# Patient Record
Sex: FEMALE | Race: WHITE | NOT HISPANIC OR LATINO | Employment: FULL TIME | URBAN - METROPOLITAN AREA
[De-identification: names, ages, dates, MRNs, and addresses within clinical notes are randomized per-mention and may not be internally consistent; named-entity substitution may affect disease eponyms.]

---

## 2017-06-06 ENCOUNTER — OFFICE VISIT (OUTPATIENT)
Dept: URGENT CARE | Facility: CLINIC | Age: 67
End: 2017-06-06
Payer: MEDICARE

## 2017-06-06 VITALS
WEIGHT: 155.2 LBS | DIASTOLIC BLOOD PRESSURE: 76 MMHG | OXYGEN SATURATION: 96 % | SYSTOLIC BLOOD PRESSURE: 120 MMHG | RESPIRATION RATE: 16 BRPM | BODY MASS INDEX: 24.94 KG/M2 | TEMPERATURE: 98.2 F | HEART RATE: 100 BPM | HEIGHT: 66 IN

## 2017-06-06 DIAGNOSIS — J06.9 VIRAL URI: ICD-10-CM

## 2017-06-06 PROCEDURE — 99214 OFFICE O/P EST MOD 30 MIN: CPT | Performed by: PHYSICIAN ASSISTANT

## 2017-06-06 PROCEDURE — 3017F COLORECTAL CA SCREEN DOC REV: CPT | Mod: 8P | Performed by: PHYSICIAN ASSISTANT

## 2017-06-06 PROCEDURE — 1036F TOBACCO NON-USER: CPT | Performed by: PHYSICIAN ASSISTANT

## 2017-06-06 PROCEDURE — 4040F PNEUMOC VAC/ADMIN/RCVD: CPT | Mod: 8P | Performed by: PHYSICIAN ASSISTANT

## 2017-06-06 PROCEDURE — G8432 DEP SCR NOT DOC, RNG: HCPCS | Performed by: PHYSICIAN ASSISTANT

## 2017-06-06 PROCEDURE — 1101F PT FALLS ASSESS-DOCD LE1/YR: CPT | Performed by: PHYSICIAN ASSISTANT

## 2017-06-06 PROCEDURE — 3014F SCREEN MAMMO DOC REV: CPT | Mod: 8P | Performed by: PHYSICIAN ASSISTANT

## 2017-06-06 PROCEDURE — G8419 CALC BMI OUT NRM PARAM NOF/U: HCPCS | Performed by: PHYSICIAN ASSISTANT

## 2017-06-06 RX ORDER — AZITHROMYCIN 250 MG/1
TABLET, FILM COATED ORAL
Qty: 6 TAB | Refills: 0 | Status: SHIPPED | OUTPATIENT
Start: 2017-06-06 | End: 2018-05-16

## 2017-06-06 RX ORDER — BENZONATATE 200 MG/1
200 CAPSULE ORAL 3 TIMES DAILY PRN
Qty: 30 CAP | Refills: 0 | Status: SHIPPED | OUTPATIENT
Start: 2017-06-06 | End: 2018-05-16

## 2017-06-06 ASSESSMENT — ENCOUNTER SYMPTOMS
HEADACHES: 0
SPUTUM PRODUCTION: 0
SHORTNESS OF BREATH: 0
VOMITING: 0
TINGLING: 0
CHILLS: 0
SWEATS: 0
MYALGIAS: 0
RHINORRHEA: 1
DIZZINESS: 0
SORE THROAT: 1
FOCAL WEAKNESS: 0
COUGH: 1
NAUSEA: 0
WHEEZING: 0
ABDOMINAL PAIN: 0
HEMOPTYSIS: 0
SENSORY CHANGE: 0
DIARRHEA: 0
PALPITATIONS: 0
FEVER: 0

## 2017-06-06 ASSESSMENT — PATIENT HEALTH QUESTIONNAIRE - PHQ9: CLINICAL INTERPRETATION OF PHQ2 SCORE: 0

## 2017-06-06 ASSESSMENT — COPD QUESTIONNAIRES: COPD: 0

## 2017-06-06 NOTE — PROGRESS NOTES
Subjective:      Michela Valdez is a 66 y.o. female who presents with Cough            Cough  This is a new problem. Episode onset: 3-4 days  The problem has been gradually worsening. The cough is non-productive. Associated symptoms include nasal congestion, postnasal drip, rhinorrhea and a sore throat. Pertinent negatives include no chest pain, chills, ear congestion, ear pain, fever, headaches, hemoptysis, myalgias, rash, shortness of breath, sweats or wheezing. She has tried prescription cough suppressant (Tessalon) for the symptoms. The treatment provided mild relief. There is no history of asthma, bronchitis, COPD or pneumonia.       Past Medical History   Diagnosis Date   • Snoring    • Hyperlipidemia    • Thyroid disease      Past Surgical History   Procedure Laterality Date   • Gyn surgery     • Thyroidectomy total  2/8/2012     Performed by ALEX CONTEH at SURGERY SAME DAY [facility]       Family History   Problem Relation Age of Onset   • Osteoporosis Mother    • Hyperlipidemia Father        Allergies   Allergen Reactions   • Aleve [Gnp Naproxen Sodium] Rash   • Penicillin G Vomiting       Medications, Allergies, and current problem list reviewed today in Epic      Review of Systems   Constitutional: Negative for fever, chills and malaise/fatigue.   HENT: Positive for congestion, postnasal drip, rhinorrhea and sore throat. Negative for ear discharge and ear pain.    Respiratory: Positive for cough. Negative for hemoptysis, sputum production, shortness of breath and wheezing.    Cardiovascular: Negative for chest pain, palpitations and leg swelling.   Gastrointestinal: Negative for nausea, vomiting, abdominal pain and diarrhea.   Musculoskeletal: Negative for myalgias.   Skin: Negative for rash.   Neurological: Negative for dizziness, tingling, sensory change, focal weakness and headaches.     All other systems reviewed and are negative.        Objective:     /76 mmHg  Pulse 100  Temp(Src)  "36.8 °C (98.2 °F)  Resp 16  Ht 1.676 m (5' 5.98\")  Wt 70.398 kg (155 lb 3.2 oz)  BMI 25.06 kg/m2  SpO2 96%  Breastfeeding? No     Physical Exam   Constitutional: She is oriented to person, place, and time. She appears well-developed and well-nourished. No distress.   HENT:   Head: Normocephalic and atraumatic.   Right Ear: Tympanic membrane, external ear and ear canal normal.   Left Ear: Tympanic membrane, external ear and ear canal normal.   Nose: Mucosal edema and rhinorrhea present.   Mouth/Throat: Uvula is midline. No oropharyngeal exudate or posterior oropharyngeal erythema.   Oropharynx with cobblestone appearance and post-nasal drip    Eyes: Conjunctivae are normal.   Neck: Neck supple.   Cardiovascular: Normal rate, regular rhythm and normal heart sounds.  Exam reveals no gallop and no friction rub.    No murmur heard.  Pulmonary/Chest: Effort normal and breath sounds normal. No respiratory distress. She has no wheezes. She has no rales.   Deep, spasmodic cough   Lymphadenopathy:     She has no cervical adenopathy.   Neurological: She is alert and oriented to person, place, and time. No cranial nerve deficit.   Skin: Skin is warm and dry. No rash noted.   Psychiatric: She has a normal mood and affect. Her behavior is normal. Judgment and thought content normal.               Assessment/Plan:     1. Viral URI  benzonatate (TESSALON) 200 MG capsule    azithromycin (ZITHROMAX) 250 MG Tab     Viral etiology discussed. Informed patient that an antibiotic will not help a viral illness.  Patient was given a contingent antibiotic prescription to fill and use as directed if symptoms progressed as discussed and agreed upon.    Current outpatient prescriptions:   •  benzonatate (TESSALON) 200 MG capsule, Take 1 Cap by mouth 3 times a day as needed for Cough., Disp: 30 Cap, Rfl: 0  •  azithromycin (ZITHROMAX) 250 MG Tab, Take as directed on package. 1 pack., Disp: 6 Tab, Rfl: 0    Encouraged fluids, rest, " humidification.     Differential diagnoses, Supportive care, and indications for immediate follow-up discussed with patient.   Instructed to return to clinic or nearest emergency department for any change in condition, further concerns, or worsening of symptoms.    The patient demonstrated a good understanding and agreed with the treatment plan.    Laura Toth PA-C

## 2017-06-06 NOTE — MR AVS SNAPSHOT
"        Michela Jacobs José Miguel   2017 3:45 PM   Office Visit   MRN: [id number]    Department:  [facility]   Dept Phone:  [phone]    Description:  Female : 1950   Provider:  Laura Toth PA-C           Reason for Visit     Cough 3x days      Allergies as of 2017     Allergen Noted Reactions    Aleve [Gnp Naproxen Sodium] 2012   Rash    Penicillin G 2012   Vomiting      You were diagnosed with     Viral URI   [[id number]]         Vital Signs     Blood Pressure Pulse Temperature Respirations Height Weight    120/76 mmHg 100 36.8 °C (98.2 °F) 16 1.676 m (5' 5.98\") 70.398 kg (155 lb 3.2 oz)    Body Mass Index Oxygen Saturation Breastfeeding? Smoking Status          25.06 kg/m2 96% No Former Smoker        Basic Information     Date Of Birth Sex Race Ethnicity Preferred Language    1950 Female White Non- English      Problem List              ICD-10-CM Priority Class Noted - Resolved    Hypothyroid E03.9   10/13/2015 - Present    Dyslipidemia E78.5   10/13/2015 - Present      Health Maintenance        Date Due Completion Dates    PAP SMEAR 7/3/1969 ---    IMM ZOSTER VACCINE 7/3/2008 ---    IMM PNEUMOCOCCAL 65+ (ADULT) LOW/MEDIUM RISK SERIES (1 of 2 - PCV13) 7/3/2013 ---    BONE DENSITY 2013    MAMMOGRAM 10/13/2016 10/13/2015 (Postponed)    Override on 10/13/2015: Postponed (was done this year, will bring in results to pcp)    COLONOSCOPY 10/13/2025 10/13/2015 (Postponed)    Override on 10/13/2015: Postponed (was done this year, pt bring in  results to pcp)    IMM DTaP/Tdap/Td Vaccine (2 - Td) 10/24/2026 10/24/2016            Current Immunizations     Influenza TIV (IM) 2012  1:32 PM    Tdap Vaccine 10/24/2016      Below and/or attached are the medications your provider expects you to take. Review all of your home medications and newly ordered medications with your provider and/or pharmacist. Follow medication instructions as directed by your provider and/or " pharmacist. Please keep your medication list with you and share with your provider. Update the information when medications are discontinued, doses are changed, or new medications (including over-the-counter products) are added; and carry medication information at all times in the event of emergency situations     Allergies:  ALEVE - Rash     PENICILLIN G - Vomiting               Medications  Valid as of: June 06, 2017 -  4:13 PM    Generic Name Brand Name Tablet Size Instructions for use    Aspirin (Tablet Delayed Response) ECOTRIN 81 MG Take 81 mg by mouth every day. HOLD OFF TAKING THIS UNTIL 2/12/12.        Atorvastatin Calcium (Tab) LIPITOR 20 MG Take 40 mg by mouth every evening.        Azithromycin (Tab) ZITHROMAX 250 MG Take as directed on package. 1 pack.        Benzonatate (Cap) TESSALON 200 MG Take 1 Cap by mouth 3 times a day as needed for Cough.        Calcium Acetate (Phos Binder) (Cap) PHOS- MG Take 1,334 mg by mouth 3 times a day, with meals.        Fish Oil   by Does not apply route.        Glucosamine-Chondroitin   Take  by mouth.        Levothyroxine Sodium (Tab) SYNTHROID 88 MCG Take 88 mcg by mouth Every morning on an empty stomach. Indications: on Sundays only additional 1/2 tab        Multiple Vitamins-Minerals   Take  by mouth.        Nutritional Supplements   Take  by mouth.        Zinc Sulfate (Cap) ZINCATE 220 MG Take 50 mg by mouth every day.        .                 Medicines prescribed today were sent to:     [facility]Y #[id number] - [place] - [address] [facility]    [address] [place] [address]    Phone: [phone] Fax: [phone]    Open 24 Hours?: No      Medication refill instructions:       If your prescription bottle indicates you have medication refills left, it is not necessary to call your provider’s office. Please contact your pharmacy and they will refill your medication.    If your prescription bottle indicates you do not have any refills left, you may request  refills at any time through one of the following ways: The online Qzzr system (except Urgent Care), by calling your provider’s office, or by asking your pharmacy to contact your provider’s office with a refill request. Medication refills are processed only during regular business hours and may not be available until the next business day. Your provider may request additional information or to have a follow-up visit with you prior to refilling your medication.   *Please Note: Medication refills are assigned a new Rx number when refilled electronically. Your pharmacy may indicate that no refills were authorized even though a new prescription for the same medication is available at the pharmacy. Please request the medicine by name with the pharmacy before contacting your provider for a refill.           Qzzr Access Code: [email or web address]  Expires: 7/2/2017  2:33 PM    Qzzr  A secure, online tool to manage your health information     [employer]’s Qzzr® is a secure, online tool that connects you to your personalized health information from the privacy of your home -- day or night - making it very easy for you to manage your healthcare. Once the activation process is completed, you can even access your medical information using the Qzzr estrada, which is available for free in the Apple Estrada store or Google Play store.     Qzzr provides the following levels of access (as shown below):   My Chart Features   [facility] Doctor [facility]  Urgent  Care Non-[facility]  Primary Care  Doctor   Email your healthcare team securely and privately 24/7 X X X    Manage appointments: schedule your next appointment; view details of past/upcoming appointments X      Request prescription refills. X      View recent personal medical records, including lab and immunizations X X X X   View health record, including health history, allergies, medications X X X X   Read reports about your outpatient visits,  procedures, consult and ER notes X X X X   See your discharge summary, which is a recap of your hospital and/or ER visit that includes your diagnosis, lab results, and care plan. X X       How to register for Thwapr:  1. Go to  [email or web address].  2. Click on the Sign Up Now box, which takes you to the New Member Sign Up page. You will need to provide the following information:  a. Enter your Thwapr Access Code exactly as it appears at the top of this page. (You will not need to use this code after you’ve completed the sign-up process. If you do not sign up before the expiration date, you must request a new code.)   b. Enter your date of birth.   c. Enter your home email address.   d. Click Submit, and follow the next screen’s instructions.  3. Create a Biosceptret ID. This will be your Biosceptret login ID and cannot be changed, so think of one that is secure and easy to remember.  4. Create a Biosceptret password. You can change your password at any time.  5. Enter your Password Reset Question and Answer. This can be used at a later time if you forget your password.   6. Enter your e-mail address. This allows you to receive e-mail notifications when new information is available in Thwapr.  7. Click Sign Up. You can now view your health information.    For assistance activating your Thwapr account, call [phone]

## 2018-02-05 ENCOUNTER — OFFICE VISIT (OUTPATIENT)
Dept: URGENT CARE | Facility: CLINIC | Age: 68
End: 2018-02-05
Payer: MEDICARE

## 2018-02-05 VITALS
OXYGEN SATURATION: 95 % | SYSTOLIC BLOOD PRESSURE: 124 MMHG | DIASTOLIC BLOOD PRESSURE: 66 MMHG | WEIGHT: 156 LBS | TEMPERATURE: 98.6 F | BODY MASS INDEX: 25.99 KG/M2 | HEART RATE: 97 BPM | RESPIRATION RATE: 14 BRPM | HEIGHT: 65 IN

## 2018-02-05 DIAGNOSIS — J06.9 PROTRACTED URI: ICD-10-CM

## 2018-02-05 PROCEDURE — 99214 OFFICE O/P EST MOD 30 MIN: CPT | Performed by: PHYSICIAN ASSISTANT

## 2018-02-05 RX ORDER — METHYLPREDNISOLONE 4 MG/1
TABLET ORAL
Qty: 21 TAB | Refills: 0 | Status: SHIPPED | OUTPATIENT
Start: 2018-02-05 | End: 2018-05-16

## 2018-02-05 RX ORDER — BENZONATATE 200 MG/1
200 CAPSULE ORAL 3 TIMES DAILY PRN
Qty: 30 CAP | Refills: 0 | Status: SHIPPED | OUTPATIENT
Start: 2018-02-05 | End: 2018-05-16

## 2018-02-05 RX ORDER — AZITHROMYCIN 250 MG/1
TABLET, FILM COATED ORAL
Qty: 6 TAB | Refills: 0 | Status: SHIPPED | OUTPATIENT
Start: 2018-02-05 | End: 2018-05-16

## 2018-02-05 ASSESSMENT — ENCOUNTER SYMPTOMS
ABDOMINAL PAIN: 0
TINGLING: 0
MYALGIAS: 0
HEADACHES: 0
SHORTNESS OF BREATH: 0
COUGH: 1
NAUSEA: 0
FOCAL WEAKNESS: 0
SPUTUM PRODUCTION: 0
WHEEZING: 0
DIARRHEA: 0
SINUS PAIN: 0
PALPITATIONS: 0
VOMITING: 0
SORE THROAT: 0
FEVER: 0
CHILLS: 0
SENSORY CHANGE: 0

## 2018-02-05 NOTE — PROGRESS NOTES
"Subjective:      Michela Valdez is a 67 y.o. female who presents with Cough (chest congestion)            Cough   This is a new problem. Episode onset: 11 days. The problem has been gradually worsening. The cough is non-productive. Pertinent negatives include no chest pain, chills, ear pain, fever, headaches, myalgias, nasal congestion, postnasal drip, sore throat, shortness of breath or wheezing. Treatments tried: gianna selzer  The treatment provided no relief. There is no history of asthma, bronchitis or pneumonia.       Past Medical History:   Diagnosis Date   • Hyperlipidemia    • Snoring    • Thyroid disease        Past Surgical History:   Procedure Laterality Date   • THYROIDECTOMY TOTAL  2/8/2012    Performed by ALEX CONTEH at SURGERY SAME DAY [facility]S   • GYN SURGERY         Family History   Problem Relation Age of Onset   • Osteoporosis Mother    • Hyperlipidemia Father        Allergies   Allergen Reactions   • Aleve [Gnp Naproxen Sodium] Rash   • Penicillin G Vomiting       Medications, Allergies, and current problem list reviewed today in Epic      Review of Systems   Constitutional: Positive for malaise/fatigue. Negative for chills and fever.   HENT: Negative for congestion, ear discharge, ear pain, postnasal drip, sinus pain and sore throat.    Respiratory: Positive for cough. Negative for sputum production, shortness of breath and wheezing.    Cardiovascular: Negative for chest pain, palpitations and leg swelling.   Gastrointestinal: Negative for abdominal pain, diarrhea, nausea and vomiting.   Musculoskeletal: Negative for myalgias.   Neurological: Negative for tingling, sensory change, focal weakness and headaches.     All other systems reviewed and are negative.        Objective:     /66   Pulse 97   Temp 37 °C (98.6 °F)   Resp 14   Ht 1.651 m (5' 5\")   Wt 70.8 kg (156 lb)   SpO2 95%   BMI 25.96 kg/m²      Physical Exam   Constitutional: She is oriented to person, place, and " time. She appears well-developed and well-nourished. No distress.   HENT:   Head: Normocephalic and atraumatic.   Right Ear: Tympanic membrane, external ear and ear canal normal.   Left Ear: Tympanic membrane, external ear and ear canal normal.   Nose: Nose normal.   Mouth/Throat: Uvula is midline and oropharynx is clear and moist.   Neck: Neck supple.   Cardiovascular: Normal rate, regular rhythm and normal heart sounds.  Exam reveals no gallop and no friction rub.    No murmur heard.  Pulmonary/Chest: Effort normal and breath sounds normal. No respiratory distress. She has no wheezes. She has no rales.   Lymphadenopathy:     She has no cervical adenopathy.   Neurological: She is alert and oriented to person, place, and time. No cranial nerve deficit.   Psychiatric: She has a normal mood and affect. Her behavior is normal. Judgment and thought content normal.               Assessment/Plan:     1. Protracted URI  MethylPREDNISolone (MEDROL DOSEPAK) 4 MG Tablet Therapy Pack    benzonatate (TESSALON) 200 MG capsule    azithromycin (ZITHROMAX) 250 MG Tab         Current Outpatient Prescriptions:   •  MethylPREDNISolone (MEDROL DOSEPAK) 4 MG Tablet Therapy Pack, Take as directed on package. 1 pack., Disp: 21 Tab, Rfl: 0  •  benzonatate (TESSALON) 200 MG capsule, Take 1 Cap by mouth 3 times a day as needed., Disp: 30 Cap, Rfl: 0  •  azithromycin (ZITHROMAX) 250 MG Tab, Take as directed on package. 1 pack, Disp: 6 Tab, Rfl: 0  Patient was given a contingent antibiotic prescription to fill and use as directed if symptoms progressed as discussed and agreed upon.     Differential diagnoses, Supportive care, and indications for immediate follow-up discussed with patient.   Instructed to return to clinic or nearest emergency department for any change in condition, further concerns, or worsening of symptoms.    The patient demonstrated a good understanding and agreed with the treatment plan.    Laura Toth P.A.-C.

## 2018-02-14 ENCOUNTER — HOSPITAL ENCOUNTER (OUTPATIENT)
Facility: MEDICAL CENTER | Age: 68
End: 2018-02-14
Attending: NURSE PRACTITIONER
Payer: MEDICARE

## 2018-02-14 ENCOUNTER — OFFICE VISIT (OUTPATIENT)
Dept: URGENT CARE | Facility: CLINIC | Age: 68
End: 2018-02-14
Payer: MEDICARE

## 2018-02-14 VITALS
HEIGHT: 65 IN | RESPIRATION RATE: 20 BRPM | OXYGEN SATURATION: 98 % | SYSTOLIC BLOOD PRESSURE: 120 MMHG | BODY MASS INDEX: 25.66 KG/M2 | TEMPERATURE: 98 F | WEIGHT: 154 LBS | HEART RATE: 88 BPM | DIASTOLIC BLOOD PRESSURE: 72 MMHG

## 2018-02-14 DIAGNOSIS — J06.9 VIRAL URI WITH COUGH: ICD-10-CM

## 2018-02-14 DIAGNOSIS — J40 BRONCHITIS: ICD-10-CM

## 2018-02-14 DIAGNOSIS — N30.00 ACUTE CYSTITIS WITHOUT HEMATURIA: ICD-10-CM

## 2018-02-14 DIAGNOSIS — N30.01 ACUTE CYSTITIS WITH HEMATURIA: ICD-10-CM

## 2018-02-14 DIAGNOSIS — R30.0 DYSURIA: ICD-10-CM

## 2018-02-14 LAB
APPEARANCE UR: NORMAL
BILIRUB UR STRIP-MCNC: NORMAL MG/DL
COLOR UR AUTO: YELLOW
GLUCOSE UR STRIP.AUTO-MCNC: NORMAL MG/DL
KETONES UR STRIP.AUTO-MCNC: NORMAL MG/DL
LEUKOCYTE ESTERASE UR QL STRIP.AUTO: NORMAL
NITRITE UR QL STRIP.AUTO: NORMAL
PH UR STRIP.AUTO: 7 [PH] (ref 5–8)
PROT UR QL STRIP: NORMAL MG/DL
RBC UR QL AUTO: NORMAL
SP GR UR STRIP.AUTO: 1.01
UROBILINOGEN UR STRIP-MCNC: NORMAL MG/DL

## 2018-02-14 PROCEDURE — 81002 URINALYSIS NONAUTO W/O SCOPE: CPT | Performed by: NURSE PRACTITIONER

## 2018-02-14 PROCEDURE — 87086 URINE CULTURE/COLONY COUNT: CPT

## 2018-02-14 PROCEDURE — 99214 OFFICE O/P EST MOD 30 MIN: CPT | Performed by: NURSE PRACTITIONER

## 2018-02-14 PROCEDURE — 87186 SC STD MICRODIL/AGAR DIL: CPT

## 2018-02-14 PROCEDURE — 87077 CULTURE AEROBIC IDENTIFY: CPT

## 2018-02-14 PROCEDURE — 99000 SPECIMEN HANDLING OFFICE-LAB: CPT | Performed by: NURSE PRACTITIONER

## 2018-02-14 RX ORDER — PREDNISONE 10 MG/1
TABLET ORAL
Qty: 15 TAB | Refills: 0 | Status: SHIPPED | OUTPATIENT
Start: 2018-02-14 | End: 2018-05-16

## 2018-02-14 RX ORDER — ALBUTEROL SULFATE 90 UG/1
2 AEROSOL, METERED RESPIRATORY (INHALATION) EVERY 6 HOURS PRN
Qty: 8.5 G | Refills: 0 | Status: SHIPPED | OUTPATIENT
Start: 2018-02-14 | End: 2018-05-16

## 2018-02-14 RX ORDER — CEFDINIR 300 MG/1
300 CAPSULE ORAL EVERY 12 HOURS
Qty: 10 CAP | Refills: 0 | Status: SHIPPED | OUTPATIENT
Start: 2018-02-14 | End: 2018-02-19

## 2018-02-14 ASSESSMENT — ENCOUNTER SYMPTOMS
HEMOPTYSIS: 0
WHEEZING: 0
MYALGIAS: 0
RHINORRHEA: 1
CARDIOVASCULAR NEGATIVE: 1
FALLS: 0
BACK PAIN: 0
COUGH: 1
CHILLS: 0
NECK PAIN: 0
SHORTNESS OF BREATH: 0
SPUTUM PRODUCTION: 0

## 2018-02-14 NOTE — PROGRESS NOTES
Subjective:      Michela Valdez is a 67 y.o. female who presents with Cough (Stll coughing and stuffy ) and Bleeding/Bruising (Couple days ago blood in the urine .)    Past Medical History:   Diagnosis Date   • Hyperlipidemia    • Snoring    • Thyroid disease      Social History     Social History   • Marital status:      Spouse name: N/A   • Number of children: N/A   • Years of education: N/A     Occupational History   • Not on file.     Social History Main Topics   • Smoking status: Former Smoker     Packs/day: 0.50     Years: 4.00     Types: Cigarettes     Quit date: 10/13/1980   • Smokeless tobacco: Never Used   • Alcohol use 3.0 - 4.2 oz/week     5 - 7 Glasses of wine per week   • Drug use: No   • Sexual activity: Not Currently     Other Topics Concern   • Not on file     Social History Narrative   • No narrative on file     Family History   Problem Relation Age of Onset   • Osteoporosis Mother    • Hyperlipidemia Father        Allergies: Aleve [gnp naproxen sodium] and Penicillin g    Past Medical History:   Diagnosis Date   • Hyperlipidemia    • Snoring    • Thyroid disease      Social History     Social History   • Marital status:      Spouse name: N/A   • Number of children: N/A   • Years of education: N/A     Occupational History   • Not on file.     Social History Main Topics   • Smoking status: Former Smoker     Packs/day: 0.50     Years: 4.00     Types: Cigarettes     Quit date: 10/13/1980   • Smokeless tobacco: Never Used   • Alcohol use 3.0 - 4.2 oz/week     5 - 7 Glasses of wine per week   • Drug use: No   • Sexual activity: Not Currently     Other Topics Concern   • Not on file     Social History Narrative   • No narrative on file     Family History   Problem Relation Age of Onset   • Osteoporosis Mother    • Hyperlipidemia Father                    Cough   This is a new problem. The current episode started 1 to 4 weeks ago. The problem has been waxing and waning. The problem occurs  "every few minutes. The cough is non-productive. Associated symptoms include ear congestion, nasal congestion, postnasal drip and rhinorrhea. Pertinent negatives include no chills, hemoptysis, myalgias, shortness of breath or wheezing. Associated symptoms comments: Hematuria  . Nothing aggravates the symptoms. She has tried nothing for the symptoms. The treatment provided no relief.       Review of Systems   Constitutional: Positive for malaise/fatigue. Negative for chills.   HENT: Positive for congestion, postnasal drip and rhinorrhea.    Respiratory: Positive for cough. Negative for hemoptysis, sputum production, shortness of breath and wheezing.    Cardiovascular: Negative.    Genitourinary: Positive for hematuria and urgency.   Musculoskeletal: Negative for back pain, falls, myalgias and neck pain.   Skin: Negative.    All other systems reviewed and are negative.         Objective:     /72   Pulse 88   Temp 36.7 °C (98 °F)   Resp 20   Ht 1.651 m (5' 5\")   Wt 69.9 kg (154 lb)   SpO2 98%   BMI 25.63 kg/m²      Physical Exam   Constitutional: She is oriented to person, place, and time. She appears well-developed and well-nourished.   HENT:   Head: Normocephalic.   Right Ear: External ear normal.   Left Ear: External ear normal.   Mouth/Throat: Oropharynx is clear and moist. No oropharyngeal exudate.   Clear nasal drainage    Eyes: Conjunctivae and EOM are normal. Pupils are equal, round, and reactive to light. Right eye exhibits no discharge. Left eye exhibits no discharge.   Neck: Normal range of motion. Neck supple.   Cardiovascular: Normal rate and regular rhythm.    Pulmonary/Chest: Effort normal.   Bronchospastic cough   Musculoskeletal: Normal range of motion.   Neurological: She is alert and oriented to person, place, and time.   Skin: Skin is dry. Capillary refill takes less than 2 seconds.   Psychiatric: She has a normal mood and affect. Her behavior is normal. Judgment and thought content " normal.   Vitals reviewed.              Assessment/Plan:     1. Dysuria    - POCT Urinalysis    2. Acute cystitis with hematuria    - POCT Urinalysis  -omnicef  -urine culture    3. Viral URI with cough    - predniSONE (DELTASONE) 10 MG Tab; Take 1 tablet by mouth 3 times daily for 5 days.  Dispense: 15 Tab; Refill: 0  - albuterol 108 (90 Base) MCG/ACT Aero Soln inhalation aerosol; Inhale 2 Puffs by mouth every 6 hours as needed.  Dispense: 8.5 g; Refill: 0        4. Bronchitis    - predniSONE (DELTASONE) 10 MG Tab; Take 1 tablet by mouth 3 times daily for 5 days.  Dispense: 15 Tab; Refill: 0  - albuterol 108 (90 Base) MCG/ACT Aero Soln inhalation aerosol; Inhale 2 Puffs by mouth every 6 hours as needed.  Dispense: 8.5 g; Refill: 0  -ER precautions for respiratory distress     5. Acute cystitis without hematuria    - POCT Urinalysis  - Urine Culture; Future  - cefdinir (OMNICEF) 300 MG Cap; Take 1 Cap by mouth every 12 hours for 5 days.  Dispense: 10 Cap; Refill: 0

## 2018-02-14 NOTE — PATIENT INSTRUCTIONS
Urinary Tract Infection  A urinary tract infection (UTI) can occur any place along the urinary tract. The tract includes the kidneys, ureters, bladder, and urethra. A type of germ called bacteria often causes a UTI. UTIs are often helped with antibiotic medicine.   HOME CARE   · If given, take antibiotics as told by your doctor. Finish them even if you start to feel better.  · Drink enough fluids to keep your pee (urine) clear or pale yellow.  · Avoid tea, drinks with caffeine, and bubbly (carbonated) drinks.  · Pee often. Avoid holding your pee in for a long time.  · Pee before and after having sex (intercourse).  · Wipe from front to back after you poop (bowel movement) if you are a woman. Use each tissue only once.  GET HELP RIGHT AWAY IF:   · You have back pain.  · You have lower belly (abdominal) pain.  · You have chills.  · You feel sick to your stomach (nauseous).  · You throw up (vomit).  · Your burning or discomfort with peeing does not go away.  · You have a fever.  · Your symptoms are not better in 3 days.  MAKE SURE YOU:   · Understand these instructions.  · Will watch your condition.  · Will get help right away if you are not doing well or get worse.     This information is not intended to replace advice given to you by your health care provider. Make sure you discuss any questions you have with your health care provider.     Document Released: 06/05/2009 Document Revised: 01/08/2016 Document Reviewed: 07/18/2013  [employer] Patient Education ©2016 [employer]

## 2018-02-17 LAB
BACTERIA UR CULT: ABNORMAL
BACTERIA UR CULT: ABNORMAL
SIGNIFICANT IND 70042: ABNORMAL
SITE SITE: ABNORMAL
SOURCE SOURCE: ABNORMAL

## 2018-05-16 ENCOUNTER — OFFICE VISIT (OUTPATIENT)
Dept: MEDICAL GROUP | Facility: LAB | Age: 68
End: 2018-05-16
Payer: MEDICARE

## 2018-05-16 VITALS
HEIGHT: 65 IN | BODY MASS INDEX: 25.83 KG/M2 | TEMPERATURE: 97.5 F | RESPIRATION RATE: 12 BRPM | OXYGEN SATURATION: 97 % | HEART RATE: 88 BPM | DIASTOLIC BLOOD PRESSURE: 72 MMHG | WEIGHT: 155 LBS | SYSTOLIC BLOOD PRESSURE: 122 MMHG

## 2018-05-16 DIAGNOSIS — Z87.39 HISTORY OF OSTEOPENIA: ICD-10-CM

## 2018-05-16 DIAGNOSIS — R73.03 PREDIABETES: ICD-10-CM

## 2018-05-16 DIAGNOSIS — R53.82 CHRONIC FATIGUE: ICD-10-CM

## 2018-05-16 DIAGNOSIS — E55.9 VITAMIN D DEFICIENCY: ICD-10-CM

## 2018-05-16 DIAGNOSIS — E78.5 DYSLIPIDEMIA: ICD-10-CM

## 2018-05-16 DIAGNOSIS — Z82.49 FAMILY HISTORY OF EARLY CAD: ICD-10-CM

## 2018-05-16 DIAGNOSIS — Z78.0 POSTMENOPAUSAL ESTROGEN DEFICIENCY: ICD-10-CM

## 2018-05-16 DIAGNOSIS — R06.83 SNORING: ICD-10-CM

## 2018-05-16 DIAGNOSIS — Z12.31 SCREENING MAMMOGRAM, ENCOUNTER FOR: ICD-10-CM

## 2018-05-16 DIAGNOSIS — E89.0 POSTOPERATIVE HYPOTHYROIDISM: ICD-10-CM

## 2018-05-16 DIAGNOSIS — R79.89 ELEVATED LFTS: ICD-10-CM

## 2018-05-16 PROCEDURE — 99215 OFFICE O/P EST HI 40 MIN: CPT | Performed by: FAMILY MEDICINE

## 2018-05-16 ASSESSMENT — PATIENT HEALTH QUESTIONNAIRE - PHQ9: CLINICAL INTERPRETATION OF PHQ2 SCORE: 0

## 2018-05-16 NOTE — PROGRESS NOTES
Michela Valdez is a 67 y.o. female here for   Chief Complaint   Patient presents with   • Establish Care   -thyroid, fatigue    HPI:  Michela Jacobs is a very pleasant 67 y.o. female. She is here today to establish care. She is a realtor. Previous PCP Dr. Michael Bloch    1. Prediabetes  This is a new problem to discuss. Patient has a hemoglobin A1c of 6.0% on her last labs in February 2018. She does not eat a healthy diet because of her work life. She does not eat dinner until late at night. She has a high carbohydrate diet. She denies any polyuria, polydipsia, neuropathy    2. Dyslipidemia  This is a new problem to discuss with me, chronic for the patient.  Doing well.  Taking atorvastatin 40mg qhs as prescribed. No myalgias, GI upset, or other side effects from medication. Denies CP or stroke symptoms. Also taking a daily ASA. Last lipid panel 2/2018 and was reviewed with the patient.     3. Elevated LFTs  This is a new problem to discuss with me, chronic for the patient. Patient brings in her labs today which do show slightly elevated ALT in the high 30s. She does have one glass of wine every night. She is on a statin. She is slightly overweight. She does have elevated sugar levels.    4. Postoperative hypothyroidism  Labwork 2/2018 /reviewed up to date. Taking medicine as directed on empty stomach with water and waits at last 30 minutes to consume other food or beverage. Denies palpitations, skin changes, temperature intolerance, changes in bowel habits. She is having increasing fatigue    5. Screening mammogram, encounter for  Patient is due for mammogram    6. History of osteopenia   7. Postmenopausal estrogen deficiency  Patient has had osteopenia in the past. She started to exercise more and is currently on vitamin D and calcium. She states her last bone density scan was normal and this was in 2016    8. Chronic fatigue  This is a new problem to discuss. Patient believes that this is likely secondary to her  lifestyle. 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 She states that she wakes up about 6:30 every morning and goes to bed at about 11:30 every night. She is not waking up multiple times per night. She has an easy time falling asleep. She can nap within seconds of sitting down anywhere. She does snore at night. She denies any apnea. She is not sleeping in the same bed as anybody currently. She has a family history of obstructive sleep apnea. She denies any depression.    Current medicines (including changes today)  Current Outpatient Prescriptions   Medication Sig Dispense Refill   • Cholecalciferol (VITAMIN D3) 1000 units Cap Take 2,000 Units by mouth.     • atorvastatin (LIPITOR) 20 MG Tab Take 40 mg by mouth every evening.     • levothyroxine (SYNTHROID) 88 MCG Tab Take 88 mcg by mouth Every morning on an empty stomach. Indications: on Sundays only additional 1/2 tab     • Glucosamine-Chondroitin (MOVE FREE PO) Take  by mouth.     • Multiple Vitamins-Minerals (CENTRUM SILVER PO) Take  by mouth.     • FISH OIL by Does not apply route.     • aspirin EC (ECOTRIN) 81 MG TBEC Take 81 mg by mouth every day. HOLD OFF TAKING THIS UNTIL 2/12/12.       No current facility-administered medications for this visit.      She  has a past medical history of Dyslipidemia (10/13/2015); Elevated LFTs (5/16/2018); History of osteopenia (5/16/2018); Hyperlipidemia; Postoperative hypothyroidism (10/13/2015); Prediabetes (5/16/2018); Snoring; and Thyroid disease.  She  has a past surgical history that includes gyn surgery; thyroidectomy total (2/8/2012); and abdominal exploration.  Social History   Substance Use Topics   • Smoking status: Former Smoker     Packs/day: 0.50     Years: 4.00     Types: Cigarettes     Quit date: 10/13/1980   • Smokeless tobacco: Never Used   • Alcohol use 3.0 - 4.2 oz/week     5 - 7 Glasses of wine per week      Comment: glass of wine daily      Social History     Social History Narrative   • No narrative on file  "    Family History   Problem Relation Age of Onset   • Osteoporosis Mother    • Hyperlipidemia Father    • Heart Disease Father      CAD, MI    • Anemia Sister    • Cancer Brother      prostate   • Heart Disease Brother      CABG     Family Status   Relation Status   • Mother  at age 89    osteo   • Father  at age 53   • Sister    • Brother Alive         ROS  Positive for fatigue, snoring, gas and bloating, occasional constipation  All other systems reviewed and are negative     Objective:     Blood pressure 122/72, pulse 88, temperature 36.4 °C (97.5 °F), resp. rate 12, height 1.651 m (5' 5\"), weight 70.3 kg (155 lb), SpO2 97 %. Body mass index is 25.79 kg/m².  Physical Exam:    Constitutional: Alert, no distress.  Skin: Warm, dry, good turgor, no rashes in visible areas.  Eye: Equal, round and reactive, conjunctiva clear, lids normal.  ENMT: Lips without lesions, good dentition, oropharynx clear. TM's pearly gray with normal light reflexes bilaterally  Neck: Trachea midline, no masses, no thyromegaly. No cervical or supraclavicular lymphadenopathy.  Respiratory: Unlabored respiratory effort, lungs clear to auscultation bilaterally, no wheezes, no ronchi.  Cardiovascular: Normal S1, S2, RRR, no murmur, no edema.  Abdomen: Soft, non-tender, no masses, no hepatosplenomegaly.  Psych: Alert and oriented x3, normal affect and mood.      Assessment and Plan:   The following treatment plan was discussed    1. Prediabetes  This is a new problem, stable, hemoglobin A1c is 6.0% 3 months ago. We'll continue to monitor this at least every 6 months. Dietary recommendations discussed. Patient interested in nutritional counseling, I placed a referral  - REFERRAL TO NUTRITION SERVICES    2. Dyslipidemia  This is a new problem to discuss with me, stable, currently on atorvastatin 40 mg nightly. Discussed importance of lifestyle modifications. She is also on a baby aspirin. She does have slightly elevated LFTs and so " we will need to monitor these closely  - REFERRAL TO NUTRITION SERVICES  - US-CAROTID DOPPLER; Future    3. Elevated LFTs  This is a new problem, stable. Since been present for the last 2 years on her labs that she brings in today. Discussed differential diagnoses including impaired fasting glucose, statin use, overweight, alcohol use. We will monitor these closely as this is very mild elevation  - REFERRAL TO NUTRITION SERVICES    4. Postoperative hypothyroidism   chronic, last labs in February 2018 which are stable. Continue levothyroxine 88 µg daily  - REFERRAL TO NUTRITION SERVICES    5. Screening mammogram, encounter for  - MA-SCREEN MAMMO W/CAD-BILAT; Future    6. History of osteopenia  Chronic, stable on vitamin D 2000 units, calcium supplementation, increase exercise  - DS-BONE DENSITY STUDY (DEXA); Future    7. Postmenopausal estrogen deficiency  - DS-BONE DENSITY STUDY (DEXA); Future    8. Chronic fatigue  This is a new problem, discussed differential diagnosis with the patient including obstructive sleep apnea, not enough time in bed, lifestyle, lack of exercise. She is going to try to give herself at least 8 hours of good sleep, increase her exercise, and I will put in a referral for sleep studies given her snoring and concern for obstructive sleep apnea  - REFERRAL TO SLEEP STUDIES  - CBC WITH DIFFERENTIAL; Future  - VITAMIN B12; Future    9. Snoring  - REFERRAL TO SLEEP STUDIES    10. Vitamin D deficiency  - VITAMIN D,25 HYDROXY; Future    11. BMI 25.0-25.9,adult  - REFERRAL TO NUTRITION SERVICES    12. Family history of early CAD  - US-CAROTID DOPPLER; Future      Records requested.  Followup: Return in about 6 months (around 11/16/2018) for Medicare Annual.         This note was created using voice recognition software. I have made every reasonable attempt to correct errors, however, I do anticipate some grammatical errors.     Total 40 minutes face-to-face time spent with patient not including any  procedure, with greater than 50% of the total time discussing patient's issues and symptoms as listed above in assessment and plan, as well as managing coordination of care for future evaluation and treatment.

## 2018-10-05 DIAGNOSIS — Z12.31 SCREENING MAMMOGRAM, ENCOUNTER FOR: ICD-10-CM

## 2018-10-08 ENCOUNTER — TELEPHONE (OUTPATIENT)
Dept: MEDICAL GROUP | Facility: LAB | Age: 68
End: 2018-10-08

## 2018-10-08 NOTE — TELEPHONE ENCOUNTER
1. Caller Name: Michela          Call Back Number: [phone]      Patient approves a detailed voicemail message: N\A    Patient returning phone call     Results of patient's mammogram per Dr. Masterson's note were discussed with the patient.

## 2018-10-08 NOTE — TELEPHONE ENCOUNTER
----- Message from Esther Masterson M.D. sent at 10/7/2018  4:17 PM PDT -----  Please let Michela know that her mammogram is normal and that I recommend yearly mammograms.     Thank you!    Esther Masterson M.D.

## 2019-01-16 ENCOUNTER — OFFICE VISIT (OUTPATIENT)
Dept: MEDICAL GROUP | Facility: LAB | Age: 69
End: 2019-01-16
Payer: MEDICARE

## 2019-01-16 VITALS
TEMPERATURE: 98.5 F | BODY MASS INDEX: 25.33 KG/M2 | HEART RATE: 84 BPM | DIASTOLIC BLOOD PRESSURE: 62 MMHG | OXYGEN SATURATION: 94 % | WEIGHT: 152 LBS | HEIGHT: 65 IN | SYSTOLIC BLOOD PRESSURE: 118 MMHG | RESPIRATION RATE: 12 BRPM

## 2019-01-16 DIAGNOSIS — R73.03 PREDIABETES: ICD-10-CM

## 2019-01-16 DIAGNOSIS — E89.0 POSTOPERATIVE HYPOTHYROIDISM: ICD-10-CM

## 2019-01-16 DIAGNOSIS — Z78.0 POSTMENOPAUSAL ESTROGEN DEFICIENCY: ICD-10-CM

## 2019-01-16 DIAGNOSIS — E78.5 DYSLIPIDEMIA: ICD-10-CM

## 2019-01-16 DIAGNOSIS — R93.89 ABNORMAL DOPPLER ULTRASOUND OF CAROTID ARTERY: ICD-10-CM

## 2019-01-16 DIAGNOSIS — R79.89 ELEVATED LFTS: ICD-10-CM

## 2019-01-16 DIAGNOSIS — I77.89 OTHER SPECIFIED DISORDERS OF ARTERIES AND ARTERIOLES (HCC): ICD-10-CM

## 2019-01-16 DIAGNOSIS — Z00.00 MEDICARE ANNUAL WELLNESS VISIT, INITIAL: ICD-10-CM

## 2019-01-16 DIAGNOSIS — Z00.00 MEDICARE ANNUAL WELLNESS VISIT, SUBSEQUENT: Primary | ICD-10-CM

## 2019-01-16 DIAGNOSIS — Z87.39 HISTORY OF OSTEOPENIA: ICD-10-CM

## 2019-01-16 DIAGNOSIS — R15.1 FECAL SMEARING: ICD-10-CM

## 2019-01-16 PROBLEM — R53.82 CHRONIC FATIGUE: Status: RESOLVED | Noted: 2018-05-16 | Resolved: 2019-01-16

## 2019-01-16 PROCEDURE — G0439 PPPS, SUBSEQ VISIT: HCPCS | Performed by: FAMILY MEDICINE

## 2019-01-16 ASSESSMENT — ENCOUNTER SYMPTOMS: GENERAL WELL-BEING: GOOD

## 2019-01-16 ASSESSMENT — PATIENT HEALTH QUESTIONNAIRE - PHQ9: CLINICAL INTERPRETATION OF PHQ2 SCORE: 0

## 2019-01-16 ASSESSMENT — ACTIVITIES OF DAILY LIVING (ADL): BATHING_REQUIRES_ASSISTANCE: 0

## 2019-01-22 NOTE — PROGRESS NOTES
Chief Complaint   Patient presents with   • Annual Wellness Visit       HPI:  Michela Valdez is a 68 y.o. here for Medicare Annual Wellness Visit     Patient Active Problem List    Diagnosis Date Noted   • Fecal smearing 01/16/2019   • Abnormal Doppler ultrasound of carotid artery 01/16/2019   • Prediabetes 05/16/2018   • Elevated LFTs 05/16/2018   • History of osteopenia 05/16/2018   • Postoperative hypothyroidism 10/13/2015   • Dyslipidemia 10/13/2015       Current Outpatient Prescriptions   Medication Sig Dispense Refill   • Probiotic Product (PROBIOTIC DAILY PO) Take  by mouth.     • Cholecalciferol (VITAMIN D3) 1000 units Cap Take 2,000 Units by mouth.     • atorvastatin (LIPITOR) 20 MG Tab Take 40 mg by mouth every evening.     • levothyroxine (SYNTHROID) 88 MCG Tab Take 88 mcg by mouth Every morning on an empty stomach. Indications: on Sundays only additional 1/2 tab     • Glucosamine-Chondroitin (MOVE FREE PO) Take  by mouth.     • Multiple Vitamins-Minerals (CENTRUM SILVER PO) Take  by mouth.     • FISH OIL by Does not apply route.     • aspirin EC (ECOTRIN) 81 MG TBEC Take 81 mg by mouth every day. HOLD OFF TAKING THIS UNTIL 2/12/12.       No current facility-administered medications for this visit.             Current supplements as per medication list.       Allergies: Aleve [gnp naproxen sodium] and Penicillin g    Current social contact/activities: travel, exercise     She  reports that she quit smoking about 38 years ago. Her smoking use included Cigarettes. She has a 2.00 pack-year smoking history. She has never used smokeless tobacco. She reports that she drinks about 3.0 - 4.2 oz of alcohol per week . She reports that she does not use drugs.  Counseling given: Not Answered        DPA/Advanced Directive:  Patient does not have an Advanced Directive.  A packet and workshop information was given on Advanced Directives.      ROS:    Gait: Uses no assistive device   Ostomy: no   Other tubes: no    Amputations: no   Chronic oxygen use: no   Last eye exam: annual   : Reports urinary leakage during the last 6 months that has somewhat interfered with their daily activities or sleep. incontinence.       Screening:    Depression Screening    Little interest or pleasure in doing things?  0 - not at all  Feeling down, depressed , or hopeless? 0 - not at all  Patient Health Questionnaire Score: 0     If depressive symptoms identified deferred to follow up visit unless specifically addressed in assessment and plan.    Interpretation of PHQ-9 Total Score   Score Severity   1-4 No Depression   5-9 Mild Depression   10-14 Moderate Depression   15-19 Moderately Severe Depression   20-27 Severe Depression    Screening for Cognitive Impairment    Three Minute Recall (leader, season, table) 3/3    Dev clock face with all 12 numbers and set the hands to show 10 past 11.  Yes    Cognitive concerns identified deferred for follow up unless specifically addressed in assessment and plan.    Fall Risk Assessment    Has the patient had two or more falls in the last year or any fall with injury in the last year?  No    Safety Assessment    Throw rugs on floor.  No  Handrails on all stairs.  No  Good lighting in all hallways.  Yes  Difficulty hearing.  No  Patient counseled about all safety risks that were identified.    Functional Assessment ADLs    Are there any barriers preventing you from cooking for yourself or meeting nutritional needs?  No.    Are there any barriers preventing you from driving safely or obtaining transportation?  No.    Are there any barriers preventing you from using a telephone or calling for help?  No.    Are there any barriers preventing you from shopping?  No.    Are there any barriers preventing you from taking care of your own finances?  No.    Are there any barriers preventing you from managing your medications?  No.    Are there any barriers preventing you from showering, bathing or dressing  yourself?  No.    Are you currently engaging in any exercise or physical activity?  Yes.     What is your perception of your health?  Good.      Health Maintenance Summary                IMM ZOSTER VACCINES Overdue 7/3/1998     IMM PNEUMOCOCCAL 65+ (ADULT) LOW/MEDIUM RISK SERIES Overdue 7/3/2013     MAMMOGRAM Next Due 10/4/2019      Done 10/4/2018 MA-SCREEN MAMMO W/CAD-BILAT     Patient has more history with this topic...    Annual Wellness Visit Next Due 1/17/2020      Done 1/16/2019 SUBSEQUENT ANNUAL WELLNESS VISIT-INCLUDES PPPS ()     Patient has more history with this topic...    COLONOSCOPY Next Due 10/13/2020      Postponed 10/13/2015 was done this year, pt bring in  results to pcp     Patient has more history with this topic...    BONE DENSITY Next Due 6/16/2021      Done 6/16/2016 DS-BONE DENSITY STUDY (DEXA)     Patient has more history with this topic...    IMM DTaP/Tdap/Td Vaccine Next Due 10/24/2026      Done 10/24/2016 Imm Admin: Tdap Vaccine          Patient Care Team:  Esther Masterson M.D. as PCP - General (Family Medicine)  C Karthik Cadet D.P.M. (Podiatry)  Clinton John M.D. as Consulting Physician (Gastroenterology)        Social History   Substance Use Topics   • Smoking status: Former Smoker     Packs/day: 0.50     Years: 4.00     Types: Cigarettes     Quit date: 10/13/1980   • Smokeless tobacco: Never Used   • Alcohol use 3.0 - 4.2 oz/week     5 - 7 Glasses of wine per week      Comment: glass of wine daily      Family History   Problem Relation Age of Onset   • Osteoporosis Mother    • Hyperlipidemia Father    • Heart Disease Father         CAD, MI    • Anemia Sister    • Cancer Brother         prostate   • Heart Disease Brother         CABG     She  has a past medical history of Dyslipidemia (10/13/2015); Elevated LFTs (5/16/2018); History of osteopenia (5/16/2018); Hyperlipidemia; Postoperative hypothyroidism (10/13/2015); Prediabetes (5/16/2018); Snoring; and Thyroid  "disease.   Past Surgical History:   Procedure Laterality Date   • THYROIDECTOMY TOTAL  2/8/2012    Performed by ALEX CONTEH at [facility]   • ABDOMINAL EXPLORATION      pyloric stenosis   • GYN SURGERY         Exam:     Blood pressure 118/62, pulse 84, temperature 36.9 °C (98.5 °F), temperature source Temporal, resp. rate 12, height 1.651 m (5' 5\"), weight 68.9 kg (152 lb), SpO2 94 %, not currently breastfeeding. Body mass index is 25.29 kg/m².    Hearing excellent.    Dentition good  Alert, oriented in no acute distress.  Eye contact is good, speech goal directed, affect calm  CV: RRR, no m/r/g, no LE edema  Pulm: CTAB    Assessment and Plan. The following treatment and monitoring plan is recommended:    1. Medicare annual wellness visit, subsequent  Age appropriate counseling given   Vaccines recommended  Last labs done about 1 year ago  Has labs ordered - will get these done  Other HCM UTD   - Subsequent Annual Wellness Visit - Includes PPPS ()    2. Postoperative hypothyroidism  Chronic, stable, repeat labs indicated, continue levothyroxine 88 mcg  - Subsequent Annual Wellness Visit - Includes PPPS ()    3. Prediabetes  Chronic, stable, repeat labs indicated and these have been ordered.  She will get these done ASAP.  Dietary changes discussed  - US-CAROTID DOPPLER BILAT; Future  - Subsequent Annual Wellness Visit - Includes PPPS ()    4. Elevated LFTs  Chronic, stable, perhaps due to prediabetes.  She is due for repeat labs and she will get these done.  Further recommendations will be based on these labs.  - Subsequent Annual Wellness Visit - Includes PPPS ()    5. Dyslipidemia  Chronic, stable on atorvastatin 40 mg.  Discussed getting her repeat labs done  - US-CAROTID DOPPLER BILAT; Future  - Subsequent Annual Wellness Visit - Includes PPPS ()    6. Fecal smearing  Chronic, stable, she is considering nerve stimulator.  - Subsequent Annual Wellness Visit - " Includes PPPS ()    7. History of osteopenia  Chronic, stable, repeat bone density scan ordered.  - DS-BONE DENSITY STUDY (DEXA); Future  - Subsequent Annual Wellness Visit - Includes PPPS ()    8. Postmenopausal estrogen deficiency  Chronic, stable, bone density scan ordered  - DS-BONE DENSITY STUDY (DEXA); Future  - Subsequent Annual Wellness Visit - Includes PPPS ()    9. Abnormal Doppler ultrasound of carotid artery  Chronic, patient reports mild plaque in 1 of the carotid arteries.  We are going to repeat carotid Doppler due to this.  She is on statin and aspirin  - US-CAROTID DOPPLER BILAT; Future  - Subsequent Annual Wellness Visit - Includes PPPS ()    10. 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  We are going to repeat carotid Doppler due to this  - US-CAROTID DOPPLER BILAT; Future  - Subsequent Annual Wellness Visit - Includes PPPS ()          Services suggested: No services needed at this time  Health Care Screening: Age-appropriate preventive services Medicare covers discussed today and ordered if indicated.  Referrals offered: Community-based lifestyle interventions to reduce health risks and promote self-management and wellness, fall prevention, nutrition, physical activity, tobacco-use cessation, weight loss, and mental health services as per orders if indicated.    Discussion today about general wellness and lifestyle habits:    · Prevent falls and reduce trip hazards; Cautioned about securing or removing rugs.  · Have a working fire alarm and carbon monoxide detector;   · Engage in regular physical activity and social activities       Follow-up: Return in about 1 year (around 1/16/2020), or if symptoms worsen or fail to improve, for Medicare Annual.

## 2019-03-22 ENCOUNTER — TELEPHONE (OUTPATIENT)
Dept: MEDICAL GROUP | Facility: LAB | Age: 69
End: 2019-03-22

## 2019-03-22 NOTE — TELEPHONE ENCOUNTER
1. Caller Name: Michela Valdez      Call Back Number: [phone] (home) [phone] (work)        Patient approves a detailed voicemail message: N\A      Patient called and LVM asking which doctors  recommended for a colonoscopy, I left patient a voice mail today, with a list of names  Dr.Pfau Dr.Shekhzadeh Pineda

## 2019-03-27 ENCOUNTER — HOSPITAL ENCOUNTER (OUTPATIENT)
Dept: RADIOLOGY | Facility: MEDICAL CENTER | Age: 69
End: 2019-03-27
Attending: FAMILY MEDICINE
Payer: MEDICARE

## 2019-03-27 DIAGNOSIS — Z78.0 POSTMENOPAUSAL ESTROGEN DEFICIENCY: ICD-10-CM

## 2019-03-27 DIAGNOSIS — Z87.39 HISTORY OF OSTEOPENIA: ICD-10-CM

## 2019-03-27 PROCEDURE — 77080 DXA BONE DENSITY AXIAL: CPT

## 2019-03-28 ENCOUNTER — HOSPITAL ENCOUNTER (OUTPATIENT)
Dept: RADIOLOGY | Facility: MEDICAL CENTER | Age: 69
End: 2019-03-28
Attending: FAMILY MEDICINE
Payer: MEDICARE

## 2019-03-28 DIAGNOSIS — R93.89 ABNORMAL DOPPLER ULTRASOUND OF CAROTID ARTERY: ICD-10-CM

## 2019-03-28 DIAGNOSIS — I77.89 OTHER SPECIFIED DISORDERS OF ARTERIES AND ARTERIOLES (HCC): ICD-10-CM

## 2019-03-28 DIAGNOSIS — E78.5 DYSLIPIDEMIA: ICD-10-CM

## 2019-03-28 DIAGNOSIS — R73.03 PREDIABETES: ICD-10-CM

## 2019-03-28 PROCEDURE — 93880 EXTRACRANIAL BILAT STUDY: CPT

## 2019-04-01 ENCOUNTER — TELEPHONE (OUTPATIENT)
Dept: MEDICAL GROUP | Facility: LAB | Age: 69
End: 2019-04-01

## 2019-04-01 NOTE — TELEPHONE ENCOUNTER
Pt returning call for her results,   Please let Michela know that her carotid ultrasound shows some plaque, worse in the left carotid. At this point, we generally recommend an aspirin and statin, which she is already doing. We should recheck this in a couple of years.     Thanks!  Esther Masterson M.D.

## 2019-04-01 NOTE — TELEPHONE ENCOUNTER
----- Message from Esther Masterson M.D. sent at 3/31/2019  4:34 PM PDT -----  Please let Michela know that her carotid ultrasound shows some plaque, worse in the left carotid. At this point, we generally recommend an aspirin and statin, which she is already doing. We should recheck this in a couple of years.     Thanks!  Esther Masterson M.D.

## 2019-04-17 ENCOUNTER — APPOINTMENT (RX ONLY)
Dept: URBAN - METROPOLITAN AREA CLINIC 35 | Facility: CLINIC | Age: 69
Setting detail: DERMATOLOGY
End: 2019-04-17

## 2019-04-17 DIAGNOSIS — L21.8 OTHER SEBORRHEIC DERMATITIS: ICD-10-CM

## 2019-04-17 PROBLEM — L30.9 DERMATITIS, UNSPECIFIED: Status: ACTIVE | Noted: 2019-04-17

## 2019-04-17 PROCEDURE — ? PRESCRIPTION

## 2019-04-17 PROCEDURE — 99212 OFFICE O/P EST SF 10 MIN: CPT

## 2019-04-17 PROCEDURE — ? MEDICATION COUNSELING

## 2019-04-17 PROCEDURE — ? ADDITIONAL NOTES

## 2019-04-17 PROCEDURE — ? COUNSELING

## 2019-04-17 PROCEDURE — ? PRESCRIPTION SAMPLES PROVIDED

## 2019-04-17 RX ORDER — PIMECROLIMUS 10 MG/G
CREAM TOPICAL BID
Qty: 1 | Refills: 3 | Status: ERX

## 2019-04-17 ASSESSMENT — LOCATION DETAILED DESCRIPTION DERM: LOCATION DETAILED: LEFT NASAL ALA

## 2019-04-17 ASSESSMENT — LOCATION ZONE DERM: LOCATION ZONE: NOSE

## 2019-04-17 ASSESSMENT — LOCATION SIMPLE DESCRIPTION DERM: LOCATION SIMPLE: LEFT NOSE

## 2019-04-17 NOTE — PROCEDURE: MEDICATION COUNSELING
Zyclara Pregnancy And Lactation Text: This medication is Pregnancy Category C. It is unknown if this medication is excreted in breast milk.
Humira Pregnancy And Lactation Text: This medication is Pregnancy Category B and is considered safe during pregnancy. It is unknown if this medication is excreted in breast milk.
Niacinamide Pregnancy And Lactation Text: These medications are considered safe during pregnancy.
Erythromycin Counseling:  I discussed with the patient the risks of erythromycin including but not limited to GI upset, allergic reaction, drug rash, diarrhea, increase in liver enzymes, and yeast infections.
Hydroxyzine Counseling: Patient advised that the medication is sedating and not to drive a car after taking this medication.  Patient informed of potential adverse effects including but not limited to dry mouth, urinary retention, and blurry vision.  The patient verbalized understanding of the proper use and possible adverse effects of hydroxyzine.  All of the patient's questions and concerns were addressed.
Prednisone Pregnancy And Lactation Text: This medication is Pregnancy Category C and it isn't know if it is safe during pregnancy. This medication is excreted in breast milk.
Use Enhanced Medication Counseling?: No
Drysol Pregnancy And Lactation Text: This medication is considered safe during pregnancy and breast feeding.
Cellcept Counseling:  I discussed with the patient the risks of mycophenolate mofetil including but not limited to infection/immunosuppression, GI upset, hypokalemia, hypercholesterolemia, bone marrow suppression, lymphoproliferative disorders, malignancy, GI ulceration/bleed/perforation, colitis, interstitial lung disease, kidney failure, progressive multifocal leukoencephalopathy, and birth defects.  The patient understands that monitoring is required including a baseline creatinine and regular CBC testing. In addition, patient must alert us immediately if symptoms of infection or other concerning signs are noted.
Drysol Counseling:  I discussed with the patient the risks of drysol/aluminum chloride including but not limited to skin rash, itching, irritation, burning.
Azathioprine Pregnancy And Lactation Text: This medication is Pregnancy Category D and isn't considered safe during pregnancy. It is unknown if this medication is excreted in breast milk.
High Dose Vitamin A Pregnancy And Lactation Text: High dose vitamin A therapy is contraindicated during pregnancy and breast feeding.
Taltz Pregnancy And Lactation Text: The risk during pregnancy and breastfeeding is uncertain with this medication.
Rifampin Pregnancy And Lactation Text: This medication is Pregnancy Category C and it isn't know if it is safe during pregnancy. It is also excreted in breast milk and should not be used if you are breast feeding.
Dapsone Pregnancy And Lactation Text: This medication is Pregnancy Category C and is not considered safe during pregnancy or breast feeding.
Minoxidil Counseling: Minoxidil is a topical medication which can increase blood flow where it is applied. It is uncertain how this medication increases hair growth. Side effects are uncommon and include stinging and allergic reactions.
Itraconazole Pregnancy And Lactation Text: This medication is Pregnancy Category C and it isn't know if it is safe during pregnancy. It is also excreted in breast milk.
Birth Control Pills Counseling: Birth Control Pill Counseling: I discussed with the patient the potential side effects of OCPs including but not limited to increased risk of stroke, heart attack, thrombophlebitis, deep venous thrombosis, hepatic adenomas, breast changes, GI upset, headaches, and depression.  The patient verbalized understanding of the proper use and possible adverse effects of OCPs. All of the patient's questions and concerns were addressed.
Azithromycin Counseling:  I discussed with the patient the risks of azithromycin including but not limited to GI upset, allergic reaction, drug rash, diarrhea, and yeast infections.
Ilumya Counseling: I discussed with the patient the risks of tildrakizumab including but not limited to immunosuppression, malignancy, posterior leukoencephalopathy syndrome, and serious infections.  The patient understands that monitoring is required including a PPD at baseline and must alert us or the primary physician if symptoms of infection or other concerning signs are noted.
Elidel Counseling: Patient may experience a mild burning sensation during topical application. Elidel is not approved in children less than 2 years of age. There have been case reports of hematologic and skin malignancies in patients using topical calcineurin inhibitors although causality is questionable.
Ketoconazole Pregnancy And Lactation Text: This medication is Pregnancy Category C and it isn't know if it is safe during pregnancy. It is also excreted in breast milk and breast feeding isn't recommended.
Bactrim Counseling:  I discussed with the patient the risks of sulfa antibiotics including but not limited to GI upset, allergic reaction, drug rash, diarrhea, dizziness, photosensitivity, and yeast infections.  Rarely, more serious reactions can occur including but not limited to aplastic anemia, agranulocytosis, methemoglobinemia, blood dyscrasias, liver or kidney failure, lung infiltrates or desquamative/blistering drug rashes.
Tazorac Pregnancy And Lactation Text: This medication is not safe during pregnancy. It is unknown if this medication is excreted in breast milk.
Erythromycin Pregnancy And Lactation Text: This medication is Pregnancy Category B and is considered safe during pregnancy. It is also excreted in breast milk.
Ketoconazole Counseling:   Patient counseled regarding improving absorption with orange juice.  Adverse effects include but are not limited to breast enlargement, headache, diarrhea, nausea, upset stomach, liver function test abnormalities, taste disturbance, and stomach pain.  There is a rare possibility of liver failure that can occur when taking ketoconazole. The patient understands that monitoring of LFTs may be required, especially at baseline. The patient verbalized understanding of the proper use and possible adverse effects of ketoconazole.  All of the patient's questions and concerns were addressed.
Azithromycin Pregnancy And Lactation Text: This medication is considered safe during pregnancy and is also secreted in breast milk.
Tazorac Counseling:  Patient advised that medication is irritating and drying.  Patient may need to apply sparingly and wash off after an hour before eventually leaving it on overnight.  The patient verbalized understanding of the proper use and possible adverse effects of tazorac.  All of the patient's questions and concerns were addressed.
Minoxidil Pregnancy And Lactation Text: This medication has not been assigned a Pregnancy Risk Category but animal studies failed to show danger with the topical medication. It is unknown if the medication is excreted in breast milk.
Erivedge Counseling- I discussed with the patient the risks of Erivedge including but not limited to nausea, vomiting, diarrhea, constipation, weight loss, changes in the sense of taste, decreased appetite, muscle spasms, and hair loss.  The patient verbalized understanding of the proper use and possible adverse effects of Erivedge.  All of the patient's questions and concerns were addressed.
Hydroxyzine Pregnancy And Lactation Text: This medication is not safe during pregnancy and should not be taken. It is also excreted in breast milk and breast feeding isn't recommended.
Birth Control Pills Pregnancy And Lactation Text: This medication should be avoided if pregnant and for the first 30 days post-partum.
Cimzia Counseling:  I discussed with the patient the risks of Cimzia including but not limited to immunosuppression, allergic reactions and infections.  The patient understands that monitoring is required including a PPD at baseline and must alert us or the primary physician if symptoms of infection or other concerning signs are noted.
Tetracycline Counseling: Patient counseled regarding possible photosensitivity and increased risk for sunburn.  Patient instructed to avoid sunlight, if possible.  When exposed to sunlight, patients should wear protective clothing, sunglasses, and sunscreen.  The patient was instructed to call the office immediately if the following severe adverse effects occur:  hearing changes, easy bruising/bleeding, severe headache, or vision changes.  The patient verbalized understanding of the proper use and possible adverse effects of tetracycline.  All of the patient's questions and concerns were addressed. Patient understands to avoid pregnancy while on therapy due to potential birth defects.
Nsaids Counseling: NSAID Counseling: I discussed with the patient that NSAIDs should be taken with food. Prolonged use of NSAIDs can result in the development of stomach ulcers.  Patient advised to stop taking NSAIDs if abdominal pain occurs.  The patient verbalized understanding of the proper use and possible adverse effects of NSAIDs.  All of the patient's questions and concerns were addressed.
Tremfya Counseling: I discussed with the patient the risks of guselkumab including but not limited to immunosuppression, serious infections, worsening of inflammatory bowel disease and drug reactions.  The patient understands that monitoring is required including a PPD at baseline and must alert us or the primary physician if symptoms of infection or other concerning signs are noted.
Metronidazole Counseling:  I discussed with the patient the risks of metronidazole including but not limited to seizures, nausea/vomiting, a metallic taste in the mouth, nausea/vomiting and severe allergy.
Cyclophosphamide Counseling:  I discussed with the patient the risks of cyclophosphamide including but not limited to hair loss, hormonal abnormalities, decreased fertility, abdominal pain, diarrhea, nausea and vomiting, bone marrow suppression and infection. The patient understands that monitoring is required while taking this medication.
Topical Clindamycin Counseling: Patient counseled that this medication may cause skin irritation or allergic reactions.  In the event of skin irritation, the patient was advised to reduce the amount of the drug applied or use it less frequently.   The patient verbalized understanding of the proper use and possible adverse effects of clindamycin.  All of the patient's questions and concerns were addressed.
Nsaids Pregnancy And Lactation Text: These medications are considered safe up to 30 weeks gestation. It is excreted in breast milk.
Cosentyx Counseling:  I discussed with the patient the risks of Cosentyx including but not limited to worsening of Crohn's disease, immunosuppression, allergic reactions and infections.  The patient understands that monitoring is required including a PPD at baseline and must alert us or the primary physician if symptoms of infection or other concerning signs are noted.
Gabapentin Counseling: I discussed with the patient the risks of gabapentin including but not limited to dizziness, somnolence, fatigue and ataxia.
Detail Level: Detailed
Spironolactone Pregnancy And Lactation Text: This medication can cause feminization of the male fetus and should be avoided during pregnancy. The active metabolite is also found in breast milk.
Terbinafine Counseling: Patient counseling regarding adverse effects of terbinafine including but not limited to headache, diarrhea, rash, upset stomach, liver function test abnormalities, itching, taste/smell disturbance, nausea, abdominal pain, and flatulence.  There is a rare possibility of liver failure that can occur when taking terbinafine.  The patient understands that a baseline LFT and kidney function test may be required. The patient verbalized understanding of the proper use and possible adverse effects of terbinafine.  All of the patient's questions and concerns were addressed.
Bactrim Pregnancy And Lactation Text: This medication is Pregnancy Category D and is known to cause fetal risk.  It is also excreted in breast milk.
Picato Counseling:  I discussed with the patient the risks of Picato including but not limited to erythema, scaling, itching, weeping, crusting, and pain.
Erivedge Pregnancy And Lactation Text: This medication is Pregnancy Category X and is absolutely contraindicated during pregnancy. It is unknown if it is excreted in breast milk.
Arava Counseling:  Patient counseled regarding adverse effects of Arava including but not limited to nausea, vomiting, abnormalities in liver function tests. Patients may develop mouth sores, rash, diarrhea, and abnormalities in blood counts. The patient understands that monitoring is required including LFTs and blood counts.  There is a rare possibility of scarring of the liver and lung problems that can occur when taking methotrexate. Persistent nausea, loss of appetite, pale stools, dark urine, cough, and shortness of breath should be reported immediately. Patient advised to discontinue Arava treatment and consult with a physician prior to attempting conception. The patient will have to undergo a treatment to eliminate Arava from the body prior to conception.
Spironolactone Counseling: Patient advised regarding risks of diarrhea, abdominal pain, hyperkalemia, birth defects (for female patients), liver toxicity and renal toxicity. The patient may need blood work to monitor liver and kidney function and potassium levels while on therapy. The patient verbalized understanding of the proper use and possible adverse effects of spironolactone.  All of the patient's questions and concerns were addressed.
Tetracycline Pregnancy And Lactation Text: This medication is Pregnancy Category D and not consider safe during pregnancy. It is also excreted in breast milk.
Cimzia Pregnancy And Lactation Text: This medication crosses the placenta but can be considered safe in certain situations. Cimzia may be excreted in breast milk.
Acitretin Counseling:  I discussed with the patient the risks of acitretin including but not limited to hair loss, dry lips/skin/eyes, liver damage, hyperlipidemia, depression/suicidal ideation, photosensitivity.  Serious rare side effects can include but are not limited to pancreatitis, pseudotumor cerebri, bony changes, clot formation/stroke/heart attack.  Patient understands that alcohol is contraindicated since it can result in liver toxicity and significantly prolong the elimination of the drug by many years.
Cephalexin Counseling: I counseled the patient regarding use of cephalexin as an antibiotic for prophylactic and/or therapeutic purposes. Cephalexin (commonly prescribed under brand name Keflex) is a cephalosporin antibiotic which is active against numerous classes of bacteria, including most skin bacteria. Side effects may include nausea, diarrhea, gastrointestinal upset, rash, hives, yeast infections, and in rare cases, hepatitis, kidney disease, seizures, fever, confusion, neurologic symptoms, and others. Patients with severe allergies to penicillin medications are cautioned that there is about a 10% incidence of cross-reactivity with cephalosporins. When possible, patients with penicillin allergies should use alternatives to cephalosporins for antibiotic therapy.
Topical Clindamycin Pregnancy And Lactation Text: This medication is Pregnancy Category B and is considered safe during pregnancy. It is unknown if it is excreted in breast milk.
Odomzo Counseling- I discussed with the patient the risks of Odomzo including but not limited to nausea, vomiting, diarrhea, constipation, weight loss, changes in the sense of taste, decreased appetite, muscle spasms, and hair loss.  The patient verbalized understanding of the proper use and possible adverse effects of Odomzo.  All of the patient's questions and concerns were addressed.
Gabapentin Pregnancy And Lactation Text: This medication is Pregnancy Category C and isn't considered safe during pregnancy. It is excreted in breast milk.
Terbinafine Pregnancy And Lactation Text: This medication is Pregnancy Category B and is considered safe during pregnancy. It is also excreted in breast milk and breast feeding isn't recommended.
Albendazole Pregnancy And Lactation Text: This medication is Pregnancy Category C and it isn't known if it is safe during pregnancy. It is also excreted in breast milk.
Xeljanz Counseling: I discussed with the patient the risks of Xeljanz therapy including increased risk of infection, liver issues, headache, diarrhea, or cold symptoms. Live vaccines should be avoided. They were instructed to call if they have any problems.
Cyclophosphamide Pregnancy And Lactation Text: This medication is Pregnancy Category D and it isn't considered safe during pregnancy. This medication is excreted in breast milk.
SSKI Counseling:  I discussed with the patient the risks of SSKI including but not limited to thyroid abnormalities, metallic taste, GI upset, fever, headache, acne, arthralgias, paraesthesias, lymphadenopathy, easy bleeding, arrhythmias, and allergic reaction.
Fluconazole Counseling:  Patient counseled regarding adverse effects of fluconazole including but not limited to headache, diarrhea, nausea, upset stomach, liver function test abnormalities, taste disturbance, and stomach pain.  There is a rare possibility of liver failure that can occur when taking fluconazole.  The patient understands that monitoring of LFTs and kidney function test may be required, especially at baseline. The patient verbalized understanding of the proper use and possible adverse effects of fluconazole.  All of the patient's questions and concerns were addressed.
Benzoyl Peroxide Pregnancy And Lactation Text: This medication is Pregnancy Category C. It is unknown if benzoyl peroxide is excreted in breast milk.
Infliximab Counseling:  I discussed with the patient the risks of infliximab including but not limited to myelosuppression, immunosuppression, autoimmune hepatitis, demyelinating diseases, lymphoma, and serious infections.  The patient understands that monitoring is required including a PPD at baseline and must alert us or the primary physician if symptoms of infection or other concerning signs are noted.
Benzoyl Peroxide Counseling: Patient counseled that medicine may cause skin irritation and bleach clothing.  In the event of skin irritation, the patient was advised to reduce the amount of the drug applied or use it less frequently.   The patient verbalized understanding of the proper use and possible adverse effects of benzoyl peroxide.  All of the patient's questions and concerns were addressed.
Albendazole Counseling:  I discussed with the patient the risks of albendazole including but not limited to cytopenia, kidney damage, nausea/vomiting and severe allergy.  The patient understands that this medication is being used in an off-label manner.
Acitretin Pregnancy And Lactation Text: This medication is Pregnancy Category X and should not be given to women who are pregnant or may become pregnant in the future. This medication is excreted in breast milk.
Metronidazole Pregnancy And Lactation Text: This medication is Pregnancy Category B and considered safe during pregnancy.  It is also excreted in breast milk.
Eucrisa Counseling: Patient may experience a mild burning sensation during topical application. Eucrisa is not approved in children less than 2 years of age.
Glycopyrrolate Counseling:  I discussed with the patient the risks of glycopyrrolate including but not limited to skin rash, drowsiness, dry mouth, difficulty urinating, and blurred vision.
Ivermectin Counseling:  Patient instructed to take medication on an empty stomach with a full glass of water.  Patient informed of potential adverse effects including but not limited to nausea, diarrhea, dizziness, itching, and swelling of the extremities or lymph nodes.  The patient verbalized understanding of the proper use and possible adverse effects of ivermectin.  All of the patient's questions and concerns were addressed.
Cyclosporine Counseling:  I discussed with the patient the risks of cyclosporine including but not limited to hypertension, gingival hyperplasia,myelosuppression, immunosuppression, liver damage, kidney damage, neurotoxicity, lymphoma, and serious infections. The patient understands that monitoring is required including baseline blood pressure, CBC, CMP, lipid panel and uric acid, and then 1-2 times monthly CMP and blood pressure.
Sski Pregnancy And Lactation Text: This medication is Pregnancy Category D and isn't considered safe during pregnancy. It is excreted in breast milk.
Dupixent Counseling: I discussed with the patient the risks of dupilumab including but not limited to eye infection and irritation, cold sores, injection site reactions, worsening of asthma, allergic reactions and increased risk of parasitic infection.  Live vaccines should be avoided while taking dupilumab. Dupilumab will also interact with certain medications such as warfarin and cyclosporine. The patient understands that monitoring is required and they must alert us or the primary physician if symptoms of infection or other concerning signs are noted.
Carac Counseling:  I discussed with the patient the risks of Carac including but not limited to erythema, scaling, itching, weeping, crusting, and pain.
Simponi Counseling:  I discussed with the patient the risks of golimumab including but not limited to myelosuppression, immunosuppression, autoimmune hepatitis, demyelinating diseases, lymphoma, and serious infections.  The patient understands that monitoring is required including a PPD at baseline and must alert us or the primary physician if symptoms of infection or other concerning signs are noted.
Protopic Pregnancy And Lactation Text: This medication is Pregnancy Category C. It is unknown if this medication is excreted in breast milk when applied topically.
Rituxan Counseling:  I discussed with the patient the risks of Rituxan infusions. Side effects can include infusion reactions, severe drug rashes including mucocutaneous reactions, reactivation of latent hepatitis and other infections and rarely progressive multifocal leukoencephalopathy.  All of the patient's questions and concerns were addressed.
Bexarotene Pregnancy And Lactation Text: This medication is Pregnancy Category X and should not be given to women who are pregnant or may become pregnant. This medication should not be used if you are breast feeding.
Xelpiercez Pregnancy And Lactation Text: This medication is Pregnancy Category D and is not considered safe during pregnancy.  The risk during breast feeding is also uncertain.
Protopic Counseling: Patient may experience a mild burning sensation during topical application. Protopic is not approved in children less than 2 years of age. There have been case reports of hematologic and skin malignancies in patients using topical calcineurin inhibitors although causality is questionable.
Bexarotene Counseling:  I discussed with the patient the risks of bexarotene including but not limited to hair loss, dry lips/skin/eyes, liver abnormalities, hyperlipidemia, pancreatitis, depression/suicidal ideation, photosensitivity, drug rash/allergic reactions, hypothyroidism, anemia, leukopenia, infection, cataracts, and teratogenicity.  Patient understands that they will need regular blood tests to check lipid profile, liver function tests, white blood cell count, thyroid function tests and pregnancy test if applicable.
Xolair Counseling:  Patient informed of potential adverse effects including but not limited to fever, muscle aches, rash and allergic reactions.  The patient verbalized understanding of the proper use and possible adverse effects of Xolair.  All of the patient's questions and concerns were addressed.
Clofazimine Counseling:  I discussed with the patient the risks of clofazimine including but not limited to skin and eye pigmentation, liver damage, nausea/vomiting, gastrointestinal bleeding and allergy.
Cephalexin Pregnancy And Lactation Text: This medication is Pregnancy Category B and considered safe during pregnancy.  It is also excreted in breast milk but can be used safely for shorter doses.
Topical Sulfur Applications Counseling: Topical Sulfur Counseling: Patient counseled that this medication may cause skin irritation or allergic reactions.  In the event of skin irritation, the patient was advised to reduce the amount of the drug applied or use it less frequently.   The patient verbalized understanding of the proper use and possible adverse effects of topical sulfur application.  All of the patient's questions and concerns were addressed.
Minocycline Counseling: Patient advised regarding possible photosensitivity and discoloration of the teeth, skin, lips, tongue and gums.  Patient instructed to avoid sunlight, if possible.  When exposed to sunlight, patients should wear protective clothing, sunglasses, and sunscreen.  The patient was instructed to call the office immediately if the following severe adverse effects occur:  hearing changes, easy bruising/bleeding, severe headache, or vision changes.  The patient verbalized understanding of the proper use and possible adverse effects of minocycline.  All of the patient's questions and concerns were addressed.
Rituxan Pregnancy And Lactation Text: This medication is Pregnancy Category C and it isn't know if it is safe during pregnancy. It is unknown if this medication is excreted in breast milk but similar antibodies are known to be excreted.
Carac Pregnancy And Lactation Text: This medication is Pregnancy Category X and contraindicated in pregnancy and in women who may become pregnant. It is unknown if this medication is excreted in breast milk.
Isotretinoin Counseling: Patient should get monthly blood tests, not donate blood, not drive at night if vision affected, not share medication, and not undergo elective surgery for 6 months after tx completed. Side effects reviewed, pt to contact office should one occur.
Solaraze Counseling:  I discussed with the patient the risks of Solaraze including but not limited to erythema, scaling, itching, weeping, crusting, and pain.
Quinolones Counseling:  I discussed with the patient the risks of fluoroquinolones including but not limited to GI upset, allergic reaction, drug rash, diarrhea, dizziness, photosensitivity, yeast infections, liver function test abnormalities, tendonitis/tendon rupture.
Colchicine Counseling:  Patient counseled regarding adverse effects including but not limited to stomach upset (nausea, vomiting, stomach pain, or diarrhea).  Patient instructed to limit alcohol consumption while taking this medication.  Colchicine may reduce blood counts especially with prolonged use.  The patient understands that monitoring of kidney function and blood counts may be required, especially at baseline. The patient verbalized understanding of the proper use and possible adverse effects of colchicine.  All of the patient's questions and concerns were addressed.
Hydroquinone Counseling:  Patient advised that medication may result in skin irritation, lightening (hypopigmentation), dryness, and burning.  In the event of skin irritation, the patient was advised to reduce the amount of the drug applied or use it less frequently.  Rarely, spots that are treated with hydroquinone can become darker (pseudoochronosis).  Should this occur, patient instructed to stop medication and call the office. The patient verbalized understanding of the proper use and possible adverse effects of hydroquinone.  All of the patient's questions and concerns were addressed.
Otezla Counseling: The side effects of Otezla were discussed with the patient, including but not limited to worsening or new depression, weight loss, diarrhea, nausea, upper respiratory tract infection, and headache. Patient instructed to call the office should any adverse effect occur.  The patient verbalized understanding of the proper use and possible adverse effects of Otezla.  All the patient's questions and concerns were addressed.
Topical Sulfur Applications Pregnancy And Lactation Text: This medication is Pregnancy Category C and has an unknown safety profile during pregnancy. It is unknown if this topical medication is excreted in breast milk.
Griseofulvin Counseling:  I discussed with the patient the risks of griseofulvin including but not limited to photosensitivity, cytopenia, liver damage, nausea/vomiting and severe allergy.  The patient understands that this medication is best absorbed when taken with a fatty meal (e.g., ice cream or french fries).
Dupixent Pregnancy And Lactation Text: This medication likely crosses the placenta but the risk for the fetus is uncertain. This medication is excreted in breast milk.
Xolair Pregnancy And Lactation Text: This medication is Pregnancy Category B and is considered safe during pregnancy. This medication is excreted in breast milk.
Glycopyrrolate Pregnancy And Lactation Text: This medication is Pregnancy Category B and is considered safe during pregnancy. It is unknown if it is excreted breast milk.
Thalidomide Counseling: I discussed with the patient the risks of thalidomide including but not limited to birth defects, anxiety, weakness, chest pain, dizziness, cough and severe allergy.
Clindamycin Counseling: I counseled the patient regarding use of clindamycin as an antibiotic for prophylactic and/or therapeutic purposes. Clindamycin is active against numerous classes of bacteria, including skin bacteria. Side effects may include nausea, diarrhea, gastrointestinal upset, rash, hives, yeast infections, and in rare cases, colitis.
Cimetidine Counseling:  I discussed with the patient the risks of Cimetidine including but not limited to gynecomastia, headache, diarrhea, nausea, drowsiness, arrhythmias, pancreatitis, skin rashes, psychosis, bone marrow suppression and kidney toxicity.
Isotretinoin Pregnancy And Lactation Text: This medication is Pregnancy Category X and is considered extremely dangerous during pregnancy. It is unknown if it is excreted in breast milk.
Solaraze Pregnancy And Lactation Text: This medication is Pregnancy Category B and is considered safe. There is some data to suggest avoiding during the third trimester. It is unknown if this medication is excreted in breast milk.
Siliq Counseling:  I discussed with the patient the risks of Siliq including but not limited to new or worsening depression, suicidal thoughts and behavior, immunosuppression, malignancy, posterior leukoencephalopathy syndrome, and serious infections.  The patient understands that monitoring is required including a PPD at baseline and must alert us or the primary physician if symptoms of infection or other concerning signs are noted. There is also a special program designed to monitor depression which is required with Siliq.
Imiquimod Counseling:  I discussed with the patient the risks of imiquimod including but not limited to erythema, scaling, itching, weeping, crusting, and pain.  Patient understands that the inflammatory response to imiquimod is variable from person to person and was educated regarded proper titration schedule.  If flu-like symptoms develop, patient knows to discontinue the medication and contact us.
Doxepin Counseling:  Patient advised that the medication is sedating and not to drive a car after taking this medication. Patient informed of potential adverse effects including but not limited to dry mouth, urinary retention, and blurry vision.  The patient verbalized understanding of the proper use and possible adverse effects of doxepin.  All of the patient's questions and concerns were addressed.
Oxybutynin Counseling:  I discussed with the patient the risks of oxybutynin including but not limited to skin rash, drowsiness, dry mouth, difficulty urinating, and blurred vision.
Stelara Counseling:  I discussed with the patient the risks of ustekinumab including but not limited to immunosuppression, malignancy, posterior leukoencephalopathy syndrome, and serious infections.  The patient understands that monitoring is required including a PPD at baseline and must alert us or the primary physician if symptoms of infection or other concerning signs are noted.
Doxycycline Counseling:  Patient counseled regarding possible photosensitivity and increased risk for sunburn.  Patient instructed to avoid sunlight, if possible.  When exposed to sunlight, patients should wear protective clothing, sunglasses, and sunscreen.  The patient was instructed to call the office immediately if the following severe adverse effects occur:  hearing changes, easy bruising/bleeding, severe headache, or vision changes.  The patient verbalized understanding of the proper use and possible adverse effects of doxycycline.  All of the patient's questions and concerns were addressed.
Hydroxychloroquine Pregnancy And Lactation Text: This medication has been shown to cause fetal harm but it isn't assigned a Pregnancy Risk Category. There are small amounts excreted in breast milk.
Clindamycin Pregnancy And Lactation Text: This medication can be used in pregnancy if certain situations. Clindamycin is also present in breast milk.
Wartpeel Counseling:  I discussed with the patient the risks of Wartpeel including but not limited to erythema, scaling, itching, weeping, crusting, and pain.
Otezla Pregnancy And Lactation Text: This medication is Pregnancy Category C and it isn't known if it is safe during pregnancy. It is unknown if it is excreted in breast milk.
Enbrel Counseling:  I discussed with the patient the risks of etanercept including but not limited to myelosuppression, immunosuppression, autoimmune hepatitis, demyelinating diseases, lymphoma, and infections.  The patient understands that monitoring is required including a PPD at baseline and must alert us or the primary physician if symptoms of infection or other concerning signs are noted.
Hydroxychloroquine Counseling:  I discussed with the patient that a baseline ophthalmologic exam is needed at the start of therapy and every year thereafter while on therapy. A CBC may also be warranted for monitoring.  The side effects of this medication were discussed with the patient, including but not limited to agranulocytosis, aplastic anemia, seizures, rashes, retinopathy, and liver toxicity. Patient instructed to call the office should any adverse effect occur.  The patient verbalized understanding of the proper use and possible adverse effects of Plaquenil.  All the patient's questions and concerns were addressed.
Methotrexate Counseling:  Patient counseled regarding adverse effects of methotrexate including but not limited to nausea, vomiting, abnormalities in liver function tests. Patients may develop mouth sores, rash, diarrhea, and abnormalities in blood counts. The patient understands that monitoring is required including LFT's and blood counts.  There is a rare possibility of scarring of the liver and lung problems that can occur when taking methotrexate. Persistent nausea, loss of appetite, pale stools, dark urine, cough, and shortness of breath should be reported immediately. Patient advised to discontinue methotrexate treatment at least three months before attempting to become pregnant.  I discussed the need for folate supplements while taking methotrexate.  These supplements can decrease side effects during methotrexate treatment. The patient verbalized understanding of the proper use and possible adverse effects of methotrexate.  All of the patient's questions and concerns were addressed.
Griseofulvin Pregnancy And Lactation Text: This medication is Pregnancy Category X and is known to cause serious birth defects. It is unknown if this medication is excreted in breast milk but breast feeding should be avoided.
5-Fu Counseling: 5-Fluorouracil Counseling:  I discussed with the patient the risks of 5-fluorouracil including but not limited to erythema, scaling, itching, weeping, crusting, and pain.
Rifampin Counseling: I discussed with the patient the risks of rifampin including but not limited to liver damage, kidney damage, red-orange body fluids, nausea/vomiting and severe allergy.
Dapsone Counseling: I discussed with the patient the risks of dapsone including but not limited to hemolytic anemia, agranulocytosis, rashes, methemoglobinemia, kidney failure, peripheral neuropathy, headaches, GI upset, and liver toxicity.  Patients who start dapsone require monitoring including baseline LFTs and weekly CBCs for the first month, then every month thereafter.  The patient verbalized understanding of the proper use and possible adverse effects of dapsone.  All of the patient's questions and concerns were addressed.
Azathioprine Counseling:  I discussed with the patient the risks of azathioprine including but not limited to myelosuppression, immunosuppression, hepatotoxicity, lymphoma, and infections.  The patient understands that monitoring is required including baseline LFTs, Creatinine, possible TPMP genotyping and weekly CBCs for the first month and then every 2 weeks thereafter.  The patient verbalized understanding of the proper use and possible adverse effects of azathioprine.  All of the patient's questions and concerns were addressed.
Zyclara Counseling:  I discussed with the patient the risks of imiquimod including but not limited to erythema, scaling, itching, weeping, crusting, and pain.  Patient understands that the inflammatory response to imiquimod is variable from person to person and was educated regarded proper titration schedule.  If flu-like symptoms develop, patient knows to discontinue the medication and contact us.
Humira Counseling:  I discussed with the patient the risks of adalimumab including but not limited to myelosuppression, immunosuppression, autoimmune hepatitis, demyelinating diseases, lymphoma, and serious infections.  The patient understands that monitoring is required including a PPD at baseline and must alert us or the primary physician if symptoms of infection or other concerning signs are noted.
Niacinamide Counseling: I recommended taking niacin or niacinamide, also know as vitamin B3, twice daily. Recent evidence suggests that taking vitamin B3 (500 mg twice daily) can reduce the risk of actinic keratoses and non-melanoma skin cancers. Side effects of vitamin B3 include flushing and headache.
Valtrex Pregnancy And Lactation Text: this medication is Pregnancy Category B and is considered safe during pregnancy. This medication is not directly found in breast milk but it's metabolite acyclovir is present.
Doxycycline Pregnancy And Lactation Text: This medication is Pregnancy Category D and not consider safe during pregnancy. It is also excreted in breast milk but is considered safe for shorter treatment courses.
Doxepin Pregnancy And Lactation Text: This medication is Pregnancy Category C and it isn't known if it is safe during pregnancy. It is also excreted in breast milk and breast feeding isn't recommended.
Prednisone Counseling:  I discussed with the patient the risks of prolonged use of prednisone including but not limited to weight gain, insomnia, osteoporosis, mood changes, diabetes, susceptibility to infection, glaucoma and high blood pressure.  In cases where prednisone use is prolonged, patients should be monitored with blood pressure checks, serum glucose levels and an eye exam.  Additionally, the patient may need to be placed on GI prophylaxis, PCP prophylaxis, and calcium and vitamin D supplementation and/or a bisphosphonate.  The patient verbalized understanding of the proper use and the possible adverse effects of prednisone.  All of the patient's questions and concerns were addressed.
Valtrex Counseling: I discussed with the patient the risks of valacyclovir including but not limited to kidney damage, nausea, vomiting and severe allergy.  The patient understands that if the infection seems to be worsening or is not improving, they are to call.
Taltz Counseling: I discussed with the patient the risks of ixekizumab including but not limited to immunosuppression, serious infections, worsening of inflammatory bowel disease and drug reactions.  The patient understands that monitoring is required including a PPD at baseline and must alert us or the primary physician if symptoms of infection or other concerning signs are noted.
Methotrexate Pregnancy And Lactation Text: This medication is Pregnancy Category X and is known to cause fetal harm. This medication is excreted in breast milk.
Itraconazole Counseling:  I discussed with the patient the risks of itraconazole including but not limited to liver damage, nausea/vomiting, neuropathy, and severe allergy.  The patient understands that this medication is best absorbed when taken with acidic beverages such as non-diet cola or ginger ale.  The patient understands that monitoring is required including baseline LFTs and repeat LFTs at intervals.  The patient understands that they are to contact us or the primary physician if concerning signs are noted.
Topical Retinoid counseling:  Patient advised to apply a pea-sized amount only at bedtime and wait 30 minutes after washing their face before applying.  If too drying, patient may add a non-comedogenic moisturizer. The patient verbalized understanding of the proper use and possible adverse effects of retinoids.  All of the patient's questions and concerns were addressed.
High Dose Vitamin A Counseling: Side effects reviewed, pt to contact office should one occur.

## 2019-04-17 NOTE — PROCEDURE: ADDITIONAL NOTES
Detail Level: Detailed
Additional Notes: Discussed with patient doing a baby fraxel with Danielle for cosmetic purpose after her rash resolved.  Recommend cosmetic consult.

## 2019-04-29 ENCOUNTER — APPOINTMENT (RX ONLY)
Dept: URBAN - METROPOLITAN AREA CLINIC 35 | Facility: CLINIC | Age: 69
Setting detail: DERMATOLOGY
End: 2019-04-29

## 2019-04-29 DIAGNOSIS — Z41.9 ENCOUNTER FOR PROCEDURE FOR PURPOSES OTHER THAN REMEDYING HEALTH STATE, UNSPECIFIED: ICD-10-CM

## 2019-04-29 PROCEDURE — ? COSMETIC CONSULTATION - MICRO-NEEDLING

## 2019-04-29 PROCEDURE — ? COSMETIC CONSULTATION: FRACTIONAL RESURFACING

## 2019-04-29 ASSESSMENT — LOCATION ZONE DERM: LOCATION ZONE: FACE

## 2019-04-29 ASSESSMENT — LOCATION SIMPLE DESCRIPTION DERM: LOCATION SIMPLE: RIGHT FOREHEAD

## 2019-04-29 ASSESSMENT — LOCATION DETAILED DESCRIPTION DERM: LOCATION DETAILED: RIGHT INFERIOR MEDIAL FOREHEAD

## 2019-05-24 ENCOUNTER — APPOINTMENT (RX ONLY)
Dept: URBAN - METROPOLITAN AREA CLINIC 35 | Facility: CLINIC | Age: 69
Setting detail: DERMATOLOGY
End: 2019-05-24

## 2019-05-24 DIAGNOSIS — Z41.9 ENCOUNTER FOR PROCEDURE FOR PURPOSES OTHER THAN REMEDYING HEALTH STATE, UNSPECIFIED: ICD-10-CM

## 2019-05-24 PROCEDURE — ? COSMETIC CONSULTATION: FILLERS

## 2019-05-24 PROCEDURE — ? COSMETIC CONSULTATION: PRODUCTS

## 2019-05-24 PROCEDURE — ? COSMETIC CONSULTATION: FRACTIONAL RESURFACING

## 2019-05-24 PROCEDURE — ? COSMETIC CONSULTATION: BOTULINUM TOXIN

## 2019-06-05 ENCOUNTER — APPOINTMENT (RX ONLY)
Dept: URBAN - METROPOLITAN AREA CLINIC 35 | Facility: CLINIC | Age: 69
Setting detail: DERMATOLOGY
End: 2019-06-05

## 2019-06-05 DIAGNOSIS — L71.0 PERIORAL DERMATITIS: ICD-10-CM

## 2019-06-05 PROCEDURE — ? MEDICATION COUNSELING

## 2019-06-05 PROCEDURE — ? COUNSELING

## 2019-06-05 PROCEDURE — 99212 OFFICE O/P EST SF 10 MIN: CPT

## 2019-06-05 PROCEDURE — ? TREATMENT REGIMEN

## 2019-06-05 PROCEDURE — ? PRESCRIPTION

## 2019-06-05 RX ORDER — DOXYCYCLINE HYCLATE 100 MG/1
CAPSULE, GELATIN COATED ORAL QD
Qty: 30 | Refills: 2 | Status: ERX

## 2019-06-05 ASSESSMENT — LOCATION DETAILED DESCRIPTION DERM
LOCATION DETAILED: LEFT INFERIOR NASAL CHEEK
LOCATION DETAILED: RIGHT INFERIOR NASAL CHEEK

## 2019-06-05 ASSESSMENT — LOCATION ZONE DERM: LOCATION ZONE: FACE

## 2019-06-05 ASSESSMENT — LOCATION SIMPLE DESCRIPTION DERM
LOCATION SIMPLE: LEFT CHEEK
LOCATION SIMPLE: RIGHT CHEEK

## 2019-06-05 NOTE — PROCEDURE: MEDICATION COUNSELING
Odomzo Counseling- I discussed with the patient the risks of Odomzo including but not limited to nausea, vomiting, diarrhea, constipation, weight loss, changes in the sense of taste, decreased appetite, muscle spasms, and hair loss.  The patient verbalized understanding of the proper use and possible adverse effects of Odomzo.  All of the patient's questions and concerns were addressed.
Arava Pregnancy And Lactation Text: This medication is Pregnancy Category X and is absolutely contraindicated during pregnancy. It is unknown if it is excreted in breast milk.
Drysol Pregnancy And Lactation Text: This medication is considered safe during pregnancy and breast feeding.
Doxepin Pregnancy And Lactation Text: This medication is Pregnancy Category C and it isn't known if it is safe during pregnancy. It is also excreted in breast milk and breast feeding isn't recommended.
Solaraze Counseling:  I discussed with the patient the risks of Solaraze including but not limited to erythema, scaling, itching, weeping, crusting, and pain.
Humira Counseling:  I discussed with the patient the risks of adalimumab including but not limited to myelosuppression, immunosuppression, autoimmune hepatitis, demyelinating diseases, lymphoma, and serious infections.  The patient understands that monitoring is required including a PPD at baseline and must alert us or the primary physician if symptoms of infection or other concerning signs are noted.
Taltz Counseling: I discussed with the patient the risks of ixekizumab including but not limited to immunosuppression, serious infections, worsening of inflammatory bowel disease and drug reactions.  The patient understands that monitoring is required including a PPD at baseline and must alert us or the primary physician if symptoms of infection or other concerning signs are noted.
Prednisone Counseling:  I discussed with the patient the risks of prolonged use of prednisone including but not limited to weight gain, insomnia, osteoporosis, mood changes, diabetes, susceptibility to infection, glaucoma and high blood pressure.  In cases where prednisone use is prolonged, patients should be monitored with blood pressure checks, serum glucose levels and an eye exam.  Additionally, the patient may need to be placed on GI prophylaxis, PCP prophylaxis, and calcium and vitamin D supplementation and/or a bisphosphonate.  The patient verbalized understanding of the proper use and the possible adverse effects of prednisone.  All of the patient's questions and concerns were addressed.
Rifampin Counseling: I discussed with the patient the risks of rifampin including but not limited to liver damage, kidney damage, red-orange body fluids, nausea/vomiting and severe allergy.
Nsaids Pregnancy And Lactation Text: These medications are considered safe up to 30 weeks gestation. It is excreted in breast milk.
Arava Counseling:  Patient counseled regarding adverse effects of Arava including but not limited to nausea, vomiting, abnormalities in liver function tests. Patients may develop mouth sores, rash, diarrhea, and abnormalities in blood counts. The patient understands that monitoring is required including LFTs and blood counts.  There is a rare possibility of scarring of the liver and lung problems that can occur when taking methotrexate. Persistent nausea, loss of appetite, pale stools, dark urine, cough, and shortness of breath should be reported immediately. Patient advised to discontinue Arava treatment and consult with a physician prior to attempting conception. The patient will have to undergo a treatment to eliminate Arava from the body prior to conception.
Azithromycin Pregnancy And Lactation Text: This medication is considered safe during pregnancy and is also secreted in breast milk.
Clofazimine Counseling:  I discussed with the patient the risks of clofazimine including but not limited to skin and eye pigmentation, liver damage, nausea/vomiting, gastrointestinal bleeding and allergy.
Solaraze Pregnancy And Lactation Text: This medication is Pregnancy Category B and is considered safe. There is some data to suggest avoiding during the third trimester. It is unknown if this medication is excreted in breast milk.
Prednisone Pregnancy And Lactation Text: This medication is Pregnancy Category C and it isn't know if it is safe during pregnancy. This medication is excreted in breast milk.
Hydroxyzine Counseling: Patient advised that the medication is sedating and not to drive a car after taking this medication.  Patient informed of potential adverse effects including but not limited to dry mouth, urinary retention, and blurry vision.  The patient verbalized understanding of the proper use and possible adverse effects of hydroxyzine.  All of the patient's questions and concerns were addressed.
Humira Pregnancy And Lactation Text: This medication is Pregnancy Category B and is considered safe during pregnancy. It is unknown if this medication is excreted in breast milk.
Elidel Counseling: Patient may experience a mild burning sensation during topical application. Elidel is not approved in children less than 2 years of age. There have been case reports of hematologic and skin malignancies in patients using topical calcineurin inhibitors although causality is questionable.
Azithromycin Counseling:  I discussed with the patient the risks of azithromycin including but not limited to GI upset, allergic reaction, drug rash, diarrhea, and yeast infections.
Taltz Pregnancy And Lactation Text: The risk during pregnancy and breastfeeding is uncertain with this medication.
Rifampin Pregnancy And Lactation Text: This medication is Pregnancy Category C and it isn't know if it is safe during pregnancy. It is also excreted in breast milk and should not be used if you are breast feeding.
Tetracycline Pregnancy And Lactation Text: This medication is Pregnancy Category D and not consider safe during pregnancy. It is also excreted in breast milk.
Acitretin Counseling:  I discussed with the patient the risks of acitretin including but not limited to hair loss, dry lips/skin/eyes, liver damage, hyperlipidemia, depression/suicidal ideation, photosensitivity.  Serious rare side effects can include but are not limited to pancreatitis, pseudotumor cerebri, bony changes, clot formation/stroke/heart attack.  Patient understands that alcohol is contraindicated since it can result in liver toxicity and significantly prolong the elimination of the drug by many years.
Clofazimine Pregnancy And Lactation Text: This medication is Pregnancy Category C and isn't considered safe during pregnancy. It is excreted in breast milk.
Bactrim Counseling:  I discussed with the patient the risks of sulfa antibiotics including but not limited to GI upset, allergic reaction, drug rash, diarrhea, dizziness, photosensitivity, and yeast infections.  Rarely, more serious reactions can occur including but not limited to aplastic anemia, agranulocytosis, methemoglobinemia, blood dyscrasias, liver or kidney failure, lung infiltrates or desquamative/blistering drug rashes.
Topical Retinoid counseling:  Patient advised to apply a pea-sized amount only at bedtime and wait 30 minutes after washing their face before applying.  If too drying, patient may add a non-comedogenic moisturizer. The patient verbalized understanding of the proper use and possible adverse effects of retinoids.  All of the patient's questions and concerns were addressed.
Ilumya Counseling: I discussed with the patient the risks of tildrakizumab including but not limited to immunosuppression, malignancy, posterior leukoencephalopathy syndrome, and serious infections.  The patient understands that monitoring is required including a PPD at baseline and must alert us or the primary physician if symptoms of infection or other concerning signs are noted.
Elidel Pregnancy And Lactation Text: This medication is Pregnancy Category C. It is unknown if this medication is excreted in breast milk.
Tetracycline Counseling: Patient counseled regarding possible photosensitivity and increased risk for sunburn.  Patient instructed to avoid sunlight, if possible.  When exposed to sunlight, patients should wear protective clothing, sunglasses, and sunscreen.  The patient was instructed to call the office immediately if the following severe adverse effects occur:  hearing changes, easy bruising/bleeding, severe headache, or vision changes.  The patient verbalized understanding of the proper use and possible adverse effects of tetracycline.  All of the patient's questions and concerns were addressed. Patient understands to avoid pregnancy while on therapy due to potential birth defects.
Tremfya Counseling: I discussed with the patient the risks of guselkumab including but not limited to immunosuppression, serious infections, worsening of inflammatory bowel disease and drug reactions.  The patient understands that monitoring is required including a PPD at baseline and must alert us or the primary physician if symptoms of infection or other concerning signs are noted.
Hydroxyzine Pregnancy And Lactation Text: This medication is not safe during pregnancy and should not be taken. It is also excreted in breast milk and breast feeding isn't recommended.
Acitretin Pregnancy And Lactation Text: This medication is Pregnancy Category X and should not be given to women who are pregnant or may become pregnant in the future. This medication is excreted in breast milk.
Albendazole Counseling:  I discussed with the patient the risks of albendazole including but not limited to cytopenia, kidney damage, nausea/vomiting and severe allergy.  The patient understands that this medication is being used in an off-label manner.
Infliximab Counseling:  I discussed with the patient the risks of infliximab including but not limited to myelosuppression, immunosuppression, autoimmune hepatitis, demyelinating diseases, lymphoma, and serious infections.  The patient understands that monitoring is required including a PPD at baseline and must alert us or the primary physician if symptoms of infection or other concerning signs are noted.
Eucrisa Pregnancy And Lactation Text: This medication has not been assigned a Pregnancy Risk Category but animal studies failed to show danger with the topical medication. It is unknown if the medication is excreted in breast milk.
Otezla Pregnancy And Lactation Text: This medication is Pregnancy Category C and it isn't known if it is safe during pregnancy. It is unknown if it is excreted in breast milk.
Bactrim Pregnancy And Lactation Text: This medication is Pregnancy Category D and is known to cause fetal risk.  It is also excreted in breast milk.
Colchicine Counseling:  Patient counseled regarding adverse effects including but not limited to stomach upset (nausea, vomiting, stomach pain, or diarrhea).  Patient instructed to limit alcohol consumption while taking this medication.  Colchicine may reduce blood counts especially with prolonged use.  The patient understands that monitoring of kidney function and blood counts may be required, especially at baseline. The patient verbalized understanding of the proper use and possible adverse effects of colchicine.  All of the patient's questions and concerns were addressed.
Eucrisa Counseling: Patient may experience a mild burning sensation during topical application. Eucrisa is not approved in children less than 2 years of age.
Otezla Counseling: The side effects of Otezla were discussed with the patient, including but not limited to worsening or new depression, weight loss, diarrhea, nausea, upper respiratory tract infection, and headache. Patient instructed to call the office should any adverse effect occur.  The patient verbalized understanding of the proper use and possible adverse effects of Otezla.  All the patient's questions and concerns were addressed.
Tazorac Pregnancy And Lactation Text: This medication is not safe during pregnancy. It is unknown if this medication is excreted in breast milk.
Hydroquinone Counseling:  Patient advised that medication may result in skin irritation, lightening (hypopigmentation), dryness, and burning.  In the event of skin irritation, the patient was advised to reduce the amount of the drug applied or use it less frequently.  Rarely, spots that are treated with hydroquinone can become darker (pseudoochronosis).  Should this occur, patient instructed to stop medication and call the office. The patient verbalized understanding of the proper use and possible adverse effects of hydroquinone.  All of the patient's questions and concerns were addressed.
Xelpiercez Pregnancy And Lactation Text: This medication is Pregnancy Category D and is not considered safe during pregnancy.  The risk during breast feeding is also uncertain.
Fluconazole Counseling:  Patient counseled regarding adverse effects of fluconazole including but not limited to headache, diarrhea, nausea, upset stomach, liver function test abnormalities, taste disturbance, and stomach pain.  There is a rare possibility of liver failure that can occur when taking fluconazole.  The patient understands that monitoring of LFTs and kidney function test may be required, especially at baseline. The patient verbalized understanding of the proper use and possible adverse effects of fluconazole.  All of the patient's questions and concerns were addressed.
Oxybutynin Counseling:  I discussed with the patient the risks of oxybutynin including but not limited to skin rash, drowsiness, dry mouth, difficulty urinating, and blurred vision.
Albendazole Pregnancy And Lactation Text: This medication is Pregnancy Category C and it isn't known if it is safe during pregnancy. It is also excreted in breast milk.
Bexarotene Counseling:  I discussed with the patient the risks of bexarotene including but not limited to hair loss, dry lips/skin/eyes, liver abnormalities, hyperlipidemia, pancreatitis, depression/suicidal ideation, photosensitivity, drug rash/allergic reactions, hypothyroidism, anemia, leukopenia, infection, cataracts, and teratogenicity.  Patient understands that they will need regular blood tests to check lipid profile, liver function tests, white blood cell count, thyroid function tests and pregnancy test if applicable.
Cephalexin Counseling: I counseled the patient regarding use of cephalexin as an antibiotic for prophylactic and/or therapeutic purposes. Cephalexin (commonly prescribed under brand name Keflex) is a cephalosporin antibiotic which is active against numerous classes of bacteria, including most skin bacteria. Side effects may include nausea, diarrhea, gastrointestinal upset, rash, hives, yeast infections, and in rare cases, hepatitis, kidney disease, seizures, fever, confusion, neurologic symptoms, and others. Patients with severe allergies to penicillin medications are cautioned that there is about a 10% incidence of cross-reactivity with cephalosporins. When possible, patients with penicillin allergies should use alternatives to cephalosporins for antibiotic therapy.
Xeljanz Counseling: I discussed with the patient the risks of Xeljanz therapy including increased risk of infection, liver issues, headache, diarrhea, or cold symptoms. Live vaccines should be avoided. They were instructed to call if they have any problems.
Tazorac Counseling:  Patient advised that medication is irritating and drying.  Patient may need to apply sparingly and wash off after an hour before eventually leaving it on overnight.  The patient verbalized understanding of the proper use and possible adverse effects of tazorac.  All of the patient's questions and concerns were addressed.
Rituxan Counseling:  I discussed with the patient the risks of Rituxan infusions. Side effects can include infusion reactions, severe drug rashes including mucocutaneous reactions, reactivation of latent hepatitis and other infections and rarely progressive multifocal leukoencephalopathy.  All of the patient's questions and concerns were addressed.
Topical Clindamycin Counseling: Patient counseled that this medication may cause skin irritation or allergic reactions.  In the event of skin irritation, the patient was advised to reduce the amount of the drug applied or use it less frequently.   The patient verbalized understanding of the proper use and possible adverse effects of clindamycin.  All of the patient's questions and concerns were addressed.
Xolair Counseling:  Patient informed of potential adverse effects including but not limited to fever, muscle aches, rash and allergic reactions.  The patient verbalized understanding of the proper use and possible adverse effects of Xolair.  All of the patient's questions and concerns were addressed.
Oxybutynin Pregnancy And Lactation Text: This medication is Pregnancy Category B and is considered safe during pregnancy. It is unknown if it is excreted in breast milk.
Dapsone Counseling: I discussed with the patient the risks of dapsone including but not limited to hemolytic anemia, agranulocytosis, rashes, methemoglobinemia, kidney failure, peripheral neuropathy, headaches, GI upset, and liver toxicity.  Patients who start dapsone require monitoring including baseline LFTs and weekly CBCs for the first month, then every month thereafter.  The patient verbalized understanding of the proper use and possible adverse effects of dapsone.  All of the patient's questions and concerns were addressed.
Fluconazole Pregnancy And Lactation Text: This medication is Pregnancy Category C and it isn't know if it is safe during pregnancy. It is also excreted in breast milk.
Bexarotene Pregnancy And Lactation Text: This medication is Pregnancy Category X and should not be given to women who are pregnant or may become pregnant. This medication should not be used if you are breast feeding.
Ivermectin Counseling:  Patient instructed to take medication on an empty stomach with a full glass of water.  Patient informed of potential adverse effects including but not limited to nausea, diarrhea, dizziness, itching, and swelling of the extremities or lymph nodes.  The patient verbalized understanding of the proper use and possible adverse effects of ivermectin.  All of the patient's questions and concerns were addressed.
Cephalexin Pregnancy And Lactation Text: This medication is Pregnancy Category B and considered safe during pregnancy.  It is also excreted in breast milk but can be used safely for shorter doses.
Clindamycin Pregnancy And Lactation Text: This medication can be used in pregnancy if certain situations. Clindamycin is also present in breast milk.
Xolair Pregnancy And Lactation Text: This medication is Pregnancy Category B and is considered safe during pregnancy. This medication is excreted in breast milk.
Isotretinoin Counseling: Patient should get monthly blood tests, not donate blood, not drive at night if vision affected, not share medication, and not undergo elective surgery for 6 months after tx completed. Side effects reviewed, pt to contact office should one occur.
Birth Control Pills Counseling: Birth Control Pill Counseling: I discussed with the patient the potential side effects of OCPs including but not limited to increased risk of stroke, heart attack, thrombophlebitis, deep venous thrombosis, hepatic adenomas, breast changes, GI upset, headaches, and depression.  The patient verbalized understanding of the proper use and possible adverse effects of OCPs. All of the patient's questions and concerns were addressed.
Imiquimod Counseling:  I discussed with the patient the risks of imiquimod including but not limited to erythema, scaling, itching, weeping, crusting, and pain.  Patient understands that the inflammatory response to imiquimod is variable from person to person and was educated regarded proper titration schedule.  If flu-like symptoms develop, patient knows to discontinue the medication and contact us.
Rituxan Pregnancy And Lactation Text: This medication is Pregnancy Category C and it isn't know if it is safe during pregnancy. It is unknown if this medication is excreted in breast milk but similar antibodies are known to be excreted.
Clindamycin Counseling: I counseled the patient regarding use of clindamycin as an antibiotic for prophylactic and/or therapeutic purposes. Clindamycin is active against numerous classes of bacteria, including skin bacteria. Side effects may include nausea, diarrhea, gastrointestinal upset, rash, hives, yeast infections, and in rare cases, colitis.
Dapsone Pregnancy And Lactation Text: This medication is Pregnancy Category C and is not considered safe during pregnancy or breast feeding.
Griseofulvin Counseling:  I discussed with the patient the risks of griseofulvin including but not limited to photosensitivity, cytopenia, liver damage, nausea/vomiting and severe allergy.  The patient understands that this medication is best absorbed when taken with a fatty meal (e.g., ice cream or french fries).
Itraconazole Counseling:  I discussed with the patient the risks of itraconazole including but not limited to liver damage, nausea/vomiting, neuropathy, and severe allergy.  The patient understands that this medication is best absorbed when taken with acidic beverages such as non-diet cola or ginger ale.  The patient understands that monitoring is required including baseline LFTs and repeat LFTs at intervals.  The patient understands that they are to contact us or the primary physician if concerning signs are noted.
Topical Sulfur Applications Counseling: Topical Sulfur Counseling: Patient counseled that this medication may cause skin irritation or allergic reactions.  In the event of skin irritation, the patient was advised to reduce the amount of the drug applied or use it less frequently.   The patient verbalized understanding of the proper use and possible adverse effects of topical sulfur application.  All of the patient's questions and concerns were addressed.
High Dose Vitamin A Counseling: Side effects reviewed, pt to contact office should one occur.
Doxycycline Counseling:  Patient counseled regarding possible photosensitivity and increased risk for sunburn.  Patient instructed to avoid sunlight, if possible.  When exposed to sunlight, patients should wear protective clothing, sunglasses, and sunscreen.  The patient was instructed to call the office immediately if the following severe adverse effects occur:  hearing changes, easy bruising/bleeding, severe headache, or vision changes.  The patient verbalized understanding of the proper use and possible adverse effects of doxycycline.  All of the patient's questions and concerns were addressed.
Siliq Counseling:  I discussed with the patient the risks of Siliq including but not limited to new or worsening depression, suicidal thoughts and behavior, immunosuppression, malignancy, posterior leukoencephalopathy syndrome, and serious infections.  The patient understands that monitoring is required including a PPD at baseline and must alert us or the primary physician if symptoms of infection or other concerning signs are noted. There is also a special program designed to monitor depression which is required with Siliq.
Isotretinoin Pregnancy And Lactation Text: This medication is Pregnancy Category X and is considered extremely dangerous during pregnancy. It is unknown if it is excreted in breast milk.
Birth Control Pills Pregnancy And Lactation Text: This medication should be avoided if pregnant and for the first 30 days post-partum.
Griseofulvin Pregnancy And Lactation Text: This medication is Pregnancy Category X and is known to cause serious birth defects. It is unknown if this medication is excreted in breast milk but breast feeding should be avoided.
Erivedge Counseling- I discussed with the patient the risks of Erivedge including but not limited to nausea, vomiting, diarrhea, constipation, weight loss, changes in the sense of taste, decreased appetite, muscle spasms, and hair loss.  The patient verbalized understanding of the proper use and possible adverse effects of Erivedge.  All of the patient's questions and concerns were addressed.
High Dose Vitamin A Pregnancy And Lactation Text: High dose vitamin A therapy is contraindicated during pregnancy and breast feeding.
Simponi Counseling:  I discussed with the patient the risks of golimumab including but not limited to myelosuppression, immunosuppression, autoimmune hepatitis, demyelinating diseases, lymphoma, and serious infections.  The patient understands that monitoring is required including a PPD at baseline and must alert us or the primary physician if symptoms of infection or other concerning signs are noted.
Spironolactone Pregnancy And Lactation Text: This medication can cause feminization of the male fetus and should be avoided during pregnancy. The active metabolite is also found in breast milk.
Doxycycline Pregnancy And Lactation Text: This medication is Pregnancy Category D and not consider safe during pregnancy. It is also excreted in breast milk but is considered safe for shorter treatment courses.
Gabapentin Counseling: I discussed with the patient the risks of gabapentin including but not limited to dizziness, somnolence, fatigue and ataxia.
Topical Sulfur Applications Pregnancy And Lactation Text: This medication is Pregnancy Category C and has an unknown safety profile during pregnancy. It is unknown if this topical medication is excreted in breast milk.
Minoxidil Counseling: Minoxidil is a topical medication which can increase blood flow where it is applied. It is uncertain how this medication increases hair growth. Side effects are uncommon and include stinging and allergic reactions.
Azathioprine Counseling:  I discussed with the patient the risks of azathioprine including but not limited to myelosuppression, immunosuppression, hepatotoxicity, lymphoma, and infections.  The patient understands that monitoring is required including baseline LFTs, Creatinine, possible TPMP genotyping and weekly CBCs for the first month and then every 2 weeks thereafter.  The patient verbalized understanding of the proper use and possible adverse effects of azathioprine.  All of the patient's questions and concerns were addressed.
Spironolactone Counseling: Patient advised regarding risks of diarrhea, abdominal pain, hyperkalemia, birth defects (for female patients), liver toxicity and renal toxicity. The patient may need blood work to monitor liver and kidney function and potassium levels while on therapy. The patient verbalized understanding of the proper use and possible adverse effects of spironolactone.  All of the patient's questions and concerns were addressed.
Cellcept Counseling:  I discussed with the patient the risks of mycophenolate mofetil including but not limited to infection/immunosuppression, GI upset, hypokalemia, hypercholesterolemia, bone marrow suppression, lymphoproliferative disorders, malignancy, GI ulceration/bleed/perforation, colitis, interstitial lung disease, kidney failure, progressive multifocal leukoencephalopathy, and birth defects.  The patient understands that monitoring is required including a baseline creatinine and regular CBC testing. In addition, patient must alert us immediately if symptoms of infection or other concerning signs are noted.
SSKI Counseling:  I discussed with the patient the risks of SSKI including but not limited to thyroid abnormalities, metallic taste, GI upset, fever, headache, acne, arthralgias, paraesthesias, lymphadenopathy, easy bleeding, arrhythmias, and allergic reaction.
Cimzia Counseling:  I discussed with the patient the risks of Cimzia including but not limited to immunosuppression, allergic reactions and infections.  The patient understands that monitoring is required including a PPD at baseline and must alert us or the primary physician if symptoms of infection or other concerning signs are noted.
Wartpeel Counseling:  I discussed with the patient the risks of Wartpeel including but not limited to erythema, scaling, itching, weeping, crusting, and pain.
Erythromycin Counseling:  I discussed with the patient the risks of erythromycin including but not limited to GI upset, allergic reaction, drug rash, diarrhea, increase in liver enzymes, and yeast infections.
Ketoconazole Counseling:   Patient counseled regarding improving absorption with orange juice.  Adverse effects include but are not limited to breast enlargement, headache, diarrhea, nausea, upset stomach, liver function test abnormalities, taste disturbance, and stomach pain.  There is a rare possibility of liver failure that can occur when taking ketoconazole. The patient understands that monitoring of LFTs may be required, especially at baseline. The patient verbalized understanding of the proper use and possible adverse effects of ketoconazole.  All of the patient's questions and concerns were addressed.
Azathioprine Pregnancy And Lactation Text: This medication is Pregnancy Category D and isn't considered safe during pregnancy. It is unknown if this medication is excreted in breast milk.
Benzoyl Peroxide Counseling: Patient counseled that medicine may cause skin irritation and bleach clothing.  In the event of skin irritation, the patient was advised to reduce the amount of the drug applied or use it less frequently.   The patient verbalized understanding of the proper use and possible adverse effects of benzoyl peroxide.  All of the patient's questions and concerns were addressed.
Mirvaso Pregnancy And Lactation Text: This medication has not been assigned a Pregnancy Risk Category. It is unknown if the medication is excreted in breast milk.
Stelara Counseling:  I discussed with the patient the risks of ustekinumab including but not limited to immunosuppression, malignancy, posterior leukoencephalopathy syndrome, and serious infections.  The patient understands that monitoring is required including a PPD at baseline and must alert us or the primary physician if symptoms of infection or other concerning signs are noted.
Sski Pregnancy And Lactation Text: This medication is Pregnancy Category D and isn't considered safe during pregnancy. It is excreted in breast milk.
Ketoconazole Pregnancy And Lactation Text: This medication is Pregnancy Category C and it isn't know if it is safe during pregnancy. It is also excreted in breast milk and breast feeding isn't recommended.
Cimzia Pregnancy And Lactation Text: This medication crosses the placenta but can be considered safe in certain situations. Cimzia may be excreted in breast milk.
Glycopyrrolate Counseling:  I discussed with the patient the risks of glycopyrrolate including but not limited to skin rash, drowsiness, dry mouth, difficulty urinating, and blurred vision.
Mirvaso Counseling: Mirvaso is a topical medication which can decrease superficial blood flow where applied. Side effects are uncommon and include stinging, redness and allergic reactions.
Wartpeel Pregnancy And Lactation Text: This medication is Pregnancy Category X and contraindicated in pregnancy and in women who may become pregnant. It is unknown if this medication is excreted in breast milk.
Erythromycin Pregnancy And Lactation Text: This medication is Pregnancy Category B and is considered safe during pregnancy. It is also excreted in breast milk.
Metronidazole Pregnancy And Lactation Text: This medication is Pregnancy Category B and considered safe during pregnancy.  It is also excreted in breast milk.
Picato Counseling:  I discussed with the patient the risks of Picato including but not limited to erythema, scaling, itching, weeping, crusting, and pain.
Terbinafine Pregnancy And Lactation Text: This medication is Pregnancy Category B and is considered safe during pregnancy. It is also excreted in breast milk and breast feeding isn't recommended.
Cyclophosphamide Counseling:  I discussed with the patient the risks of cyclophosphamide including but not limited to hair loss, hormonal abnormalities, decreased fertility, abdominal pain, diarrhea, nausea and vomiting, bone marrow suppression and infection. The patient understands that monitoring is required while taking this medication.
Benzoyl Peroxide Pregnancy And Lactation Text: This medication is Pregnancy Category C. It is unknown if benzoyl peroxide is excreted in breast milk.
Detail Level: Detailed
Cosentyx Counseling:  I discussed with the patient the risks of Cosentyx including but not limited to worsening of Crohn's disease, immunosuppression, allergic reactions and infections.  The patient understands that monitoring is required including a PPD at baseline and must alert us or the primary physician if symptoms of infection or other concerning signs are noted.
Thalidomide Counseling: I discussed with the patient the risks of thalidomide including but not limited to birth defects, anxiety, weakness, chest pain, dizziness, cough and severe allergy.
Metronidazole Counseling:  I discussed with the patient the risks of metronidazole including but not limited to seizures, nausea/vomiting, a metallic taste in the mouth, nausea/vomiting and severe allergy.
Glycopyrrolate Pregnancy And Lactation Text: This medication is Pregnancy Category B and is considered safe during pregnancy. It is unknown if it is excreted breast milk.
Terbinafine Counseling: Patient counseling regarding adverse effects of terbinafine including but not limited to headache, diarrhea, rash, upset stomach, liver function test abnormalities, itching, taste/smell disturbance, nausea, abdominal pain, and flatulence.  There is a rare possibility of liver failure that can occur when taking terbinafine.  The patient understands that a baseline LFT and kidney function test may be required. The patient verbalized understanding of the proper use and possible adverse effects of terbinafine.  All of the patient's questions and concerns were addressed.
Zyclara Counseling:  I discussed with the patient the risks of imiquimod including but not limited to erythema, scaling, itching, weeping, crusting, and pain.  Patient understands that the inflammatory response to imiquimod is variable from person to person and was educated regarded proper titration schedule.  If flu-like symptoms develop, patient knows to discontinue the medication and contact us.
Valtrex Counseling: I discussed with the patient the risks of valacyclovir including but not limited to kidney damage, nausea, vomiting and severe allergy.  The patient understands that if the infection seems to be worsening or is not improving, they are to call.
Include Pregnancy/Lactation Warning?: No
Hydroxychloroquine Pregnancy And Lactation Text: This medication has been shown to cause fetal harm but it isn't assigned a Pregnancy Risk Category. There are small amounts excreted in breast milk.
Minocycline Counseling: Patient advised regarding possible photosensitivity and discoloration of the teeth, skin, lips, tongue and gums.  Patient instructed to avoid sunlight, if possible.  When exposed to sunlight, patients should wear protective clothing, sunglasses, and sunscreen.  The patient was instructed to call the office immediately if the following severe adverse effects occur:  hearing changes, easy bruising/bleeding, severe headache, or vision changes.  The patient verbalized understanding of the proper use and possible adverse effects of minocycline.  All of the patient's questions and concerns were addressed.
Cyclophosphamide Pregnancy And Lactation Text: This medication is Pregnancy Category D and it isn't considered safe during pregnancy. This medication is excreted in breast milk.
Carac Counseling:  I discussed with the patient the risks of Carac including but not limited to erythema, scaling, itching, weeping, crusting, and pain.
Hydroxychloroquine Counseling:  I discussed with the patient that a baseline ophthalmologic exam is needed at the start of therapy and every year thereafter while on therapy. A CBC may also be warranted for monitoring.  The side effects of this medication were discussed with the patient, including but not limited to agranulocytosis, aplastic anemia, seizures, rashes, retinopathy, and liver toxicity. Patient instructed to call the office should any adverse effect occur.  The patient verbalized understanding of the proper use and possible adverse effects of Plaquenil.  All the patient's questions and concerns were addressed.
Valtrex Pregnancy And Lactation Text: this medication is Pregnancy Category B and is considered safe during pregnancy. This medication is not directly found in breast milk but it's metabolite acyclovir is present.
Niacinamide Counseling: I recommended taking niacin or niacinamide, also know as vitamin B3, twice daily. Recent evidence suggests that taking vitamin B3 (500 mg twice daily) can reduce the risk of actinic keratoses and non-melanoma skin cancers. Side effects of vitamin B3 include flushing and headache.
Cimetidine Counseling:  I discussed with the patient the risks of Cimetidine including but not limited to gynecomastia, headache, diarrhea, nausea, drowsiness, arrhythmias, pancreatitis, skin rashes, psychosis, bone marrow suppression and kidney toxicity.
Opioid Counseling: I discussed with the patient the potential side effects of opioids including but not limited to addiction, altered mental status, and depression. I stressed avoiding alcohol, benzodiazepines, muscle relaxants and sleep aids unless specifically okayed by a physician. The patient verbalized understanding of the proper use and possible adverse effects of opioids. All of the patient's questions and concerns were addressed. They were instructed to flush the remaining pills down the toilet if they did not need them for pain.
Dupixent Counseling: I discussed with the patient the risks of dupilumab including but not limited to eye infection and irritation, cold sores, injection site reactions, worsening of asthma, allergic reactions and increased risk of parasitic infection.  Live vaccines should be avoided while taking dupilumab. Dupilumab will also interact with certain medications such as warfarin and cyclosporine. The patient understands that monitoring is required and they must alert us or the primary physician if symptoms of infection or other concerning signs are noted.
Protopic Counseling: Patient may experience a mild burning sensation during topical application. Protopic is not approved in children less than 2 years of age. There have been case reports of hematologic and skin malignancies in patients using topical calcineurin inhibitors although causality is questionable.
Cyclosporine Counseling:  I discussed with the patient the risks of cyclosporine including but not limited to hypertension, gingival hyperplasia,myelosuppression, immunosuppression, liver damage, kidney damage, neurotoxicity, lymphoma, and serious infections. The patient understands that monitoring is required including baseline blood pressure, CBC, CMP, lipid panel and uric acid, and then 1-2 times monthly CMP and blood pressure.
Methotrexate Counseling:  Patient counseled regarding adverse effects of methotrexate including but not limited to nausea, vomiting, abnormalities in liver function tests. Patients may develop mouth sores, rash, diarrhea, and abnormalities in blood counts. The patient understands that monitoring is required including LFT's and blood counts.  There is a rare possibility of scarring of the liver and lung problems that can occur when taking methotrexate. Persistent nausea, loss of appetite, pale stools, dark urine, cough, and shortness of breath should be reported immediately. Patient advised to discontinue methotrexate treatment at least three months before attempting to become pregnant.  I discussed the need for folate supplements while taking methotrexate.  These supplements can decrease side effects during methotrexate treatment. The patient verbalized understanding of the proper use and possible adverse effects of methotrexate.  All of the patient's questions and concerns were addressed.
Enbrel Counseling:  I discussed with the patient the risks of etanercept including but not limited to myelosuppression, immunosuppression, autoimmune hepatitis, demyelinating diseases, lymphoma, and infections.  The patient understands that monitoring is required including a PPD at baseline and must alert us or the primary physician if symptoms of infection or other concerning signs are noted.
Niacinamide Pregnancy And Lactation Text: These medications are considered safe during pregnancy.
Quinolones Counseling:  I discussed with the patient the risks of fluoroquinolones including but not limited to GI upset, allergic reaction, drug rash, diarrhea, dizziness, photosensitivity, yeast infections, liver function test abnormalities, tendonitis/tendon rupture.
Opioid Pregnancy And Lactation Text: These medications can lead to premature delivery and should be avoided during pregnancy. These medications are also present in breast milk in small amounts.
Protopic Pregnancy And Lactation Text: This medication is Pregnancy Category C. It is unknown if this medication is excreted in breast milk when applied topically.
Dupixent Pregnancy And Lactation Text: This medication likely crosses the placenta but the risk for the fetus is uncertain. This medication is excreted in breast milk.
5-Fu Counseling: 5-Fluorouracil Counseling:  I discussed with the patient the risks of 5-fluorouracil including but not limited to erythema, scaling, itching, weeping, crusting, and pain.
Drysol Counseling:  I discussed with the patient the risks of drysol/aluminum chloride including but not limited to skin rash, itching, irritation, burning.
Doxepin Counseling:  Patient advised that the medication is sedating and not to drive a car after taking this medication. Patient informed of potential adverse effects including but not limited to dry mouth, urinary retention, and blurry vision.  The patient verbalized understanding of the proper use and possible adverse effects of doxepin.  All of the patient's questions and concerns were addressed.
Methotrexate Pregnancy And Lactation Text: This medication is Pregnancy Category X and is known to cause fetal harm. This medication is excreted in breast milk.
Nsaids Counseling: NSAID Counseling: I discussed with the patient that NSAIDs should be taken with food. Prolonged use of NSAIDs can result in the development of stomach ulcers.  Patient advised to stop taking NSAIDs if abdominal pain occurs.  The patient verbalized understanding of the proper use and possible adverse effects of NSAIDs.  All of the patient's questions and concerns were addressed.
Rhofade Counseling: Rhofade is a topical medication which can decrease superficial blood flow where applied. Side effects are uncommon and include stinging, redness and allergic reactions.

## 2019-08-15 ENCOUNTER — APPOINTMENT (RX ONLY)
Dept: URBAN - METROPOLITAN AREA CLINIC 35 | Facility: CLINIC | Age: 69
Setting detail: DERMATOLOGY
End: 2019-08-15

## 2019-08-15 DIAGNOSIS — Z85.828 PERSONAL HISTORY OF OTHER MALIGNANT NEOPLASM OF SKIN: ICD-10-CM

## 2019-08-15 DIAGNOSIS — L82.1 OTHER SEBORRHEIC KERATOSIS: ICD-10-CM

## 2019-08-15 DIAGNOSIS — L84 CORNS AND CALLOSITIES: ICD-10-CM

## 2019-08-15 DIAGNOSIS — L81.4 OTHER MELANIN HYPERPIGMENTATION: ICD-10-CM

## 2019-08-15 DIAGNOSIS — B35.3 TINEA PEDIS: ICD-10-CM

## 2019-08-15 DIAGNOSIS — Z71.89 OTHER SPECIFIED COUNSELING: ICD-10-CM

## 2019-08-15 DIAGNOSIS — D22 MELANOCYTIC NEVI: ICD-10-CM

## 2019-08-15 PROBLEM — D22.5 MELANOCYTIC NEVI OF TRUNK: Status: ACTIVE | Noted: 2019-08-15

## 2019-08-15 PROCEDURE — ? COUNSELING

## 2019-08-15 PROCEDURE — ? TREATMENT REGIMEN

## 2019-08-15 PROCEDURE — 99213 OFFICE O/P EST LOW 20 MIN: CPT

## 2019-08-15 ASSESSMENT — LOCATION DETAILED DESCRIPTION DERM
LOCATION DETAILED: LEFT LATERAL PLANTAR HEEL
LOCATION DETAILED: RIGHT MEDIAL PLANTAR HEEL
LOCATION DETAILED: LEFT SUPERIOR HELIX
LOCATION DETAILED: RIGHT DISTAL POSTERIOR UPPER ARM
LOCATION DETAILED: RIGHT SUPERIOR UPPER BACK
LOCATION DETAILED: LEFT LATERAL BREAST 5-6:00 REGION
LOCATION DETAILED: 1ST WEBSPACE LEFT FOOT
LOCATION DETAILED: 1ST WEBSPACE RIGHT FOOT
LOCATION DETAILED: RIGHT MEDIAL TRAPEZIAL NECK

## 2019-08-15 ASSESSMENT — LOCATION SIMPLE DESCRIPTION DERM
LOCATION SIMPLE: RIGHT POSTERIOR UPPER ARM
LOCATION SIMPLE: LEFT PLANTAR SURFACE
LOCATION SIMPLE: RIGHT FOOT
LOCATION SIMPLE: LEFT EAR
LOCATION SIMPLE: LEFT FOOT
LOCATION SIMPLE: RIGHT PLANTAR SURFACE
LOCATION SIMPLE: LEFT BREAST
LOCATION SIMPLE: RIGHT UPPER BACK
LOCATION SIMPLE: POSTERIOR NECK

## 2019-08-15 ASSESSMENT — LOCATION ZONE DERM
LOCATION ZONE: EAR
LOCATION ZONE: NECK
LOCATION ZONE: TRUNK
LOCATION ZONE: ARM
LOCATION ZONE: FEET

## 2019-08-15 NOTE — PROCEDURE: TREATMENT REGIMEN
Plan: Informed Melissa that she can use Cerave SA to treat  the Callus on the bottom of her feet and toes.
Detail Level: Zone
Plan: Informed Karen she can use over the counter Lamisil Cream to treat the fungus in between her toes.

## 2019-08-19 ENCOUNTER — HOSPITAL ENCOUNTER (OUTPATIENT)
Dept: LAB | Facility: MEDICAL CENTER | Age: 69
End: 2019-08-19
Attending: INTERNAL MEDICINE
Payer: MEDICARE

## 2019-08-19 LAB
ALBUMIN SERPL BCP-MCNC: 4.3 G/DL (ref 3.2–4.9)
ALBUMIN/GLOB SERPL: 1.7 G/DL
ALP SERPL-CCNC: 92 U/L (ref 30–99)
ALT SERPL-CCNC: 26 U/L (ref 2–50)
ANION GAP SERPL CALC-SCNC: 8 MMOL/L (ref 0–11.9)
APPEARANCE UR: CLEAR
AST SERPL-CCNC: 25 U/L (ref 12–45)
BILIRUB SERPL-MCNC: 0.4 MG/DL (ref 0.1–1.5)
BILIRUB UR QL STRIP.AUTO: NEGATIVE
BUN SERPL-MCNC: 22 MG/DL (ref 8–22)
CALCIUM SERPL-MCNC: 9.1 MG/DL (ref 8.5–10.5)
CHLORIDE SERPL-SCNC: 109 MMOL/L (ref 96–112)
CHOLEST SERPL-MCNC: 169 MG/DL (ref 100–199)
CO2 SERPL-SCNC: 26 MMOL/L (ref 20–33)
COLOR UR: YELLOW
CREAT SERPL-MCNC: 0.73 MG/DL (ref 0.5–1.4)
GLOBULIN SER CALC-MCNC: 2.5 G/DL (ref 1.9–3.5)
GLUCOSE SERPL-MCNC: 99 MG/DL (ref 65–99)
GLUCOSE UR STRIP.AUTO-MCNC: NEGATIVE MG/DL
HDLC SERPL-MCNC: 51 MG/DL
KETONES UR STRIP.AUTO-MCNC: NEGATIVE MG/DL
LDLC SERPL CALC-MCNC: 85 MG/DL
LEUKOCYTE ESTERASE UR QL STRIP.AUTO: NEGATIVE
MICRO URNS: NORMAL
NITRITE UR QL STRIP.AUTO: NEGATIVE
PH UR STRIP.AUTO: 5 [PH] (ref 5–8)
POTASSIUM SERPL-SCNC: 4.7 MMOL/L (ref 3.6–5.5)
PROT SERPL-MCNC: 6.8 G/DL (ref 6–8.2)
PROT UR QL STRIP: NEGATIVE MG/DL
RBC UR QL AUTO: NEGATIVE
SODIUM SERPL-SCNC: 143 MMOL/L (ref 135–145)
SP GR UR STRIP.AUTO: 1.02
TRIGL SERPL-MCNC: 167 MG/DL (ref 0–149)
TSH SERPL DL<=0.005 MIU/L-ACNC: 2.97 UIU/ML (ref 0.38–5.33)
UROBILINOGEN UR STRIP.AUTO-MCNC: 0.2 MG/DL
VIT B12 SERPL-MCNC: 737 PG/ML (ref 211–911)

## 2019-08-19 PROCEDURE — 82607 VITAMIN B-12: CPT

## 2019-08-19 PROCEDURE — 84443 ASSAY THYROID STIM HORMONE: CPT

## 2019-08-19 PROCEDURE — 83036 HEMOGLOBIN GLYCOSYLATED A1C: CPT

## 2019-08-19 PROCEDURE — 80053 COMPREHEN METABOLIC PANEL: CPT

## 2019-08-19 PROCEDURE — 81003 URINALYSIS AUTO W/O SCOPE: CPT

## 2019-08-19 PROCEDURE — 36415 COLL VENOUS BLD VENIPUNCTURE: CPT

## 2019-08-19 PROCEDURE — 80061 LIPID PANEL: CPT

## 2019-08-20 LAB
EST. AVERAGE GLUCOSE BLD GHB EST-MCNC: 128 MG/DL
HBA1C MFR BLD: 6.1 % (ref 0–5.6)

## 2019-11-08 ENCOUNTER — TELEPHONE (OUTPATIENT)
Dept: MEDICAL GROUP | Facility: LAB | Age: 69
End: 2019-11-08

## 2019-11-08 NOTE — TELEPHONE ENCOUNTER
1. Caller Name: Michela Valdez    Call Back Number: [phone] (home) [phone] (work)        Patient approves a detailed voicemail message: N\A    Patient called and LVM asking if she can get a x-ray of her lower back if needed over the weekend. It's her right side above tailbone, spread from left to right   She's taking 2 advil every 5 hours sleeping and sitting fine just walking is bugging her.     Please advise

## 2019-11-12 ENCOUNTER — OFFICE VISIT (OUTPATIENT)
Dept: MEDICAL GROUP | Facility: LAB | Age: 69
End: 2019-11-12
Payer: MEDICARE

## 2019-11-12 VITALS
SYSTOLIC BLOOD PRESSURE: 108 MMHG | DIASTOLIC BLOOD PRESSURE: 62 MMHG | HEART RATE: 79 BPM | TEMPERATURE: 98.9 F | HEIGHT: 65 IN | BODY MASS INDEX: 25.99 KG/M2 | WEIGHT: 156 LBS | OXYGEN SATURATION: 96 % | RESPIRATION RATE: 16 BRPM

## 2019-11-12 DIAGNOSIS — Z23 NEED FOR VACCINATION: ICD-10-CM

## 2019-11-12 DIAGNOSIS — Z12.39 ENCOUNTER FOR SCREENING FOR MALIGNANT NEOPLASM OF BREAST: ICD-10-CM

## 2019-11-12 DIAGNOSIS — M25.559 HIP PAIN, ACUTE, UNSPECIFIED LATERALITY: ICD-10-CM

## 2019-11-12 DIAGNOSIS — Z12.31 ENCOUNTER FOR SCREENING MAMMOGRAM FOR MALIGNANT NEOPLASM OF BREAST: ICD-10-CM

## 2019-11-12 PROCEDURE — G0009 ADMIN PNEUMOCOCCAL VACCINE: HCPCS | Performed by: NURSE PRACTITIONER

## 2019-11-12 PROCEDURE — 90670 PCV13 VACCINE IM: CPT | Performed by: NURSE PRACTITIONER

## 2019-11-12 PROCEDURE — 99214 OFFICE O/P EST MOD 30 MIN: CPT | Mod: 25 | Performed by: NURSE PRACTITIONER

## 2019-11-12 RX ORDER — INFLUENZA A VIRUS A/MICHIGAN/45/2015 X-275 (H1N1) ANTIGEN (FORMALDEHYDE INACTIVATED), INFLUENZA A VIRUS A/SINGAPORE/INFIMH-16-0019/2016 IVR-186 (H3N2) ANTIGEN (FORMALDEHYDE INACTIVATED), AND INFLUENZA B VIRUS B/MARYLAND/15/2016 BX-69A (A B/COLORADO/6/2017-LIKE VIRUS) ANTIGEN (FORMALDEHYDE INACTIVATED) 60; 60; 60 UG/.5ML; UG/.5ML; UG/.5ML
INJECTION, SUSPENSION INTRAMUSCULAR
Refills: 0 | COMMUNITY
Start: 2019-09-29 | End: 2020-04-24

## 2019-11-12 RX ORDER — ZOSTER VACCINE RECOMBINANT, ADJUVANTED 50 MCG/0.5
KIT INTRAMUSCULAR
Refills: 0 | COMMUNITY
Start: 2019-09-29 | End: 2020-04-24

## 2019-11-12 NOTE — PROGRESS NOTES
Michela Valdez is an 69 y.o. female who presents for evaluation of back pain      Hip/Buttock pain  Patient presents for evaluation after a fall she sustained 2 weeks ago where she slipped and fell on her left side.  She thought she was okay and was able to stand immediately.  She did have some tenderness in her left buttock which over the last week has resolved however she does have some new sore tender areas on her right buttock.  She describes it as aching pain that has improved since the fall.  Location of pain: buttock  Radiation? no  Length of time: 2 weeks    Initial precipitant of symptoms: injury  Time of day when symptoms are worst: afternoon  Alleviating factors identifiable by patient: medication Advil  Exacerbating factors identifiable by patient: walking  Treatments so far initiated by patient: NSAIDS    Prior history:  None  1. Recent history of major trauma: no  2. Minor trauma or strenuous lifting in older patient: Yes  3. History of chronic corticosteroid therapy: no  4. Recent onset of bladder dysfunction, lucy. retention: No    ROS is NEGATIVE for BLE radicular pain, saddle paresthesias, bowel or bladder incontinence, gait instability      Current medicines (including changes today)  She  has a past medical history of Dyslipidemia (10/13/2015), Elevated LFTs (2018), History of osteopenia (2018), Hyperlipidemia, Postoperative hypothyroidism (10/13/2015), Prediabetes (2018), Snoring, and Thyroid disease.  She  has a past surgical history that includes gyn surgery; thyroidectomy total (2012); and abdominal exploration.  Social History     Tobacco Use   • Smoking status: Former Smoker     Packs/day: 0.50     Years: 4.00     Pack years: 2.00     Types: Cigarettes     Last attempt to quit: 10/13/1980     Years since quittin.1   • Smokeless tobacco: Never Used   Substance Use Topics   • Alcohol use: Yes     Alcohol/week: 3.0 - 4.2 oz     Types: 5 - 7 Glasses of wine per week      Comment: glass of wine daily    • Drug use: No     Social History     Patient does not qualify to have social determinant information on file (likely too young).   Social History Narrative   • Not on file     Family History   Problem Relation Age of Onset   • Osteoporosis Mother    • Hyperlipidemia Father    • Heart Disease Father         CAD, MI    • Anemia Sister    • Cancer Brother         prostate   • Heart Disease Brother         CABG     Family Status   Relation Name Status   • Mo   at age 89        osteo   • Fa   at age 53   • Sis  (Not Specified)   • Bro  Alive     Allergies: Aleve [gnp naproxen sodium] and Penicillin g  Current Outpatient Medications   Medication Sig Dispense Refill   • Probiotic Product (PROBIOTIC DAILY PO) Take  by mouth.     • Cholecalciferol (VITAMIN D3) 1000 units Cap Take 2,000 Units by mouth.     • atorvastatin (LIPITOR) 20 MG Tab Take 40 mg by mouth every evening.     • levothyroxine (SYNTHROID) 88 MCG Tab Take 88 mcg by mouth Every morning on an empty stomach. Indications: on Sundays only additional 1/2 tab     • Glucosamine-Chondroitin (MOVE FREE PO) Take  by mouth.     • Multiple Vitamins-Minerals (CENTRUM SILVER PO) Take  by mouth.     • FISH OIL by Does not apply route.     • aspirin EC (ECOTRIN) 81 MG TBEC Take 81 mg by mouth every day. HOLD OFF TAKING THIS UNTIL 12.     • FLUZONE HIGH-DOSE 0.5 ML Suspension Prefilled Syringe injection PHARMACIST ADMINISTERED IMMUNIZATION ADMINISTERED AT TIME OF DISPENSING  0   • SHINGRIX 50 MCG/0.5ML Recon Susp PHARMACIST ADMINISTERED IMMUNIZATION ADMINISTERED AT TIME OF DISPENSING  0     No current facility-administered medications for this visit.          EXAM:  General: healthy, alert, no distress .exam  Body habitus: not obese  Gait: normal  Visible deformity of trunk? no  Tenderness of vertebral spinous processes? no  Tenderness of other back or buttock areas? yes - TTP right and right buttocks.   SPINE: No  significant spinal curvature on forward bend.  SLT negative. DTR 2+ patella, 1+ achilles bilaterally. Strength 5/5 proximal and distal LE.  No pain with stress of SI. Full hip ROM. Poor hamstring flexibility. No symptoms with axial loading.       PLAN:  1. Hip pain, acute, unspecified laterality  New problem. Stable.  Likely MSK in nature. WPatient given conservative care instructions (NSAIDs, stretching, warm & cold compresses, OTC oral and topical analgesics, and avoiding aggravating factors) as well as instructions for stretches   rest, ice, heat, NSAIDS, stretching exercises discussed with patient and written info given    Pt advised to follow up 2 wks, sooner if worsening sx, including weakness or numbness in the legs, or if not improving over the next week or two.  - DX-PELVIS-1 OR 2 VIEWS; Future    2. Need for vaccination  - Pneumococcal Conjugate Vaccine 13-Valent    3. Encounter for screening for malignant neoplasm of breast  - MA-SCREENING MAMMO BILAT W/TOMOSYNTHESIS W/CAD; Future    Return if symptoms worsen or fail to improve.    Please note that this dictation was created using voice recognition software. I have made every reasonable attempt to correct obvious errors, but I expect that there are errors of grammar and possibly content that I did not discover before finalizing the note.

## 2019-11-18 ENCOUNTER — TELEPHONE (OUTPATIENT)
Dept: MEDICAL GROUP | Facility: LAB | Age: 69
End: 2019-11-18

## 2019-11-18 NOTE — TELEPHONE ENCOUNTER
1. Caller Name: Michela Valdez      Call Back Number: [phone] (home) [phone] (work)        Patient approves a detailed voicemail message: N\A      Patient called and LVM stating she is not feeling better since previously seen, she is question about getting a MRI ordered. Please advise.

## 2020-01-29 ENCOUNTER — HOSPITAL ENCOUNTER (OUTPATIENT)
Dept: LAB | Facility: MEDICAL CENTER | Age: 70
End: 2020-01-29
Attending: INTERNAL MEDICINE
Payer: MEDICARE

## 2020-01-29 PROCEDURE — 84439 ASSAY OF FREE THYROXINE: CPT

## 2020-01-29 PROCEDURE — 36415 COLL VENOUS BLD VENIPUNCTURE: CPT

## 2020-01-29 PROCEDURE — 84443 ASSAY THYROID STIM HORMONE: CPT

## 2020-01-30 LAB
T4 FREE SERPL-MCNC: 0.94 NG/DL (ref 0.53–1.43)
TSH SERPL DL<=0.005 MIU/L-ACNC: 1.34 UIU/ML (ref 0.38–5.33)

## 2020-04-24 ENCOUNTER — TELEMEDICINE (OUTPATIENT)
Dept: MEDICAL GROUP | Facility: LAB | Age: 70
End: 2020-04-24
Payer: MEDICARE

## 2020-04-24 DIAGNOSIS — K63.9 BOWEL TROUBLE: ICD-10-CM

## 2020-04-24 DIAGNOSIS — M25.551 RIGHT HIP PAIN: ICD-10-CM

## 2020-04-24 PROCEDURE — 99214 OFFICE O/P EST MOD 30 MIN: CPT | Mod: 95,CR | Performed by: FAMILY MEDICINE

## 2020-04-24 RX ORDER — GLUCOSAM/CHONDRO/HERB 149/HYAL 750-100 MG
TABLET ORAL
COMMUNITY
Start: 2015-11-02 | End: 2020-04-24

## 2020-04-24 RX ORDER — LANOLIN ALCOHOL/MO/W.PET/CERES
1000 CREAM (GRAM) TOPICAL DAILY
COMMUNITY
End: 2023-01-18

## 2020-04-24 RX ORDER — LEVOTHYROXINE SODIUM 88 UG/1
TABLET ORAL
COMMUNITY
Start: 2014-12-30 | End: 2020-04-24

## 2020-04-24 RX ORDER — ASPIRIN 81 MG/1
TABLET ORAL
COMMUNITY
Start: 2017-04-04 | End: 2020-04-24

## 2020-04-24 RX ORDER — MULTIVIT-MIN/FOLIC ACID/LUTEIN 400-250MCG
TABLET,CHEWABLE ORAL
COMMUNITY
Start: 2015-11-02 | End: 2020-04-24

## 2020-04-24 RX ORDER — PYRITHIONE ZINC 1 G/ML
SHAMPOO TOPICAL
COMMUNITY
Start: 2015-11-02 | End: 2020-04-24

## 2020-04-24 RX ORDER — ATORVASTATIN CALCIUM 40 MG/1
40 TABLET, FILM COATED ORAL
COMMUNITY
Start: 2020-02-26 | End: 2020-04-24

## 2020-04-24 RX ORDER — ATORVASTATIN CALCIUM 40 MG/1
TABLET, FILM COATED ORAL
COMMUNITY
Start: 2014-08-22 | End: 2020-04-24

## 2020-04-24 NOTE — PROGRESS NOTES
"Telemedicine Visit: Established Patient     This encounter was conducted via Zoom .   Verbal consent was obtained. Patient's identity was verified.    Subjective:   CC:   Chief Complaint   Patient presents with   • Establish Care     N2U Aleja, lab results review.    • Bowel Problem     Gas, BM not consistant. Takes colace, HX of hemrroids.    • Hip Pain     Possible referral or MRI order, Occassional pain in the morning, like stuck feeling.      Michela Valdez is a 69 y.o. female presenting for evaluation and management of:    #Hip pain   -Right hip pain.   -Intermittent pain located upper thigh, non radiating.    -Described as a sharp pain that lasts several minutes.   -Had difficulty walking with the pain/difficulty with extension of the hip with pain   -Slightly more painful after long walks/hikes.   -Did have a fall last fall after slipping on ice.     #Bowel Problems  -Onset: 6 months.   -Has been having a lot of gas/bloating to the point of being very uncomfortable.   -Soft, inconsistent bowel movements.  Has also had some difficulty controlling bowel movements with some \"leakage\" happening occasionally.  -Today she does state that she does struggle with constipation at times although does not feel constipated right now.  -Denies any difficulty in coping with eating, blood in stool, black or tarry stools, fevers, chills, unanticipated weight loss, night sweats.  -Colonoscopy in fall, had some polyps, follow up in 5 years, otherwise normal colonoscopy.     ROS   Denies any recent fevers or chills. No nausea or vomiting. No chest pains or shortness of breath.     Allergies   Allergen Reactions   • Aleve [Gnp Naproxen Sodium] Rash     Rash.    • Penicillin G Vomiting       Current medicines (including changes today)  Current Outpatient Medications   Medication Sig Dispense Refill   • Probiotic Product (FLORAJEN3 PO) FLORAJEN3 CAPS     • Cyanocobalamin (VITAMIN B-12) 1000 MCG Tab Take 1,000 mcg by mouth " every day.     • Cholecalciferol (VITAMIN D3) 1000 units Cap Take 2,000 Units by mouth.     • atorvastatin (LIPITOR) 20 MG Tab Take 40 mg by mouth every evening.     • levothyroxine (SYNTHROID) 88 MCG Tab Take 88 mcg by mouth Every morning on an empty stomach. Indications: 2 tablets on sunday     • Glucosamine-Chondroitin (MOVE FREE PO) Take  by mouth.     • Multiple Vitamins-Minerals (CENTRUM SILVER PO) Take  by mouth.     • FISH OIL by Does not apply route. 3 time a week     • aspirin EC (ECOTRIN) 81 MG TBEC Take 81 mg by mouth every day. HOLD OFF TAKING THIS UNTIL 2/12/12.       No current facility-administered medications for this visit.        Patient Active Problem List    Diagnosis Date Noted   • Fecal smearing 01/16/2019   • Abnormal Doppler ultrasound of carotid artery 01/16/2019   • Prediabetes 05/16/2018   • Elevated LFTs 05/16/2018   • History of osteopenia 05/16/2018   • Postoperative hypothyroidism 10/13/2015   • Dyslipidemia 10/13/2015       Family History   Problem Relation Age of Onset   • Osteoporosis Mother    • Hyperlipidemia Father    • Heart Disease Father         CAD, MI    • Anemia Sister    • Cancer Brother         prostate   • Heart Disease Brother         CABG       She  has a past medical history of Dyslipidemia (10/13/2015), Elevated LFTs (5/16/2018), History of osteopenia (5/16/2018), Hyperlipidemia, Postoperative hypothyroidism (10/13/2015), Prediabetes (5/16/2018), Snoring, and Thyroid disease.  She  has a past surgical history that includes gyn surgery; thyroidectomy total (2/8/2012); and abdominal exploration.       Objective:   Vitals obtained by patient:  Physical Exam:  Constitutional: Alert, no distress, well-groomed.  Skin: No rashes in visible areas.  Eye: Round. Conjunctiva clear, lids normal. No icterus.   ENMT: Lips pink without lesions, good dentition, moist mucous membranes. Phonation normal.  Neck: No masses, no thyromegaly. Moves freely without pain.  CV: Pulse as  reported by patient  Respiratory: Unlabored respiratory effort, no cough or audible wheeze  Psych: Alert and oriented x3, normal affect and mood.       Assessment and Plan:   The following treatment plan was discussed:     1. Right hip pain  -New problem.  -Signs and symptoms at this time most likely secondary for arthritis.  Difficult to tell without physical examination; however, will order a x-ray of the right hip t assess for any signs of arthritis at this time.  -Conservative treatments were discussed including possible physical therapy, rest as needed, NSAIDs or Tylenol as needed.  -We will call patient with results.  We will follow-up as needed.  - DX-HIP-UNILATERAL-W/O PELVIS-2/3 VIEWS RIGHT; Future    2. Bowel trouble  -New problem   -Uncertain etiology of bowel trouble at this time; some symptoms are consistent for possible chronic diarrhea.  At this time will try treatment with Colace daily for 2 weeks.  Continue with appropriate water intake, fiber intake.  After 2 weeks we will see if this helps with bowel movements and below bowel incontinence.  If no improvement will refer to GI specialist.  We will also continue to work on gas issues.  Patient is to begin a food diary to see if there is any certain foods that make as worse.  Also discussed over-the-counter simethicone, Beano as needed.  Follow-up as needed.        Follow-up: Return in about 6 months (around 10/24/2020) for Medicare AWV.

## 2020-06-29 NOTE — HPI: FULL BODY SKIN EXAMINATION
How Severe Are Your Spot(S)?: mild
What Type Of Note Output Would You Prefer (Optional)?: Standard Output
What Is The Reason For Today's Visit?: Full Body Skin Examination with No Concerns
What Is The Reason For Today's Visit? (Being Monitored For X): the re-examination of lesions previously examined
30-Jun-2020

## 2020-08-11 ENCOUNTER — TELEPHONE (OUTPATIENT)
Dept: MEDICAL GROUP | Facility: LAB | Age: 70
End: 2020-08-11

## 2020-08-11 NOTE — TELEPHONE ENCOUNTER
ANNUAL WELLNESS VISIT PRE-VISIT PLANNING WITHOUT OUTREACH    1.  Reviewed note from last office visit with PCP: YES    2.  If any orders were placed at last visit, do we have Results/Consult Notes?        •  Labs - Labs were not ordered at last office visit.  Note: If patient appointment is for lab review and patient did not complete labs, check with provider if OK to reschedule patient until labs completed.       •  Imaging - Imaging ordered, NOT completed. Patient advised to complete prior to next appointment.       •  Referrals - No referrals were ordered at last office visit.    3.  Immunizations were updated in [facility] using WebIZ?: Yes       •  WebIZ Recommendations: SHINGRIX (Shingles)        •  Is patient due for Tdap? NO       •  Is patient due for Shingrix? YES. Patient was not notified of copay/out of pocket cost.     4.  Patient is due for the following Health Maintenance Topics:   Health Maintenance Due   Topic Date Due   • HEPATITIS C SCREENING  1950   • MAMMOGRAM  10/04/2019   • Annual Wellness Visit  01/17/2020       - Patient already has appointment scheduled for Annual Wellness Visit (AWV).    5.  Reviewed/Updated the following with patient:       •   Preferred Pharmacy? NO       •   Preferred Lab? NO       •   Preferred Communication? NO       •   Allergies? NO       •   Medications? NO       •   Social History? NO       •   Family History (document living status of immediate family members and if + hx of  cancer, diabetes, hypertension, hyperlipidemia, heart attack, stroke) NO    6.  Care Team Updated:       •   DME Company (gait device, O2, CPAP, etc.): NO       •   Other Specialists (eye doctor, derm, GYN, cardiology, endo, etc): NO    7. Orders for overdue Health Maintenance topics pended in Pre-Charting? NO    8.  Patient has the following Care Path diagnoses on Problem List:  NONE    9.  Patient was advised: “This is a free wellness visit. The provider will screen for medical conditions  to help you stay healthy. If you have other concerns to address you may be asked to discuss these at a separate visit or there may be an additional fee.”     10.  Patient was informed to arrive 15 min prior to their scheduled appointment and bring in their medication bottles.

## 2020-08-12 ENCOUNTER — OFFICE VISIT (OUTPATIENT)
Dept: MEDICAL GROUP | Facility: LAB | Age: 70
End: 2020-08-12
Payer: MEDICARE

## 2020-08-12 VITALS
OXYGEN SATURATION: 95 % | HEART RATE: 84 BPM | SYSTOLIC BLOOD PRESSURE: 112 MMHG | HEIGHT: 65 IN | BODY MASS INDEX: 26.82 KG/M2 | RESPIRATION RATE: 16 BRPM | TEMPERATURE: 99.1 F | DIASTOLIC BLOOD PRESSURE: 70 MMHG | WEIGHT: 161 LBS

## 2020-08-12 DIAGNOSIS — Z87.39 HISTORY OF OSTEOPENIA: ICD-10-CM

## 2020-08-12 DIAGNOSIS — M85.80 OSTEOPENIA, UNSPECIFIED LOCATION: ICD-10-CM

## 2020-08-12 DIAGNOSIS — R73.03 PREDIABETES: ICD-10-CM

## 2020-08-12 DIAGNOSIS — Z00.00 MEDICARE ANNUAL WELLNESS VISIT, SUBSEQUENT: ICD-10-CM

## 2020-08-12 DIAGNOSIS — E78.5 DYSLIPIDEMIA: ICD-10-CM

## 2020-08-12 DIAGNOSIS — R79.89 ELEVATED LFTS: ICD-10-CM

## 2020-08-12 DIAGNOSIS — K59.09 CHRONIC CONSTIPATION: ICD-10-CM

## 2020-08-12 DIAGNOSIS — E89.0 POSTOPERATIVE HYPOTHYROIDISM: ICD-10-CM

## 2020-08-12 DIAGNOSIS — Z12.39 SCREENING FOR MALIGNANT NEOPLASM OF BREAST: ICD-10-CM

## 2020-08-12 DIAGNOSIS — K57.30 DIVERTICULAR DISEASE OF COLON: ICD-10-CM

## 2020-08-12 DIAGNOSIS — R15.1 FECAL SMEARING: ICD-10-CM

## 2020-08-12 DIAGNOSIS — R73.01 IMPAIRED FASTING GLUCOSE: ICD-10-CM

## 2020-08-12 DIAGNOSIS — I65.23 BILATERAL CAROTID ARTERY STENOSIS: ICD-10-CM

## 2020-08-12 DIAGNOSIS — Z12.31 ENCOUNTER FOR SCREENING MAMMOGRAM FOR MALIGNANT NEOPLASM OF BREAST: ICD-10-CM

## 2020-08-12 PROBLEM — R93.89 ABNORMAL DOPPLER ULTRASOUND OF CAROTID ARTERY: Status: RESOLVED | Noted: 2019-01-16 | Resolved: 2020-08-12

## 2020-08-12 PROCEDURE — G0439 PPPS, SUBSEQ VISIT: HCPCS | Performed by: FAMILY MEDICINE

## 2020-08-12 RX ORDER — ASCORBIC ACID/MULTIVIT-MIN 1000 MG
EFFERVESCENT POWDER IN PACKET ORAL DAILY
COMMUNITY
End: 2021-09-02

## 2020-08-12 ASSESSMENT — ACTIVITIES OF DAILY LIVING (ADL): BATHING_REQUIRES_ASSISTANCE: 0

## 2020-08-12 ASSESSMENT — ENCOUNTER SYMPTOMS: GENERAL WELL-BEING: EXCELLENT

## 2020-08-12 ASSESSMENT — PATIENT HEALTH QUESTIONNAIRE - PHQ9: CLINICAL INTERPRETATION OF PHQ2 SCORE: 0

## 2020-08-12 NOTE — PROGRESS NOTES
Chief Complaint   Patient presents with   • Medicare Annual Wellness     SCP         HPI:  Michela Jacobs is a 70 y.o. here for Medicare Annual Wellness Visit      Patient Active Problem List    Diagnosis Date Noted   • Fecal smearing 01/16/2019   • Abnormal Doppler ultrasound of carotid artery 01/16/2019   • Prediabetes 05/16/2018   • Elevated LFTs 05/16/2018   • History of osteopenia 05/16/2018   • Bilateral carotid artery stenosis 05/24/2016   • Impaired fasting glucose 11/02/2015   • Postoperative hypothyroidism 10/13/2015   • Dyslipidemia 10/13/2015   • Diverticular disease of colon 07/21/2015   • Osteopenia 07/21/2015   • Thyroid nodule 07/21/2015       Current Outpatient Medications   Medication Sig Dispense Refill   • Multiple Vitamins-Minerals (EMERGEN-C VITAMIN C) Pack Take  by mouth every day.     • Probiotic Product (FLORAJEN3 PO) FLORAJEN3 CAPS     • Cyanocobalamin (VITAMIN B-12) 1000 MCG Tab Take 1,000 mcg by mouth every day.     • Cholecalciferol (VITAMIN D3) 1000 units Cap Take 2,000 Units by mouth.     • atorvastatin (LIPITOR) 20 MG Tab Take 40 mg by mouth every evening.     • levothyroxine (SYNTHROID) 88 MCG Tab Take 88 mcg by mouth Every morning on an empty stomach. Indications: 2 tablets on sunday     • Glucosamine-Chondroitin (MOVE FREE PO) Take  by mouth.     • Multiple Vitamins-Minerals (CENTRUM SILVER PO) Take  by mouth.     • FISH OIL by Does not apply route. 3 time a week     • aspirin EC (ECOTRIN) 81 MG TBEC Take 81 mg by mouth every day. HOLD OFF TAKING THIS UNTIL 2/12/12.       No current facility-administered medications for this visit.         Patient is taking medications as noted in medication list.  Current supplements as per medication list.     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 Yes.    She  reports that  she quit smoking about 39 years ago. Her smoking use included cigarettes. She has a 2.00 pack-year smoking history. She has never used smokeless tobacco. She reports current alcohol use of about 3.0 - 4.2 oz of alcohol per week. She reports that she does not use drugs.  Counseling given: No        DPA/Advanced directive: Patient has Advanced Directive, but it is not on file. Instructed to bring in a copy to scan into their chart.    ROS:    Gait: Uses no assistive device   Ostomy: No   Other tubes: No   Amputations: No   Chronic oxygen use No   Last eye exam March 2019   Wears hearing aids: No   : Denies any urinary leakage during the last 6 months        Depression Screening    Little interest or pleasure in doing things?  0 - not at all  Feeling down, depressed, or hopeless? 0 - not at all  Patient Health Questionnaire Score: 0    If depressive symptoms identified deferred to follow up visit unless specifically addressed in assessment and plan.    Interpretation of PHQ-9 Total Score   Score Severity   1-4 No Depression   5-9 Mild Depression   10-14 Moderate Depression   15-19 Moderately Severe Depression   20-27 Severe Depression    Screening for Cognitive Impairment    Three Minute Recall (river, nation, finger)  3/3    Dev clock face with all 12 numbers and set the hands to show 10 past 11.  Yes 5/5  If cognitive concerns identified, deferred for follow up unless specifically addressed in assessment and plan.    Fall Risk Assessment    Has the patient had two or more falls in the last year or any fall with injury in the last year?  No  If fall risk identified, deferred for follow up unless specifically addressed in assessment and plan.    Safety Assessment    Throw rugs on floor.  Yes  Handrails on all stairs.  Yes  Good lighting in all hallways.  Yes  Difficulty hearing.  No  Patient counseled about all safety risks that were identified.    Functional Assessment ADLs    Are there any barriers preventing you  from cooking for yourself or meeting nutritional needs?  No.    Are there any barriers preventing you from driving safely or obtaining transportation?  No.    Are there any barriers preventing you from using a telephone or calling for help?  No.    Are there any barriers preventing you from shopping?  No.    Are there any barriers preventing you from taking care of your own finances?  No.    Are there any barriers preventing you from managing your medications?  No.    Are there any barriers preventing you from showering, bathing or dressing yourself?  No.    Are you currently engaging in any exercise or physical activity?  Yes.  Stretching, push ups, sit ups, every morning  What is your perception of your health?  Excellent.    Health Maintenance Summary                HEPATITIS C SCREENING Overdue 1950     MAMMOGRAM Overdue 10/4/2019      Done 10/4/2018 MA-SCREEN MAMMO W/CAD-BILAT     Patient has more history with this topic...    Annual Wellness Visit Overdue 1/17/2020      Done 1/16/2019 SUBSEQUENT ANNUAL WELLNESS VISIT-INCLUDES PPPS ()     Patient has more history with this topic...    IMM INFLUENZA Next Due 9/1/2020      Done 9/29/2019 Imm Admin: Influenza Vaccine Adult HD     Patient has more history with this topic...    IMM PNEUMOCOCCAL VACCINE: 65+ Years Next Due 11/12/2020      Done 11/12/2019 Imm Admin: Pneumococcal Conjugate Vaccine (Prevnar/PCV-13)    BONE DENSITY Next Due 3/27/2024      Done 3/27/2019 DS-BONE DENSITY STUDY (DEXA)     Patient has more history with this topic...    COLONOSCOPY Next Due 7/1/2024      Done 7/1/2019 REFERRAL TO GI FOR COLONOSCOPY     Patient has more history with this topic...    IMM DTaP/Tdap/Td Vaccine Next Due 10/24/2026      Done 10/24/2016 Imm Admin: Tdap Vaccine          Patient Care Team:  Eriberto Rivas M.D. as PCP - General (Family Medicine)  ZACHARY Cadet D.P.M. (Podiatry)  Clinton John M.D. as Consulting Physician  "(Gastroenterology)  Vivian Ruff as [facility]     Social History     Tobacco Use   • Smoking status: Former Smoker     Packs/day: 0.50     Years: 4.00     Pack years: 2.00     Types: Cigarettes     Quit date: 10/13/1980     Years since quittin.8   • Smokeless tobacco: Never Used   Substance Use Topics   • Alcohol use: Yes     Alcohol/week: 3.0 - 4.2 oz     Types: 5 - 7 Glasses of wine per week     Comment: glass of wine daily    • Drug use: No     Family History   Problem Relation Age of Onset   • Osteoporosis Mother    • Hyperlipidemia Father    • Heart Disease Father         CAD, MI    • Anemia Sister    • Cancer Brother         prostate   • Heart Disease Brother         CABG     She  has a past medical history of Dyslipidemia (10/13/2015), Elevated LFTs (2018), History of osteopenia (2018), Hyperlipidemia, Postoperative hypothyroidism (10/13/2015), Prediabetes (2018), Snoring, and Thyroid disease.   Past Surgical History:   Procedure Laterality Date   • THYROIDECTOMY TOTAL  2012    Performed by ALEX CONTEH at SURGERY SAME DAY [facility] ORS   • ABDOMINAL EXPLORATION      pyloric stenosis   • GYN SURGERY             Exam:     /70 (BP Location: Left arm, Patient Position: Sitting, BP Cuff Size: Adult)   Pulse 84   Temp 37.3 °C (99.1 °F) (Temporal)   Resp 16   Ht 1.644 m (5' 4.72\")   Wt 73 kg (161 lb)   SpO2 95%  Body mass index is 27.02 kg/m².    Hearing excellent.    Dentition good  Alert, oriented in no acute distress.  Eye contact is good, speech goal directed, affect calm      Assessment and Plan. The following treatment and monitoring plan is recommended:      1. Medicare annual wellness visit, subsequent  -Questions concerns were answered at this time.  Will check labs as below.  -Discussed general health safety including skin checks, sunscreen, hydration, seatbelts and helmets.  -Follow-up for annual wellness visit next year.  - " Subsequent Annual Wellness Visit - Includes PPPS ()  - CBC WITHOUT DIFFERENTIAL; Future  - Comp Metabolic Panel; Future  - Lipid Profile; Future  - HEP C VIRUS ANTIBODY; Future    2. Chronic constipation  -New problem at this time.  Discussed the importance of fiber supplementation, hydration.  She can use MiraLAX, Colace as needed.  No red flag symptoms at this time.  -Colonoscopy completed last year July 2019.  No concerns with exception of diverticulosis.  -Follow-up as needed.    3. Postoperative hypothyroidism  This problem is chronic, stable, and monitored appropriately by history/labs/imaging as needed, and is well-controlled on the current regimen.  Please continue current regimen thyroxine.  -Reviewed previous labs, unremarkable.      4. Dyslipidemia  This problem is chronic, stable, and monitored appropriately by history/labs/imaging as needed, and is well-controlled on the current regimen.  Please continue current regimen of atorvastatin.  We will recheck labs at this time.      5. Prediabetes  This problem is chronic, stable, and monitored appropriately by history/labs/imaging as needed, and is well-controlled on the current regimen.  Please continue current regimen appropriate diet and exercise.  Will recheck fasting blood glucose.      6. Elevated LFTs  This problem is chronic, stable, and monitored appropriately by history/labs/imaging as needed, and is well-controlled on the current regimen.  Please continue current regimen       7. History of osteopenia  This problem is chronic, stable, and monitored appropriately by history/labs/imaging as needed, and is well-controlled on the current regimen.  Please continue current regimen weightbearing exercises, vitamin D and calcium supplementation.  DEXA completed last year, will repeat DEXA in 1 to 2 years.    8. Fecal smearing  This problem is chronic, stable, and monitored appropriately by history/labs/imaging as needed, and is well-controlled on the  current regimen.  Please continue current regimen.    9. Bilateral carotid artery stenosis  -Diagnosed after carotid Doppler on 3/28/2019 showing plaque at the bifurcation of the right carotid with less than 50% stenosis.  Also very mild plaques on the left carotid.  -This problem is chronic, stable, and monitored appropriately by history/labs/imaging as needed, and is well-controlled on the current regimen.  Please continue current regimen of atorvastatin.     10. Diverticular disease of colon  This problem is chronic, stable, and monitored appropriately by history/labs/imaging as needed, and is well-controlled on the current regimen.  Please continue current regimen, appropriate diet, hydration.    11. Impaired fasting glucose  This problem is chronic, stable, and monitored appropriately by history/labs/imaging as needed, and is well-controlled on the current regimen.  Please continue current regimen appropriate diet and exercise. Repeat fasting blood glucose today.     12. Osteopenia, unspecified location  This problem is chronic, stable, and monitored appropriately by history/labs/imaging as needed, and is well-controlled on the current regimen.  Please continue current regimen, see above.     13. Screening for malignant neoplasm of breast  - MA-SCREENING MAMMO BILAT W/TOMOSYNTHESIS W/CAD; Future    14. Encounter for screening mammogram for malignant neoplasm of breast   - MA-SCREENING MAMMO BILAT W/TOMOSYNTHESIS W/CAD; Future    Services suggested: No services needed at this time  Health Care Screening recommendations as per orders if indicated.  Referrals offered: PT/OT/Nutrition counseling/Behavioral Health/Smoking cessation as per orders if indicated.    Discussion today about general wellness and lifestyle habits:    · Prevent falls and reduce trip hazards; Cautioned about securing or removing rugs.  · Have a working fire alarm and carbon monoxide detector;   · Engage in regular physical activity and social  activities       Follow-up: Return in about 1 year (around 8/12/2021) for Medicare AWV.

## 2020-08-20 ENCOUNTER — APPOINTMENT (RX ONLY)
Dept: URBAN - METROPOLITAN AREA CLINIC 35 | Facility: CLINIC | Age: 70
Setting detail: DERMATOLOGY
End: 2020-08-20

## 2020-08-20 DIAGNOSIS — L82.1 OTHER SEBORRHEIC KERATOSIS: ICD-10-CM

## 2020-08-20 DIAGNOSIS — L57.0 ACTINIC KERATOSIS: ICD-10-CM

## 2020-08-20 DIAGNOSIS — Z71.89 OTHER SPECIFIED COUNSELING: ICD-10-CM

## 2020-08-20 DIAGNOSIS — L81.4 OTHER MELANIN HYPERPIGMENTATION: ICD-10-CM

## 2020-08-20 DIAGNOSIS — Z85.828 PERSONAL HISTORY OF OTHER MALIGNANT NEOPLASM OF SKIN: ICD-10-CM

## 2020-08-20 DIAGNOSIS — D22 MELANOCYTIC NEVI: ICD-10-CM

## 2020-08-20 PROBLEM — D22.61 MELANOCYTIC NEVI OF RIGHT UPPER LIMB, INCLUDING SHOULDER: Status: ACTIVE | Noted: 2020-08-20

## 2020-08-20 PROCEDURE — 17003 DESTRUCT PREMALG LES 2-14: CPT

## 2020-08-20 PROCEDURE — 99213 OFFICE O/P EST LOW 20 MIN: CPT | Mod: 25

## 2020-08-20 PROCEDURE — ? LIQUID NITROGEN

## 2020-08-20 PROCEDURE — ? COUNSELING

## 2020-08-20 PROCEDURE — 17000 DESTRUCT PREMALG LESION: CPT

## 2020-08-20 ASSESSMENT — LOCATION DETAILED DESCRIPTION DERM
LOCATION DETAILED: LEFT SUPERIOR HELIX
LOCATION DETAILED: RIGHT POSTERIOR SHOULDER
LOCATION DETAILED: MID-FRONTAL SCALP
LOCATION DETAILED: RIGHT SUPERIOR UPPER BACK
LOCATION DETAILED: RIGHT DISTAL POSTERIOR UPPER ARM
LOCATION DETAILED: LEFT MEDIAL FRONTAL SCALP

## 2020-08-20 ASSESSMENT — LOCATION ZONE DERM
LOCATION ZONE: TRUNK
LOCATION ZONE: EAR
LOCATION ZONE: ARM
LOCATION ZONE: SCALP

## 2020-08-20 ASSESSMENT — LOCATION SIMPLE DESCRIPTION DERM
LOCATION SIMPLE: LEFT EAR
LOCATION SIMPLE: LEFT SCALP
LOCATION SIMPLE: RIGHT POSTERIOR UPPER ARM
LOCATION SIMPLE: RIGHT UPPER BACK
LOCATION SIMPLE: ANTERIOR SCALP
LOCATION SIMPLE: RIGHT SHOULDER

## 2020-08-20 NOTE — PROCEDURE: LIQUID NITROGEN
Post-Care Instructions: I reviewed with the patient in detail post-care instructions. Patient is to wear sunprotection, and avoid picking at any of the treated lesions. Pt may apply Vaseline to crusted or scabbing areas.
Consent: The patient's consent was obtained including but not limited to risks of crusting, scabbing, blistering, scarring, darker or lighter pigmentary change, recurrence, incomplete removal and infection.
Render Note In Bullet Format When Appropriate: No
Detail Level: Detailed
Duration Of Freeze Thaw-Cycle (Seconds): 10
Number Of Freeze-Thaw Cycles: 1 freeze-thaw cycle

## 2020-09-14 ENCOUNTER — HOSPITAL ENCOUNTER (OUTPATIENT)
Dept: RADIOLOGY | Facility: MEDICAL CENTER | Age: 70
End: 2020-09-14
Payer: MEDICARE

## 2020-09-14 ENCOUNTER — HOSPITAL ENCOUNTER (OUTPATIENT)
Dept: RADIOLOGY | Facility: MEDICAL CENTER | Age: 70
End: 2020-09-14
Attending: FAMILY MEDICINE
Payer: MEDICARE

## 2020-09-14 DIAGNOSIS — M25.551 RIGHT HIP PAIN: ICD-10-CM

## 2020-09-14 PROCEDURE — 73502 X-RAY EXAM HIP UNI 2-3 VIEWS: CPT | Mod: RT

## 2020-09-15 RX ORDER — LEVOTHYROXINE SODIUM 88 UG/1
TABLET ORAL
Qty: 105 TAB | Refills: 3 | OUTPATIENT
Start: 2020-09-15

## 2020-09-16 RX ORDER — LEVOTHYROXINE SODIUM 88 UG/1
TABLET ORAL
Qty: 105 TAB | Refills: 3 | Status: SHIPPED | OUTPATIENT
Start: 2020-09-16 | End: 2021-09-27 | Stop reason: SDUPTHER

## 2020-10-02 ENCOUNTER — TELEPHONE (OUTPATIENT)
Dept: MEDICAL GROUP | Facility: LAB | Age: 70
End: 2020-10-02

## 2020-10-02 DIAGNOSIS — J30.2 SEASONAL ALLERGIES: ICD-10-CM

## 2020-10-02 NOTE — TELEPHONE ENCOUNTER
1. Caller Name: Michela Valdez                          Call Back Number: [phone] (home) [phone] (work)        How would the patient prefer to be contacted with a response: Phone call do NOT leave a detailed message    2. SPECIFIC Action To Be Taken: Referral pending, please sign.    3. Diagnosis/Clinical Reason for Request: Allergies     4. Specialty & Provider Name/Lab/Imaging Location: Unsure     5. Is appointment scheduled for requested order/referral: no    Patient was not informed they will receive a return phone call from the office ONLY if there are any questions before processing their request. Advised to call back if they haven't received a call from the referral department in 5 days.

## 2020-10-14 ENCOUNTER — HOSPITAL ENCOUNTER (OUTPATIENT)
Dept: RADIOLOGY | Facility: MEDICAL CENTER | Age: 70
End: 2020-10-14
Attending: FAMILY MEDICINE
Payer: MEDICARE

## 2020-10-14 DIAGNOSIS — Z12.39 SCREENING FOR MALIGNANT NEOPLASM OF BREAST: ICD-10-CM

## 2020-10-14 DIAGNOSIS — Z12.31 ENCOUNTER FOR SCREENING MAMMOGRAM FOR MALIGNANT NEOPLASM OF BREAST: ICD-10-CM

## 2020-10-14 PROCEDURE — 77067 SCR MAMMO BI INCL CAD: CPT

## 2020-10-28 ENCOUNTER — APPOINTMENT (RX ONLY)
Dept: URBAN - METROPOLITAN AREA CLINIC 35 | Facility: CLINIC | Age: 70
Setting detail: DERMATOLOGY
End: 2020-10-28

## 2020-10-28 DIAGNOSIS — Z00.00 ENCOUNTER FOR GENERAL ADULT MEDICAL EXAMINATION WITHOUT ABNORMAL FINDINGS: ICD-10-CM

## 2020-10-28 PROBLEM — Z71.1 PERSON WITH FEARED HEALTH COMPLAINT IN WHOM NO DIAGNOSIS IS MADE: Status: ACTIVE | Noted: 2020-10-28

## 2020-10-28 PROCEDURE — ? COUNSELING

## 2020-10-28 PROCEDURE — 99212 OFFICE O/P EST SF 10 MIN: CPT

## 2020-10-28 ASSESSMENT — LOCATION SIMPLE DESCRIPTION DERM: LOCATION SIMPLE: LEFT EAR

## 2020-10-28 ASSESSMENT — LOCATION ZONE DERM: LOCATION ZONE: EAR

## 2020-10-28 ASSESSMENT — LOCATION DETAILED DESCRIPTION DERM: LOCATION DETAILED: LEFT CAVUM CONCHA

## 2020-11-12 ENCOUNTER — APPOINTMENT (RX ONLY)
Dept: URBAN - METROPOLITAN AREA CLINIC 35 | Facility: CLINIC | Age: 70
Setting detail: DERMATOLOGY
End: 2020-11-12

## 2020-11-12 DIAGNOSIS — L82.1 OTHER SEBORRHEIC KERATOSIS: ICD-10-CM

## 2020-11-12 PROCEDURE — ? BENIGN DESTRUCTION COSMETIC

## 2020-11-12 PROCEDURE — ? LIQUID NITROGEN (COSMETIC)

## 2020-11-12 PROCEDURE — ? COUNSELING

## 2020-11-12 ASSESSMENT — LOCATION DETAILED DESCRIPTION DERM
LOCATION DETAILED: RIGHT CLAVICULAR NECK
LOCATION DETAILED: LEFT MEDIAL SUPERIOR CHEST
LOCATION DETAILED: LEFT LATERAL SUPERIOR CHEST
LOCATION DETAILED: LEFT ANTERIOR SHOULDER
LOCATION DETAILED: RIGHT MEDIAL SUPERIOR CHEST
LOCATION DETAILED: RIGHT LATERAL SUPERIOR CHEST

## 2020-11-12 ASSESSMENT — LOCATION SIMPLE DESCRIPTION DERM
LOCATION SIMPLE: LEFT SHOULDER
LOCATION SIMPLE: RIGHT ANTERIOR NECK
LOCATION SIMPLE: CHEST

## 2020-11-12 ASSESSMENT — LOCATION ZONE DERM
LOCATION ZONE: NECK
LOCATION ZONE: TRUNK
LOCATION ZONE: ARM

## 2020-11-12 NOTE — PROCEDURE: LIQUID NITROGEN (COSMETIC)
Detail Level: Detailed
Total Number Of Lesions Treated: 5
Duration Of Freeze Thaw-Cycle (Seconds): 0
Consent: The patient's consent was obtained including but not limited to risks of crusting, scabbing, blistering, scarring, darker or lighter pigmentary change, recurrence, incomplete removal and infection.
Render Post Care In The Note?: yes
Post-Care Instructions: I reviewed with the patient in detail post-care instructions. Patient is to wear sunprotection, and avoid picking at any of the treated lesions. Pt may apply Vaseline to crusted or scabbing areas.

## 2020-11-12 NOTE — PROCEDURE: BENIGN DESTRUCTION COSMETIC
Anesthesia Type: 1% lidocaine with 1:100,000 epinephrine and a 1:6 solution of 8.4% sodium bicarbonate
Anesthesia Volume In Cc: 0.5
Price (Use Numbers Only, No Special Characters Or $): 400
Consent: The patient's consent was obtained including but not limited to risks of crusting, scabbing, blistering, scarring, darker or lighter pigmentary change, recurrence, incomplete removal and infection.
Detail Level: Detailed
Post-Care Instructions: I reviewed with the patient in detail post-care instructions. Patient is to wear sunprotection, and avoid picking at any of the treated lesions. Pt may apply Vaseline to crusted or scabbing areas.

## 2020-11-18 ENCOUNTER — PATIENT OUTREACH (OUTPATIENT)
Dept: HEALTH INFORMATION MANAGEMENT | Facility: OTHER | Age: 70
End: 2020-11-18

## 2020-11-18 NOTE — PROGRESS NOTES
Outcome: Left Message  [facility] plan outreach    Please transfer to Patient Outreach Team at [phone] when patient returns call.      [facility] Verified: yes    Attempt # 1

## 2021-01-15 DIAGNOSIS — Z23 NEED FOR VACCINATION: ICD-10-CM

## 2021-01-26 ENCOUNTER — IMMUNIZATION (OUTPATIENT)
Dept: FAMILY PLANNING/WOMEN'S HEALTH CLINIC | Facility: IMMUNIZATION CENTER | Age: 71
End: 2021-01-26
Attending: INTERNAL MEDICINE
Payer: MEDICARE

## 2021-01-26 DIAGNOSIS — Z23 NEED FOR VACCINATION: ICD-10-CM

## 2021-01-26 DIAGNOSIS — Z23 ENCOUNTER FOR VACCINATION: Primary | ICD-10-CM

## 2021-01-26 PROCEDURE — 91300 PFIZER SARS-COV-2 VACCINE: CPT

## 2021-01-26 PROCEDURE — 0001A PFIZER SARS-COV-2 VACCINE: CPT

## 2021-02-02 ENCOUNTER — HOSPITAL ENCOUNTER (OUTPATIENT)
Dept: LAB | Facility: MEDICAL CENTER | Age: 71
End: 2021-02-02
Attending: FAMILY MEDICINE
Payer: MEDICARE

## 2021-02-02 DIAGNOSIS — Z00.00 MEDICARE ANNUAL WELLNESS VISIT, SUBSEQUENT: ICD-10-CM

## 2021-02-02 LAB
ALBUMIN SERPL BCP-MCNC: 4.4 G/DL (ref 3.2–4.9)
ALBUMIN/GLOB SERPL: 1.7 G/DL
ALP SERPL-CCNC: 96 U/L (ref 30–99)
ALT SERPL-CCNC: 43 U/L (ref 2–50)
ANION GAP SERPL CALC-SCNC: 9 MMOL/L (ref 7–16)
AST SERPL-CCNC: 29 U/L (ref 12–45)
BILIRUB SERPL-MCNC: 0.3 MG/DL (ref 0.1–1.5)
BUN SERPL-MCNC: 22 MG/DL (ref 8–22)
CALCIUM SERPL-MCNC: 9.4 MG/DL (ref 8.5–10.5)
CHLORIDE SERPL-SCNC: 105 MMOL/L (ref 96–112)
CHOLEST SERPL-MCNC: 185 MG/DL (ref 100–199)
CO2 SERPL-SCNC: 26 MMOL/L (ref 20–33)
CREAT SERPL-MCNC: 0.66 MG/DL (ref 0.5–1.4)
ERYTHROCYTE [DISTWIDTH] IN BLOOD BY AUTOMATED COUNT: 43.3 FL (ref 35.9–50)
FASTING STATUS PATIENT QL REPORTED: NORMAL
GLOBULIN SER CALC-MCNC: 2.6 G/DL (ref 1.9–3.5)
GLUCOSE SERPL-MCNC: 110 MG/DL (ref 65–99)
HCT VFR BLD AUTO: 43.5 % (ref 37–47)
HCV AB SER QL: NORMAL
HDLC SERPL-MCNC: 64 MG/DL
HGB BLD-MCNC: 14.5 G/DL (ref 12–16)
LDLC SERPL CALC-MCNC: 102 MG/DL
MCH RBC QN AUTO: 30.8 PG (ref 27–33)
MCHC RBC AUTO-ENTMCNC: 33.3 G/DL (ref 33.6–35)
MCV RBC AUTO: 92.4 FL (ref 81.4–97.8)
PLATELET # BLD AUTO: 257 K/UL (ref 164–446)
PMV BLD AUTO: 10.5 FL (ref 9–12.9)
POTASSIUM SERPL-SCNC: 4.7 MMOL/L (ref 3.6–5.5)
PROT SERPL-MCNC: 7 G/DL (ref 6–8.2)
RBC # BLD AUTO: 4.71 M/UL (ref 4.2–5.4)
SODIUM SERPL-SCNC: 140 MMOL/L (ref 135–145)
TRIGL SERPL-MCNC: 93 MG/DL (ref 0–149)
WBC # BLD AUTO: 6.4 K/UL (ref 4.8–10.8)

## 2021-02-02 PROCEDURE — 86803 HEPATITIS C AB TEST: CPT

## 2021-02-02 PROCEDURE — 80053 COMPREHEN METABOLIC PANEL: CPT

## 2021-02-02 PROCEDURE — 85027 COMPLETE CBC AUTOMATED: CPT

## 2021-02-02 PROCEDURE — 36415 COLL VENOUS BLD VENIPUNCTURE: CPT

## 2021-02-02 PROCEDURE — 80061 LIPID PANEL: CPT

## 2021-02-19 ENCOUNTER — IMMUNIZATION (OUTPATIENT)
Dept: FAMILY PLANNING/WOMEN'S HEALTH CLINIC | Facility: IMMUNIZATION CENTER | Age: 71
End: 2021-02-19
Attending: INTERNAL MEDICINE
Payer: MEDICARE

## 2021-02-19 DIAGNOSIS — Z23 ENCOUNTER FOR VACCINATION: Primary | ICD-10-CM

## 2021-02-19 PROCEDURE — 0002A PFIZER SARS-COV-2 VACCINE: CPT

## 2021-02-19 PROCEDURE — 91300 PFIZER SARS-COV-2 VACCINE: CPT

## 2021-03-15 ENCOUNTER — OFFICE VISIT (OUTPATIENT)
Dept: MEDICAL GROUP | Facility: PHYSICIAN GROUP | Age: 71
End: 2021-03-15
Payer: MEDICARE

## 2021-03-15 VITALS
HEIGHT: 65 IN | TEMPERATURE: 98.6 F | DIASTOLIC BLOOD PRESSURE: 60 MMHG | OXYGEN SATURATION: 95 % | SYSTOLIC BLOOD PRESSURE: 118 MMHG | HEART RATE: 89 BPM | BODY MASS INDEX: 27.64 KG/M2 | WEIGHT: 165.9 LBS

## 2021-03-15 DIAGNOSIS — E78.5 DYSLIPIDEMIA: ICD-10-CM

## 2021-03-15 DIAGNOSIS — Z78.0 MENOPAUSE: ICD-10-CM

## 2021-03-15 DIAGNOSIS — K57.30 DIVERTICULAR DISEASE OF COLON: ICD-10-CM

## 2021-03-15 DIAGNOSIS — I65.23 BILATERAL CAROTID ARTERY STENOSIS: ICD-10-CM

## 2021-03-15 DIAGNOSIS — R73.03 PREDIABETES: ICD-10-CM

## 2021-03-15 DIAGNOSIS — E89.0 POSTOPERATIVE HYPOTHYROIDISM: ICD-10-CM

## 2021-03-15 DIAGNOSIS — Z87.39 HISTORY OF OSTEOPENIA: ICD-10-CM

## 2021-03-15 PROCEDURE — 99205 OFFICE O/P NEW HI 60 MIN: CPT | Performed by: INTERNAL MEDICINE

## 2021-03-15 RX ORDER — ATORVASTATIN CALCIUM 40 MG/1
TABLET, FILM COATED ORAL
COMMUNITY
Start: 2020-12-26 | End: 2022-07-13 | Stop reason: SDUPTHER

## 2021-03-15 RX ORDER — POLYETHYLENE GLYCOL 3350 17 G/17G
17 POWDER, FOR SOLUTION ORAL DAILY
COMMUNITY
End: 2021-09-02

## 2021-03-15 ASSESSMENT — PATIENT HEALTH QUESTIONNAIRE - PHQ9: CLINICAL INTERPRETATION OF PHQ2 SCORE: 0

## 2021-03-15 ASSESSMENT — FIBROSIS 4 INDEX: FIB4 SCORE: 1.24

## 2021-03-15 NOTE — PROGRESS NOTES
Subjective:     CC:  Establish care    HISTORY OF THE PRESENT ILLNESS: Michela Valdez is a 70 y.o. female here today to establish primary medical care and discuss the below stated chronic medical conditions. Michela is unaccompanied for today's visit.     Problem   Prediabetes       Ref. Range 8/19/2019 10:50   Glycohemoglobin Latest Ref Range: 0.0 - 5.6 % 6.1 (H)   Estim. Avg Glu Latest Units: mg/dL 128     She has history of prediabetes.  No prior use of glucose lowering medications.  Unfortunately her hemoglobin A1c was not drawn with her recent blood work.  She would consider using a medicine that would help with both weight loss and control blood sugar such as a once weekly GLP-1 inhibitor.     History of Osteopenia    Bone Density (3/2019):  lumbar spine T score of -0.2   proximal left femur T score of -0.1      When compared to the previous study dated 09/22/2008, there has been a 12.6% increase in the bone mineral density of the lumbar spine and a  2.0% decrease in the bone mineral density of the left femur.     IMPRESSION: According to the World Health Organization classification, bone mineral density of this patient is normal for the lumbar spine and left proximal femur.     10-year Probability of Fracture:  Major Osteoporotic     12.2%  Hip     1.5%    She reports prior history of osteopenia.  Her mother had osteoporosis.  In the diagnosis of osteopenia she increased her exercise and her bone mass improved.  She is taking vitamin D2 1000 international units daily.  She receives calcium through her multivitamin.  No history of fractures as an adult.  No prior use of antiresorptive medications.     Bilateral Carotid Artery Stenosis    Carotid US (3/2019):  Right carotid- Plaque of the bifurcation extending into the internal carotid. Velocities are consistent with < 50% stenosis of the internal carotid artery.     Left carotid- Very mild plaque of the carotid bifurcation. Doppler velocities are normal.        She has known right greater than left mild carotid artery disease.  She is followed vascular medicine in the past.  Her father had early CAD.  She is on statin and low-dose aspirin.     Postoperative Hypothyroidism       Ref. Range 8/19/2019 10:50 1/29/2020 13:38   TSH Latest Ref Range: 0.380 - 5.330 uIU/mL 2.970 1.340   Free T-4 Latest Ref Range: 0.53 - 1.43 ng/dL  0.94     She reports prior thyroidectomy due to nodule found incidentally.  She has since maintained on levothyroxine 88 mcg daily.  No recent dose adjustments.  She has had progressive weight gain since her thyroid was removed and feels are likely related.  She wonders if her dose needs to be adjusted.  Otherwise no signs or symptoms of overtreatment under treatment.     Dyslipidemia       Ref. Range 2/2/2021 09:28   Cholesterol,Tot Latest Ref Range: 100 - 199 mg/dL 185   Triglycerides Latest Ref Range: 0 - 149 mg/dL 93   HDL Latest Ref Range: >=40 mg/dL 64   LDL Latest Ref Range: <100 mg/dL 102 (H)     Has a history of elevated cholesterol.  Her father had early CAD.  She follows with Dr. Bloch of vascular medicine.    Current regimen: Atorvastatin 40 mg daily and aspirin 81 mg daily     Diverticular Disease of Colon    Colonoscopy completed July 2019.  This noted diverticulosis throughout the entire colon.  She also had one area of a nonbleeding angiodysplastic lesion.    She reports challenges with constipation and tried Colace for 1 week with some improvement. No known history of diverticulitis.          Current Medications:  Current Outpatient Medications Ordered in Epic   Medication Sig Dispense Refill   • atorvastatin (LIPITOR) 40 MG Tab TAKE 1 TABLET BY MOUTH DAILY     • polyethylene glycol/lytes (MIRALAX) 17 g Pack Take 17 g by mouth every day.     • levothyroxine (SYNTHROID) 88 MCG Tab TAKE 2 TABLETS BY MOUTH ON SUNDAYS AND 1 TABLET DAILY FOR THE REST OF THE WEEK 105 Tab 3   • Multiple Vitamins-Minerals (EMERGEN-C VITAMIN C) Pack Take   "by mouth every day.     • Probiotic Product (FLORAJEN3 PO) FLORAJEN3 CAPS     • Cyanocobalamin (VITAMIN B-12) 1000 MCG Tab Take 1,000 mcg by mouth every day.     • Cholecalciferol (VITAMIN D3) 1000 units Cap Take 2,000 Units by mouth.     • Glucosamine-Chondroitin (MOVE FREE PO) Take  by mouth.     • Multiple Vitamins-Minerals (CENTRUM SILVER PO) Take  by mouth.     • FISH OIL by Does not apply route. 3 time a week     • aspirin EC (ECOTRIN) 81 MG TBEC Take 81 mg by mouth every day. HOLD OFF TAKING THIS UNTIL 2/12/12.       No current [facility]-ordered facility-administered medications on file.       PMH, PSH, Social History, Medications, Allergies, FMH updated and reviewed as documented:    Objective:   Physical Exam:    Vitals: /60 (BP Location: Right arm, Patient Position: Sitting, BP Cuff Size: Adult)   Pulse 89   Temp 37 °C (98.6 °F) (Temporal)   Ht 1.651 m (5' 5\")   Wt 75.3 kg (165 lb 14.4 oz)   SpO2 95%    BMI: Body mass index is 27.61 kg/m².  Physical Exam   Constitutional: She is well-developed, well-nourished, and in no distress.   Eyes: Pupils are equal, round, and reactive to light. Conjunctivae are normal. No scleral icterus.   Cardiovascular: Normal rate, regular rhythm and normal heart sounds.   No murmur heard.  Pulmonary/Chest: Effort normal and breath sounds normal. No respiratory distress.   Abdominal: Soft. Bowel sounds are normal. There is no abdominal tenderness.   Musculoskeletal:         General: No deformity or edema. Normal range of motion.   Neurological: She is alert. Gait normal.   Skin: Skin is warm and dry. No rash noted.   Psychiatric: Mood, memory, affect and judgment normal.        Assessment & Plan:   Michela Jacobs is a 70 y.o. female with the following:  Problem List Items Addressed This Visit     Postoperative hypothyroidism     Chronic and ongoing problem.  Unfortunately, her thyroid was not checked on her recent lab work so I will have her get this updated so we can see if " she requires a dose adjustment.  Advised her that increasing the dose may help with weight loss but can also lead to issues such as arrhythmia and osteoporosis.  We will continue with the current dose of levothyroxine 88 mcg daily.         Relevant Orders    TSH WITH REFLEX TO FT4    Dyslipidemia     Chronic and stable problem.  Cholesterol values are well controlled.  Continue current regimen atorvastatin 40 mg daily and low-dose aspirin.         Prediabetes     Previous problem that requires follow-up.  We will refer her to the healthy improvement program to meet with a dietitian to help prevent progression into diabetes.  Unfortunately her hemoglobin A1c was not drawn with her recent lab work so we will also get this updated.  Discussed with her utility in using something like once weekly GLP-1 inhibitor to help with both weight loss and tighter control blood sugar.  In the meantime encouraged her to start using something like my fitness pal or new to assist with keeping track of her calories and holding her more accountable to help with weight loss.         Relevant Orders    HEMOGLOBIN A1C    REFERRAL TO [facility] (HIP)    History of osteopenia     Previous problem, stable.  We will update her bone density to ensure ongoing stability of her bone mass.  In the meantime she appropriate to continue on calcium and vitamin D as well as regular weightbearing exercise.         Relevant Orders    DS-BONE DENSITY STUDY (DEXA)    Bilateral carotid artery stenosis     Chronic and stable problem.  Continue on atorvastatin 40 mg daily and aspirin 81 mg daily.  Continue follow-up with vascular medicine as recommended.         Relevant Medications    atorvastatin (LIPITOR) 40 MG Tab    Diverticular disease of colon     Chronic and stable problem.  Recommend that she increase oral intake and add fiber in addition to MiraLAX.  The underlying goal is to have a soft formed bowel movement daily to mitigate  risk of diverticulitis and constipation.           Other Visit Diagnoses     Menopause        Relevant Orders    DS-BONE DENSITY STUDY (DEXA)           RTC: Return in about 1 year (around 3/15/2022).    I spent a total of 65 minutes with record review, exam, communication with the patient, communication with other providers, and documentation of this encounter.    PLEASE NOTE: This dictation was created using voice recognition software. I have made every reasonable attempt to correct obvious errors, but I expect that there are errors of grammar and possibly content that I did not discover before finalizing the note.      Archana Robertson, DO  Geriatric and Internal Medicine  [facility]

## 2021-03-15 NOTE — ASSESSMENT & PLAN NOTE
Chronic and stable problem.  Continue on atorvastatin 40 mg daily and aspirin 81 mg daily.  Continue follow-up with vascular medicine as recommended.

## 2021-03-16 NOTE — ASSESSMENT & PLAN NOTE
Chronic and stable problem.  Cholesterol values are well controlled.  Continue current regimen atorvastatin 40 mg daily and low-dose aspirin.

## 2021-03-16 NOTE — ASSESSMENT & PLAN NOTE
Chronic and stable problem.  Recommend that she increase oral intake and add fiber in addition to MiraLAX.  The underlying goal is to have a soft formed bowel movement daily to mitigate risk of diverticulitis and constipation.

## 2021-03-16 NOTE — ASSESSMENT & PLAN NOTE
Chronic and ongoing problem.  Unfortunately, her thyroid was not checked on her recent lab work so I will have her get this updated so we can see if she requires a dose adjustment.  Advised her that increasing the dose may help with weight loss but can also lead to issues such as arrhythmia and osteoporosis.  We will continue with the current dose of levothyroxine 88 mcg daily.

## 2021-03-16 NOTE — ASSESSMENT & PLAN NOTE
Previous problem that requires follow-up.  We will refer her to the healthy improvement program to meet with a dietitian to help prevent progression into diabetes.  Unfortunately her hemoglobin A1c was not drawn with her recent lab work so we will also get this updated.  Discussed with her utility in using something like once weekly GLP-1 inhibitor to help with both weight loss and tighter control blood sugar.  In the meantime encouraged her to start using something like my fitness pal or new to assist with keeping track of her calories and holding her more accountable to help with weight loss.

## 2021-03-16 NOTE — ASSESSMENT & PLAN NOTE
Previous problem, stable.  We will update her bone density to ensure ongoing stability of her bone mass.  In the meantime she appropriate to continue on calcium and vitamin D as well as regular weightbearing exercise.

## 2021-04-01 ENCOUNTER — HOSPITAL ENCOUNTER (OUTPATIENT)
Dept: LAB | Facility: MEDICAL CENTER | Age: 71
End: 2021-04-01
Attending: INTERNAL MEDICINE
Payer: MEDICARE

## 2021-04-01 DIAGNOSIS — E89.0 POSTOPERATIVE HYPOTHYROIDISM: ICD-10-CM

## 2021-04-01 DIAGNOSIS — R73.03 PREDIABETES: ICD-10-CM

## 2021-04-01 LAB
EST. AVERAGE GLUCOSE BLD GHB EST-MCNC: 148 MG/DL
HBA1C MFR BLD: 6.8 % (ref 4–5.6)
TSH SERPL DL<=0.005 MIU/L-ACNC: 1.03 UIU/ML (ref 0.38–5.33)

## 2021-04-01 PROCEDURE — 84443 ASSAY THYROID STIM HORMONE: CPT

## 2021-04-01 PROCEDURE — 36415 COLL VENOUS BLD VENIPUNCTURE: CPT

## 2021-04-01 PROCEDURE — 83036 HEMOGLOBIN GLYCOSYLATED A1C: CPT

## 2021-05-19 ENCOUNTER — HOSPITAL ENCOUNTER (OUTPATIENT)
Dept: RADIOLOGY | Facility: MEDICAL CENTER | Age: 71
End: 2021-05-19
Attending: INTERNAL MEDICINE
Payer: MEDICARE

## 2021-05-19 DIAGNOSIS — Z78.0 MENOPAUSE: ICD-10-CM

## 2021-05-19 DIAGNOSIS — Z87.39 HISTORY OF OSTEOPENIA: ICD-10-CM

## 2021-05-19 PROCEDURE — 77080 DXA BONE DENSITY AXIAL: CPT

## 2021-07-28 ENCOUNTER — PATIENT OUTREACH (OUTPATIENT)
Dept: HEALTH INFORMATION MANAGEMENT | Facility: OTHER | Age: 71
End: 2021-07-28

## 2021-08-02 ENCOUNTER — PATIENT MESSAGE (OUTPATIENT)
Dept: MEDICAL GROUP | Facility: PHYSICIAN GROUP | Age: 71
End: 2021-08-02

## 2021-08-03 ENCOUNTER — APPOINTMENT (RX ONLY)
Dept: URBAN - METROPOLITAN AREA CLINIC 35 | Facility: CLINIC | Age: 71
Setting detail: DERMATOLOGY
End: 2021-08-03

## 2021-08-03 DIAGNOSIS — L91.8 OTHER HYPERTROPHIC DISORDERS OF THE SKIN: ICD-10-CM

## 2021-08-03 DIAGNOSIS — L82.1 OTHER SEBORRHEIC KERATOSIS: ICD-10-CM

## 2021-08-03 PROCEDURE — ? BENIGN DESTRUCTION COSMETIC

## 2021-08-03 ASSESSMENT — LOCATION ZONE DERM
LOCATION ZONE: NOSE
LOCATION ZONE: EYELID

## 2021-08-03 ASSESSMENT — LOCATION SIMPLE DESCRIPTION DERM
LOCATION SIMPLE: NOSE
LOCATION SIMPLE: RIGHT EYELID

## 2021-08-03 ASSESSMENT — LOCATION DETAILED DESCRIPTION DERM
LOCATION DETAILED: RIGHT MEDIAL CANTHUS
LOCATION DETAILED: NASAL ROOT

## 2021-08-03 NOTE — TELEPHONE ENCOUNTER
From: Michela Valdez  To: Physician Archana Robertson  Sent: 8/2/2021 10:57 PM PDT  Subject: Test Result Question    Hi Dr Robertson  I was waiting for several months to get into the program you referred me to - HIP I believe. I called monthly to check the status and when i called last a couple weeks ago they told me the program has been discontinued. I am wondering if there is another program you would recommend.  I am exercising with a  twice a week and trying to watch my diet, but I suspect I should have my A1c tested again. I am trying to avoid the need for medication of course.  I received a call to have a wellness checkup which I scheduled out a ways so I had a chance to talk to you.    Thank you  Michela

## 2021-08-03 NOTE — PROCEDURE: BENIGN DESTRUCTION COSMETIC
Consent: The patient's consent was obtained including but not limited to risks of crusting, scabbing, blistering, scarring, darker or lighter pigmentary change, recurrence, incomplete removal and infection.
Post-Care Instructions: I reviewed with the patient in detail post-care instructions. Patient is to wear sunprotection, and avoid picking at any of the treated lesions. Pt may apply Vaseline to crusted or scabbing areas.
Anesthesia Volume In Cc: 0.5
Detail Level: Detailed
Price (Use Numbers Only, No Special Characters Or $): 200

## 2021-08-03 NOTE — PATIENT COMMUNICATION
Phone Number Called: [phone] Delia Keith    Call outcome: Left detailed message for patient. Informed to call back with any additional questions.    Message: Left message with Delia at [facility] Referral Department to see if we can have the referral sent to a dietitian or a different department or if a new referral needs to be put in place. Left Pt's MRN and direct extension.

## 2021-08-03 NOTE — PATIENT COMMUNICATION
Delia sent an email asking if pt could be referred to a dietitian outside of [facility].

## 2021-08-05 NOTE — PATIENT COMMUNICATION
"Per email via Sharon Keith:    \"I have re-processed the referral to R and sent a Se[employer] message to patient.\"      Referral processed to:    [facility] - Endocrinology, Wellness and Weight Management?  [address] [address]  Phone: [phone]  Fax: [phone]    Information sent to pt.  "

## 2021-08-31 ENCOUNTER — APPOINTMENT (RX ONLY)
Dept: URBAN - METROPOLITAN AREA CLINIC 35 | Facility: CLINIC | Age: 71
Setting detail: DERMATOLOGY
End: 2021-08-31

## 2021-08-31 DIAGNOSIS — L82.1 OTHER SEBORRHEIC KERATOSIS: ICD-10-CM

## 2021-08-31 DIAGNOSIS — D485 NEOPLASM OF UNCERTAIN BEHAVIOR OF SKIN: ICD-10-CM

## 2021-08-31 DIAGNOSIS — Z71.89 OTHER SPECIFIED COUNSELING: ICD-10-CM

## 2021-08-31 DIAGNOSIS — L81.4 OTHER MELANIN HYPERPIGMENTATION: ICD-10-CM

## 2021-08-31 DIAGNOSIS — Z85.828 PERSONAL HISTORY OF OTHER MALIGNANT NEOPLASM OF SKIN: ICD-10-CM

## 2021-08-31 DIAGNOSIS — D22 MELANOCYTIC NEVI: ICD-10-CM

## 2021-08-31 PROBLEM — D22.61 MELANOCYTIC NEVI OF RIGHT UPPER LIMB, INCLUDING SHOULDER: Status: ACTIVE | Noted: 2021-08-31

## 2021-08-31 PROBLEM — D48.5 NEOPLASM OF UNCERTAIN BEHAVIOR OF SKIN: Status: ACTIVE | Noted: 2021-08-31

## 2021-08-31 PROCEDURE — 11102 TANGNTL BX SKIN SINGLE LES: CPT

## 2021-08-31 PROCEDURE — ? BIOPSY BY SHAVE METHOD

## 2021-08-31 PROCEDURE — ? COUNSELING

## 2021-08-31 PROCEDURE — 99213 OFFICE O/P EST LOW 20 MIN: CPT | Mod: 25

## 2021-08-31 ASSESSMENT — LOCATION ZONE DERM
LOCATION ZONE: ARM
LOCATION ZONE: TRUNK
LOCATION ZONE: EAR
LOCATION ZONE: FACE

## 2021-08-31 ASSESSMENT — LOCATION SIMPLE DESCRIPTION DERM
LOCATION SIMPLE: RIGHT UPPER BACK
LOCATION SIMPLE: CHIN
LOCATION SIMPLE: RIGHT POSTERIOR UPPER ARM
LOCATION SIMPLE: RIGHT SHOULDER
LOCATION SIMPLE: LEFT EAR

## 2021-08-31 ASSESSMENT — LOCATION DETAILED DESCRIPTION DERM
LOCATION DETAILED: LEFT SUPERIOR HELIX
LOCATION DETAILED: RIGHT CHIN
LOCATION DETAILED: RIGHT SUPERIOR UPPER BACK
LOCATION DETAILED: RIGHT DISTAL POSTERIOR UPPER ARM
LOCATION DETAILED: RIGHT POSTERIOR SHOULDER

## 2021-08-31 NOTE — PATIENT COMMUNICATION
Phone Number Called: [phone] (home) [phone] (work)    Call outcome: Spoke to patient regarding message below.    Message: Called and scheduled appointment for A1C. She preferred to make it a whole visit instead of nurses visit.

## 2021-09-01 ENCOUNTER — TELEPHONE (OUTPATIENT)
Dept: MEDICAL GROUP | Facility: PHYSICIAN GROUP | Age: 71
End: 2021-09-01

## 2021-09-01 NOTE — TELEPHONE ENCOUNTER
ESTABLISHED PATIENT PRE-VISIT PLANNING     Patient was NOT contacted to complete PVP.     Note: Patient will not be contacted if there is no indication to call.     1.  Reviewed notes from the last few office visits within the medical group: Yes    2.  If any orders were placed at last visit or intended to be done for this visit (i.e. 6 mos follow-up), do we have Results/Consult Notes?         •  Labs - Labs ordered, completed on 4/1/2021 and results are in chart.  Note: If patient appointment is for lab review and patient did not complete labs, check with provider if OK to reschedule patient until labs completed.       •  Imaging - Imaging ordered, completed and results are in chart.       •  Referrals - Referral ordered, patient has NOT been seen.    3. Is this appointment scheduled as a Hospital Follow-Up? No    4.  Immunizations were updated in Epic using Reconcile Outside Information activity? Yes    5.  Patient is due for the following Health Maintenance Topics:   Health Maintenance Due   Topic Date Due   • IMM INFLUENZA (1) 09/01/2021       6.  AHA (Pulse8) form printed for Provider? Email sent to [facility] requesting form

## 2021-09-02 ENCOUNTER — OFFICE VISIT (OUTPATIENT)
Dept: MEDICAL GROUP | Facility: PHYSICIAN GROUP | Age: 71
End: 2021-09-02
Payer: MEDICARE

## 2021-09-02 VITALS
WEIGHT: 159 LBS | BODY MASS INDEX: 26.49 KG/M2 | RESPIRATION RATE: 15 BRPM | OXYGEN SATURATION: 98 % | DIASTOLIC BLOOD PRESSURE: 60 MMHG | HEART RATE: 90 BPM | TEMPERATURE: 98.5 F | SYSTOLIC BLOOD PRESSURE: 132 MMHG | HEIGHT: 65 IN

## 2021-09-02 DIAGNOSIS — R73.03 PREDIABETES: ICD-10-CM

## 2021-09-02 LAB
HBA1C MFR BLD: 6 % (ref 0–5.6)
INT CON NEG: NEGATIVE
INT CON POS: POSITIVE

## 2021-09-02 PROCEDURE — 83036 HEMOGLOBIN GLYCOSYLATED A1C: CPT | Mod: QW | Performed by: FAMILY MEDICINE

## 2021-09-02 PROCEDURE — 99215 OFFICE O/P EST HI 40 MIN: CPT | Performed by: FAMILY MEDICINE

## 2021-09-02 ASSESSMENT — ENCOUNTER SYMPTOMS
HEADACHES: 0
WEAKNESS: 0
VOMITING: 0
DIARRHEA: 0
COUGH: 0
NAUSEA: 0
CONSTIPATION: 1
MUSCULOSKELETAL NEGATIVE: 1
FEVER: 0
PALPITATIONS: 0
ABDOMINAL PAIN: 0
HEARTBURN: 0
PSYCHIATRIC NEGATIVE: 1
EYES NEGATIVE: 1
DIZZINESS: 0
CHILLS: 0
SHORTNESS OF BREATH: 0

## 2021-09-02 ASSESSMENT — FIBROSIS 4 INDEX: FIB4 SCORE: 1.26

## 2021-09-02 NOTE — ASSESSMENT & PLAN NOTE
9/2/21: Here today for pre diabetes follow up and recheck on her A1c. March 2021, her A1c was 6.8 in the diabetes range. She decided to do non pharmacologic management at that time. The health maintenance program she was referred to is no longer in service, I referred her today to [facility] nutrition and metabolism services per her request. She is working with a  2 x week for 1 hour, has lost 3 lbs of fat and gained 5 lbs of muscle. She is also doing some exercises on her own during her off days. Discussed food intake for a typical day:   Black coffee for breakfast  Lunch: chicken tortilla soup from [email or web address]  Evening is eating cheese and crackers  Is buying some prepackaged food from whole foods  Eating a lot of chicken and salmon  Eating some fruits, discussed eating berries which are lower in sugar  Also discussed batch cooking some vegetables and ways to increase vegetable intake to help increase fiber to help with feelings of fullness and her constipation issues.   Patient is feeling fatigued by 5 pm, she feels that she shouldn't feel so tired but is also working full time as a . Likely this is due to high demand job and stress. She is using calm gavino for meditation but has not been using regularly. Will work on meditation, exercise, and increasing fluids to 80 ml daily to help with fatigue and constipation. Will f/u in 3 months for A1C recheck and to reassess goals at that time.     Glycohemoglobin 0.0 - 5.6 % 6.0 Abnormal     Internal Control Negative  Negative    Internal Control Positive  Positive

## 2021-09-27 ENCOUNTER — TELEPHONE (OUTPATIENT)
Dept: MEDICAL GROUP | Facility: PHYSICIAN GROUP | Age: 71
End: 2021-09-27

## 2021-09-27 ENCOUNTER — PATIENT MESSAGE (OUTPATIENT)
Dept: MEDICAL GROUP | Facility: PHYSICIAN GROUP | Age: 71
End: 2021-09-27

## 2021-09-27 DIAGNOSIS — E89.0 POSTOPERATIVE HYPOTHYROIDISM: ICD-10-CM

## 2021-09-27 RX ORDER — LEVOTHYROXINE SODIUM 88 UG/1
88 TABLET ORAL
Qty: 100 TABLET | Refills: 3 | Status: SHIPPED | OUTPATIENT
Start: 2021-09-27 | End: 2022-07-21

## 2021-09-27 NOTE — TELEPHONE ENCOUNTER
From: Michela Valdez  To: Nurse Practitioner Gloria Aleman  Sent: 9/27/2021 7:36 AM PDT  Subject: Urgent refill of prescription!    Urgent - Could you please refill my thyroid medication prescription? I only have 2 pills left and I have been trying to get in touch with Dr. Sanchez's office for over a week. No return call. [facility] has sent a request at least twice.   Levothyroxine 88 mcg  #[id number]  [facility] [phone]     Thank you.

## 2021-09-27 NOTE — TELEPHONE ENCOUNTER
1. Caller Name: Michela Jacobs José Miguel                          Call Back Number: [phone] (home) [phone] (work)        How would the patient prefer to be contacted with a response: Phone call OK to leave a detailed message    Called and spoke to Michela soliz taken care of.

## 2021-10-13 ENCOUNTER — APPOINTMENT (RX ONLY)
Dept: URBAN - METROPOLITAN AREA CLINIC 36 | Facility: CLINIC | Age: 71
Setting detail: DERMATOLOGY
End: 2021-10-13

## 2021-10-13 PROBLEM — C44.319 BASAL CELL CARCINOMA OF SKIN OF OTHER PARTS OF FACE: Status: ACTIVE | Noted: 2021-10-13

## 2021-10-13 PROCEDURE — ? CONSULTATION FOR MOHS SURGERY

## 2021-10-13 PROCEDURE — 99213 OFFICE O/P EST LOW 20 MIN: CPT

## 2021-10-13 NOTE — HPI: MOHS SURGERY CONSULTATION
Has The Cancer Been Biopsied Before?: has been previously biopsied
Additional History: History of Mohs on left ear 2005
Who Is Your Referring Provider?: Karon Alcantar
Body Location Override (Optional): Right chin

## 2021-12-06 ENCOUNTER — TELEPHONE (OUTPATIENT)
Dept: MEDICAL GROUP | Facility: PHYSICIAN GROUP | Age: 71
End: 2021-12-06

## 2021-12-06 NOTE — TELEPHONE ENCOUNTER
ESTABLISHED PATIENT PRE-VISIT PLANNING     Patient was NOT contacted to complete PVP.     Note: Patient will not be contacted if there is no indication to call.     1.  Reviewed notes from the last few office visits within the medical group: Yes    2.  If any orders were placed at last visit or intended to be done for this visit (i.e. 6 mos follow-up), do we have Results/Consult Notes?         •  Labs - Labs ordered, completed on 09/02/2021 and results are in chart.  Note: If patient appointment is for lab review and patient did not complete labs, check with provider if OK to reschedule patient until labs completed.       •  Imaging - Imaging was not ordered at last office visit.       •  Referrals - Referral ordered, patient has NOT been seen.    3. Is this appointment scheduled as a Hospital Follow-Up? No    4.  Immunizations were updated in Epic using Reconcile Outside Information activity? Yes    5.  Patient is due for the following Health Maintenance Topics:   Health Maintenance Due   Topic Date Due   • COVID-19 Vaccine (3 - Booster for Pfizer series) 08/19/2021   • IMM INFLUENZA (1) 09/01/2021   • MAMMOGRAM  10/14/2021       - Patient plans to schedule appointment for Immunizations: FLU and Mammogram.    6.  AHA (Pulse8) form printed for Provider? No, already completed

## 2021-12-07 ENCOUNTER — OFFICE VISIT (OUTPATIENT)
Dept: MEDICAL GROUP | Facility: PHYSICIAN GROUP | Age: 71
End: 2021-12-07
Payer: MEDICARE

## 2021-12-07 VITALS
SYSTOLIC BLOOD PRESSURE: 110 MMHG | HEART RATE: 77 BPM | HEIGHT: 65 IN | DIASTOLIC BLOOD PRESSURE: 60 MMHG | TEMPERATURE: 97.7 F | OXYGEN SATURATION: 96 % | WEIGHT: 161 LBS | BODY MASS INDEX: 26.82 KG/M2 | RESPIRATION RATE: 12 BRPM

## 2021-12-07 DIAGNOSIS — E89.0 POSTOPERATIVE HYPOTHYROIDISM: ICD-10-CM

## 2021-12-07 DIAGNOSIS — K59.01 SLOW TRANSIT CONSTIPATION: ICD-10-CM

## 2021-12-07 DIAGNOSIS — Z12.31 ENCOUNTER FOR SCREENING MAMMOGRAM FOR BREAST CANCER: ICD-10-CM

## 2021-12-07 DIAGNOSIS — I65.23 BILATERAL CAROTID ARTERY STENOSIS: ICD-10-CM

## 2021-12-07 DIAGNOSIS — E78.5 DYSLIPIDEMIA: ICD-10-CM

## 2021-12-07 DIAGNOSIS — M25.561 ACUTE PAIN OF RIGHT KNEE: ICD-10-CM

## 2021-12-07 DIAGNOSIS — R73.03 PREDIABETES: ICD-10-CM

## 2021-12-07 PROCEDURE — 99215 OFFICE O/P EST HI 40 MIN: CPT | Performed by: INTERNAL MEDICINE

## 2021-12-07 ASSESSMENT — FIBROSIS 4 INDEX: FIB4 SCORE: 1.26

## 2021-12-08 NOTE — ASSESSMENT & PLAN NOTE
Chronic and improved problem.  She had one episode of A1c above 6.5 but then corrected it back down to 6.0.  Will update A1c to ensure stability.  Discussed that she still qualify for use of GLP-1 inhibitor if she would like a medicine to also assist with weight loss centrally.  
Chronic and ongoing problem.  Will update carotid ultrasound to ensure stability.  Continue statin and aspirin.  
Chronic and ongoing problem.  Will update lipid panel to ensure stability.  Continue atorvastatin 40 mg daily and aspirin 81 mg daily.  
Chronic and ongoing problem.  Will update thyroid panel to ensure stability.  Continue levothyroxine 88 mcg daily in the meantime.  
New problem by my evaluation but more longstanding per patient for at least 1 to 2 years.  She has tried various over-the-counter medicines such as Colace and MiraLAX with mixed results.  Advised her she needs to increase water consumption to about 70 ounces daily.  Retrial of MiraLAX add a third to half a capful once daily.  Add back fiber such as Metamucil or Citrucel.  If the above remedies are not helpful then could consider empiric treatment for SIBO or low-dose Linzess.  We will update thyroid panel to ensure hypothyroidism is not contributing.  
New problem, requires follow-up.  Will refer her to Dr. Mckeon of orthopedic surgery for additional evaluation of potential medial meniscus injury.  
Pt with c/o abdominal pain and nausea denies vomiting.

## 2021-12-08 NOTE — PROGRESS NOTES
Subjective:   Chief Complaint/History of Present Illness:  Michela Valdez is a 71 y.o. female established patient who presents today to discuss medical problems as listed below. Michela Jacobs is unaccompanied for today's visit.    Problem   Acute Pain of Right Knee    She reports right knee pain which started about 3 months ago.  Located on the medial aspect of the knee.  More recently about a week ago she has significant pain and swelling which made it difficult for her to ambulate.  She was on a trip to [place] and think she may have tweaked it.  She has not met with orthopedics but would be interested in having evaluation to evaluate for meniscal injury.     Slow Transit Constipation    She has known diverticular disease with associated constipation.  She has been taking Colace with mixed results.  Sometimes will be ineffective and other times she will have a small amount of anal leakage.  She previously tried MiraLAX but this was too strong for her, she was taking 1 full capful.  She admits that she has not drink enough water.  She tried fiber but it worsen the constipation due to inadequate hydration.  No prior evaluation for SIBO, does note some increased distention and gas for which he uses Gas-X.  Recent colonoscopy in 2019 was reassuring.  Due to update lab work.     Prediabetes       Ref. Range 4/1/2021 13:39 9/2/2021 10:04   Glycohemoglobin Latest Ref Range: 0.0 - 5.6 % 6.8 (H) 6.0 (A)     She has history of prediabetes.  No prior use of glucose lowering medications.  Unfortunately her hemoglobin A1c was not drawn with her recent blood work.  She would consider using a medicine that would help with both weight loss and control blood sugar such as a once weekly GLP-1 inhibitor.     Bilateral Carotid Artery Stenosis    Carotid US (3/2019):  Right carotid- Plaque of the bifurcation extending into the internal carotid. Velocities are consistent with < 50% stenosis of the internal carotid artery.     Left carotid-  Very mild plaque of the carotid bifurcation. Doppler velocities are normal.       She has known right greater than left mild carotid artery disease.  She is followed vascular medicine in the past.  Her father had early CAD.  She is on statin and low-dose aspirin.     Postoperative Hypothyroidism       Ref. Range 4/1/2021 13:39   TSH Latest Ref Range: 0.380 - 5.330 uIU/mL 1.030     She reports prior thyroidectomy due to nodule found incidentally.  She has since maintained on levothyroxine 88 mcg daily.  No recent dose adjustments.  She has had progressive weight gain since her thyroid was removed and feels are likely related.  She wonders if her dose needs to be adjusted.  Otherwise no signs or symptoms of overtreatment under treatment.     Dyslipidemia       Ref. Range 2/2/2021 09:28   Cholesterol,Tot Latest Ref Range: 100 - 199 mg/dL 185   Triglycerides Latest Ref Range: 0 - 149 mg/dL 93   HDL Latest Ref Range: >=40 mg/dL 64   LDL Latest Ref Range: <100 mg/dL 102 (H)     Has a history of elevated cholesterol.  Her father had early CAD.  She previously followed with Dr. Bloch of vascular medicine.    Current regimen: Atorvastatin 40 mg daily and aspirin 81 mg daily       Current Medications:  Current Outpatient Medications Ordered in Epic   Medication Sig Dispense Refill   • levothyroxine (SYNTHROID) 88 MCG Tab Take 1 Tablet by mouth every morning on an empty stomach. 100 Tablet 3   • Saccharomyces boulardii ([employer] RESISTANCE FORMULA PO) Take 1 Packet by mouth every day.     • atorvastatin (LIPITOR) 40 MG Tab TAKE 1 TABLET BY MOUTH DAILY     • Probiotic Product (FLORAJEN3 PO) FLORAJEN3 CAPS     • Cyanocobalamin (VITAMIN B-12) 1000 MCG Tab Take 1,000 mcg by mouth every day.     • Cholecalciferol (VITAMIN D3) 1000 units Cap Take 2,000 Units by mouth.     • Glucosamine-Chondroitin (MOVE FREE PO) Take  by mouth.     • Multiple Vitamins-Minerals (CENTRUM SILVER PO) Take  by mouth.     • FISH OIL by Does not apply route. 3  "time a week     • aspirin EC (ECOTRIN) 81 MG TBEC Take 81 mg by mouth every day. HOLD OFF TAKING THIS UNTIL 2/12/12.       No current [facility]-ordered facility-administered medications on file.          Objective:   Physical Exam:    Vitals: /60 (BP Location: Left arm, Patient Position: Sitting, BP Cuff Size: Adult)   Pulse 77   Temp 36.5 °C (97.7 °F)   Resp 12   Ht 1.651 m (5' 5\")   Wt 73 kg (161 lb)   SpO2 96%    BMI: Body mass index is 26.79 kg/m².  Physical Exam  Constitutional:       General: She is not in acute distress.     Appearance: Normal appearance. She is not ill-appearing.   HENT:      Right Ear: Ear canal and external ear normal. There is no impacted cerumen.      Left Ear: Ear canal and external ear normal. There is no impacted cerumen.   Eyes:      General: No scleral icterus.     Conjunctiva/sclera: Conjunctivae normal.   Cardiovascular:      Rate and Rhythm: Normal rate and regular rhythm.      Pulses: Normal pulses.      Heart sounds: No murmur heard.      Pulmonary:      Effort: Pulmonary effort is normal.      Breath sounds: Normal breath sounds. No wheezing or rhonchi.   Abdominal:      General: Bowel sounds are normal.      Palpations: Abdomen is soft.      Tenderness: There is no abdominal tenderness.   Musculoskeletal:      Right lower leg: No edema.      Left lower leg: No edema.      Comments: Right medial knee pain   Skin:     General: Skin is warm and dry.      Findings: No bruising or rash.   Psychiatric:         Mood and Affect: Mood normal.         Behavior: Behavior normal.         Thought Content: Thought content normal.         Judgment: Judgment normal.         Assessment and Plan:   Michela Jacobs is a 71 y.o. female with the following:  Problem List Items Addressed This Visit     Postoperative hypothyroidism     Chronic and ongoing problem.  Will update thyroid panel to ensure stability.  Continue levothyroxine 88 mcg daily in the meantime.         Relevant Orders    TSH    " FREE THYROXINE    Dyslipidemia     Chronic and ongoing problem.  Will update lipid panel to ensure stability.  Continue atorvastatin 40 mg daily and aspirin 81 mg daily.         Relevant Orders    CBC WITH DIFFERENTIAL    Comp Metabolic Panel    Lipid Profile    Prediabetes     Chronic and improved problem.  She had one episode of A1c above 6.5 but then corrected it back down to 6.0.  Will update A1c to ensure stability.  Discussed that she still qualify for use of GLP-1 inhibitor if she would like a medicine to also assist with weight loss centrally.         Relevant Orders    HEMOGLOBIN A1C    Bilateral carotid artery stenosis     Chronic and ongoing problem.  Will update carotid ultrasound to ensure stability.  Continue statin and aspirin.         Relevant Orders    US-CAROTID DOPPLER BILAT    Acute pain of right knee     New problem, requires follow-up.  Will refer her to Dr. Mckeon of orthopedic surgery for additional evaluation of potential medial meniscus injury.         Relevant Orders    Referral to Orthopedics    Slow transit constipation     New problem by my evaluation but more longstanding per patient for at least 1 to 2 years.  She has tried various over-the-counter medicines such as Colace and MiraLAX with mixed results.  Advised her she needs to increase water consumption to about 70 ounces daily.  Retrial of MiraLAX add a third to half a capful once daily.  Add back fiber such as Metamucil or Citrucel.  If the above remedies are not helpful then could consider empiric treatment for SIBO or low-dose Linzess.  We will update thyroid panel to ensure hypothyroidism is not contributing.           Other Visit Diagnoses     Encounter for screening mammogram for breast cancer        Relevant Orders    MA-SCREENING MAMMO BILAT W/TOMOSYNTHESIS W/CAD          RTC: Return in about 6 months (around 6/7/2022).    I spent a total of 42 minutes with record review, exam, communication with the patient, communication  with other providers, and documentation of this encounter.    PLEASE NOTE: This dictation was created using voice recognition software. I have made every reasonable attempt to correct obvious errors, but I expect that there are errors of grammar and possibly content that I did not discover before finalizing the note.      Archana Robertson, DO  Geriatric and Internal Medicine  [facility]

## 2021-12-08 NOTE — PATIENT INSTRUCTIONS
SIBO- small intestine bacterial overgrowth, look up and read if this sounds consistent with your bowel changes.      Constipation  To achieve at least three soft bowel movements per week:  1.     Eat regularly scheduled meals and a well-balanced diet rich in fiber by eating more whole-grain breads and cereals, fruits, and vegetables; we recommend at least 6 servings of fruit and vegetables every day.     2.     Drink enough fluid to keep stools soft; try to drink at least 8 to 10 glasses (8 ounces) of non-caffeinated fluids throughout the day (70 ounces of water for goal)     3.     Initiate fiber supplements   a.   Metamucil or Citrucel:   - add 1 tablespoon to 8 ounces of water daily for 2 weeks  - after 2 weeks, you may increase amount to 2 tablespoons daily if needed to achieve ?3 bowel movements per week without excessive straining; continue supplementation indefinitely      b.   If fiber causes bloating, try switching from Metamucil to Citrucel If needed, add an osmotic laxative, such as [place] Milk of Magnesia 15 mL to 30 mL daily or polyethylene                        Glycol 3350 (Miralax) 17 grams in 8 ounces of liquid daily     If you have not had a large, soft, easy-to-pass bowel movements in >24 hours:  1.     Take Miralax, 2 capfuls every 12 hours until satisfactory bowel movement  If you have not had a large, soft, easy-to-pass bowel movements in >48 hours:  1.     Take bisacodyl 10mg pill or suppository, or  2.     Milk of Magnesia 30 mL or sodium phosphate  Repeat or alternate one of these laxatives every 4 hours until satisfactory bowel movement.

## 2022-01-13 ENCOUNTER — APPOINTMENT (RX ONLY)
Dept: URBAN - METROPOLITAN AREA CLINIC 36 | Facility: CLINIC | Age: 72
Setting detail: DERMATOLOGY
End: 2022-01-13

## 2022-01-13 VITALS — OXYGEN SATURATION: 96 % | SYSTOLIC BLOOD PRESSURE: 139 MMHG | HEART RATE: 84 BPM | DIASTOLIC BLOOD PRESSURE: 90 MMHG

## 2022-01-13 DIAGNOSIS — L82.1 OTHER SEBORRHEIC KERATOSIS: ICD-10-CM

## 2022-01-13 PROBLEM — C44.319 BASAL CELL CARCINOMA OF SKIN OF OTHER PARTS OF FACE: Status: ACTIVE | Noted: 2022-01-13

## 2022-01-13 PROCEDURE — 17312 MOHS ADDL STAGE: CPT

## 2022-01-13 PROCEDURE — 17311 MOHS 1 STAGE H/N/HF/G: CPT

## 2022-01-13 PROCEDURE — ? COUNSELING

## 2022-01-13 PROCEDURE — 13132 CMPLX RPR F/C/C/M/N/AX/G/H/F: CPT | Mod: 59

## 2022-01-13 PROCEDURE — ? LIQUID NITROGEN

## 2022-01-13 PROCEDURE — 17110 DESTRUCTION B9 LES UP TO 14: CPT

## 2022-01-13 PROCEDURE — ? MOHS SURGERY

## 2022-01-13 ASSESSMENT — LOCATION SIMPLE DESCRIPTION DERM
LOCATION SIMPLE: LEFT CHEEK
LOCATION SIMPLE: CHEST

## 2022-01-13 ASSESSMENT — LOCATION ZONE DERM
LOCATION ZONE: FACE
LOCATION ZONE: TRUNK

## 2022-01-13 ASSESSMENT — LOCATION DETAILED DESCRIPTION DERM
LOCATION DETAILED: LEFT SUPERIOR LATERAL MALAR CHEEK
LOCATION DETAILED: LEFT MEDIAL SUPERIOR CHEST

## 2022-01-13 NOTE — PROCEDURE: MIPS QUALITY
Detail Level: Detailed
Quality 110: Preventive Care And Screening: Influenza Immunization: Influenza Immunization previously received during influenza season
Quality 265: Biopsy Follow-Up: Biopsy results reviewed, communicated, tracked, and documented
Quality 226: Preventive Care And Screening: Tobacco Use: Screening And Cessation Intervention: Patient screened for tobacco use and is an ex/non-smoker
Quality 130: Documentation Of Current Medications In The Medical Record: Current Medications Documented
Quality 111:Pneumonia Vaccination Status For Older Adults: Pneumococcal Vaccination Previously Received
Quality 431: Preventive Care And Screening: Unhealthy Alcohol Use - Screening: Patient not identified as an unhealthy alcohol user when screened for unhealthy alcohol use using a systematic screening method

## 2022-01-13 NOTE — PROCEDURE: LIQUID NITROGEN
Post-Care Instructions: I reviewed with the patient in detail post-care instructions. Patient is to wear sunprotection, and avoid picking at any of the treated lesions. Pt may apply Vaseline to crusted or scabbing areas.
Detail Level: Simple
Include Z78.9 (Other Specified Conditions Influencing Health Status) As An Associated Diagnosis?: No
Consent: The patient's consent was obtained including but not limited to risks of crusting, scabbing, blistering, scarring, darker or lighter pigmentary change, recurrence, incomplete removal and infection.
Medical Necessity Information: It is in your best interest to select a reason for this procedure from the list below. All of these items fulfill various CMS LCD requirements except the new and changing color options.
Number Of Freeze-Thaw Cycles: 1 freeze-thaw cycle
Show Applicator Variable?: Yes
Medical Necessity Clause: This procedure was medically necessary because the lesions that were treated were:
Spray Paint Text: The liquid nitrogen was applied to the skin utilizing a spray paint frosting technique.

## 2022-01-20 ENCOUNTER — APPOINTMENT (OUTPATIENT)
Dept: RADIOLOGY | Facility: MEDICAL CENTER | Age: 72
End: 2022-01-20
Attending: INTERNAL MEDICINE
Payer: MEDICARE

## 2022-01-20 ENCOUNTER — APPOINTMENT (RX ONLY)
Dept: URBAN - METROPOLITAN AREA CLINIC 36 | Facility: CLINIC | Age: 72
Setting detail: DERMATOLOGY
End: 2022-01-20

## 2022-01-20 DIAGNOSIS — Z48.02 ENCOUNTER FOR REMOVAL OF SUTURES: ICD-10-CM

## 2022-01-20 DIAGNOSIS — L90.5 SCAR CONDITIONS AND FIBROSIS OF SKIN: ICD-10-CM

## 2022-01-20 PROCEDURE — ? SUTURE REMOVAL (GLOBAL PERIOD)

## 2022-01-20 ASSESSMENT — LOCATION DETAILED DESCRIPTION DERM: LOCATION DETAILED: RIGHT CHIN

## 2022-01-20 ASSESSMENT — LOCATION ZONE DERM: LOCATION ZONE: FACE

## 2022-01-20 ASSESSMENT — LOCATION SIMPLE DESCRIPTION DERM: LOCATION SIMPLE: CHIN

## 2022-01-20 NOTE — PROCEDURE: SUTURE REMOVAL (GLOBAL PERIOD)
Detail Level: Detailed
Add 92102 Cpt? (Important Note: In 2017 The Use Of 94013 Is Being Tracked By Cms To Determine Future Global Period Reimbursement For Global Periods): no

## 2022-01-27 ENCOUNTER — APPOINTMENT (OUTPATIENT)
Dept: RADIOLOGY | Facility: MEDICAL CENTER | Age: 72
End: 2022-01-27
Attending: INTERNAL MEDICINE
Payer: MEDICARE

## 2022-02-03 ENCOUNTER — HOSPITAL ENCOUNTER (OUTPATIENT)
Dept: RADIOLOGY | Facility: MEDICAL CENTER | Age: 72
End: 2022-02-03
Attending: INTERNAL MEDICINE
Payer: MEDICARE

## 2022-02-03 DIAGNOSIS — I65.23 BILATERAL CAROTID ARTERY STENOSIS: ICD-10-CM

## 2022-02-03 PROCEDURE — 93880 EXTRACRANIAL BILAT STUDY: CPT

## 2022-02-23 ENCOUNTER — APPOINTMENT (RX ONLY)
Dept: URBAN - METROPOLITAN AREA CLINIC 36 | Facility: CLINIC | Age: 72
Setting detail: DERMATOLOGY
End: 2022-02-23

## 2022-02-23 DIAGNOSIS — Z41.9 ENCOUNTER FOR PROCEDURE FOR PURPOSES OTHER THAN REMEDYING HEALTH STATE, UNSPECIFIED: ICD-10-CM

## 2022-02-23 PROCEDURE — ? FRAXEL

## 2022-02-23 ASSESSMENT — LOCATION SIMPLE DESCRIPTION DERM: LOCATION SIMPLE: CHIN

## 2022-02-23 ASSESSMENT — LOCATION ZONE DERM: LOCATION ZONE: FACE

## 2022-02-23 ASSESSMENT — LOCATION DETAILED DESCRIPTION DERM: LOCATION DETAILED: RIGHT CHIN

## 2022-02-23 NOTE — PROCEDURE: FRAXEL
Number Of Passes: 1
Consent: Written consent obtained, risks reviewed including but not limited to pain and incomplete improvement.
Detail Level: Zone
Wavelength: 1550nm
Large Metal Eye Shield Text: The ocular mucosa was anesthetized with tetracaine. Once adequate anesthesia was optained, large metal eye shields were inserted and remained in place until the procedure was completed.
Total Coverage: 14%
Add Post-Care Below To The Note: No
Location: Use Location Override
Indication: surgical scars
Small Plastic Eye Shield Text: The ocular mucosa was anesthetized with tetracaine. Once adequate anesthesia was optained, small plastic eye shields were inserted and remained in place until the procedure was completed.
Post-Care Instructions: I reviewed with the patient in detail post-care instructions. Patient should avoid sun until area fully healed.
Medium Plastic Eye Shield Text: The ocular mucosa was anesthetized with tetracaine. Once adequate anesthesia was optained, medium plastic eye shields were inserted and remained in place until the procedure was completed.
Large Plastic Eye Shield Text: The ocular mucosa was anesthetized with tetracaine. Once adequate anesthesia was optained, large plastic eye shields were inserted and remained in place until the procedure was completed.
External Cooling Fan Speed: 5
Energy(Mj/Cm2): 70
Medium Metal Eye Shield Text: The ocular mucosa was anesthetized with tetracaine. Once adequate anesthesia was optained, medium metal eye shields were inserted and remained in place until the procedure was completed.
Small Metal Eye Shield Text: The ocular mucosa was anesthetized with tetracaine. Once adequate anesthesia was optained, small metal eye shields were inserted and remained in place until the procedure was completed.
Number Of Passes: 4

## 2022-03-11 ENCOUNTER — APPOINTMENT (OUTPATIENT)
Dept: RADIOLOGY | Facility: MEDICAL CENTER | Age: 72
End: 2022-03-11
Attending: INTERNAL MEDICINE
Payer: MEDICARE

## 2022-03-15 ENCOUNTER — APPOINTMENT (OUTPATIENT)
Dept: MEDICAL GROUP | Facility: PHYSICIAN GROUP | Age: 72
End: 2022-03-15
Payer: MEDICARE

## 2022-03-15 ENCOUNTER — OFFICE VISIT (OUTPATIENT)
Dept: MEDICAL GROUP | Facility: PHYSICIAN GROUP | Age: 72
End: 2022-03-15

## 2022-03-15 VITALS
RESPIRATION RATE: 12 BRPM | HEIGHT: 65 IN | TEMPERATURE: 97.6 F | OXYGEN SATURATION: 97 % | SYSTOLIC BLOOD PRESSURE: 108 MMHG | BODY MASS INDEX: 27.49 KG/M2 | HEART RATE: 90 BPM | WEIGHT: 165 LBS | DIASTOLIC BLOOD PRESSURE: 60 MMHG

## 2022-03-15 DIAGNOSIS — Z00.00 ENCOUNTER FOR SUBSEQUENT ANNUAL WELLNESS VISIT (AWV) IN MEDICARE PATIENT: Primary | ICD-10-CM

## 2022-03-15 DIAGNOSIS — R73.03 PREDIABETES: ICD-10-CM

## 2022-03-15 DIAGNOSIS — G89.29 CHRONIC PAIN OF RIGHT KNEE: ICD-10-CM

## 2022-03-15 DIAGNOSIS — M25.561 CHRONIC PAIN OF RIGHT KNEE: ICD-10-CM

## 2022-03-15 DIAGNOSIS — E89.0 POSTOPERATIVE HYPOTHYROIDISM: ICD-10-CM

## 2022-03-15 DIAGNOSIS — E78.5 DYSLIPIDEMIA: ICD-10-CM

## 2022-03-15 DIAGNOSIS — I65.23 BILATERAL CAROTID ARTERY STENOSIS: ICD-10-CM

## 2022-03-15 PROCEDURE — G0439 PPPS, SUBSEQ VISIT: HCPCS | Performed by: INTERNAL MEDICINE

## 2022-03-15 ASSESSMENT — ENCOUNTER SYMPTOMS: GENERAL WELL-BEING: EXCELLENT

## 2022-03-15 ASSESSMENT — FIBROSIS 4 INDEX: FIB4 SCORE: 1.26

## 2022-03-15 ASSESSMENT — ACTIVITIES OF DAILY LIVING (ADL): BATHING_REQUIRES_ASSISTANCE: 0

## 2022-03-15 ASSESSMENT — PATIENT HEALTH QUESTIONNAIRE - PHQ9: CLINICAL INTERPRETATION OF PHQ2 SCORE: 0

## 2022-03-16 NOTE — ASSESSMENT & PLAN NOTE
Chronic and ongoing problem, will recheck A1c. Considering GLP1 inhibitor therapy but will start with updating labs.

## 2022-03-16 NOTE — PROGRESS NOTES
Chief Complaint   Patient presents with   • Annual Exam         HPI:  Michela Valdez is a 71 y.o. female here for Medicare Annual Wellness Visit     Problem   Chronic Pain of Right Knee    She reports right knee pain which started about 3 months ago.  Located on the medial aspect of the knee.  In early December 2021 she had significant pain and swelling which made it difficult for her to ambulate.  She was on a trip to [place] and think she may have tweaked it.  She met with Dr. Mcdonald of orthopedic surgery and MRI knee was consistent with meniscal injury.  He advised her to return when the pain became more daily nuisance and they could consider operative therapy.     Prediabetes       Ref. Range 9/2/2021 10:04   Glycohemoglobin Latest Ref Range: 0.0 - 5.6 % 6.0 (A)     She has history of prediabetes.  No prior use of glucose lowering medications.  We have discussed GLP1 inhibitor therapy for both prediabetes and help with weight loss, she will think about it.     Bilateral Carotid Artery Stenosis    Carotid US (2022):  Right- Heterogeneous plaque of the bifurcation extending into the internal carotid artery. <50% internal carotid artery stenosis. Plaque is smooth on the surface and heterogeneous with mixed echogenicity.        Left-   Very mild plaque of the carotid bifurcation. Doppler velocities are normal    throughout the carotid arteries.    Plaque is smooth on the surface and homogeneous with low echogenicity.       The bilateral subclavian and vertebral artery waveforms are antegrade and    normal in character and velocity.       She has known right greater than left mild carotid artery disease.  She is followed vascular medicine in the past.  Her father had early CAD.  She is on statin and low-dose aspirin.     Postoperative Hypothyroidism       Ref. Range 4/1/2021 13:39   TSH Latest Ref Range: 0.380 - 5.330 uIU/mL 1.030     She reports prior thyroidectomy due to nodule found incidentally.  She has since  maintained on levothyroxine 88 mcg daily.  No recent dose adjustments.  She has had progressive weight gain since her thyroid was removed and feels are likely related.  She wonders if her dose needs to be adjusted.  Otherwise no signs or symptoms of overtreatment under treatment.     Dyslipidemia       Ref. Range 2/2/2021 09:28   Cholesterol,Tot Latest Ref Range: 100 - 199 mg/dL 185   Triglycerides Latest Ref Range: 0 - 149 mg/dL 93   HDL Latest Ref Range: >=40 mg/dL 64   LDL Latest Ref Range: <100 mg/dL 102 (H)     Has a history of elevated cholesterol.  Her father had early CAD.  She previously followed with Dr. Bloch of vascular medicine.    Current regimen: Atorvastatin 40 mg daily and aspirin 81 mg daily              Current supplements as per medication list.       Allergies: Penicillins, Aleve [gnp naproxen sodium], and Penicillin g    Current social contact/activities: exercising, pickle ball    She  reports that she quit smoking about 41 years ago. Her smoking use included cigarettes. She has a 2.00 pack-year smoking history. She has never used smokeless tobacco. She reports current alcohol use of about 3.0 - 4.2 oz of alcohol per week. She reports that she does not use drugs.  Counseling given: Not Answered      DPA/Advanced Directive:  Patient has Advanced Directive, but it is not on file. Instructed to bring in a copy to scan into their chart.    ROS:    Gait: Uses no assistive device  Ostomy: No  Other tubes: No  Amputations: No  Chronic oxygen use: No  Last eye exam: 6 months ago   Wears hearing aids: No   : Reports urinary leakage during the last 6 months that has not interfered at all with their daily activities or sleep.    Screening:    Depression Screening  Little interest or pleasure in doing things?  0 - not at all  Feeling down, depressed , or hopeless? 0 - not at all  Trouble falling or staying asleep, or sleeping too much?     Feeling tired or having little energy?     Poor appetite or  overeating?     Feeling bad about yourself - or that you are a failure or have let yourself or your family down?    Trouble concentrating on things, such as reading the newspaper or watching television?    Moving or speaking so slowly that other people could have noticed.  Or the opposite - being so fidgety or restless that you have been moving around a lot more than usual?     Thoughts that you would be better off dead, or of hurting yourself?     Patient Health Questionnaire Score:      If depressive symptoms identified deferred to follow up visit unless specifically addressed in assessment and plan.    Interpretation of PHQ-9 Total Score   Score Severity   1-4 No Depression   5-9 Mild Depression   10-14 Moderate Depression   15-19 Moderately Severe Depression   20-27 Severe Depression    Screening for Cognitive Impairment  Three Minute Recall (daughter, heaven, mountain) 3/3    Dev clock face with all 12 numbers and set the hands to show 10 past 11.  Yes    Cognitive concerns identified deferred for follow up unless specifically addressed in assessment and plan.    Fall Risk Assessment  Has the patient had two or more falls in the last year or any fall with injury in the last year?  No    Safety Assessment  Throw rugs on floor.  No  Handrails on all stairs.  No  Good lighting in all hallways.  Yes  Difficulty hearing.  No  Patient counseled about all safety risks that were identified.    Functional Assessment ADLs  Are there any barriers preventing you from cooking for yourself or meeting nutritional needs?  No.    Are there any barriers preventing you from driving safely or obtaining transportation?  No.    Are there any barriers preventing you from using a telephone or calling for help?  No.    Are there any barriers preventing you from shopping?  No.    Are there any barriers preventing you from taking care of your own finances?  No.    Are there any barriers preventing you from managing your medications?   No.    Are there any barriers preventing you from showering, bathing or dressing yourself? No.    Are you currently engaging in any exercise or physical activity?  Yes.  Work out twice a week at a gym  Works 10hours a day   What is your perception of your health? Excellent.    Health Maintenance Summary          Overdue - IMM INFLUENZA (1) Overdue since 9/1/2021 09/29/2019  Imm Admin: Influenza Vaccine Adult HD    11/28/2018  Imm Admin: Influenza Vaccine Adult HD    02/09/2012  Imm Admin: INFLUENZA TIV (IM)          Scheduled - MAMMOGRAM (Yearly) Scheduled for 4/14/2022    10/14/2020  MA-SCREENING MAMMO BILAT W/TOMOSYNTHESIS W/CAD    10/04/2018  MA-SCREEN MAMMO W/CAD-BILAT    10/24/2017  [id number]    02/24/2017  [id number]    10/13/2015  Postponed - was done this year, will bring in results to pcp          Annual Wellness Visit (Every 366 Days) Next due on 3/16/2023    03/15/2022  Level of Service: ANNUAL WELLNESS VISIT-INCLUDES PPPS SUBSEQUE*    09/02/2021  Level of Service: ANNUAL WELLNESS VISIT-INCLUDES PPPS SUBSEQUE*    02/02/2021  Visit Dx: Medicare annual wellness visit, subsequent    08/12/2020  Visit Dx: Medicare annual wellness visit, subsequent    08/12/2020  Subsequent Annual Wellness Visit - Includes PPPS ()    Only the first 5 history entries have been loaded, but more history exists.          COLORECTAL CANCER SCREENING (COLONOSCOPY - Every 5 Years) Tentatively due on 7/1/2024 07/01/2019  REFERRAL TO GI FOR COLONOSCOPY    10/13/2015  COLONOSCOPY (Postponed - was done this year, pt bring in  results to pcp)    08/05/2014  REFERRAL TO GI FOR COLONOSCOPY          BONE DENSITY (Every 5 Years) Tentatively due on 5/19/2026 05/19/2021  DS-BONE DENSITY STUDY (DEXA)    03/27/2019  DS-BONE DENSITY STUDY (DEXA)    06/16/2016  DS-BONE DENSITY STUDY (DEXA)    09/22/2008  DS-BONE DENSITY STUDY (DEXA)          IMM DTaP/Tdap/Td Vaccine (2 - Td or Tdap) Next due on 10/24/2026     10/24/2016  Imm Admin: Tdap Vaccine          IMM ZOSTER VACCINES (Series Information) Completed    2020  Imm Admin: Zoster Vaccine Recombinant (RZV) (SHINGRIX)    02/15/2020  Imm Admin: Zoster Vaccine Recombinant (RZV) (SHINGRIX)    2019  Imm Admin: Zoster Vaccine Recombinant (RZV) (SHINGRIX)          IMM PNEUMOCOCCAL VACCINE: 65+ Years (Series Information) Completed    2020  Imm Admin: Pneumococcal polysaccharide vaccine (PPSV-23)    2019  Imm Admin: Pneumococcal Conjugate Vaccine (Prevnar/PCV-13)          HEPATITIS C SCREENING  Completed    2021  HEP C VIRUS ANTIBODY          COVID-19 Vaccine (Series Information) Completed    2021  Imm Admin: Pfizer SARS-CoV-2 Vaccine 12+    2021  Imm Admin: Pfizer SARS-CoV-2 Vaccine    2021  Imm Admin: Pfizer SARS-CoV-2 Vaccine          IMM HEP B VACCINE (Series Information) Aged Out    No completion history exists for this topic.          IMM MENINGOCOCCAL VACCINE (MCV4) (Series Information) Aged Out    No completion history exists for this topic.          Discontinued - PAP SMEAR  Discontinued    No completion history exists for this topic.                Patient Care Team:  Archana Robertson D.O. as PCP - General (Internal Medicine)  Deondre Pemberton D.O. as PCP - [facility] Paneled  C ANGEL AgueroPKAM (Podiatry)  Clinton John M.D. as Consulting Physician (Gastroenterology)  Vivian Ruff (Inactive) as [facility]       Social History     Tobacco Use   • Smoking status: Former Smoker     Packs/day: 0.50     Years: 4.00     Pack years: 2.00     Types: Cigarettes     Quit date: 10/13/1980     Years since quittin.4   • Smokeless tobacco: Never Used   Substance Use Topics   • Alcohol use: Yes     Alcohol/week: 3.0 - 4.2 oz     Types: 5 - 7 Glasses of wine per week     Comment: glass of wine daily    • Drug use: No     Family History   Problem Relation Age of Onset   • Osteoporosis Mother   "  • Hyperlipidemia Father    • Heart Disease Father         CAD, MI    • Anemia Sister    • Cancer Brother         prostate   • Heart Disease Brother         CABG     She  has a past medical history of Dyslipidemia (10/13/2015), Elevated LFTs (5/16/2018), History of osteopenia (5/16/2018), Hyperlipidemia, Postoperative hypothyroidism (10/13/2015), Prediabetes (5/16/2018), Snoring, and Thyroid disease.   Past Surgical History:   Procedure Laterality Date   • THYROIDECTOMY TOTAL  2/8/2012    Performed by ALEX CONTEH at SURGERY SAME DAY [facility] ORS   • ABDOMINAL EXPLORATION      pyloric stenosis   • GYN SURGERY         Exam:   /60 (BP Location: Right arm, Patient Position: Sitting, BP Cuff Size: Adult)   Pulse 90   Temp 36.4 °C (97.6 °F) (Temporal)   Resp 12   Ht 1.651 m (5' 5\")   Wt 74.8 kg (165 lb)   SpO2 97%  Body mass index is 27.46 kg/m².    Physical Exam  Constitutional:       General: She is not in acute distress.     Appearance: Normal appearance. She is normal weight. She is not ill-appearing.   Eyes:      General: No scleral icterus.     Conjunctiva/sclera: Conjunctivae normal.   Cardiovascular:      Rate and Rhythm: Normal rate and regular rhythm.      Pulses: Normal pulses.   Pulmonary:      Effort: Pulmonary effort is normal. No respiratory distress.      Breath sounds: Normal breath sounds. No wheezing or rhonchi.   Musculoskeletal:      Right lower leg: No edema.      Left lower leg: No edema.   Skin:     General: Skin is warm and dry.      Findings: No bruising or rash.   Psychiatric:         Mood and Affect: Mood normal.         Behavior: Behavior normal.         Thought Content: Thought content normal.         Judgment: Judgment normal.          Assessment and Plan. The following treatment and monitoring plan is recommended:      Michela is a 71 y.o. female with the following:  Problem List Items Addressed This Visit     Postoperative hypothyroidism     Chronic and stable problem, she " will update her thyroid labs and we will adjust her levothyroxine if indicated.  Continue levothyroxine 88 mcg daily in the meantime.         Dyslipidemia     Chronic and ongoing problem.  Will update lipid panel to ensure stability.  Continue atorvastatin and aspirin, she follows with vascular medicine periodically.         Prediabetes     Chronic and ongoing problem, will recheck A1c. Considering GLP1 inhibitor therapy but will start with updating labs.         Bilateral carotid artery stenosis     Chronic and ongoing problem, right > left carotid stenosis, mild, continue statin and aspirin.         Chronic pain of right knee     Chronic and improved problem.  Pain is certainly more tolerable.  She met with Dr. Rodriguez of orthopedic surgery and completed MRI which showed meniscal damage.  He offered repair and suggested she return if the knee become more bothersome and they could discuss options.  Improved at this time, she is continuing to exercise without limitation.           Other Visit Diagnoses     Encounter for subsequent annual wellness visit (AWV) in Medicare patient    -  Primary           Services suggested: No services needed at this time  Health Care Screening: Age-appropriate preventive services recommended by USPTF and ACIP covered by Medicare were discussed today. Services ordered if indicated and agreed upon by the patient.  Referrals offered: Community-based lifestyle interventions to reduce health risks and promote self-management and wellness, fall prevention, nutrition, physical activity, tobacco-use cessation, weight loss, and mental health services as per orders if indicated.    Discussion today about general wellness and lifestyle habits:    · Prevent falls and reduce trip hazards; Cautioned about securing or removing rugs.  · Have a working fire alarm and carbon monoxide detector;   · Engage in regular physical activity and social activities     Follow-up: Return in about 7 months (around  10/15/2022).    I spent a total of 35 minutes with record review, exam, communication with the patient, communication with other providers, and documentation of this encounter.       PLEASE NOTE: This dictation was created using voice recognition software. I have made every reasonable attempt to correct obvious errors, but I expect that there are errors of grammar and possibly content that I did not discover before finalizing the note.      Archana Robertson, DO  Geriatric and Internal Medicine  [facility]

## 2022-03-16 NOTE — ASSESSMENT & PLAN NOTE
Chronic and stable problem, she will update her thyroid labs and we will adjust her levothyroxine if indicated.  Continue levothyroxine 88 mcg daily in the meantime.

## 2022-03-16 NOTE — ASSESSMENT & PLAN NOTE
Chronic and improved problem.  Pain is certainly more tolerable.  She met with Dr. Rodriguez of orthopedic surgery and completed MRI which showed meniscal damage.  He offered repair and suggested she return if the knee become more bothersome and they could discuss options.  Improved at this time, she is continuing to exercise without limitation.

## 2022-03-16 NOTE — ASSESSMENT & PLAN NOTE
Chronic and ongoing problem.  Will update lipid panel to ensure stability.  Continue atorvastatin and aspirin, she follows with vascular medicine periodically.

## 2022-03-23 ENCOUNTER — NON-PROVIDER VISIT (OUTPATIENT)
Dept: MEDICAL GROUP | Facility: PHYSICIAN GROUP | Age: 72
End: 2022-03-23
Payer: MEDICARE

## 2022-03-23 ENCOUNTER — HOSPITAL ENCOUNTER (OUTPATIENT)
Facility: MEDICAL CENTER | Age: 72
End: 2022-03-23
Attending: INTERNAL MEDICINE
Payer: MEDICARE

## 2022-03-23 DIAGNOSIS — E78.5 DYSLIPIDEMIA: ICD-10-CM

## 2022-03-23 DIAGNOSIS — E89.0 POSTOPERATIVE HYPOTHYROIDISM: ICD-10-CM

## 2022-03-23 DIAGNOSIS — R73.03 PREDIABETES: ICD-10-CM

## 2022-03-23 LAB
EST. AVERAGE GLUCOSE BLD GHB EST-MCNC: 126 MG/DL
HBA1C MFR BLD: 6 % (ref 4–5.6)

## 2022-03-23 PROCEDURE — 36415 COLL VENOUS BLD VENIPUNCTURE: CPT | Performed by: INTERNAL MEDICINE

## 2022-03-23 PROCEDURE — 84439 ASSAY OF FREE THYROXINE: CPT

## 2022-03-23 PROCEDURE — 84443 ASSAY THYROID STIM HORMONE: CPT

## 2022-03-23 PROCEDURE — 85025 COMPLETE CBC W/AUTO DIFF WBC: CPT

## 2022-03-23 PROCEDURE — 80061 LIPID PANEL: CPT

## 2022-03-23 PROCEDURE — 83036 HEMOGLOBIN GLYCOSYLATED A1C: CPT

## 2022-03-23 PROCEDURE — 99000 SPECIMEN HANDLING OFFICE-LAB: CPT | Performed by: INTERNAL MEDICINE

## 2022-03-23 PROCEDURE — 80053 COMPREHEN METABOLIC PANEL: CPT

## 2022-03-23 NOTE — PROGRESS NOTES
Pt was seated, confirmed pt name and , procedure explained to pt, venipuncture performed in LAC using aseptic technique, 2 lav., 1 green, 2 gold tubes collected, gauze placed with pressure on venipuncture site until hemostasis observed, site clean and dry, no redness or swelling observed, bandage placed, pt tolerated well and voiced no concerns.

## 2022-03-24 ENCOUNTER — APPOINTMENT (RX ONLY)
Dept: URBAN - METROPOLITAN AREA CLINIC 36 | Facility: CLINIC | Age: 72
Setting detail: DERMATOLOGY
End: 2022-03-24

## 2022-03-24 DIAGNOSIS — L90.5 SCAR CONDITIONS AND FIBROSIS OF SKIN: ICD-10-CM

## 2022-03-24 LAB
ALBUMIN SERPL BCP-MCNC: 4.4 G/DL (ref 3.2–4.9)
ALBUMIN/GLOB SERPL: 1.7 G/DL
ALP SERPL-CCNC: 103 U/L (ref 30–99)
ALT SERPL-CCNC: 26 U/L (ref 2–50)
ANION GAP SERPL CALC-SCNC: 12 MMOL/L (ref 7–16)
AST SERPL-CCNC: 28 U/L (ref 12–45)
BASOPHILS # BLD AUTO: 1.1 % (ref 0–1.8)
BASOPHILS # BLD: 0.06 K/UL (ref 0–0.12)
BILIRUB SERPL-MCNC: 0.4 MG/DL (ref 0.1–1.5)
BUN SERPL-MCNC: 19 MG/DL (ref 8–22)
CALCIUM SERPL-MCNC: 9.6 MG/DL (ref 8.5–10.5)
CHLORIDE SERPL-SCNC: 105 MMOL/L (ref 96–112)
CHOLEST SERPL-MCNC: 167 MG/DL (ref 100–199)
CO2 SERPL-SCNC: 23 MMOL/L (ref 20–33)
CREAT SERPL-MCNC: 0.64 MG/DL (ref 0.5–1.4)
EOSINOPHIL # BLD AUTO: 0.11 K/UL (ref 0–0.51)
EOSINOPHIL NFR BLD: 2 % (ref 0–6.9)
ERYTHROCYTE [DISTWIDTH] IN BLOOD BY AUTOMATED COUNT: 45.1 FL (ref 35.9–50)
GFR SERPLBLD CREATININE-BSD FMLA CKD-EPI: 93 ML/MIN/1.73 M 2
GLOBULIN SER CALC-MCNC: 2.6 G/DL (ref 1.9–3.5)
GLUCOSE SERPL-MCNC: 107 MG/DL (ref 65–99)
HCT VFR BLD AUTO: 44 % (ref 37–47)
HDLC SERPL-MCNC: 51 MG/DL
HGB BLD-MCNC: 14.1 G/DL (ref 12–16)
IMM GRANULOCYTES # BLD AUTO: 0.01 K/UL (ref 0–0.11)
IMM GRANULOCYTES NFR BLD AUTO: 0.2 % (ref 0–0.9)
LDLC SERPL CALC-MCNC: 89 MG/DL
LYMPHOCYTES # BLD AUTO: 2.08 K/UL (ref 1–4.8)
LYMPHOCYTES NFR BLD: 37.4 % (ref 22–41)
MCH RBC QN AUTO: 30.3 PG (ref 27–33)
MCHC RBC AUTO-ENTMCNC: 32 G/DL (ref 33.6–35)
MCV RBC AUTO: 94.4 FL (ref 81.4–97.8)
MONOCYTES # BLD AUTO: 0.48 K/UL (ref 0–0.85)
MONOCYTES NFR BLD AUTO: 8.6 % (ref 0–13.4)
NEUTROPHILS # BLD AUTO: 2.82 K/UL (ref 2–7.15)
NEUTROPHILS NFR BLD: 50.7 % (ref 44–72)
NRBC # BLD AUTO: 0 K/UL
NRBC BLD-RTO: 0 /100 WBC
PLATELET # BLD AUTO: 269 K/UL (ref 164–446)
PMV BLD AUTO: 10 FL (ref 9–12.9)
POTASSIUM SERPL-SCNC: 4.3 MMOL/L (ref 3.6–5.5)
PROT SERPL-MCNC: 7 G/DL (ref 6–8.2)
RBC # BLD AUTO: 4.66 M/UL (ref 4.2–5.4)
SODIUM SERPL-SCNC: 140 MMOL/L (ref 135–145)
T4 FREE SERPL-MCNC: 1.52 NG/DL (ref 0.93–1.7)
TRIGL SERPL-MCNC: 136 MG/DL (ref 0–149)
TSH SERPL DL<=0.005 MIU/L-ACNC: 0.5 UIU/ML (ref 0.38–5.33)
WBC # BLD AUTO: 5.6 K/UL (ref 4.8–10.8)

## 2022-03-24 PROCEDURE — ? ADDITIONAL NOTES

## 2022-03-24 PROCEDURE — ? FRAXEL

## 2022-03-24 NOTE — PROCEDURE: FRAXEL
Energy(Mj/Cm2): 1
Detail Level: Detailed
Number Of Passes: 2
Small Metal Eye Shield Text: The ocular mucosa was anesthetized with tetracaine. Once adequate anesthesia was optained, small metal eye shields were inserted and remained in place until the procedure was completed.
Medium Metal Eye Shield Text: The ocular mucosa was anesthetized with tetracaine. Once adequate anesthesia was optained, medium metal eye shields were inserted and remained in place until the procedure was completed.
Wavelength: 1550nm
Large Metal Eye Shield Text: The ocular mucosa was anesthetized with tetracaine. Once adequate anesthesia was optained, large metal eye shields were inserted and remained in place until the procedure was completed.
Indication: surgical scars
Small Plastic Eye Shield Text: The ocular mucosa was anesthetized with tetracaine. Once adequate anesthesia was optained, small plastic eye shields were inserted and remained in place until the procedure was completed.
Location: chin
Consent: Written consent obtained, risks reviewed including but not limited to pain and incomplete improvement.
Medium Plastic Eye Shield Text: The ocular mucosa was anesthetized with tetracaine. Once adequate anesthesia was optained, medium plastic eye shields were inserted and remained in place until the procedure was completed.
Add Post-Care Below To The Note: No
Large Plastic Eye Shield Text: The ocular mucosa was anesthetized with tetracaine. Once adequate anesthesia was optained, large plastic eye shields were inserted and remained in place until the procedure was completed.
Energy(Mj/Cm2): 70
External Cooling Fan Speed: 5
Post-Care Instructions: I reviewed with the patient in detail post-care instructions. Patient should avoid sun until area fully healed.

## 2022-03-24 NOTE — PROCEDURE: ADDITIONAL NOTES
Additional Notes: Pt will call if any questions or concerns re: site, otherwise no further treatment necessary
Detail Level: Simple
Render Risk Assessment In Note?: no

## 2022-04-01 ENCOUNTER — NON-PROVIDER VISIT (OUTPATIENT)
Dept: MEDICAL GROUP | Facility: PHYSICIAN GROUP | Age: 72
End: 2022-04-01
Payer: MEDICARE

## 2022-04-01 PROCEDURE — 99999 PR NO CHARGE: CPT | Performed by: INTERNAL MEDICINE

## 2022-04-01 NOTE — PROGRESS NOTES
Discussed current diet and exercise regime. Discussed improved food choice and amended exercise program.

## 2022-04-14 ENCOUNTER — HOSPITAL ENCOUNTER (OUTPATIENT)
Dept: RADIOLOGY | Facility: MEDICAL CENTER | Age: 72
End: 2022-04-14
Attending: INTERNAL MEDICINE
Payer: MEDICARE

## 2022-04-14 DIAGNOSIS — Z12.31 ENCOUNTER FOR SCREENING MAMMOGRAM FOR BREAST CANCER: ICD-10-CM

## 2022-04-14 PROCEDURE — 77063 BREAST TOMOSYNTHESIS BI: CPT

## 2022-04-19 ENCOUNTER — OFFICE VISIT (OUTPATIENT)
Dept: MEDICAL GROUP | Facility: PHYSICIAN GROUP | Age: 72
End: 2022-04-19
Payer: MEDICARE

## 2022-04-19 VITALS
WEIGHT: 162.9 LBS | SYSTOLIC BLOOD PRESSURE: 110 MMHG | RESPIRATION RATE: 12 BRPM | HEART RATE: 86 BPM | TEMPERATURE: 97.5 F | BODY MASS INDEX: 26.18 KG/M2 | OXYGEN SATURATION: 96 % | DIASTOLIC BLOOD PRESSURE: 60 MMHG | HEIGHT: 66 IN

## 2022-04-19 DIAGNOSIS — J04.0 ACUTE LARYNGITIS: ICD-10-CM

## 2022-04-19 PROCEDURE — 99213 OFFICE O/P EST LOW 20 MIN: CPT | Performed by: INTERNAL MEDICINE

## 2022-04-19 RX ORDER — BENZONATATE 200 MG/1
200 CAPSULE ORAL 3 TIMES DAILY PRN
Qty: 60 CAPSULE | Refills: 1 | Status: SHIPPED | OUTPATIENT
Start: 2022-04-19 | End: 2022-11-29

## 2022-04-19 ASSESSMENT — FIBROSIS 4 INDEX: FIB4 SCORE: 1.49

## 2022-04-19 NOTE — ASSESSMENT & PLAN NOTE
New problem, mild severity, recommend ongoing supportive care with OTC cough/cold medicine and vitamin C. Will send in prescription for benzonatate 200 mg three times daily as needed for laryngeal irritation/cough. Discussed that she needs to keep this away from children. She will monitor for worsening of symptoms, at this time her exam and vitals are very reassuring.

## 2022-04-19 NOTE — PROGRESS NOTES
"Subjective:   Chief Complaint/History of Present Illness:  Michela Valdez is a 71 y.o. female established patient who presents today to discuss medical problems as listed below. Michela is unaccompanied for today's visit.    Problem   Acute Laryngitis    Michela presents to clinic with 8 days of URI. Start as a cold with sinus pain and pressure and drainage. She now is left with residual cough which is very intrusive for her work. Cough is nonproductive. She is using OTC vitamins. She otherwise feels well. No fevers/chills, malaise, chest pain, productive cough, or GI symptoms. This feels similar to prior episodes of laryngitis. She has had good luck with tessalon pearles in the past. Also using gianna seltzer cold and cough 2-3 times daily.          Current Medications:  Current Outpatient Medications Ordered in Epic   Medication Sig Dispense Refill   • Ascorbic Acid (VITAMIN C ER PO) Take  by mouth.     • benzonatate (TESSALON) 200 MG capsule Take 1 Capsule by mouth 3 times a day as needed for Cough. 60 Capsule 1   • levothyroxine (SYNTHROID) 88 MCG Tab Take 1 Tablet by mouth every morning on an empty stomach. 100 Tablet 3   • atorvastatin (LIPITOR) 40 MG Tab TAKE 1 TABLET BY MOUTH DAILY     • Cyanocobalamin (VITAMIN B-12) 1000 MCG Tab Take 1,000 mcg by mouth every day.     • Cholecalciferol (VITAMIN D3) 1000 units Cap Take 2,000 Units by mouth.     • Glucosamine-Chondroitin (MOVE FREE PO) Take  by mouth.     • Multiple Vitamins-Minerals (CENTRUM SILVER PO) Take  by mouth.     • FISH OIL by Does not apply route. 3 time a week     • aspirin EC (ECOTRIN) 81 MG TBEC Take 81 mg by mouth every day. HOLD OFF TAKING THIS UNTIL 2/12/12.       No current [facility]-ordered facility-administered medications on file.          Objective:   Physical Exam:    Vitals: /60 (BP Location: Left arm, Patient Position: Sitting, BP Cuff Size: Adult)   Pulse 86   Temp 36.4 °C (97.5 °F) (Temporal)   Resp 12   Ht 1.676 m (5' 6\")   Wt 73.9 " kg (162 lb 14.4 oz)   SpO2 96%    BMI: Body mass index is 26.29 kg/m².  Physical Exam  Constitutional:       General: She is not in acute distress.     Appearance: Normal appearance. She is not ill-appearing.   HENT:      Right Ear: Tympanic membrane and ear canal normal. There is no impacted cerumen.      Left Ear: Tympanic membrane and ear canal normal. There is no impacted cerumen.      Nose: Congestion present. No rhinorrhea.      Mouth/Throat:      Pharynx: Oropharynx is clear. No oropharyngeal exudate or posterior oropharyngeal erythema.   Eyes:      General: No scleral icterus.     Conjunctiva/sclera: Conjunctivae normal.   Cardiovascular:      Rate and Rhythm: Normal rate and regular rhythm.      Pulses: Normal pulses.   Pulmonary:      Effort: Pulmonary effort is normal. No respiratory distress.      Breath sounds: Normal breath sounds. No wheezing or rhonchi.      Comments: +dry cough  Musculoskeletal:      Right lower leg: No edema.      Left lower leg: No edema.   Lymphadenopathy:      Cervical: No cervical adenopathy.   Skin:     General: Skin is warm and dry.      Findings: No bruising or rash.   Neurological:      Gait: Gait normal.   Psychiatric:         Mood and Affect: Mood normal.         Behavior: Behavior normal.         Thought Content: Thought content normal.         Judgment: Judgment normal.          Assessment and Plan:   Michela is a 71 y.o. female with the following:  Problem List Items Addressed This Visit     Acute laryngitis     New problem, mild severity, recommend ongoing supportive care with OTC cough/cold medicine and vitamin C. Will send in prescription for benzonatate 200 mg three times daily as needed for laryngeal irritation/cough. Discussed that she needs to keep this away from children. She will monitor for worsening of symptoms, at this time her exam and vitals are very reassuring.         Relevant Medications    benzonatate (TESSALON) 200 MG capsule           RTC: Return if  symptoms worsen or fail to improve.    I spent a total of 22 minutes with record review, exam, communication with the patient, communication with other providers, and documentation of this encounter.    PLEASE NOTE: This dictation was created using voice recognition software. I have made every reasonable attempt to correct obvious errors, but I expect that there are errors of grammar and possibly content that I did not discover before finalizing the note.      Archana Robertson, DO  Geriatric and Internal Medicine  [facility]

## 2022-04-20 ENCOUNTER — APPOINTMENT (OUTPATIENT)
Dept: MEDICAL GROUP | Facility: PHYSICIAN GROUP | Age: 72
End: 2022-04-20
Payer: MEDICARE

## 2022-07-14 RX ORDER — ATORVASTATIN CALCIUM 40 MG/1
40 TABLET, FILM COATED ORAL NIGHTLY
Qty: 30 TABLET | Refills: 0 | Status: SHIPPED | OUTPATIENT
Start: 2022-07-14 | End: 2022-09-07 | Stop reason: SDUPTHER

## 2022-07-21 ENCOUNTER — APPOINTMENT (RX ONLY)
Dept: URBAN - METROPOLITAN AREA CLINIC 35 | Facility: CLINIC | Age: 72
Setting detail: DERMATOLOGY
End: 2022-07-21

## 2022-07-21 DIAGNOSIS — E89.0 POSTOPERATIVE HYPOTHYROIDISM: ICD-10-CM

## 2022-07-21 DIAGNOSIS — L81.4 OTHER MELANIN HYPERPIGMENTATION: ICD-10-CM

## 2022-07-21 PROCEDURE — ? COUNSELING

## 2022-07-21 PROCEDURE — 99212 OFFICE O/P EST SF 10 MIN: CPT

## 2022-07-21 PROCEDURE — ? PHOTO-DOCUMENTATION

## 2022-07-21 RX ORDER — LEVOTHYROXINE SODIUM 88 UG/1
TABLET ORAL
Qty: 100 TABLET | Refills: 0 | Status: SHIPPED | OUTPATIENT
Start: 2022-07-21 | End: 2022-09-28 | Stop reason: SDUPTHER

## 2022-07-25 NOTE — PROGRESS NOTES
Subjective:     CC:   Chief Complaint   Patient presents with   • Other     Questions about Covid Booster   • Nutrition Counseling       HPI:   Michela Jacobs presents today with pre diabetes follow up and A1c and discuss new referral to nutrition management.   Constipation issues- taking two colace daily, gave constipation instructions on wrap up and discussed in office.    Prediabetes  9/2/21: Here today for pre diabetes follow up and recheck on her A1c. March 2021, her A1c was 6.8 in the diabetes range. She decided to do non pharmacologic management at that time. The health maintenance program she was referred to is no longer in service, I referred her today to [facility] nutrition and metabolism services per her request. She is working with a  2 x week for 1 hour, has lost 3 lbs of fat and gained 5 lbs of muscle. She is also doing some exercises on her own during her off days. Discussed food intake for a typical day:   Black coffee for breakfast  Lunch: chicken tortilla soup from [employer].  Evening is eating cheese and crackers  Is buying some prepackaged food from [employer]  Eating a lot of chicken and salmon  Eating some fruits, discussed eating berries which are lower in sugar  Also discussed batch cooking some vegetables and ways to increase vegetable intake to help increase fiber to help with feelings of fullness and her constipation issues.   Patient is feeling fatigued by 5 pm, she feels that she shouldn't feel so tired but is also working full time as a realestate agent. Likely this is due to high demand job and stress. She is using calm gavino for meditation but has not been using regularly. Will work on meditation, exercise, and increasing fluids to 80 ml daily to help with fatigue and constipation. Will f/u in 3 months for A1C recheck and to reassess goals at that time.     Glycohemoglobin 0.0 - 5.6 % 6.0 Abnormal     Internal Control Negative  Negative    Internal Control Positive  Positive            Current  "Outpatient Medications Ordered in Epic   Medication Sig Dispense Refill   • Saccharomyces boulardii ([employer] RESISTANCE FORMULA PO) Take 1 Packet by mouth every day.     • atorvastatin (LIPITOR) 40 MG Tab TAKE 1 TABLET BY MOUTH DAILY     • levothyroxine (SYNTHROID) 88 MCG Tab TAKE 2 TABLETS BY MOUTH ON SUNDAYS AND 1 TABLET DAILY FOR THE REST OF THE WEEK 105 Tab 3   • Probiotic Product (FLORAJEN3 PO) FLORAJEN3 CAPS     • Cyanocobalamin (VITAMIN B-12) 1000 MCG Tab Take 1,000 mcg by mouth every day.     • Cholecalciferol (VITAMIN D3) 1000 units Cap Take 2,000 Units by mouth.     • Glucosamine-Chondroitin (MOVE FREE PO) Take  by mouth.     • Multiple Vitamins-Minerals (CENTRUM SILVER PO) Take  by mouth.     • FISH OIL by Does not apply route. 3 time a week     • aspirin EC (ECOTRIN) 81 MG TBEC Take 81 mg by mouth every day. HOLD OFF TAKING THIS UNTIL 2/12/12.       No current [facility]-ordered facility-administered medications on file.       Health Maintenance: Completed    Review of Systems   Constitutional: Positive for malaise/fatigue. Negative for chills and fever.        Intentional weight loss   HENT: Negative.    Eyes: Negative.    Respiratory: Negative for cough and shortness of breath.    Cardiovascular: Negative for chest pain, palpitations and leg swelling.   Gastrointestinal: Positive for constipation. Negative for abdominal pain, diarrhea, heartburn, nausea and vomiting.   Genitourinary: Negative.    Musculoskeletal: Negative.    Neurological: Negative for dizziness, weakness and headaches.   Psychiatric/Behavioral: Negative.          Objective:     Exam:  /60 (BP Location: Right arm, Patient Position: Sitting, BP Cuff Size: Adult)   Pulse 90   Temp 36.9 °C (98.5 °F) (Temporal)   Resp 15   Ht 1.651 m (5' 5\")   Wt 72.1 kg (159 lb)   SpO2 98%   BMI 26.46 kg/m²  Body mass index is 26.46 kg/m².    Physical Exam  Vitals reviewed.   Constitutional:       Appearance: Normal appearance.   Eyes:      " Conjunctiva/sclera: Conjunctivae normal.      Pupils: Pupils are equal, round, and reactive to light.   Cardiovascular:      Rate and Rhythm: Normal rate and regular rhythm.      Pulses: Normal pulses.      Heart sounds: Normal heart sounds. No murmur heard.     Pulmonary:      Effort: Pulmonary effort is normal. No respiratory distress.      Breath sounds: Normal breath sounds.   Abdominal:      General: Abdomen is flat.   Musculoskeletal:      Right lower leg: No edema.      Left lower leg: No edema.   Skin:     General: Skin is warm and dry.      Coloration: Skin is not jaundiced or pale.   Neurological:      Mental Status: She is alert and oriented to person, place, and time.   Psychiatric:         Mood and Affect: Mood normal.         Behavior: Behavior normal.          A chaperone was offered to the patient during today's exam. Patient declined chaperone.        Assessment & Plan:     71 y.o. female with the following -     1. Prediabetes  Chronic on going problem, improving with diet and exercise habits. Encouraged patient to continue to exercise at least 30 min daily and  Increase vegetable and water intake and cut back on processed carbohydrates. Pt agreeable to plan and will f/u in 3 months for A1c recheck.   - POCT Hemoglobin A1C  - REFERRAL TO NUTRITION SERVICES    Other orders  - Saccharomyces boulardii ([employer] RESISTANCE FORMULA PO); Take 1 Packet by mouth every day.       I spent a total of 45 minutes with record review, exam, communication with the patient, communication with other providers, and documentation of this encounter.      Return in about 3 months (around 12/2/2021) for A1c follow up.    Please note that this dictation was created using voice recognition software. I have made every reasonable attempt to correct obvious errors, but I expect that there are errors of grammar and possibly content that I did not discover before finalizing the note.       no

## 2022-08-30 ENCOUNTER — APPOINTMENT (RX ONLY)
Dept: URBAN - METROPOLITAN AREA CLINIC 35 | Facility: CLINIC | Age: 72
Setting detail: DERMATOLOGY
End: 2022-08-30

## 2022-08-30 DIAGNOSIS — D22 MELANOCYTIC NEVI: ICD-10-CM

## 2022-08-30 DIAGNOSIS — L72.0 EPIDERMAL CYST: ICD-10-CM

## 2022-08-30 DIAGNOSIS — Z85.828 PERSONAL HISTORY OF OTHER MALIGNANT NEOPLASM OF SKIN: ICD-10-CM

## 2022-08-30 DIAGNOSIS — L82.1 OTHER SEBORRHEIC KERATOSIS: ICD-10-CM

## 2022-08-30 DIAGNOSIS — L81.4 OTHER MELANIN HYPERPIGMENTATION: ICD-10-CM

## 2022-08-30 DIAGNOSIS — D485 NEOPLASM OF UNCERTAIN BEHAVIOR OF SKIN: ICD-10-CM

## 2022-08-30 PROBLEM — D22.61 MELANOCYTIC NEVI OF RIGHT UPPER LIMB, INCLUDING SHOULDER: Status: ACTIVE | Noted: 2022-08-30

## 2022-08-30 PROBLEM — D22.4 MELANOCYTIC NEVI OF SCALP AND NECK: Status: ACTIVE | Noted: 2022-08-30

## 2022-08-30 PROBLEM — D48.5 NEOPLASM OF UNCERTAIN BEHAVIOR OF SKIN: Status: ACTIVE | Noted: 2022-08-30

## 2022-08-30 PROCEDURE — 99213 OFFICE O/P EST LOW 20 MIN: CPT

## 2022-08-30 PROCEDURE — ? COUNSELING

## 2022-08-30 PROCEDURE — ? DEFER

## 2022-08-30 ASSESSMENT — LOCATION ZONE DERM
LOCATION ZONE: ARM
LOCATION ZONE: TRUNK
LOCATION ZONE: SCALP
LOCATION ZONE: EAR
LOCATION ZONE: LEG
LOCATION ZONE: NECK

## 2022-08-30 ASSESSMENT — LOCATION DETAILED DESCRIPTION DERM
LOCATION DETAILED: RIGHT POSTERIOR SHOULDER
LOCATION DETAILED: RIGHT MEDIAL UPPER BACK
LOCATION DETAILED: RIGHT DISTAL POSTERIOR UPPER ARM
LOCATION DETAILED: MID POSTERIOR NECK
LOCATION DETAILED: RIGHT SUPERIOR LATERAL NECK
LOCATION DETAILED: LEFT ANTERIOR LATERAL PROXIMAL THIGH
LOCATION DETAILED: LEFT SUPERIOR HELIX
LOCATION DETAILED: LEFT SUPERIOR MEDIAL UPPER BACK
LOCATION DETAILED: RIGHT MEDIAL FRONTAL SCALP

## 2022-08-30 ASSESSMENT — LOCATION SIMPLE DESCRIPTION DERM
LOCATION SIMPLE: RIGHT SHOULDER
LOCATION SIMPLE: LEFT UPPER BACK
LOCATION SIMPLE: POSTERIOR NECK
LOCATION SIMPLE: RIGHT SCALP
LOCATION SIMPLE: LEFT THIGH
LOCATION SIMPLE: RIGHT UPPER BACK
LOCATION SIMPLE: NECK
LOCATION SIMPLE: RIGHT POSTERIOR UPPER ARM
LOCATION SIMPLE: LEFT EAR

## 2022-08-30 NOTE — PROCEDURE: DEFER
X Size Of Lesion In Cm (Optional): 0
Procedure To Be Performed At Next Visit: Biopsy by shave method
Detail Level: Simple
Size Of Lesion In Cm (Optional): 1.2
Introduction Text (Please End With A Colon): The following procedure was deferred:

## 2022-09-07 RX ORDER — ATORVASTATIN CALCIUM 40 MG/1
40 TABLET, FILM COATED ORAL NIGHTLY
Qty: 30 TABLET | Refills: 0 | Status: SHIPPED | OUTPATIENT
Start: 2022-09-07 | End: 2022-09-28 | Stop reason: SDUPTHER

## 2022-09-07 NOTE — TELEPHONE ENCOUNTER
Requested Prescriptions     Pending Prescriptions Disp Refills   • atorvastatin (LIPITOR) 40 MG Tab 30 Tablet 0     Sig: Take 1 Tablet by mouth every evening. TAKE 1 TABLET BY MOUTH DAILY   Rani Lyn M.D.

## 2022-09-28 ENCOUNTER — OFFICE VISIT (OUTPATIENT)
Dept: MEDICAL GROUP | Facility: PHYSICIAN GROUP | Age: 72
End: 2022-09-28
Payer: MEDICARE

## 2022-09-28 VITALS
WEIGHT: 162.5 LBS | RESPIRATION RATE: 14 BRPM | SYSTOLIC BLOOD PRESSURE: 122 MMHG | HEART RATE: 79 BPM | DIASTOLIC BLOOD PRESSURE: 76 MMHG | BODY MASS INDEX: 26.12 KG/M2 | OXYGEN SATURATION: 97 % | HEIGHT: 66 IN | TEMPERATURE: 97.1 F

## 2022-09-28 DIAGNOSIS — N95.8 GENITOURINARY SYNDROME OF MENOPAUSE: ICD-10-CM

## 2022-09-28 DIAGNOSIS — R74.8 ELEVATED ALKALINE PHOSPHATASE LEVEL: ICD-10-CM

## 2022-09-28 DIAGNOSIS — I65.23 BILATERAL CAROTID ARTERY STENOSIS: ICD-10-CM

## 2022-09-28 DIAGNOSIS — K59.01 SLOW TRANSIT CONSTIPATION: ICD-10-CM

## 2022-09-28 DIAGNOSIS — Z23 NEED FOR VACCINATION: ICD-10-CM

## 2022-09-28 DIAGNOSIS — E89.0 POSTOPERATIVE HYPOTHYROIDISM: ICD-10-CM

## 2022-09-28 DIAGNOSIS — R00.2 PALPITATIONS: ICD-10-CM

## 2022-09-28 DIAGNOSIS — M12.811 ROTATOR CUFF ARTHROPATHY OF RIGHT SHOULDER: ICD-10-CM

## 2022-09-28 DIAGNOSIS — R61 NIGHT SWEATS: ICD-10-CM

## 2022-09-28 DIAGNOSIS — E78.5 DYSLIPIDEMIA: ICD-10-CM

## 2022-09-28 DIAGNOSIS — Z11.59 NEED FOR HEPATITIS B SCREENING TEST: ICD-10-CM

## 2022-09-28 DIAGNOSIS — L98.9 SKIN LESION: ICD-10-CM

## 2022-09-28 DIAGNOSIS — R73.03 PREDIABETES: ICD-10-CM

## 2022-09-28 DIAGNOSIS — M25.559 HIP PAIN: ICD-10-CM

## 2022-09-28 PROCEDURE — 90662 IIV NO PRSV INCREASED AG IM: CPT | Performed by: INTERNAL MEDICINE

## 2022-09-28 PROCEDURE — G0008 ADMIN INFLUENZA VIRUS VAC: HCPCS | Performed by: INTERNAL MEDICINE

## 2022-09-28 PROCEDURE — 99215 OFFICE O/P EST HI 40 MIN: CPT | Mod: 25 | Performed by: INTERNAL MEDICINE

## 2022-09-28 RX ORDER — LEVOTHYROXINE SODIUM 88 UG/1
88 TABLET ORAL
Qty: 100 TABLET | Refills: 3 | Status: SHIPPED | OUTPATIENT
Start: 2022-09-28 | End: 2023-09-29

## 2022-09-28 RX ORDER — ESTRADIOL 0.1 MG/G
0.4 CREAM VAGINAL
Qty: 42.5 G | Refills: 3 | Status: SHIPPED | OUTPATIENT
Start: 2022-09-28 | End: 2023-01-18

## 2022-09-28 RX ORDER — ATORVASTATIN CALCIUM 40 MG/1
40 TABLET, FILM COATED ORAL NIGHTLY
Qty: 100 TABLET | Refills: 3 | Status: SHIPPED | OUTPATIENT
Start: 2022-09-28 | End: 2023-03-16 | Stop reason: SDUPTHER

## 2022-09-28 ASSESSMENT — FIBROSIS 4 INDEX: FIB4 SCORE: 1.51

## 2022-09-28 NOTE — ASSESSMENT & PLAN NOTE
Relatively new and decompensated problem.  She will complete MRI of the right shoulder next week, asked her to have them send me a copy as well.  Discussed local shoulder surgeons that she could meet with to go over operative options.

## 2022-09-28 NOTE — ASSESSMENT & PLAN NOTE
Chronic and ongoing problem.  Again discussed that this could be related to inadequate oral hydration.  Could consider adding fiber or MiraLAX to help further lubricate the bowel and bulk up the stool.  No red flag features at this time.  Could consider medication grade laxatives if this continues to worsen.

## 2022-09-28 NOTE — ASSESSMENT & PLAN NOTE
New problem, will need plain film xrays to delineate primary OA of the joint vs impingement, labral tear, etc. Anticipates she will seek operative repair through [facility] after her right shoulder is taken care of.

## 2022-09-28 NOTE — ASSESSMENT & PLAN NOTE
New problem, occurs fairly infrequently. Discussed if frequency or severity increased we could perform a heart monitor to assess for PVCs vs SVT vs afib, etc. She will keep me updated and start keeping a journal to look for triggers.

## 2022-09-28 NOTE — ASSESSMENT & PLAN NOTE
New problem, mild severity, no red flag features. She will continue to monitor. Could consider off label use of liothyronine which can be helpful for postmenopausal causes of night sweats.

## 2022-09-28 NOTE — ASSESSMENT & PLAN NOTE
Chronic and ongoing problem.  Will update hemoglobin A1c to ensure ongoing stability.  If there has been worsening of her values could consider initiation with GLP-1 inhibitor therapy.

## 2022-09-28 NOTE — ASSESSMENT & PLAN NOTE
New problem, does not look suspicious for squamous cell carcinoma, will request medical records from her dermatology appointment last month.

## 2022-09-28 NOTE — ASSESSMENT & PLAN NOTE
Chronic and stable problem.  No additional imaging needed at this time.  Continue with atorvastatin and aspirin.

## 2022-09-28 NOTE — ASSESSMENT & PLAN NOTE
New and decompensated problem.  We will have her start estradiol to apply nightly 2-3 times per week to strengthen the vulvar and vaginal tissue.

## 2022-09-28 NOTE — ASSESSMENT & PLAN NOTE
Chronic and stable problem.  Continue levothyroxine 88 mcg daily.  Update thyroid levels to ensure stability.

## 2022-09-28 NOTE — ASSESSMENT & PLAN NOTE
New problem, requires additional evaluation, will check isoenzymes and confirm this is coming from arthritis and does not require further work-up.

## 2022-09-28 NOTE — ASSESSMENT & PLAN NOTE
Chronic and stable problem.  Appropriate to continue current regiment with atorvastatin 40 mg daily.  May contribute slightly to her constipation.  Update lipid panel to ensure ongoing stability.

## 2022-09-28 NOTE — LETTER
[facility]  Archana Robertson D.O.  [address]  [place] [address]  Fax: [phone]   Authorization for Release/Disclosure of   Protected Health Information   Name: CORBY OLIVIA : 1950 SSN: [id number]   Address: [address] [place] [address] [place] [address] Phone:    [phone] (home) [phone] (work)   I authorize the entity listed below to release/disclose the PHI below to:   [facility]/Archana Robertson D.O. and Archana Robertson D.O.   Provider or Entity Name:  [facility]    Address   City, State, Zip  [place] Phone:      Fax:     Reason for request: continuity of care   Information to be released:    [  ] LAST COLONOSCOPY,  including any PATH REPORT and follow-up  [  ] LAST FIT/COLOGUARD RESULT [  ] LAST DEXA  [  ] LAST MAMMOGRAM  [  ] LAST PAP  [  ] LAST LABS [  ] RETINA EXAM REPORT  [  ] IMMUNIZATION RECORDS  [ x ] Release all info      [ x ] Check here and initial the line next to each item to release ALL health information INCLUDING  _____ Care and treatment for drug and / or alcohol abuse  _____ HIV testing, infection status, or AIDS  _____ Genetic Testing    DATES OF SERVICE OR TIME PERIOD TO BE DISCLOSED: __-___________  I understand and acknowledge that:  * This Authorization may be revoked at any time by you in writing, except if your health information has already been used or disclosed.  * Your health information that will be used or disclosed as a result of you signing this authorization could be re-disclosed by the recipient. If this occurs, your re-disclosed health information may no longer be protected by State or Federal laws.  * You may refuse to sign this Authorization. Your refusal will not affect your ability to obtain treatment.  * This Authorization becomes effective upon signing and will  on (date) __________.      If no date is indicated, this Authorization will  one (1) year from the signature date.    Name:  Michela Valdez    Signature:   Date:     9/28/2022       PLEASE FAX REQUESTED RECORDS BACK TO: [phone]

## 2022-09-28 NOTE — PROGRESS NOTES
Subjective:   Chief Complaint/History of Present Illness:  Michela Valdez is a 72 y.o. female established patient who presents today to discuss medical problems as listed below. Michela is unaccompanied for today's visit.    Problem   Elevated Alkaline Phosphatase Level     Latest Reference Range & Units 3/23/22 09:34   Alkaline Phosphatase 30 - 99 U/L 103 (H)     New finding in March 2022.  Likely related to increased bone turnover from known arthritis.     Genitourinary Syndrome of Menopause    She notes dryness of the vulva and vagina.  She would like to try some maintenance medicine to better lubricate the area.  No personal history of breast cancer or stroke/heart attack.  She be interested in adding topical estrogen.     Skin lesion- right hand, SK    She reports a lesion on the right hand which she was able to remove herself recently.  Residual border does not look concerning.  She notes full skin check with dermatology with Dr. Santos last month.  I do not have these medical records available.     Rotator Cuff Arthropathy of Right Shoulder    She has right shoulder pain and was felt to have an acute on chronic rotator cuff tear in spring 2022.  She saw Dr. Blevins with physiatry.  She is going to complete an MRI of the right shoulder in early October 2022.  She is interested in operative repair if indicated.     Hip pain- right > left    She notes insidious onset of hip pain. Initially on the left side but now more recently on the right side. She notices it when walking long distances, fore example when she was visiting [place]. Does not limit her day to day ability to complete ADL's but becoming more of a nuisance.     Palpitations    She reports infrequent episodes of palpitations. Typically fleeting in nature, no clear trigger. Has occurred at night while she is resting or when she first awakes in the morning. No relationship to caffeine or stress. No prior evaluation for arrhythmia.     Night Sweats     She has noted irritating night sweats more recently. She has always ran more warm when sleeping and typically uses a fan and wears only light blankets. Now with more perspiration when she wakes up. No drenching night sweats.      Slow Transit Constipation    She has known diverticular disease with associated constipation.  She has been taking Colace with mixed results.  Sometimes will be ineffective and other times she will have a small amount of anal leakage.  She previously tried MiraLAX but this was too strong for her, she was taking 1 full capful.  She admits that she has not drink enough water.  She tried fiber but it worsen the constipation due to inadequate hydration.  No prior evaluation for SIBO, does note some increased distention and gas for which he uses Gas-X.  Recent colonoscopy in 2019 was reassuring.      Prediabetes     Latest Reference Range & Units 3/23/22 09:34   Glycohemoglobin 4.0 - 5.6 % 6.0 (H)   Estim. Avg Glu mg/dL 126     She has history of prediabetes.  No prior use of glucose lowering medications.  We have discussed GLP1 inhibitor therapy for both prediabetes and help with weight loss, she will think about it.     Bilateral Carotid Artery Stenosis    Carotid US (2022):  Right- Heterogeneous plaque of the bifurcation extending into the internal carotid artery. <50% internal carotid artery stenosis. Plaque is smooth on the surface and heterogeneous with mixed echogenicity.  Left- Very mild plaque of the carotid bifurcation. Doppler velocities are normal throughout the carotid arteries. Plaque is smooth on the surface and homogeneous with low echogenicity. The bilateral subclavian and vertebral artery waveforms are antegrade and normal in character and velocity.    She has known right greater than left mild carotid artery disease.  She is followed vascular medicine in the past.  Her father had early CAD.  She is on statin and low-dose aspirin.     Postoperative Hypothyroidism       Latest  Reference Range & Units 3/23/22 09:34   TSH 0.380 - 5.330 uIU/mL 0.500   Free T-4 0.93 - 1.70 ng/dL 1.52     She reports prior thyroidectomy due to nodule found incidentally.  She has since maintained on levothyroxine 88 mcg daily.  No recent dose adjustments.  She has had progressive weight gain since her thyroid was removed and feels are likely related.  She wonders if her dose needs to be adjusted.  Otherwise no signs or symptoms of overtreatment under treatment.     Dyslipidemia     Latest Reference Range & Units 3/23/22 09:34   Cholesterol,Tot 100 - 199 mg/dL 167   Triglycerides 0 - 149 mg/dL 136   HDL >=40 mg/dL 51   LDL <100 mg/dL 89     Has a history of elevated cholesterol.  Her father had early CAD.  She previously followed with Dr. Bloch of vascular medicine.    Current regimen: Atorvastatin 40 mg daily and aspirin 81 mg daily          Current Medications:  Current Outpatient Medications Ordered in Epic   Medication Sig Dispense Refill    atorvastatin (LIPITOR) 40 MG Tab Take 1 Tablet by mouth every evening. TAKE 1 TABLET BY MOUTH DAILY 100 Tablet 3    levothyroxine (SYNTHROID) 88 MCG Tab Take 1 Tablet by mouth every morning on an empty stomach. 100 Tablet 3    estradiol (ESTRACE) 0.1 MG/GM vaginal cream Insert 0.4 g into the vagina every 72 hours. 42.5 g 3    benzonatate (TESSALON) 200 MG capsule Take 1 Capsule by mouth 3 times a day as needed for Cough. 60 Capsule 1    Cyanocobalamin (VITAMIN B-12) 1000 MCG Tab Take 1,000 mcg by mouth every day.      Cholecalciferol (VITAMIN D3) 1000 units Cap Take 2,000 Units by mouth.      Glucosamine-Chondroitin (MOVE FREE PO) Take 2 Tablets by mouth every day.      FISH OIL 1200 mg 3 times a week      aspirin EC (ECOTRIN) 81 MG TBEC Take 81 mg by mouth every day. HOLD OFF TAKING THIS UNTIL 2/12/12.       No current [facility]-ordered facility-administered medications on file.          Objective:   Physical Exam:    Vitals: /76   Pulse 79   Temp 36.2 °C (97.1 °F)  "(Temporal)   Resp 14   Ht 1.676 m (5' 6\")   Wt 73.7 kg (162 lb 8 oz)   SpO2 97%    BMI: Body mass index is 26.23 kg/m².  Physical Exam  Constitutional:       General: She is not in acute distress.     Appearance: Normal appearance. She is not ill-appearing.   HENT:      Right Ear: Tympanic membrane and ear canal normal. There is no impacted cerumen.      Left Ear: Tympanic membrane and ear canal normal. There is no impacted cerumen.      Mouth/Throat:      Comments: Normal phonation  Eyes:      General: No scleral icterus.     Conjunctiva/sclera: Conjunctivae normal.   Cardiovascular:      Rate and Rhythm: Normal rate and regular rhythm.      Pulses: Normal pulses.      Heart sounds: No murmur heard.  Pulmonary:      Effort: Pulmonary effort is normal. No respiratory distress.      Breath sounds: Normal breath sounds. No wheezing or rhonchi.   Abdominal:      General: Bowel sounds are normal. There is no distension.      Palpations: Abdomen is soft.   Musculoskeletal:         General: Tenderness present.      Comments: Decreased active ROM of right shoulder and bilateral hips   Skin:     General: Skin is warm and dry.      Findings: Lesion present. No bruising or rash.      Comments: Right hand with flat white round lesion where previous SK was   Psychiatric:         Mood and Affect: Mood normal.         Behavior: Behavior normal.         Thought Content: Thought content normal.         Judgment: Judgment normal.             Assessment and Plan:   Michela is a 72 y.o. female with the following:  Problem List Items Addressed This Visit       Postoperative hypothyroidism     Chronic and stable problem.  Continue levothyroxine 88 mcg daily.  Update thyroid levels to ensure stability.         Relevant Medications    levothyroxine (SYNTHROID) 88 MCG Tab    Other Relevant Orders    CBC WITH DIFFERENTIAL    Comp Metabolic Panel    TSH    FREE THYROXINE    VITAMIN D,25 HYDROXY (DEFICIENCY)    VITAMIN B12    Dyslipidemia "     Chronic and stable problem.  Appropriate to continue current regiment with atorvastatin 40 mg daily.  May contribute slightly to her constipation.  Update lipid panel to ensure ongoing stability.         Relevant Medications    atorvastatin (LIPITOR) 40 MG Tab    Other Relevant Orders    CBC WITH DIFFERENTIAL    Comp Metabolic Panel    Lipid Profile    Prediabetes     Chronic and ongoing problem.  Will update hemoglobin A1c to ensure ongoing stability.  If there has been worsening of her values could consider initiation with GLP-1 inhibitor therapy.         Relevant Orders    CBC WITH DIFFERENTIAL    Comp Metabolic Panel    HEMOGLOBIN A1C    Bilateral carotid artery stenosis     Chronic and stable problem.  No additional imaging needed at this time.  Continue with atorvastatin and aspirin.         Relevant Medications    atorvastatin (LIPITOR) 40 MG Tab    Slow transit constipation     Chronic and ongoing problem.  Again discussed that this could be related to inadequate oral hydration.  Could consider adding fiber or MiraLAX to help further lubricate the bowel and bulk up the stool.  No red flag features at this time.  Could consider medication grade laxatives if this continues to worsen.         Elevated alkaline phosphatase level     New problem, requires additional evaluation, will check isoenzymes and confirm this is coming from arthritis and does not require further work-up.         Relevant Orders    Comp Metabolic Panel    ALKALINE PHOSPHATASE ISOENZYMES    Genitourinary syndrome of menopause     New and decompensated problem.  We will have her start estradiol to apply nightly 2-3 times per week to strengthen the vulvar and vaginal tissue.         Relevant Medications    estradiol (ESTRACE) 0.1 MG/GM vaginal cream    Skin lesion- right hand, SK     New problem, does not look suspicious for squamous cell carcinoma, will request medical records from her dermatology appointment last month.         Rotator  cuff arthropathy of right shoulder     Relatively new and decompensated problem.  She will complete MRI of the right shoulder next week, asked her to have them send me a copy as well.  Discussed local shoulder surgeons that she could meet with to go over operative options.         Hip pain- right > left     New problem, will need plain film xrays to delineate primary OA of the joint vs impingement, labral tear, etc. Anticipates she will seek operative repair through [facility] after her right shoulder is taken care of.         Palpitations     New problem, occurs fairly infrequently. Discussed if frequency or severity increased we could perform a heart monitor to assess for PVCs vs SVT vs afib, etc. She will keep me updated and start keeping a journal to look for triggers.         Night sweats     New problem, mild severity, no red flag features. She will continue to monitor. Could consider off label use of liothyronine which can be helpful for postmenopausal causes of night sweats.          Other Visit Diagnoses       Need for vaccination        Relevant Orders    INFLUENZA VACCINE, HIGH DOSE (65+ ONLY) (Completed)    Need for hepatitis B screening test        Relevant Orders    HEP B SURFACE AB               RTC: Return in about 6 months (around 3/28/2023).    I spent a total of 50 minutes with record review, exam, communication with the patient, communication with other providers, and documentation of this encounter.    PLEASE NOTE: This dictation was created using voice recognition software. I have made every reasonable attempt to correct obvious errors, but I expect that there are errors of grammar and possibly content that I did not discover before finalizing the note.      Archana Robertson, DO  Geriatric and Internal Medicine  [facility]

## 2022-10-17 ENCOUNTER — HOSPITAL ENCOUNTER (OUTPATIENT)
Dept: LAB | Facility: MEDICAL CENTER | Age: 72
End: 2022-10-17
Attending: INTERNAL MEDICINE
Payer: MEDICARE

## 2022-10-17 DIAGNOSIS — Z11.59 NEED FOR HEPATITIS B SCREENING TEST: ICD-10-CM

## 2022-10-17 DIAGNOSIS — R74.8 ELEVATED ALKALINE PHOSPHATASE LEVEL: ICD-10-CM

## 2022-10-17 DIAGNOSIS — E78.5 DYSLIPIDEMIA: ICD-10-CM

## 2022-10-17 DIAGNOSIS — E89.0 POSTOPERATIVE HYPOTHYROIDISM: ICD-10-CM

## 2022-10-17 DIAGNOSIS — R73.03 PREDIABETES: ICD-10-CM

## 2022-10-17 LAB
25(OH)D3 SERPL-MCNC: 32 NG/ML (ref 30–100)
ALBUMIN SERPL BCP-MCNC: 4.6 G/DL (ref 3.2–4.9)
ALBUMIN/GLOB SERPL: 1.6 G/DL
ALP SERPL-CCNC: 125 U/L (ref 30–99)
ALT SERPL-CCNC: 29 U/L (ref 2–50)
ANION GAP SERPL CALC-SCNC: 10 MMOL/L (ref 7–16)
AST SERPL-CCNC: 19 U/L (ref 12–45)
BASOPHILS # BLD AUTO: 1 % (ref 0–1.8)
BASOPHILS # BLD: 0.06 K/UL (ref 0–0.12)
BILIRUB SERPL-MCNC: 0.4 MG/DL (ref 0.1–1.5)
BUN SERPL-MCNC: 17 MG/DL (ref 8–22)
CALCIUM SERPL-MCNC: 9.5 MG/DL (ref 8.5–10.5)
CHLORIDE SERPL-SCNC: 104 MMOL/L (ref 96–112)
CHOLEST SERPL-MCNC: 166 MG/DL (ref 100–199)
CO2 SERPL-SCNC: 25 MMOL/L (ref 20–33)
CREAT SERPL-MCNC: 0.68 MG/DL (ref 0.5–1.4)
EOSINOPHIL # BLD AUTO: 0.17 K/UL (ref 0–0.51)
EOSINOPHIL NFR BLD: 2.9 % (ref 0–6.9)
ERYTHROCYTE [DISTWIDTH] IN BLOOD BY AUTOMATED COUNT: 39.5 FL (ref 35.9–50)
EST. AVERAGE GLUCOSE BLD GHB EST-MCNC: 128 MG/DL
FASTING STATUS PATIENT QL REPORTED: NORMAL
GFR SERPLBLD CREATININE-BSD FMLA CKD-EPI: 91 ML/MIN/1.73 M 2
GLOBULIN SER CALC-MCNC: 2.9 G/DL (ref 1.9–3.5)
GLUCOSE SERPL-MCNC: 116 MG/DL (ref 65–99)
HBA1C MFR BLD: 6.1 % (ref 4–5.6)
HCT VFR BLD AUTO: 44.4 % (ref 37–47)
HDLC SERPL-MCNC: 45 MG/DL
HGB BLD-MCNC: 14.8 G/DL (ref 12–16)
IMM GRANULOCYTES # BLD AUTO: 0.01 K/UL (ref 0–0.11)
IMM GRANULOCYTES NFR BLD AUTO: 0.2 % (ref 0–0.9)
LDLC SERPL CALC-MCNC: 92 MG/DL
LYMPHOCYTES # BLD AUTO: 2.25 K/UL (ref 1–4.8)
LYMPHOCYTES NFR BLD: 38.1 % (ref 22–41)
MCH RBC QN AUTO: 30 PG (ref 27–33)
MCHC RBC AUTO-ENTMCNC: 33.3 G/DL (ref 33.6–35)
MCV RBC AUTO: 89.9 FL (ref 81.4–97.8)
MONOCYTES # BLD AUTO: 0.51 K/UL (ref 0–0.85)
MONOCYTES NFR BLD AUTO: 8.6 % (ref 0–13.4)
NEUTROPHILS # BLD AUTO: 2.91 K/UL (ref 2–7.15)
NEUTROPHILS NFR BLD: 49.2 % (ref 44–72)
NRBC # BLD AUTO: 0 K/UL
NRBC BLD-RTO: 0 /100 WBC
PLATELET # BLD AUTO: 319 K/UL (ref 164–446)
PMV BLD AUTO: 11.2 FL (ref 9–12.9)
POTASSIUM SERPL-SCNC: 4.5 MMOL/L (ref 3.6–5.5)
PROT SERPL-MCNC: 7.5 G/DL (ref 6–8.2)
RBC # BLD AUTO: 4.94 M/UL (ref 4.2–5.4)
SODIUM SERPL-SCNC: 139 MMOL/L (ref 135–145)
T4 FREE SERPL-MCNC: 1.52 NG/DL (ref 0.93–1.7)
TRIGL SERPL-MCNC: 145 MG/DL (ref 0–149)
TSH SERPL DL<=0.005 MIU/L-ACNC: 0.52 UIU/ML (ref 0.38–5.33)
VIT B12 SERPL-MCNC: 1363 PG/ML (ref 211–911)
WBC # BLD AUTO: 5.9 K/UL (ref 4.8–10.8)

## 2022-10-17 PROCEDURE — 80061 LIPID PANEL: CPT

## 2022-10-17 PROCEDURE — 84443 ASSAY THYROID STIM HORMONE: CPT

## 2022-10-17 PROCEDURE — 84439 ASSAY OF FREE THYROXINE: CPT

## 2022-10-17 PROCEDURE — 36415 COLL VENOUS BLD VENIPUNCTURE: CPT

## 2022-10-17 PROCEDURE — 82607 VITAMIN B-12: CPT

## 2022-10-17 PROCEDURE — 84075 ASSAY ALKALINE PHOSPHATASE: CPT | Mod: XU

## 2022-10-17 PROCEDURE — 84080 ASSAY ALKALINE PHOSPHATASES: CPT

## 2022-10-17 PROCEDURE — 80053 COMPREHEN METABOLIC PANEL: CPT

## 2022-10-17 PROCEDURE — 83036 HEMOGLOBIN GLYCOSYLATED A1C: CPT

## 2022-10-17 PROCEDURE — 82306 VITAMIN D 25 HYDROXY: CPT

## 2022-10-17 PROCEDURE — 85025 COMPLETE CBC W/AUTO DIFF WBC: CPT

## 2022-10-20 LAB
ALP BONE SERPL-CCNC: 41 U/L (ref 0–55)
ALP ISOS SERPL HS-CCNC: 0 U/L
ALP LIVER SERPL-CCNC: 79 U/L (ref 0–94)
ALP SERPL-CCNC: 120 U/L (ref 40–120)

## 2022-11-14 ENCOUNTER — APPOINTMENT (RX ONLY)
Dept: URBAN - METROPOLITAN AREA CLINIC 35 | Facility: CLINIC | Age: 72
Setting detail: DERMATOLOGY
End: 2022-11-14

## 2022-11-14 DIAGNOSIS — D485 NEOPLASM OF UNCERTAIN BEHAVIOR OF SKIN: ICD-10-CM | Status: IMPROVED

## 2022-11-14 PROBLEM — D48.5 NEOPLASM OF UNCERTAIN BEHAVIOR OF SKIN: Status: ACTIVE | Noted: 2022-11-14

## 2022-11-14 PROCEDURE — ? ADDITIONAL NOTES

## 2022-11-14 PROCEDURE — ? COUNSELING

## 2022-11-14 PROCEDURE — 99212 OFFICE O/P EST SF 10 MIN: CPT

## 2022-11-14 ASSESSMENT — LOCATION SIMPLE DESCRIPTION DERM: LOCATION SIMPLE: ANTERIOR SCALP

## 2022-11-14 ASSESSMENT — LOCATION ZONE DERM: LOCATION ZONE: SCALP

## 2022-11-14 ASSESSMENT — LOCATION DETAILED DESCRIPTION DERM: LOCATION DETAILED: MID-FRONTAL SCALP

## 2022-11-14 NOTE — PROCEDURE: ADDITIONAL NOTES
Detail Level: Simple
Render Risk Assessment In Note?: no
Additional Notes: Will continue to observe as lesion appears lighter on today’s examination. Patient was advised that early melanoma are hard to determine without a biopsy and discussed risks of waiting.  Patient deferred biopsy and we’ll continue to monitor.  The lesion appears lighter than on previous exam.

## 2022-11-29 ENCOUNTER — OFFICE VISIT (OUTPATIENT)
Dept: MEDICAL GROUP | Facility: LAB | Age: 72
End: 2022-11-29
Payer: MEDICARE

## 2022-11-29 VITALS
DIASTOLIC BLOOD PRESSURE: 70 MMHG | OXYGEN SATURATION: 95 % | HEIGHT: 66 IN | WEIGHT: 163 LBS | BODY MASS INDEX: 26.2 KG/M2 | TEMPERATURE: 97.9 F | HEART RATE: 87 BPM | RESPIRATION RATE: 12 BRPM | SYSTOLIC BLOOD PRESSURE: 130 MMHG

## 2022-11-29 DIAGNOSIS — J04.0 LARYNGITIS: ICD-10-CM

## 2022-11-29 PROCEDURE — 99213 OFFICE O/P EST LOW 20 MIN: CPT | Performed by: INTERNAL MEDICINE

## 2022-11-29 RX ORDER — AZITHROMYCIN 250 MG/1
TABLET, FILM COATED ORAL
Qty: 6 TABLET | Refills: 0 | Status: SHIPPED | OUTPATIENT
Start: 2022-11-29 | End: 2023-01-18

## 2022-11-29 RX ORDER — BENZONATATE 100 MG/1
100 CAPSULE ORAL 3 TIMES DAILY PRN
Qty: 60 CAPSULE | Refills: 0 | Status: SHIPPED | OUTPATIENT
Start: 2022-11-29 | End: 2023-03-21

## 2022-11-29 ASSESSMENT — FIBROSIS 4 INDEX: FIB4 SCORE: 0.82

## 2022-11-29 NOTE — PROGRESS NOTES
CC: Michela Valdez is a 72 y.o. female is suffering from   Chief Complaint   Patient presents with    URI     X 4 days cough, sinus congestion         SUBJECTIVE:  1. Laryngitis  Michela is here for follow-up, has a history of laryngitis, we have discussed that she might benefit from the use of azithromycin.  However given that this is likely viral, I recommended she hold off treatment for 2 to 3 days.        Past social, family, history: , realtor  Social History     Tobacco Use    Smoking status: Former     Packs/day: 0.50     Years: 4.00     Pack years: 2.00     Types: Cigarettes     Quit date:      Years since quittin.9    Smokeless tobacco: Never   Substance Use Topics    Alcohol use: Yes     Alcohol/week: 3.0 - 4.2 oz     Types: 5 - 7 Glasses of wine per week     Comment: glass of wine daily     Drug use: No         MEDICATIONS:    Current Outpatient Medications:     azithromycin (ZITHROMAX) 250 MG Tab, Two day one then qd x 4., Disp: 6 Tablet, Rfl: 0    benzonatate (TESSALON) 100 MG Cap, Take 1 Capsule by mouth 3 times a day as needed for Cough., Disp: 60 Capsule, Rfl: 0    atorvastatin (LIPITOR) 40 MG Tab, Take 1 Tablet by mouth every evening. TAKE 1 TABLET BY MOUTH DAILY, Disp: 100 Tablet, Rfl: 3    levothyroxine (SYNTHROID) 88 MCG Tab, Take 1 Tablet by mouth every morning on an empty stomach., Disp: 100 Tablet, Rfl: 3    Cholecalciferol (VITAMIN D3) 1000 units Cap, Take 2,000 Units by mouth., Disp: , Rfl:     Glucosamine-Chondroitin (MOVE FREE PO), Take 2 Tablets by mouth every day., Disp: , Rfl:     FISH OIL, 1200 mg 3 times a week, Disp: , Rfl:     aspirin EC (ECOTRIN) 81 MG TBEC, Take 81 mg by mouth every day. HOLD OFF TAKING THIS UNTIL 12., Disp: , Rfl:     estradiol (ESTRACE) 0.1 MG/GM vaginal cream, Insert 0.4 g into the vagina every 72 hours. (Patient not taking: Reported on 2022), Disp: 42.5 g, Rfl: 3    Cyanocobalamin (VITAMIN B-12) 1000 MCG Tab, Take 1,000 mcg by mouth  "every day. (Patient not taking: Reported on 11/29/2022), Disp: , Rfl:     PROBLEMS:  Patient Active Problem List    Diagnosis Date Noted    Elevated alkaline phosphatase level 09/28/2022    Genitourinary syndrome of menopause 09/28/2022    Skin lesion- right hand, SK 09/28/2022    Rotator cuff arthropathy of right shoulder 09/28/2022    Hip pain- right > left 09/28/2022    Palpitations 09/28/2022    Night sweats 09/28/2022    BMI 26.0-26.9,adult 09/28/2022    Acute laryngitis 04/19/2022    Chronic pain of right knee 12/07/2021    Slow transit constipation 12/07/2021    Prediabetes 05/16/2018    History of osteopenia 05/16/2018    Bilateral carotid artery stenosis 05/24/2016    Postoperative hypothyroidism 10/13/2015    Dyslipidemia 10/13/2015    Diverticular disease of colon 07/21/2015    Thyroid nodule 07/21/2015       REVIEW OF SYSTEMS:  Gen.:  No Nausea, Vomiting, fever, Chills.  Heart: No chest pain.  Lungs:  No shortness of Breath.  Psychological: Lawson unusual Anxiety depression     PHYSICAL EXAM   Constitutional: Alert, cooperative, not in acute distress.  Cardiovascular:  Rate Rhythm is regular without murmurs rubs clicks.     Thorax & Lungs: Clear to auscultation, no wheezing, rhonchi, or rales  HENT: Normocephalic, Atraumatic.  Eyes: PERRLA, EOMI, Conjunctiva normal.   Neck: Trachia is midline no swelling of the thyroid.   Lymphatic: No lymphadenopathy noted.   Neurologic: Alert & oriented x 3, cranial nerves II through XII are intact, Normal motor function, Normal sensory function, No focal deficits noted.   Psychiatric: Affect normal, Judgment normal, Mood normal.     VITAL SIGNS:/70   Pulse 87   Temp 36.6 °C (97.9 °F) (Temporal)   Resp 12   Ht 1.676 m (5' 6\")   Wt 73.9 kg (163 lb)   SpO2 95%   BMI 26.31 kg/m²     Labs: Reviewed    Assessment:                                                     Plan:    1. Laryngitis  Likely viral infection, azithromycin only after waiting an additional 2 to " 3 days.  Treat with benzoate.  - azithromycin (ZITHROMAX) 250 MG Tab; Two day one then qd x 4.  Dispense: 6 Tablet; Refill: 0  - benzonatate (TESSALON) 100 MG Cap; Take 1 Capsule by mouth 3 times a day as needed for Cough.  Dispense: 60 Capsule; Refill: 0      URI x 5 day history nikky fever chills nikky loss of smell. Pyloric stenosis

## 2023-01-18 ENCOUNTER — OFFICE VISIT (OUTPATIENT)
Dept: MEDICAL GROUP | Facility: PHYSICIAN GROUP | Age: 73
End: 2023-01-18
Payer: MEDICARE

## 2023-01-18 VITALS
RESPIRATION RATE: 18 BRPM | WEIGHT: 163 LBS | TEMPERATURE: 98 F | SYSTOLIC BLOOD PRESSURE: 126 MMHG | HEART RATE: 88 BPM | DIASTOLIC BLOOD PRESSURE: 68 MMHG | BODY MASS INDEX: 26.2 KG/M2 | HEIGHT: 66 IN | OXYGEN SATURATION: 97 %

## 2023-01-18 DIAGNOSIS — I77.9 MILD CAROTID ARTERY DISEASE (HCC): ICD-10-CM

## 2023-01-18 DIAGNOSIS — R14.0 ABDOMINAL BLOATING: ICD-10-CM

## 2023-01-18 DIAGNOSIS — E89.0 POSTOPERATIVE HYPOTHYROIDISM: ICD-10-CM

## 2023-01-18 DIAGNOSIS — R73.03 PREDIABETES: ICD-10-CM

## 2023-01-18 DIAGNOSIS — E78.5 DYSLIPIDEMIA: ICD-10-CM

## 2023-01-18 DIAGNOSIS — K59.01 SLOW TRANSIT CONSTIPATION: ICD-10-CM

## 2023-01-18 DIAGNOSIS — R74.8 ELEVATED ALKALINE PHOSPHATASE LEVEL: ICD-10-CM

## 2023-01-18 DIAGNOSIS — Z91.89 OTHER SPECIFIED PERSONAL RISK FACTORS, NOT ELSEWHERE CLASSIFIED: ICD-10-CM

## 2023-01-18 DIAGNOSIS — J38.5 LARYNGEAL SPASM: ICD-10-CM

## 2023-01-18 DIAGNOSIS — M12.811 ROTATOR CUFF ARTHROPATHY OF RIGHT SHOULDER: ICD-10-CM

## 2023-01-18 PROBLEM — J04.0 ACUTE LARYNGITIS: Status: RESOLVED | Noted: 2022-04-19 | Resolved: 2023-01-18

## 2023-01-18 PROCEDURE — 99214 OFFICE O/P EST MOD 30 MIN: CPT | Performed by: INTERNAL MEDICINE

## 2023-01-18 RX ORDER — NICOTINE POLACRILEX 4 MG/1
1 GUM, CHEWING ORAL DAILY
COMMUNITY
End: 2023-03-16

## 2023-01-18 ASSESSMENT — FIBROSIS 4 INDEX: FIB4 SCORE: 0.82

## 2023-01-18 ASSESSMENT — PATIENT HEALTH QUESTIONNAIRE - PHQ9: CLINICAL INTERPRETATION OF PHQ2 SCORE: 0

## 2023-01-18 NOTE — ASSESSMENT & PLAN NOTE
Chronic ongoing problem, all of her lipids are at goal on current medical therapy.  We will get a baseline CT cardiac score due to history of early cardiac death in her father.  She has 5 other siblings who are also undergoing evaluation of their heart health.

## 2023-01-18 NOTE — ASSESSMENT & PLAN NOTE
Chronic ongoing problem.  Continues to have irregular bowel movements with constipation, what is more bothersome is the generalized bloating in the upper abdomen.  Discussed that she may have SIBO, she would like to follow-up with GI to complete breath testing if indicated and consider moving up next colonoscopy if bowels remain irregular.

## 2023-01-18 NOTE — PROGRESS NOTES
Subjective:   Chief Complaint/History of Present Illness:  Michela Valdez is a 72 y.o. female established patient who presents today to discuss medical problems as listed below. Michela is unaccompanied for today's visit.    Problem   Laryngeal Spasm    She had a frightening episode at [facility] on January 5 where she was walking and suddenly could not breathe.  Shortly before the episode she had been eating popcorn but did not have any sensation of aspiration or coughing.  She reports that she was unable to vocalize and felt that she could not breathe.  She did not have a sensation of anything stuck in her esophagus.  There was a nurse nearby who performed a month rest of the Heimlich maneuver, but this did not dislodge anything however it seemed to slowly help prove the episode which lasted a total of about 2 minutes.  He has not had anything like this before.  No history of aspiration.  No prior evaluation of vocal cords or larynx by ENT.  Does have mild heartburn symptoms and is willing to add Prilosec to her regiment.     Rotator Cuff Arthropathy of Right Shoulder    She has right shoulder pain and was felt to have an acute on chronic rotator cuff tear in spring 2022.  She saw Dr. Blevins with physiatry.  She is going to complete an MRI of the right shoulder in early October 2022.  She is interested in operative repair if indicated.    She was referred to Dr. Garcia and saw the PA twice but has not yet had an opportunity to discuss with the surgeon.     Slow transit constipation with bloating    She has known diverticular disease with associated constipation.  She has been taking Colace with mixed results.  Sometimes will be ineffective and other times she will have a small amount of anal leakage.  She previously tried MiraLAX but this was too strong for her, she was taking 1 full capful.  She admits that she has not drink enough water.  She tried fiber but it worsen the constipation due to inadequate  hydration.  No prior evaluation for SIBO, does note some increased distention and gas for which he uses Gas-X.  Recent colonoscopy in 2019 was reassuring.      Prediabetes     Latest Reference Range & Units 10/17/22 09:42   Glycohemoglobin 4.0 - 5.6 % 6.1 (H)   Estim. Avg Glu mg/dL 128     She has history of prediabetes.  No prior use of glucose lowering medications.  We have discussed GLP1 agonist therapy for both prediabetes and help with weight loss, she would like to try with natural approach first.     Mild carotid artery stenosis (HCC)    Carotid US (2022):  Right- Heterogeneous plaque of the bifurcation extending into the internal carotid artery. <50% internal carotid artery stenosis. Plaque is smooth on the surface and heterogeneous with mixed echogenicity.  Left- Very mild plaque of the carotid bifurcation. Doppler velocities are normal throughout the carotid arteries. Plaque is smooth on the surface and homogeneous with low echogenicity. The bilateral subclavian and vertebral artery waveforms are antegrade and normal in character and velocity.    She has known right greater than left mild carotid artery disease.  She is followed vascular medicine in the past.  Her father had early CAD.  She is on statin and low-dose aspirin.     Postoperative Hypothyroidism     Latest Reference Range & Units 10/17/22 09:42   TSH 0.380 - 5.330 uIU/mL 0.520   Free T-4 0.93 - 1.70 ng/dL 1.52     She reports prior thyroidectomy due to nodule found incidentally.  No recent dose adjustments of her levothyroxine.  She has had progressive weight gain since her thyroid was removed and feels are likely related.  She wonders if her dose needs to be adjusted.  Otherwise no signs or symptoms of overtreatment under treatment.    Current regimen: Levothyroxine 88 mcg daily.     Dyslipidemia     Latest Reference Range & Units 10/17/22 09:42   Cholesterol,Tot 100 - 199 mg/dL 166   Triglycerides 0 - 149 mg/dL 145   HDL >=40 mg/dL 45   LDL  "<100 mg/dL 92     Has a history of elevated cholesterol.  Her father had early CAD.  She previously followed with Dr. Bloch of vascular medicine.    Current regimen: Atorvastatin 40 mg daily and aspirin 81 mg daily     Acute Laryngitis (Resolved)    Michela presents to clinic with 8 days of URI. Start as a cold with sinus pain and pressure and drainage. She now is left with residual cough which is very intrusive for her work. Cough is nonproductive. She is using OTC vitamins. She otherwise feels well. No fevers/chills, malaise, chest pain, productive cough, or GI symptoms. This feels similar to prior episodes of laryngitis. She has had good luck with tessalon pearles in the past. Also using gianna seltzer cold and cough 2-3 times daily.          Current Medications:  Current Outpatient Medications Ordered in Epic   Medication Sig Dispense Refill    omeprazole (PRILOSEC) 20 MG Tablet Delayed Response delayed-release tablet Take 1 Tablet by mouth every day.      benzonatate (TESSALON) 100 MG Cap Take 1 Capsule by mouth 3 times a day as needed for Cough. 60 Capsule 0    atorvastatin (LIPITOR) 40 MG Tab Take 1 Tablet by mouth every evening. TAKE 1 TABLET BY MOUTH DAILY 100 Tablet 3    levothyroxine (SYNTHROID) 88 MCG Tab Take 1 Tablet by mouth every morning on an empty stomach. 100 Tablet 3    Cholecalciferol (VITAMIN D3) 1000 units Cap Take 2,000 Units by mouth.      Glucosamine-Chondroitin (MOVE FREE PO) Take 2 Tablets by mouth every day.      FISH OIL 1200 mg 3 times a week      aspirin EC (ECOTRIN) 81 MG TBEC Take 81 mg by mouth every day. HOLD OFF TAKING THIS UNTIL 2/12/12.       No current [facility]-ordered facility-administered medications on file.          Objective:   Physical Exam:    Vitals: /68 (BP Location: Left arm, Patient Position: Sitting, BP Cuff Size: Adult)   Pulse 88   Temp 36.7 °C (98 °F) (Temporal)   Resp 18   Ht 1.676 m (5' 6\")   Wt 73.9 kg (163 lb)   SpO2 97%    BMI: Body mass index is 26.31 " kg/m².  Physical Exam  Constitutional:       General: She is not in acute distress.     Appearance: Normal appearance. She is not ill-appearing.   HENT:      Right Ear: There is no impacted cerumen.      Left Ear: There is no impacted cerumen.   Eyes:      General: No scleral icterus.     Conjunctiva/sclera: Conjunctivae normal.   Cardiovascular:      Rate and Rhythm: Normal rate and regular rhythm.      Pulses: Normal pulses.      Heart sounds: No murmur heard.  Pulmonary:      Effort: Pulmonary effort is normal. No respiratory distress.      Breath sounds: No wheezing, rhonchi or rales.   Abdominal:      General: There is distension.      Palpations: Abdomen is soft.      Tenderness: There is no abdominal tenderness. There is no guarding or rebound.      Comments: Upper > lower abdominal bloating   Musculoskeletal:      Right lower leg: No edema.      Left lower leg: No edema.   Skin:     General: Skin is warm and dry.      Findings: No bruising or rash.   Neurological:      Gait: Gait normal.   Psychiatric:         Mood and Affect: Mood normal.         Behavior: Behavior normal.         Thought Content: Thought content normal.         Judgment: Judgment normal.             Assessment and Plan:   Michela is a 72 y.o. female with the following:  Problem List Items Addressed This Visit       Postoperative hypothyroidism     Chronic ongoing problem.  We will update thyroid labs to ensure ongoing stability.  Continue levothyroxine 88 mcg daily.         Dyslipidemia     Chronic ongoing problem, all of her lipids are at goal on current medical therapy.  We will get a baseline CT cardiac score due to history of early cardiac death in her father.  She has 5 other siblings who are also undergoing evaluation of their heart health.         Relevant Orders    CBC WITH DIFFERENTIAL    Comp Metabolic Panel    Lipid Profile    VITAMIN D,25 HYDROXY (DEFICIENCY)    VITAMIN B12    TSH    FREE THYROXINE    Prediabetes     Chronic  ongoing problem.  We will update hemoglobin A1c to track trajectory.  She reports that she is not very strict with her diet.  Have discussed GLP-1 agonist therapy in the past which she declines for now.         Relevant Orders    HEMOGLOBIN A1C    Mild carotid artery stenosis (HCC)     Chronic ongoing problem.  Appropriate to continue on current medical therapy with atorvastatin 40 mg daily and aspirin 81 mg daily.  We will update CT cardiac score to assess overall atherosclerotic burden due to known history of rotted artery stenosis.         Slow transit constipation with bloating     Chronic ongoing problem.  Continues to have irregular bowel movements with constipation, what is more bothersome is the generalized bloating in the upper abdomen.  Discussed that she may have SIBO, she would like to follow-up with GI to complete breath testing if indicated and consider moving up next colonoscopy if bowels remain irregular.         Elevated alkaline phosphatase level    Relevant Orders    Referral to Gastroenterology    Rotator cuff arthropathy of right shoulder     Chronic ongoing problem, shoulder pain continues to worsen.  Encouraged her to call make a follow-up appointment so she can discuss in person with Dr. Garcia for proceeding with a reverse total shoulder arthroplasty which is what is been recommended.  She understands the length of time to rehab would like to get it done sooner than later.         Laryngeal spasm     New decompensated episode, unclear if this was a laryngeal spasm, primary vocal cord issue, or something unrelated.  Either way I think it be worthwhile to get a look at her anatomy with fiberoptic evaluation by ENT to make sure that there are no anatomic issues with the vocal cords and that they are moving appropriately.  Will refer to [facility].  Advised that she start on omeprazole 20 mg daily in the meantime in case there is a component of heartburn contributing.         Relevant Orders     Referral to ENT     Other Visit Diagnoses       Other specified personal risk factors, not elsewhere classified        Relevant Orders    CT-CARDIAC SCORING    Abdominal bloating        Relevant Orders    Referral to Gastroenterology             RTC: Return in about 2 months (around 3/18/2023).    I spent a total of 38 minutes with record review, exam, communication with the patient, communication with other providers, and documentation of this encounter.    PLEASE NOTE: This dictation was created using voice recognition software. I have made every reasonable attempt to correct obvious errors, but I expect that there are errors of grammar and possibly content that I did not discover before finalizing the note.      Archana Robertson, DO  Geriatric and Internal Medicine  [facility]

## 2023-01-18 NOTE — ASSESSMENT & PLAN NOTE
Chronic ongoing problem, shoulder pain continues to worsen.  Encouraged her to call make a follow-up appointment so she can discuss in person with Dr. Garcia for proceeding with a reverse total shoulder arthroplasty which is what is been recommended.  She understands the length of time to rehab would like to get it done sooner than later.

## 2023-01-18 NOTE — ASSESSMENT & PLAN NOTE
Chronic ongoing problem.  Appropriate to continue on current medical therapy with atorvastatin 40 mg daily and aspirin 81 mg daily.  We will update CT cardiac score to assess overall atherosclerotic burden due to known history of rotted artery stenosis.

## 2023-01-18 NOTE — ASSESSMENT & PLAN NOTE
New decompensated episode, unclear if this was a laryngeal spasm, primary vocal cord issue, or something unrelated.  Either way I think it be worthwhile to get a look at her anatomy with fiberoptic evaluation by ENT to make sure that there are no anatomic issues with the vocal cords and that they are moving appropriately.  Will refer to [facility].  Advised that she start on omeprazole 20 mg daily in the meantime in case there is a component of heartburn contributing.

## 2023-01-18 NOTE — ASSESSMENT & PLAN NOTE
Chronic ongoing problem.  We will update thyroid labs to ensure ongoing stability.  Continue levothyroxine 88 mcg daily.

## 2023-01-18 NOTE — ASSESSMENT & PLAN NOTE
Chronic ongoing problem.  We will update hemoglobin A1c to track trajectory.  She reports that she is not very strict with her diet.  Have discussed GLP-1 agonist therapy in the past which she declines for now.

## 2023-01-19 ENCOUNTER — APPOINTMENT (RX ONLY)
Dept: URBAN - METROPOLITAN AREA CLINIC 35 | Facility: CLINIC | Age: 73
Setting detail: DERMATOLOGY
End: 2023-01-19

## 2023-01-19 DIAGNOSIS — L82.1 OTHER SEBORRHEIC KERATOSIS: ICD-10-CM

## 2023-01-19 DIAGNOSIS — D485 NEOPLASM OF UNCERTAIN BEHAVIOR OF SKIN: ICD-10-CM | Status: RESOLVED

## 2023-01-19 DIAGNOSIS — L81.4 OTHER MELANIN HYPERPIGMENTATION: ICD-10-CM

## 2023-01-19 DIAGNOSIS — L57.0 ACTINIC KERATOSIS: ICD-10-CM

## 2023-01-19 DIAGNOSIS — L72.0 EPIDERMAL CYST: ICD-10-CM

## 2023-01-19 PROBLEM — D48.5 NEOPLASM OF UNCERTAIN BEHAVIOR OF SKIN: Status: ACTIVE | Noted: 2023-01-19

## 2023-01-19 PROCEDURE — ? COUNSELING

## 2023-01-19 PROCEDURE — 17000 DESTRUCT PREMALG LESION: CPT

## 2023-01-19 PROCEDURE — ? LIQUID NITROGEN

## 2023-01-19 PROCEDURE — 99212 OFFICE O/P EST SF 10 MIN: CPT | Mod: 25

## 2023-01-19 ASSESSMENT — LOCATION DETAILED DESCRIPTION DERM
LOCATION DETAILED: RIGHT MEDIAL SUPERIOR CHEST
LOCATION DETAILED: LEFT RADIAL DORSAL HAND
LOCATION DETAILED: LEFT MEDIAL SUPERIOR CHEST
LOCATION DETAILED: RIGHT MEDIAL UPPER BACK
LOCATION DETAILED: RIGHT RADIAL DORSAL HAND
LOCATION DETAILED: LEFT ANTERIOR DISTAL THIGH
LOCATION DETAILED: LEFT POSTERIOR NECK

## 2023-01-19 ASSESSMENT — LOCATION SIMPLE DESCRIPTION DERM
LOCATION SIMPLE: LEFT HAND
LOCATION SIMPLE: RIGHT UPPER BACK
LOCATION SIMPLE: RIGHT HAND
LOCATION SIMPLE: CHEST
LOCATION SIMPLE: LEFT THIGH
LOCATION SIMPLE: POSTERIOR NECK

## 2023-01-19 ASSESSMENT — LOCATION ZONE DERM
LOCATION ZONE: TRUNK
LOCATION ZONE: HAND
LOCATION ZONE: LEG
LOCATION ZONE: NECK

## 2023-01-25 ENCOUNTER — APPOINTMENT (RX ONLY)
Dept: URBAN - METROPOLITAN AREA CLINIC 35 | Facility: CLINIC | Age: 73
Setting detail: DERMATOLOGY
End: 2023-01-25

## 2023-01-25 DIAGNOSIS — Z41.9 ENCOUNTER FOR PROCEDURE FOR PURPOSES OTHER THAN REMEDYING HEALTH STATE, UNSPECIFIED: ICD-10-CM

## 2023-01-25 PROCEDURE — ? COSMETIC CONSULTATION: FILLERS

## 2023-01-25 PROCEDURE — ? ADDITIONAL NOTES

## 2023-01-25 PROCEDURE — ? COSMETIC CONSULTATION: BROWN SPOTS

## 2023-01-25 ASSESSMENT — LOCATION ZONE DERM
LOCATION ZONE: HAND
LOCATION ZONE: TRUNK
LOCATION ZONE: FACE

## 2023-01-25 ASSESSMENT — LOCATION SIMPLE DESCRIPTION DERM
LOCATION SIMPLE: RIGHT HAND
LOCATION SIMPLE: LEFT CHEEK
LOCATION SIMPLE: LEFT HAND
LOCATION SIMPLE: RIGHT CHEEK
LOCATION SIMPLE: CHEST

## 2023-01-25 ASSESSMENT — LOCATION DETAILED DESCRIPTION DERM
LOCATION DETAILED: LEFT ULNAR DORSAL HAND
LOCATION DETAILED: RIGHT MEDIAL SUPERIOR CHEST
LOCATION DETAILED: LEFT INFERIOR CENTRAL MALAR CHEEK
LOCATION DETAILED: RIGHT RADIAL DORSAL HAND
LOCATION DETAILED: RIGHT INFERIOR MEDIAL MALAR CHEEK

## 2023-01-25 NOTE — PROCEDURE: ADDITIONAL NOTES
Render Risk Assessment In Note?: yes
Detail Level: Zone
Additional Notes: Sculptra to lower face with cannula
Additional Notes: Recommend Retinols such as Alastin and Skin Medica. \\nSampled Alastin .25 retinol

## 2023-01-29 ENCOUNTER — HOSPITAL ENCOUNTER (OUTPATIENT)
Dept: RADIOLOGY | Facility: MEDICAL CENTER | Age: 73
End: 2023-01-29
Attending: INTERNAL MEDICINE
Payer: COMMERCIAL

## 2023-01-29 DIAGNOSIS — Z91.89 OTHER SPECIFIED PERSONAL RISK FACTORS, NOT ELSEWHERE CLASSIFIED: ICD-10-CM

## 2023-01-29 PROCEDURE — 4410556 CT-CARDIAC SCORING (SELF PAY ONLY)

## 2023-01-31 DIAGNOSIS — I77.9 MILD CAROTID ARTERY DISEASE (HCC): ICD-10-CM

## 2023-01-31 DIAGNOSIS — R93.1 AGATSTON CAC SCORE 200-399: ICD-10-CM

## 2023-01-31 DIAGNOSIS — R73.03 PREDIABETES: ICD-10-CM

## 2023-01-31 DIAGNOSIS — R00.2 PALPITATIONS: ICD-10-CM

## 2023-01-31 DIAGNOSIS — E78.5 DYSLIPIDEMIA: ICD-10-CM

## 2023-02-03 ENCOUNTER — TELEPHONE (OUTPATIENT)
Dept: HEALTH INFORMATION MANAGEMENT | Facility: OTHER | Age: 73
End: 2023-02-03
Payer: MEDICARE

## 2023-03-03 ENCOUNTER — TELEPHONE (OUTPATIENT)
Dept: CARDIOLOGY | Facility: MEDICAL CENTER | Age: 73
End: 2023-03-03
Payer: MEDICARE

## 2023-03-03 NOTE — TELEPHONE ENCOUNTER
Spoke to patient in regards to obtaining records for NP appointment with . Per patient has never been treated by a previous cardiologist.

## 2023-03-09 ENCOUNTER — HOSPITAL ENCOUNTER (OUTPATIENT)
Dept: LAB | Facility: MEDICAL CENTER | Age: 73
End: 2023-03-09
Attending: INTERNAL MEDICINE
Payer: MEDICARE

## 2023-03-09 DIAGNOSIS — E78.5 DYSLIPIDEMIA: ICD-10-CM

## 2023-03-09 DIAGNOSIS — R73.03 PREDIABETES: ICD-10-CM

## 2023-03-09 LAB
25(OH)D3 SERPL-MCNC: 36 NG/ML (ref 30–100)
ALBUMIN SERPL BCP-MCNC: 4.6 G/DL (ref 3.2–4.9)
ALBUMIN/GLOB SERPL: 1.7 G/DL
ALP SERPL-CCNC: 112 U/L (ref 30–99)
ALT SERPL-CCNC: 46 U/L (ref 2–50)
ANION GAP SERPL CALC-SCNC: 10 MMOL/L (ref 7–16)
AST SERPL-CCNC: 33 U/L (ref 12–45)
BASOPHILS # BLD AUTO: 1 % (ref 0–1.8)
BASOPHILS # BLD: 0.07 K/UL (ref 0–0.12)
BILIRUB SERPL-MCNC: 0.5 MG/DL (ref 0.1–1.5)
BUN SERPL-MCNC: 18 MG/DL (ref 8–22)
CALCIUM ALBUM COR SERPL-MCNC: 8.8 MG/DL (ref 8.5–10.5)
CALCIUM SERPL-MCNC: 9.3 MG/DL (ref 8.5–10.5)
CHLORIDE SERPL-SCNC: 104 MMOL/L (ref 96–112)
CHOLEST SERPL-MCNC: 177 MG/DL (ref 100–199)
CO2 SERPL-SCNC: 24 MMOL/L (ref 20–33)
CREAT SERPL-MCNC: 0.61 MG/DL (ref 0.5–1.4)
EOSINOPHIL # BLD AUTO: 0.21 K/UL (ref 0–0.51)
EOSINOPHIL NFR BLD: 3.1 % (ref 0–6.9)
ERYTHROCYTE [DISTWIDTH] IN BLOOD BY AUTOMATED COUNT: 41.9 FL (ref 35.9–50)
EST. AVERAGE GLUCOSE BLD GHB EST-MCNC: 131 MG/DL
FASTING STATUS PATIENT QL REPORTED: NORMAL
GFR SERPLBLD CREATININE-BSD FMLA CKD-EPI: 93 ML/MIN/1.73 M 2
GLOBULIN SER CALC-MCNC: 2.7 G/DL (ref 1.9–3.5)
GLUCOSE SERPL-MCNC: 110 MG/DL (ref 65–99)
HBA1C MFR BLD: 6.2 % (ref 4–5.6)
HCT VFR BLD AUTO: 42.5 % (ref 37–47)
HDLC SERPL-MCNC: 43 MG/DL
HGB BLD-MCNC: 14.1 G/DL (ref 12–16)
IMM GRANULOCYTES # BLD AUTO: 0.02 K/UL (ref 0–0.11)
IMM GRANULOCYTES NFR BLD AUTO: 0.3 % (ref 0–0.9)
LDLC SERPL CALC-MCNC: 96 MG/DL
LYMPHOCYTES # BLD AUTO: 2.12 K/UL (ref 1–4.8)
LYMPHOCYTES NFR BLD: 31.6 % (ref 22–41)
MCH RBC QN AUTO: 30.5 PG (ref 27–33)
MCHC RBC AUTO-ENTMCNC: 33.2 G/DL (ref 33.6–35)
MCV RBC AUTO: 91.8 FL (ref 81.4–97.8)
MONOCYTES # BLD AUTO: 0.49 K/UL (ref 0–0.85)
MONOCYTES NFR BLD AUTO: 7.3 % (ref 0–13.4)
NEUTROPHILS # BLD AUTO: 3.79 K/UL (ref 2–7.15)
NEUTROPHILS NFR BLD: 56.7 % (ref 44–72)
NRBC # BLD AUTO: 0 K/UL
NRBC BLD-RTO: 0 /100 WBC
PLATELET # BLD AUTO: 265 K/UL (ref 164–446)
PMV BLD AUTO: 11.1 FL (ref 9–12.9)
POTASSIUM SERPL-SCNC: 4.1 MMOL/L (ref 3.6–5.5)
PROT SERPL-MCNC: 7.3 G/DL (ref 6–8.2)
RBC # BLD AUTO: 4.63 M/UL (ref 4.2–5.4)
SODIUM SERPL-SCNC: 138 MMOL/L (ref 135–145)
T4 FREE SERPL-MCNC: 1.37 NG/DL (ref 0.93–1.7)
TRIGL SERPL-MCNC: 188 MG/DL (ref 0–149)
TSH SERPL DL<=0.005 MIU/L-ACNC: 0.47 UIU/ML (ref 0.38–5.33)
VIT B12 SERPL-MCNC: 856 PG/ML (ref 211–911)
WBC # BLD AUTO: 6.7 K/UL (ref 4.8–10.8)

## 2023-03-09 PROCEDURE — 83036 HEMOGLOBIN GLYCOSYLATED A1C: CPT

## 2023-03-09 PROCEDURE — 80061 LIPID PANEL: CPT

## 2023-03-09 PROCEDURE — 85025 COMPLETE CBC W/AUTO DIFF WBC: CPT

## 2023-03-09 PROCEDURE — 80053 COMPREHEN METABOLIC PANEL: CPT

## 2023-03-09 PROCEDURE — 36415 COLL VENOUS BLD VENIPUNCTURE: CPT

## 2023-03-09 PROCEDURE — 84443 ASSAY THYROID STIM HORMONE: CPT

## 2023-03-09 PROCEDURE — 82607 VITAMIN B-12: CPT

## 2023-03-09 PROCEDURE — 84439 ASSAY OF FREE THYROXINE: CPT

## 2023-03-09 PROCEDURE — 82306 VITAMIN D 25 HYDROXY: CPT

## 2023-03-16 ENCOUNTER — OFFICE VISIT (OUTPATIENT)
Dept: CARDIOLOGY | Facility: MEDICAL CENTER | Age: 73
End: 2023-03-16
Attending: INTERNAL MEDICINE
Payer: MEDICARE

## 2023-03-16 VITALS
DIASTOLIC BLOOD PRESSURE: 74 MMHG | HEIGHT: 66 IN | RESPIRATION RATE: 16 BRPM | BODY MASS INDEX: 25.71 KG/M2 | WEIGHT: 160 LBS | OXYGEN SATURATION: 94 % | HEART RATE: 72 BPM | SYSTOLIC BLOOD PRESSURE: 120 MMHG

## 2023-03-16 DIAGNOSIS — R00.2 PALPITATIONS: ICD-10-CM

## 2023-03-16 DIAGNOSIS — R42 DIZZINESS: ICD-10-CM

## 2023-03-16 DIAGNOSIS — R93.1 AGATSTON CAC SCORE 200-399: Chronic | ICD-10-CM

## 2023-03-16 DIAGNOSIS — E78.5 DYSLIPIDEMIA: ICD-10-CM

## 2023-03-16 DIAGNOSIS — I77.9 MILD CAROTID ARTERY DISEASE (HCC): ICD-10-CM

## 2023-03-16 LAB — EKG IMPRESSION: NORMAL

## 2023-03-16 PROCEDURE — 93000 ELECTROCARDIOGRAM COMPLETE: CPT | Performed by: INTERNAL MEDICINE

## 2023-03-16 PROCEDURE — 99204 OFFICE O/P NEW MOD 45 MIN: CPT | Performed by: INTERNAL MEDICINE

## 2023-03-16 RX ORDER — ATORVASTATIN CALCIUM 80 MG/1
80 TABLET, FILM COATED ORAL NIGHTLY
Qty: 100 TABLET | Refills: 3 | Status: SHIPPED | OUTPATIENT
Start: 2023-03-16 | End: 2023-12-24

## 2023-03-16 RX ORDER — CHLORAL HYDRATE 500 MG
1000 CAPSULE ORAL 2 TIMES DAILY
COMMUNITY
Start: 2023-03-16 | End: 2023-04-24

## 2023-03-16 ASSESSMENT — ENCOUNTER SYMPTOMS
BRUISES/BLEEDS EASILY: 1
NAUSEA: 0
WEAKNESS: 0
DIZZINESS: 1
COUGH: 0
PND: 0
SORE THROAT: 0
CHILLS: 0
CLAUDICATION: 0
CONSTIPATION: 1
ABDOMINAL PAIN: 0
SHORTNESS OF BREATH: 0
FOCAL WEAKNESS: 0
PALPITATIONS: 1
BLURRED VISION: 0
FEVER: 0
FALLS: 0

## 2023-03-16 ASSESSMENT — FIBROSIS 4 INDEX: FIB4 SCORE: 1.36

## 2023-03-16 NOTE — PROGRESS NOTES
Chief Complaint   Patient presents with    Dyslipidemia    Palpitations    Coronary Artery Disease       Subjective     Michela Valdez is a 72 y.o. female who presents today  in consultation from Archana Robertson D.O. for evaluation of elevated CAC with strong family history of CAD and mild carotid stenosis    Past Medical History:   Diagnosis Date    Acute laryngitis 2022    Michela presents to clinic with 8 days of URI. Start as a cold with sinus pain and pressure and drainage. She now is left with residual cough which is very intrusive for her work. Cough is nonproductive. She is using OTC vitamins. She otherwise feels well. No fevers/chills, malaise, chest pain, productive cough, or GI symptoms. This feels similar to prior episodes of laryngitis. She has had good keyana    Dyslipidemia 10/13/2015    Elevated LFTs 2018    History of osteopenia 2018    Hyperlipidemia     Postoperative hypothyroidism 10/13/2015    Prediabetes 2018    Snoring     Thyroid disease      Past Surgical History:   Procedure Laterality Date    THYROIDECTOMY TOTAL  2012    Performed by ALEX CONTEH at [facility]    ABDOMINAL EXPLORATION      pyloric stenosis    GYN SURGERY       Family History   Problem Relation Age of Onset    Osteoporosis Mother     Hyperlipidemia Father     Heart Disease Father         CAD, MI     Anemia Sister     Cancer Brother         prostate    Heart Disease Brother         CABG     Social History     Socioeconomic History    Marital status:      Spouse name: Not on file    Number of children: Not on file    Years of education: Not on file    Highest education level: Not on file   Occupational History    Not on file   Tobacco Use    Smoking status: Former     Packs/day: 0.50     Years: 4.00     Pack years: 2.00     Types: Cigarettes     Quit date:      Years since quittin.2    Smokeless tobacco: Never   Substance and Sexual Activity    Alcohol use: Yes      Alcohol/week: 3.0 - 4.2 oz     Types: 5 - 7 Glasses of wine per week     Comment: glass of wine daily     Drug use: No    Sexual activity: Not Currently   Other Topics Concern    Not on file   Social History Narrative    Not on file     Social Determinants of Health     Financial Resource Strain: Not on file   Food Insecurity: Not on file   Transportation Needs: Not on file   Physical Activity: Not on file   Stress: Not on file   Social Connections: Not on file   Intimate Partner Violence: Not on file   Housing Stability: Not on file     Allergies   Allergen Reactions    Penicillins     Aleve [Gnp Naproxen Sodium] Rash     Rash.     Penicillin G Vomiting     Outpatient Encounter Medications as of 3/16/2023   Medication Sig Dispense Refill    atorvastatin (LIPITOR) 80 MG tablet Take 1 Tablet by mouth every evening. TAKE 1 TABLET BY MOUTH DAILY 100 Tablet 3    Omega-3 Fatty Acids (FISH OIL) 1000 MG Cap capsule Take 1 Capsule by mouth 2 times a day.      Coenzyme Q10 300 MG Cap Take 1 Capsule by mouth every day. 30 Capsule     benzonatate (TESSALON) 100 MG Cap Take 1 Capsule by mouth 3 times a day as needed for Cough. 60 Capsule 0    levothyroxine (SYNTHROID) 88 MCG Tab Take 1 Tablet by mouth every morning on an empty stomach. 100 Tablet 3    Cholecalciferol (VITAMIN D3) 1000 units Cap Take 2,000 Units by mouth.      Glucosamine-Chondroitin (MOVE FREE PO) Take 2 Tablets by mouth every day.      aspirin EC (ECOTRIN) 81 MG TBEC Take 81 mg by mouth every day. HOLD OFF TAKING THIS UNTIL 2/12/12.      [DISCONTINUED] omeprazole (PRILOSEC) 20 MG Tablet Delayed Response delayed-release tablet Take 1 Tablet by mouth every day.      [DISCONTINUED] atorvastatin (LIPITOR) 40 MG Tab Take 1 Tablet by mouth every evening. TAKE 1 TABLET BY MOUTH DAILY 100 Tablet 3    [DISCONTINUED] FISH OIL 1200 mg 3 times a week       No facility-administered encounter medications on file as of 3/16/2023.     Review of Systems   Constitutional:   "Negative for chills and fever.   HENT:  Negative for sore throat.    Eyes:  Negative for blurred vision.   Respiratory:  Negative for cough and shortness of breath.    Cardiovascular:  Positive for palpitations. Negative for chest pain, claudication, leg swelling and PND.   Gastrointestinal:  Positive for constipation. Negative for abdominal pain and nausea.   Musculoskeletal:  Positive for joint pain. Negative for falls.   Skin:  Negative for rash.   Neurological:  Positive for dizziness. Negative for focal weakness and weakness.   Endo/Heme/Allergies:  Positive for environmental allergies. Bruises/bleeds easily.            Objective     /74 (BP Location: Left arm, Patient Position: Sitting, BP Cuff Size: Adult)   Pulse 72   Resp 16   Ht 1.676 m (5' 6\")   Wt 72.6 kg (160 lb)   SpO2 94%   BMI 25.82 kg/m²     Physical Exam  Constitutional:       General: She is not in acute distress.     Appearance: She is not diaphoretic.   HENT:      Mouth/Throat:      Comments: Wearing a mask for COVID protocol  Eyes:      General: No scleral icterus.  Neck:      Vascular: No JVD.   Cardiovascular:      Rate and Rhythm: Normal rate.      Heart sounds: Normal heart sounds. No murmur heard.    No friction rub. No gallop.   Pulmonary:      Effort: No respiratory distress.      Breath sounds: No wheezing or rales.   Abdominal:      General: Bowel sounds are normal.      Palpations: Abdomen is soft.   Musculoskeletal:      Right lower leg: No edema.      Left lower leg: No edema.   Skin:     Findings: No rash.   Neurological:      Mental Status: She is alert. Mental status is at baseline.   Psychiatric:         Mood and Affect: Mood normal.          We reviewed in person the most recent labs  Recent Results (from the past 5040 hour(s))   FASTING STATUS    Collection Time: 10/17/22  9:41 AM   Result Value Ref Range    Fasting Status Fasting    ALKALINE PHOSPHATASE ISOENZYMES    Collection Time: 10/17/22  9:42 AM   Result " Value Ref Range    Alkaline Phosphatase 120 40 - 120 U/L    Liver Fractions 79 0 - 94 U/L    Bone Fractions 41 0 - 55 U/L    Alk Phos Other Calc 0 U/L   VITAMIN B12    Collection Time: 10/17/22  9:42 AM   Result Value Ref Range    Vitamin B12 -True Cobalamin 1363 (H) 211 - 911 pg/mL   VITAMIN D,25 HYDROXY (DEFICIENCY)    Collection Time: 10/17/22  9:42 AM   Result Value Ref Range    25-Hydroxy   Vitamin D 25 32 30 - 100 ng/mL   FREE THYROXINE    Collection Time: 10/17/22  9:42 AM   Result Value Ref Range    Free T-4 1.52 0.93 - 1.70 ng/dL   TSH    Collection Time: 10/17/22  9:42 AM   Result Value Ref Range    TSH 0.520 0.380 - 5.330 uIU/mL   Lipid Profile    Collection Time: 10/17/22  9:42 AM   Result Value Ref Range    Cholesterol,Tot 166 100 - 199 mg/dL    Triglycerides 145 0 - 149 mg/dL    HDL 45 >=40 mg/dL    LDL 92 <100 mg/dL   HEMOGLOBIN A1C    Collection Time: 10/17/22  9:42 AM   Result Value Ref Range    Glycohemoglobin 6.1 (H) 4.0 - 5.6 %    Est Avg Glucose 128 mg/dL   Comp Metabolic Panel    Collection Time: 10/17/22  9:42 AM   Result Value Ref Range    Sodium 139 135 - 145 mmol/L    Potassium 4.5 3.6 - 5.5 mmol/L    Chloride 104 96 - 112 mmol/L    Co2 25 20 - 33 mmol/L    Anion Gap 10.0 7.0 - 16.0    Glucose 116 (H) 65 - 99 mg/dL    Bun 17 8 - 22 mg/dL    Creatinine 0.68 0.50 - 1.40 mg/dL    Calcium 9.5 8.5 - 10.5 mg/dL    AST(SGOT) 19 12 - 45 U/L    ALT(SGPT) 29 2 - 50 U/L    Alkaline Phosphatase 125 (H) 30 - 99 U/L    Total Bilirubin 0.4 0.1 - 1.5 mg/dL    Albumin 4.6 3.2 - 4.9 g/dL    Total Protein 7.5 6.0 - 8.2 g/dL    Globulin 2.9 1.9 - 3.5 g/dL    A-G Ratio 1.6 g/dL   CBC WITH DIFFERENTIAL    Collection Time: 10/17/22  9:42 AM   Result Value Ref Range    WBC 5.9 4.8 - 10.8 K/uL    RBC 4.94 4.20 - 5.40 M/uL    Hemoglobin 14.8 12.0 - 16.0 g/dL    Hematocrit 44.4 37.0 - 47.0 %    MCV 89.9 81.4 - 97.8 fL    MCH 30.0 27.0 - 33.0 pg    MCHC 33.3 (L) 33.6 - 35.0 g/dL    RDW 39.5 35.9 - 50.0 fL    Platelet  Count 319 164 - 446 K/uL    MPV 11.2 9.0 - 12.9 fL    Neutrophils-Polys 49.20 44.00 - 72.00 %    Lymphocytes 38.10 22.00 - 41.00 %    Monocytes 8.60 0.00 - 13.40 %    Eosinophils 2.90 0.00 - 6.90 %    Basophils 1.00 0.00 - 1.80 %    Immature Granulocytes 0.20 0.00 - 0.90 %    Nucleated RBC 0.00 /100 WBC    Neutrophils (Absolute) 2.91 2.00 - 7.15 K/uL    Lymphs (Absolute) 2.25 1.00 - 4.80 K/uL    Monos (Absolute) 0.51 0.00 - 0.85 K/uL    Eos (Absolute) 0.17 0.00 - 0.51 K/uL    Baso (Absolute) 0.06 0.00 - 0.12 K/uL    Immature Granulocytes (abs) 0.01 0.00 - 0.11 K/uL    NRBC (Absolute) 0.00 K/uL   ESTIMATED GFR    Collection Time: 10/17/22  9:42 AM   Result Value Ref Range    GFR (CKD-EPI) 91 >60 mL/min/1.73 m 2   CBC WITH DIFFERENTIAL    Collection Time: 03/09/23 10:32 AM   Result Value Ref Range    WBC 6.7 4.8 - 10.8 K/uL    RBC 4.63 4.20 - 5.40 M/uL    Hemoglobin 14.1 12.0 - 16.0 g/dL    Hematocrit 42.5 37.0 - 47.0 %    MCV 91.8 81.4 - 97.8 fL    MCH 30.5 27.0 - 33.0 pg    MCHC 33.2 (L) 33.6 - 35.0 g/dL    RDW 41.9 35.9 - 50.0 fL    Platelet Count 265 164 - 446 K/uL    MPV 11.1 9.0 - 12.9 fL    Neutrophils-Polys 56.70 44.00 - 72.00 %    Lymphocytes 31.60 22.00 - 41.00 %    Monocytes 7.30 0.00 - 13.40 %    Eosinophils 3.10 0.00 - 6.90 %    Basophils 1.00 0.00 - 1.80 %    Immature Granulocytes 0.30 0.00 - 0.90 %    Nucleated RBC 0.00 /100 WBC    Neutrophils (Absolute) 3.79 2.00 - 7.15 K/uL    Lymphs (Absolute) 2.12 1.00 - 4.80 K/uL    Monos (Absolute) 0.49 0.00 - 0.85 K/uL    Eos (Absolute) 0.21 0.00 - 0.51 K/uL    Baso (Absolute) 0.07 0.00 - 0.12 K/uL    Immature Granulocytes (abs) 0.02 0.00 - 0.11 K/uL    NRBC (Absolute) 0.00 K/uL   Comp Metabolic Panel    Collection Time: 03/09/23 10:32 AM   Result Value Ref Range    Sodium 138 135 - 145 mmol/L    Potassium 4.1 3.6 - 5.5 mmol/L    Chloride 104 96 - 112 mmol/L    Co2 24 20 - 33 mmol/L    Anion Gap 10.0 7.0 - 16.0    Glucose 110 (H) 65 - 99 mg/dL    Bun 18 8 - 22  mg/dL    Creatinine 0.61 0.50 - 1.40 mg/dL    Calcium 9.3 8.5 - 10.5 mg/dL    AST(SGOT) 33 12 - 45 U/L    ALT(SGPT) 46 2 - 50 U/L    Alkaline Phosphatase 112 (H) 30 - 99 U/L    Total Bilirubin 0.5 0.1 - 1.5 mg/dL    Albumin 4.6 3.2 - 4.9 g/dL    Total Protein 7.3 6.0 - 8.2 g/dL    Globulin 2.7 1.9 - 3.5 g/dL    A-G Ratio 1.7 g/dL   HEMOGLOBIN A1C    Collection Time: 23 10:32 AM   Result Value Ref Range    Glycohemoglobin 6.2 (H) 4.0 - 5.6 %    Est Avg Glucose 131 mg/dL   Lipid Profile    Collection Time: 23 10:32 AM   Result Value Ref Range    Cholesterol,Tot 177 100 - 199 mg/dL    Triglycerides 188 (H) 0 - 149 mg/dL    HDL 43 >=40 mg/dL    LDL 96 <100 mg/dL   VITAMIN D,25 HYDROXY (DEFICIENCY)    Collection Time: 23 10:32 AM   Result Value Ref Range    25-Hydroxy   Vitamin D 25 36 30 - 100 ng/mL   VITAMIN B12    Collection Time: 23 10:32 AM   Result Value Ref Range    Vitamin B12 -True Cobalamin 856 211 - 911 pg/mL   TSH    Collection Time: 23 10:32 AM   Result Value Ref Range    TSH 0.470 0.380 - 5.330 uIU/mL   FREE THYROXINE    Collection Time: 23 10:32 AM   Result Value Ref Range    Free T-4 1.37 0.93 - 1.70 ng/dL   FASTING STATUS    Collection Time: 23 10:32 AM   Result Value Ref Range    Fasting Status Fasting    CORRECTED CALCIUM    Collection Time: 23 10:32 AM   Result Value Ref Range    Correct Calcium 8.8 8.5 - 10.5 mg/dL   ESTIMATED GFR    Collection Time: 23 10:32 AM   Result Value Ref Range    GFR (CKD-EPI) 93 >60 mL/min/1.73 m 2   EKG    Collection Time: 23  8:59 AM   Result Value Ref Range    Report       [facility] [place] [facility]    Test Date:  2023  Pt Name:    CORBY OLIVIA                  Department: [facility]  MRN:        [id number]                      Room:  Gender:     Female                       Technician: CA  :        [id number]                   Requested By:TEODORO RENTERIA  Order #:    [id number]                    Reading  MD:    Measurements  Intervals                                Axis  Rate:       72                           P:          38  AL:         156                          QRS:        63  QRSD:       92                           T:          42  QT:         394  QTc:        432    Interpretive Statements  Sinus rhythm  Compared to ECG 02/08/2012 07:17:09  No significant changes       We reviewed CT images    Assessment & Plan     1. Palpitations  EKG    Cardiac Event Monitor      2. Mild carotid artery stenosis (HCC)  Lipoprotein (a)    CRP HIGH SENSITIVE (CARDIAC)      3. Agatston CAC score 200-399 340 in 2023  Lipoprotein (a)    CRP HIGH SENSITIVE (CARDIAC)      4. Dyslipidemia  CRP HIGH SENSITIVE (CARDIAC)    atorvastatin (LIPITOR) 80 MG tablet      5. Dizziness  Cardiac Event Monitor          Medical Decision Making: Today's Assessment/Status/Plan:        It was my pleasure to meet with Ms. Valdez.    We addressed the management of hypertension at today's visit. Blood pressure is well controlled.  We specifically assessed the labs on hypertension treatment    We addressed the management of dyslipidemia and atherosclerosis at today's visit. She is on appropriate statin. Increase atorvastatin to 80    Aspirin 81 is reasonable    Patch for dizziness    We addressed the management of atherosclerotic artery disease.  She is on proper antiplatelet, cholesterol management and beta-blockers as appropriate.  We addressed the potential side effects and laboratory follow-up for these medications.      Can safely proceed with ENT and shoulder surgery.      Ms. Valdez does not require regular cardiology follow up, I have advised her to call our office or e-mail using my MyChart if needed.    It is my pleasure to participate in the care of Ms. Valdez.  Please do not hesitate to contact me with questions or concerns.    Arnaud Iqbal MD PhD FACC  Cardiologist [facility]    Please note that this  dictation was created using voice recognition software. There may be errors I did not discover before finalizing the note.

## 2023-03-16 NOTE — PATIENT INSTRUCTIONS
Work on at least 1.5 - 5 hours a week of moderate exercise (typical brisk walking or similar activity)    If you have had a heart attack, stent, bypass or reduced heart strength (EF <35%): cardiac rehab may reduce your risk of dying by 13-24% and need to go to the hospital by 30% within the first year (1)    Please look into the following diets and incorporate them into your diet    LOW SALT DIET   KEEP YOUR SODIUM EQUAL TO CALORIES AND NO MORE THAN DOUBLE THE CALORIES FOR A LOW SALT DIET    Cardiosmart.org - great resource for American College of Cardiology on heart disease prevention and treatment    FOR TREATMENT OR PREVENTION OF CORONARY ARTERY DISEASE  These three programs are approved by Medicare/Insurers for those with heart disease  [place] - [facility]  Dr. Little[facility] Program for Reversing Heart Disease - Edu Rodríguez's Cardiologist vegetarian-based  [facility] - [place]-based mind-body Program    This is a commonly referenced Program  Dr Palacio - [facility] [employer] (book and documentary) - vegetarian-based    FOR TREATMENT OF BLOOD PRESSURE  DASH DIET - American Heart Association for treatment of HYPERTENSION    FOR TREATMENT OF BAD CHOLESTEROL/FATS  REDUCE PROCESSED SUGAR AS MUCH AS POSSIBLE  INCREASE WHOLE GRAINS/VEGETABLES  INCREASE FIBER    Lowering total cholesterol and LDL (bad) cholesterol:  - Eat leaner cuts of meat, or eliminate altogether if possible red meat, and frequently substitute fish or chicken.  - Limit saturated fat to no more than 7-10% of total calories no more than 10 g per day is recommended. Some sources of saturated fat include butter, animal fats, hydrogenated vegetable fats and oils, many desserts, whole milk dairy products.  - Replaced saturated fats with polyunsaturated fats and monounsaturated fats. Foods high in monounsaturated fat include nuts, canola oil, avocados, and olives.  - Limit trans fat (processed  foods) and replaced with fresh fruits and vegetables  - Recommend nonfat dairy products  - Increase substantially the amount of soluble fiber intake (legumes such as beans, fruit, whole grains).  - Consider nutritional supplements: plant sterile spreads such as Benecol, fish oil,  flaxseed oil, omega-3 acids capsules 1000 mg twice a day, or viscous fiber such as Metamucil  - Attain ideal weight and regular exercise (at least 30 minutes per day of moderate exercise)  ASK ABOUT STATIN OR NON STATIN MEDICATION TO REDUCE YOUR LDL AND HEART RISK    Lowering triglycerides:  - Reduce intake of simple sugar: Desserts, candy, pastries, honey, sodas, sugared cereals, yogurt, Gatorade, sports bars, canned fruit, smoothies, fruit juice, coffee drinks  - Reduced intake of refined starches: Refined Pasta, most bread  - Reduce or abstain from alcohol  - Increase omega-3 fatty acids: Emerson, Trout, Mackerel, Herring, Albacore tuna and supplements  - Attain ideal weight and regular exercise (at least 30 minutes per day of moderate exercise)  ASK ABOUT PURIFIED OMEGA 3 EPA or FISH OIL TO REDUCE YOUR TG AND HEART RISK    Elevating HDL (good) cholesterol:  - Increase physical activity  - Increase omega-3 fatty acids and supplements as listed above  - Incorporating appropriate amounts of monounsaturated fats such as nuts, olive oil, canola oil, avocados, olives  - Stop smoking  - Attain ideal weight and regular exercise (at least 30 minutes per day of moderate exercise)    A total of 884 randomized controlled intervention trials evaluating 27 types of micronutrients among [id number],627 participants (4,895,544 person-years) were identified. Supplementation with n-3 fatty acid, n-6 fatty acid, l-arginine, l-citrulline, folic acid, vitamin D, magnesium, zinc, ?-lipoic acid, coenzyme Q10, melatonin, catechin, curcumin, flavanol, genistein, and quercetin showed moderate- to high-quality evidence for reducing CVD risk factors. Specifically, n-3  fatty acid supplementation decreased CVD mortality (relative risk [RR]: 0.93; 95% CI: 0.88-0.97), myocardial infarction (RR: 0.85; 95% CI: 0.78-0.92), and coronary heart disease events (RR: 0.86; 95% CI: 0.80-0.93). Folic acid supplementation decreased stroke risk (RR: 0.84; 95% CI: 0.72-0.97), and coenzyme Q10 supplementation decreased all-cause mortality events (RR: 0.68; 95% CI: 0.49-0.94). Vitamin C, vitamin D, vitamin E, and selenium showed no effect on CVD or type 2 diabetes risk. ?-carotene supplementation increased all-cause mortality (RR: 1.10; 95% CI: 1.05-1.15), CVD mortality events (RR: 1.12; 95% CI: 1.06-1.18), and stroke risk (RR: 1.09; 95% CI: 1.01-1.17).           [email or web address]://[email or web address]

## 2023-03-21 ENCOUNTER — OFFICE VISIT (OUTPATIENT)
Dept: MEDICAL GROUP | Facility: PHYSICIAN GROUP | Age: 73
End: 2023-03-21
Payer: MEDICARE

## 2023-03-21 VITALS
HEART RATE: 79 BPM | SYSTOLIC BLOOD PRESSURE: 120 MMHG | DIASTOLIC BLOOD PRESSURE: 66 MMHG | WEIGHT: 165 LBS | BODY MASS INDEX: 26.63 KG/M2 | OXYGEN SATURATION: 95 % | TEMPERATURE: 97.1 F | RESPIRATION RATE: 12 BRPM

## 2023-03-21 DIAGNOSIS — E78.5 DYSLIPIDEMIA: ICD-10-CM

## 2023-03-21 DIAGNOSIS — R93.1 AGATSTON CAC SCORE 200-399: Chronic | ICD-10-CM

## 2023-03-21 DIAGNOSIS — E89.0 POSTOPERATIVE HYPOTHYROIDISM: ICD-10-CM

## 2023-03-21 DIAGNOSIS — Z00.00 ENCOUNTER FOR SUBSEQUENT ANNUAL WELLNESS VISIT (AWV) IN MEDICARE PATIENT: Primary | ICD-10-CM

## 2023-03-21 DIAGNOSIS — R74.8 ELEVATED ALKALINE PHOSPHATASE LEVEL: ICD-10-CM

## 2023-03-21 DIAGNOSIS — R73.03 PREDIABETES: ICD-10-CM

## 2023-03-21 PROCEDURE — G0439 PPPS, SUBSEQ VISIT: HCPCS | Performed by: INTERNAL MEDICINE

## 2023-03-21 ASSESSMENT — PATIENT HEALTH QUESTIONNAIRE - PHQ9: CLINICAL INTERPRETATION OF PHQ2 SCORE: 0

## 2023-03-21 ASSESSMENT — FIBROSIS 4 INDEX: FIB4 SCORE: 1.36

## 2023-03-21 ASSESSMENT — ENCOUNTER SYMPTOMS: GENERAL WELL-BEING: EXCELLENT

## 2023-03-21 ASSESSMENT — ACTIVITIES OF DAILY LIVING (ADL): BATHING_REQUIRES_ASSISTANCE: 0

## 2023-03-21 NOTE — LETTER
[facility]  Archana Robertson D.O.  [address]  [place] [address]  Fax: [phone]   Authorization for Release/Disclosure of   Protected Health Information   Name: MICHELA VALDEZ : 1950 SSN: [id number]   Address: [address] [place] [address]  [place] [address] Phone:    [phone] (home)    I authorize the entity listed below to release/disclose the PHI below to:   [facility]/Archana Robertson D.O. and Archana Robertson D.O.   Provider or Entity Name:  Dr. Garcia[facility]   Address   City, [facility], [facility]   Phone:      Fax:     Reason for request: continuity of care   Information to be released:    [  ] LAST COLONOSCOPY,  including any PATH REPORT and follow-up  [  ] LAST FIT/COLOGUARD RESULT [  ] LAST DEXA  [  ] LAST MAMMOGRAM  [  ] LAST PAP  [  ] LAST LABS [  ] RETINA EXAM REPORT  [  ] IMMUNIZATION RECORDS  [ x ] Release all info      [ x ] Check here and initial the line next to each item to release ALL health information INCLUDING  _____ Care and treatment for drug and / or alcohol abuse  _____ HIV testing, infection status, or AIDS  _____ Genetic Testing    DATES OF SERVICE OR TIME PERIOD TO BE DISCLOSED: ___-_____  I understand and acknowledge that:  * This Authorization may be revoked at any time by you in writing, except if your health information has already been used or disclosed.  * Your health information that will be used or disclosed as a result of you signing this authorization could be re-disclosed by the recipient. If this occurs, your re-disclosed health information may no longer be protected by State or Federal laws.  * You may refuse to sign this Authorization. Your refusal will not affect your ability to obtain treatment.  * This Authorization becomes effective upon signing and will  on (date) __________.      If no date is indicated, this Authorization will  one (1) year from the signature date.    Name: Michela Valdez  Signature: Date:    3/21/2023     PLEASE FAX REQUESTED RECORDS BACK TO: [phone]

## 2023-03-21 NOTE — PROGRESS NOTES
Chief Complaint   Patient presents with    Annual Exam     Annual   LOV 1/18/2023   Last labs 3/9/23         HPI:  Michela Valdez is a 72 y.o. female here for Medicare Annual Wellness Visit     Problem   Agatston CAC score 200-399 340 in 2023    CT Cardiac Score  Coronary calcification:  LMA - 64.8  LCX - 0.0  LAD - 16.5  RCA - 258.7  PDA - 0.0     Total Calcium Score: 340.0     Percentile: Calcium score is above the 75th percentile for the patient's age and sex.     IMPRESSION: Calcium Score of 100-399 AND >75th percentile:     You have significant cholesterol (plaque) build-up in the arteries that supply blood flow to your heart.     She had elevated CT cardiac score, seen by cardiology, atorvastatin doubled to 80 mg.    Current regimen: atorvastatin 80 mg daily  Previous regimen: atorvastatin 40 mg daily     Elevated Alkaline Phosphatase Level     Latest Reference Range & Units 10/17/22 09:42 03/09/23 10:32   Alkaline Phosphatase 30 - 99 U/L 125 (H) 112 (H)      Latest Reference Range & Units 10/17/22 09:42   Alkaline Phosphatase 40 - 120 U/L 120   Bone Fractions 0 - 55 U/L 41   Liver Fractions 0 - 94 U/L 79   Alk Phos Other Calc U/L 0     New finding in March 2022.  Likely related to increased bone turnover from known arthritis vs fatty liver disease.     Prediabetes     Latest Reference Range & Units 03/09/23 10:32   Glycohemoglobin 4.0 - 5.6 % 6.2 (H)   Estim. Avg Glu mg/dL 131     She has history of prediabetes.  No prior use of glucose lowering medications.  We have discussed GLP1 agonist therapy for both prediabetes and help with weight loss, she would like to try with natural approach first.     Postoperative Hypothyroidism     Latest Reference Range & Units 03/09/23 10:32   TSH 0.380 - 5.330 uIU/mL 0.470   Free T-4 0.93 - 1.70 ng/dL 1.37     She reports prior thyroidectomy due to nodule found incidentally.  No recent dose adjustments of her levothyroxine.  She has had progressive weight gain since her  thyroid was removed and feels are likely related.  She wonders if her dose needs to be adjusted.  Otherwise no signs or symptoms of overtreatment under treatment.    Current regimen: Levothyroxine 88 mcg daily.     Dyslipidemia     Latest Reference Range & Units 10/17/22 09:42 03/09/23 10:32   Cholesterol,Tot 100 - 199 mg/dL 166 177   Triglycerides 0 - 149 mg/dL 145 188 (H)   HDL >=40 mg/dL 45 43   LDL <100 mg/dL 92 96     Has a history of elevated cholesterol.  Her father had early CAD.  She previously followed with Dr. Bloch of vascular medicine.    Current regimen: Atorvastatin 80 mg daily and aspirin 81 mg daily              Current supplements as per medication list.       Allergies: Penicillins, Aleve [gnp naproxen sodium], and Penicillin g    Current social contact/activities:     She  reports that she quit smoking about 43 years ago. Her smoking use included cigarettes. She has a 2.00 pack-year smoking history. She has never used smokeless tobacco. She reports current alcohol use of about 3.0 - 4.2 oz per week. She reports that she does not use drugs.  Counseling given: Not Answered      DPA/Advanced Directive:  Patient has Living Will, but it is not on file. Instructed to bring in a copy to scan into their chart.    ROS:    Gait: Uses no assistive device  Ostomy: No  Other tubes: No  Amputations: No  Chronic oxygen use: No  Last eye exam: 1 week ago with  Dr. Jin  3/13/23  Wears hearing aids: No   : Denies any urinary leakage during the last 6 months    Screening:    Depression Screening  Little interest or pleasure in doing things?  0 - not at all  Feeling down, depressed , or hopeless? 0 - not at all  Trouble falling or staying asleep, or sleeping too much?     Feeling tired or having little energy?     Poor appetite or overeating?     Feeling bad about yourself - or that you are a failure or have let yourself or your family down?    Trouble concentrating on things, such as reading the newspaper or  watching television?    Moving or speaking so slowly that other people could have noticed.  Or the opposite - being so fidgety or restless that you have been moving around a lot more than usual?     Thoughts that you would be better off dead, or of hurting yourself?     Patient Health Questionnaire Score:      If depressive symptoms identified deferred to follow up visit unless specifically addressed in assessment and plan.    Interpretation of PHQ-9 Total Score   Score Severity   1-4 No Depression   5-9 Mild Depression   10-14 Moderate Depression   15-19 Moderately Severe Depression   20-27 Severe Depression    Screening for Cognitive Impairment  Three Minute Recall (daughter, heaven, mountain) 3/3    Dev clock face with all 12 numbers and set the hands to show 10 past 11.  Yes    Cognitive concerns identified deferred for follow up unless specifically addressed in assessment and plan.    Fall Risk Assessment  Has the patient had two or more falls in the last year or any fall with injury in the last year?  No    Safety Assessment  Throw rugs on floor.  No  Handrails on all stairs.  No  Good lighting in all hallways.  Yes  Difficulty hearing.  Yes  Patient counseled about all safety risks that were identified.    Functional Assessment ADLs  Are there any barriers preventing you from cooking for yourself or meeting nutritional needs?  No.    Are there any barriers preventing you from driving safely or obtaining transportation?  No.    Are there any barriers preventing you from using a telephone or calling for help?  No    Are there any barriers preventing you from shopping?  No.    Are there any barriers preventing you from taking care of your own finances?  No    Are there any barriers preventing you from managing your medications?  No    Are there any barriers preventing you from showering, bathing or dressing yourself? No    Are you currently engaging in any exercise or physical activity?  Yes. Works out at gym  twice a week    What is your perception of your health? Excellent    Advance Care Planning  Do you have an Advance Directive, Living Will, Durable Power of , or POLST?      Living Will     completed, but not in chart    Anticipatory Guidance  Wear a seatbelt at all times in a moving vehicle  Continue with regular follow up and age recommended vaccinations    Health Maintenance Summary            Annual Wellness Visit (Every 366 Days) Next due on 3/21/2024      03/21/2023  Level of Service: ANNUAL WELLNESS VISIT-INCLUDES PPPS SUBSEQUE*    09/28/2022  Level of Service: ANNUAL WELLNESS VISIT-INCLUDES PPPS SUBSEQUE*    03/15/2022  Level of Service: ANNUAL WELLNESS VISIT-INCLUDES PPPS SUBSEQUE*    09/02/2021  Level of Service: ANNUAL WELLNESS VISIT-INCLUDES PPPS SUBSEQUE*    02/02/2021  Visit Dx: Medicare annual wellness visit, subsequent    Only the first 5 history entries have been loaded, but more history exists.              MAMMOGRAM (Every 2 Years) Next due on 4/14/2024 04/14/2022  MA-SCREENING MAMMO BILAT W/TOMOSYNTHESIS W/CAD    10/14/2020  MA-SCREENING MAMMO BILAT W/TOMOSYNTHESIS W/CAD    10/04/2018  MA-SCREEN MAMMO W/CAD-BILAT    10/24/2017  [id number]    02/24/2017  [id number]    Only the first 5 history entries have been loaded, but more history exists.              COLORECTAL CANCER SCREENING (COLONOSCOPY - Every 5 Years) Next due on 7/1/2024 07/01/2019  REFERRAL TO GI FOR COLONOSCOPY    10/13/2015  COLONOSCOPY (Postponed - was done this year, pt bring in  results to pcp)    08/05/2014  REFERRAL TO GI FOR COLONOSCOPY              BONE DENSITY (Every 5 Years) Next due on 5/19/2026 05/19/2021  DS-BONE DENSITY STUDY (DEXA)    03/27/2019  DS-BONE DENSITY STUDY (DEXA)    06/16/2016  DS-BONE DENSITY STUDY (DEXA)    09/22/2008  DS-BONE DENSITY STUDY (DEXA)              IMM DTaP/Tdap/Td Vaccine (2 - Td or Tdap) Next due on 10/24/2026      10/24/2016  Imm Admin: Tdap  Vaccine              IMM ZOSTER VACCINES (Series Information) Completed      05/09/2020  Imm Admin: Zoster Vaccine Recombinant (RZV) (SHINGRIX)    02/15/2020  Imm Admin: Zoster Vaccine Recombinant (RZV) (SHINGRIX)    09/29/2019  Imm Admin: Zoster Vaccine Recombinant (RZV) (SHINGRIX)              IMM PNEUMOCOCCAL VACCINE: 65+ Years (Series Information) Completed      12/03/2020  Imm Admin: Pneumococcal polysaccharide vaccine (PPSV-23)    11/12/2019  Imm Admin: Pneumococcal Conjugate Vaccine (Prevnar/PCV-13)              HEPATITIS C SCREENING  Completed      02/02/2021  HEP C VIRUS ANTIBODY              IMM INFLUENZA (Series Information) Completed      09/28/2022  Imm Admin: Influenza Vaccine Adult HD    10/06/2021  Imm Admin: Influenza (IM) Preservative Free - HISTORICAL DATA    09/29/2019  Imm Admin: Influenza Vaccine Adult HD    11/28/2018  Imm Admin: Influenza Vaccine Adult HD    02/09/2012  Imm Admin: INFLUENZA TIV (IM)              COVID-19 Vaccine (Series Information) Completed      10/05/2022  Imm Admin: PFIZER BIVALENT BOOSTER SARS-COV-2 VACCINE (12+)    09/25/2021  Imm Admin: PFIZER PURPLE CAP SARS-COV-2 VACCINATION (12+)    02/19/2021  Imm Admin: PFIZER PURPLE CAP SARS-COV-2 VACCINATION (12+)    01/26/2021  Imm Admin: PFIZER PURPLE CAP SARS-COV-2 VACCINATION (12+)              IMM HEP B VACCINE (Series Information) Aged Out      No completion history exists for this topic.              IMM MENINGOCOCCAL ACWY VACCINE (Series Information) Aged Out      No completion history exists for this topic.                    Patient Care Team:  Archana Robertson D.O. as PCP - General (Internal Medicine)  Archana Robertson D.O. as PCP - [facility] Paneled (Internal Medicine)  ZACHARY Cadet D.P.M. (Podiatry)  Clinton John M.D. as Consulting Physician (Gastroenterology)  Vivian Ruff (Inactive) as [facility]   Erendira Blevins MD (Sports Medicine)  Lili Jin  (Optometry)      Social History     Tobacco Use    Smoking status: Former     Packs/day: 0.50     Years: 4.00     Pack years: 2.00     Types: Cigarettes     Quit date:      Years since quittin.2    Smokeless tobacco: Never   Substance Use Topics    Alcohol use: Yes     Alcohol/week: 3.0 - 4.2 oz     Types: 5 - 7 Glasses of wine per week     Comment: glass of wine daily     Drug use: No     Family History   Problem Relation Age of Onset    Osteoporosis Mother     Hyperlipidemia Father     Heart Disease Father         CAD, MI     Anemia Sister     Cancer Brother         prostate    Heart Disease Brother         CABG     She  has a past medical history of Acute laryngitis (2022), Dyslipidemia (10/13/2015), Elevated LFTs (2018), History of osteopenia (2018), Hyperlipidemia, Postoperative hypothyroidism (10/13/2015), Prediabetes (2018), Snoring, and Thyroid disease.   Past Surgical History:   Procedure Laterality Date    THYROIDECTOMY TOTAL  2012    [facility]    ABDOMINAL EXPLORATION      pyloric stenosis    GYN SURGERY         Exam:   /66 (BP Location: Left arm, Patient Position: Sitting, BP Cuff Size: Adult)   Pulse 79   Temp 36.2 °C (97.1 °F) (Temporal)   Resp 12   Wt 74.8 kg (165 lb)   SpO2 95%  Body mass index is 26.63 kg/m² (pended).    Hearing good.    Dentition good  Alert, oriented in no acute distress.  Eye contact is good, speech goal directed, affect calm  Physical Exam  Constitutional:       General: She is not in acute distress.     Appearance: Normal appearance.   HENT:      Ears:      Comments: Hearing grossly normal  Eyes:      General: No scleral icterus.     Conjunctiva/sclera: Conjunctivae normal.   Cardiovascular:      Rate and Rhythm: Normal rate.   Pulmonary:      Effort: Pulmonary effort is normal. No respiratory distress.   Skin:     General: Skin is warm and dry.   Psychiatric:         Mood and  Affect: Mood normal.         Behavior: Behavior normal.         Thought Content: Thought content normal.         Judgment: Judgment normal.        Assessment and Plan. The following treatment and monitoring plan is recommended:        Michela is a 72 y.o. female with the following:  Problem List Items Addressed This Visit       Agatston CAC score 200-399 340 in 2023 (Chronic)     Relatively new problem, atorvastatin doubled from 40 mg to 80 mg by cardiology. Would like to establish care with Dr. Barrientos at [facility].         Relevant Orders    REFERRAL TO CARDIOLOGY    Dyslipidemia     Chronic ongoing problem, due to elevated LDL atorvastatin was increased from 40 mg to 80 mg by cardiology, plan to recheck in a few weeks.         Relevant Orders    REFERRAL TO CARDIOLOGY    Elevated alkaline phosphatase level     Chronic ongoing problem, unclear cause, relatively new problem, perhaps related to arthritis vs fatty liver. She has reached out to Dr. John for additional follow up.         Postoperative hypothyroidism     Chronic ongoing problem, continue levothyroxine 88 mcg, labs are stable.         Prediabetes     Chronic and ongoing problem, continue observing without medication.          Other Visit Diagnoses       Encounter for subsequent annual wellness visit (AWV) in Medicare patient    -  Primary             Services suggested: No services needed at this time  Health Care Screening: Age-appropriate preventive services recommended by USPTF and ACIP covered by Medicare were discussed today. Services ordered if indicated and agreed upon by the patient.  Referrals offered: Community-based lifestyle interventions to reduce health risks and promote self-management and wellness, fall prevention, nutrition, physical activity, tobacco-use cessation, weight loss, and mental health services as per orders if indicated.    Discussion today about general wellness and lifestyle habits:    Prevent falls and reduce  trip hazards; Cautioned about securing or removing rugs.  Have a working fire alarm and carbon monoxide detector;   Engage in regular physical activity and social activities     Follow-up: Return in about 6 months (around 9/21/2023).    I spent a total of 36 minutes with record review, exam, communication with the patient, communication with other providers, and documentation of this encounter.       PLEASE NOTE: This dictation was created using voice recognition software. I have made every reasonable attempt to correct obvious errors, but I expect that there are errors of grammar and possibly content that I did not discover before finalizing the note.      Archana Robertson, DO  Geriatric and Internal Medicine  [facility]

## 2023-03-21 NOTE — ASSESSMENT & PLAN NOTE
Chronic ongoing problem, due to elevated LDL atorvastatin was increased from 40 mg to 80 mg by cardiology, plan to recheck in a few weeks.

## 2023-03-22 NOTE — ASSESSMENT & PLAN NOTE
Chronic ongoing problem, unclear cause, relatively new problem, perhaps related to arthritis vs fatty liver. She has reached out to Dr. John for additional follow up.

## 2023-03-23 ENCOUNTER — APPOINTMENT (OUTPATIENT)
Dept: MEDICAL GROUP | Facility: PHYSICIAN GROUP | Age: 73
End: 2023-03-23
Payer: MEDICARE

## 2023-03-24 ENCOUNTER — TELEPHONE (OUTPATIENT)
Dept: HEALTH INFORMATION MANAGEMENT | Facility: OTHER | Age: 73
End: 2023-03-24
Payer: MEDICARE

## 2023-04-17 ENCOUNTER — APPOINTMENT (OUTPATIENT)
Dept: ADMISSIONS | Facility: MEDICAL CENTER | Age: 73
End: 2023-04-17
Attending: ORTHOPAEDIC SURGERY
Payer: MEDICARE

## 2023-04-24 ENCOUNTER — PRE-ADMISSION TESTING (OUTPATIENT)
Dept: ADMISSIONS | Facility: MEDICAL CENTER | Age: 73
End: 2023-04-24
Attending: ORTHOPAEDIC SURGERY
Payer: MEDICARE

## 2023-04-24 VITALS — BODY MASS INDEX: 29.23 KG/M2 | HEIGHT: 63 IN

## 2023-04-24 NOTE — PREPROCEDURE INSTRUCTIONS
Pre admit apt: Pt. Instructed to continue regularly prescribed medications through day before surgery.  Instructed to take the following medications, the day of surgery, with a sip of water per anesthesia protocol: levothyroxine  METS -greater than 4  Pt states no previous issues with anesthesia  Instructed pt to notify Dr. Howe, if she develops and new sxs of illness/covid prior to surgery.

## 2023-06-14 ENCOUNTER — OFFICE VISIT (OUTPATIENT)
Dept: MEDICAL GROUP | Facility: PHYSICIAN GROUP | Age: 73
End: 2023-06-14
Payer: MEDICARE

## 2023-06-14 VITALS
RESPIRATION RATE: 16 BRPM | WEIGHT: 163.3 LBS | DIASTOLIC BLOOD PRESSURE: 60 MMHG | HEIGHT: 66 IN | OXYGEN SATURATION: 96 % | TEMPERATURE: 98.2 F | SYSTOLIC BLOOD PRESSURE: 124 MMHG | BODY MASS INDEX: 26.24 KG/M2 | HEART RATE: 88 BPM

## 2023-06-14 DIAGNOSIS — R93.1 AGATSTON CAC SCORE 200-399: Chronic | ICD-10-CM

## 2023-06-14 DIAGNOSIS — K59.01 SLOW TRANSIT CONSTIPATION: ICD-10-CM

## 2023-06-14 DIAGNOSIS — R73.03 PREDIABETES: ICD-10-CM

## 2023-06-14 DIAGNOSIS — R15.1 FECAL SMEARING: ICD-10-CM

## 2023-06-14 DIAGNOSIS — N95.8 GENITOURINARY SYNDROME OF MENOPAUSE: ICD-10-CM

## 2023-06-14 PROCEDURE — 3074F SYST BP LT 130 MM HG: CPT | Performed by: INTERNAL MEDICINE

## 2023-06-14 PROCEDURE — 3078F DIAST BP <80 MM HG: CPT | Performed by: INTERNAL MEDICINE

## 2023-06-14 PROCEDURE — 99215 OFFICE O/P EST HI 40 MIN: CPT | Performed by: INTERNAL MEDICINE

## 2023-06-14 RX ORDER — ATORVASTATIN CALCIUM 40 MG/1
TABLET, FILM COATED ORAL
COMMUNITY
Start: 2023-06-05 | End: 2023-06-14

## 2023-06-14 ASSESSMENT — FIBROSIS 4 INDEX: FIB4 SCORE: 1.36

## 2023-06-14 NOTE — ASSESSMENT & PLAN NOTE
Chronic ongoing issue, A1c has not improved, after discussion she is agreeable to starting on Rybelsus 7 mg daily.  She will break this in half for the first 2 weeks and monitor for nausea and constipation.  She will then increase to 1 full tablet for at least 4 weeks and update me.  Discussed we also have room to go up.  She will pay close attention to side effects and keep me updated.  We will recheck A1c in clinic in 3 months.

## 2023-06-14 NOTE — ASSESSMENT & PLAN NOTE
Chronic ongoing issue.  Discussed it may be a manifestation of SIBO.  Could also be related to chronic constipation.  She currently has a bowel movement every day and is not taking anything in particular but does have Colace and MiraLAX at home.  Some issues more recently with fecal leakage, although very minimal still quite frustrating.  We are going to start by referring her to pelvic floor physical therapy.  May need to refer her to Dr. Bolaños of colorectal surgery if not improving.  Also discussed we can do a trial of lactulose if constipation worsens after we start Rybelsus.   Pt. Seen and examined. R proximal humerus fracture s/p fall, found down w/ rhabdo. Maintain sling and NWB. R shoulder films ordered. Full consult to follow.      Electronically signed by Rosamaria Leroy DO on 8/18/2021 at 7:00 AM

## 2023-06-14 NOTE — ASSESSMENT & PLAN NOTE
New decompensated problem.  After discussing treatment options we will start with pelvic floor physical therapy.  There is no melena or hematochezia.  There is no rectal pain.  May need to consider evaluation with colorectal surgery if not improving or she develops any red flag features.  She voiced understanding will keep me updated.

## 2023-06-14 NOTE — ASSESSMENT & PLAN NOTE
Chronic ongoing problem, doing fine on the higher dose of atorvastatin 80 mg daily with baby aspirin, continue at this time.  She reports some dyspnea, does not happen with exertion I told her to keep an eye on it and update her cardiologist as this can sometimes indicate an anginal equivalent.

## 2023-06-14 NOTE — PROGRESS NOTES
Subjective:   Chief Complaint/History of Present Illness:  Michela Valdez is a 72 y.o. female established patient who presents today to discuss medical problems as listed below. Michela is unaccompanied for today's visit.    Problem   Fecal Smearing    She reports issues with scant amount of fecal drainage, sometimes she does not have a warning and so she wears a pad.  Can be a drop in upwards to a teaspoon amount.  Does not happen all the time.  She feels like she has very good rectal tone and it should not be a strength issue.  She does deal some constipation and wonders if this is a manifestation of obstipation.  Not currently taking any stool softeners or laxatives.  She has diverticulosis throughout the colon.  She has eliminated caffeinated coffee.     Agatston CAC score [id number] in 2023    CT Cardiac Score  Coronary calcification:  LMA - 64.8  LCX - 0.0  LAD - 16.5  RCA - 258.7  PDA - 0.0     Total Calcium Score: 340.0     Percentile: Calcium score is above the 75th percentile for the patient's age and sex.     IMPRESSION: Calcium Score of 100-399 AND >75th percentile:     You have significant cholesterol (plaque) build-up in the arteries that supply blood flow to your heart.     She had elevated CT cardiac score, seen by cardiology, atorvastatin doubled to 80 mg.    Current regimen: atorvastatin 80 mg daily  Previous regimen: atorvastatin 40 mg daily     Genitourinary Syndrome of Menopause    She notes dryness of the vulva and vagina.  She would like to try some maintenance medicine to better lubricate the area.  No personal history of breast cancer or stroke/heart attack.  She be interested in adding topical estrogen.     Slow transit constipation with bloating    She has known diverticular disease with associated constipation.  She has been taking Colace with mixed results.  Sometimes will be ineffective and other times she will have a small amount of anal leakage.  She previously tried MiraLAX but this  "was too strong for her, she was taking 1 full capful.  She admits that she has not drink enough water.  She tried fiber but it worsen the constipation due to inadequate hydration.  No prior evaluation for SIBO, does note some increased distention and gas for which he uses Gas-X.  Recent colonoscopy in 2019 was reassuring.      Prediabetes     Latest Reference Range & Units 03/09/23 10:32   Glycohemoglobin 4.0 - 5.6 % 6.2 (H)   Estim. Avg Glu mg/dL 131     She has history of prediabetes.  No prior use of glucose lowering medications.  We have discussed GLP1 agonist therapy for both prediabetes and help with weight loss, she is now open to initiation with oral semaglutide.    Current regimen: Rybelsus 7 mg daily          Current Medications:  Current Outpatient Medications Ordered in Epic   Medication Sig Dispense Refill    Semaglutide 7 MG Tab Take 1 Tablet by mouth every day. 30 Tablet 2    atorvastatin (LIPITOR) 80 MG tablet Take 1 Tablet by mouth every evening. TAKE 1 TABLET BY MOUTH DAILY 100 Tablet 3    levothyroxine (SYNTHROID) 88 MCG Tab Take 1 Tablet by mouth every morning on an empty stomach. (Patient taking differently: Take 88 mcg by mouth every morning on an empty stomach. Take 2 tablets, by mouth on Sundays) 100 Tablet 3    Cholecalciferol (VITAMIN D3) 1000 units Cap Take 2,000 Units by mouth.      Glucosamine-Chondroitin (MOVE FREE PO) Take 1,500 mg by mouth every day. Chondroitin, 200mg      aspirin EC (ECOTRIN) 81 MG TBEC Take 81 mg by mouth every day. HOLD OFF TAKING THIS UNTIL 2/12/12.       No current [facility]-ordered facility-administered medications on file.          Objective:   Physical Exam:    Vitals: /60 (BP Location: Left arm, Patient Position: Sitting, BP Cuff Size: Adult)   Pulse 88   Temp 36.8 °C (98.2 °F) (Temporal)   Resp 16   Ht 1.676 m (5' 6\")   Wt 74.1 kg (163 lb 4.8 oz)   SpO2 96%    BMI: Body mass index is 26.36 kg/m².  Physical Exam  Constitutional:       General: She is " not in acute distress.     Appearance: Normal appearance. She is not ill-appearing.   HENT:      Right Ear: External ear normal. There is no impacted cerumen.      Left Ear: External ear normal. There is no impacted cerumen.   Eyes:      General: No scleral icterus.     Conjunctiva/sclera: Conjunctivae normal.   Cardiovascular:      Rate and Rhythm: Normal rate and regular rhythm.      Pulses: Normal pulses.      Heart sounds: No murmur heard.  Pulmonary:      Effort: Pulmonary effort is normal. No respiratory distress.      Breath sounds: No wheezing, rhonchi or rales.   Abdominal:      General: Bowel sounds are normal. There is no distension.      Palpations: Abdomen is soft.      Tenderness: There is no abdominal tenderness.   Musculoskeletal:      Right lower leg: No edema.      Left lower leg: No edema.   Lymphadenopathy:      Cervical: No cervical adenopathy.   Skin:     General: Skin is warm and dry.      Findings: No rash.   Neurological:      Gait: Gait normal.   Psychiatric:         Mood and Affect: Mood normal.         Behavior: Behavior normal.         Thought Content: Thought content normal.         Judgment: Judgment normal.               Assessment and Plan:   Michela is a 72 y.o. female with the following:  Problem List Items Addressed This Visit       Agatston CAC score [phone] in 2023 (Chronic)     Chronic ongoing problem, doing fine on the higher dose of atorvastatin 80 mg daily with baby aspirin, continue at this time.  She reports some dyspnea, does not happen with exertion I told her to keep an eye on it and update her cardiologist as this can sometimes indicate an anginal equivalent.         Fecal smearing     New decompensated problem.  After discussing treatment options we will start with pelvic floor physical therapy.  There is no melena or hematochezia.  There is no rectal pain.  May need to consider evaluation with colorectal surgery if not improving or she develops any red flag  features.  She voiced understanding will keep me updated.         Relevant Orders    Referral to Physical Therapy    Genitourinary syndrome of menopause     Chronic ongoing problem.  Will refer to pelvic floor physical therapy to help with strengthening.         Prediabetes     Chronic ongoing issue, A1c has not improved, after discussion she is agreeable to starting on Rybelsus 7 mg daily.  She will break this in half for the first 2 weeks and monitor for nausea and constipation.  She will then increase to 1 full tablet for at least 4 weeks and update me.  Discussed we also have room to go up.  She will pay close attention to side effects and keep me updated.  We will recheck A1c in clinic in 3 months.         Relevant Medications    Semaglutide 7 MG Tab    Slow transit constipation with bloating     Chronic ongoing issue.  Discussed it may be a manifestation of SIBO.  Could also be related to chronic constipation.  She currently has a bowel movement every day and is not taking anything in particular but does have Colace and MiraLAX at home.  Some issues more recently with fecal leakage, although very minimal still quite frustrating.  We are going to start by referring her to pelvic floor physical therapy.  May need to refer her to Dr. Bolaños of colorectal surgery if not improving.  Also discussed we can do a trial of lactulose if constipation worsens after we start Rybelsus.               RTC: Return in about 3 months (around 9/14/2023).    I spent a total of 42 minutes with record review, exam, communication with the patient, communication with other providers, and documentation of this encounter.    PLEASE NOTE: This dictation was created using voice recognition software. I have made every reasonable attempt to correct obvious errors, but I expect that there are errors of grammar and possibly content that I did not discover before finalizing the note.      Archana Robertson, DO  Geriatric and Internal  Medicine  [facility]

## 2023-06-27 ENCOUNTER — TELEPHONE (OUTPATIENT)
Dept: CARDIOLOGY | Facility: MEDICAL CENTER | Age: 73
End: 2023-06-27
Payer: MEDICARE

## 2023-06-27 NOTE — TELEPHONE ENCOUNTER
CW    Caller: Michela Valdez    Topic/issue: MEDICAL ADVICE    Michela states that on her AVS from her last visit with CW. She states that she sees an order that she isn't sure she should be doing. She would like to have a call back to discuss her last OV. Please advise.    Thank you,  Nadeem STYLES    Callback Number: [phone] (home)

## 2023-06-28 NOTE — TELEPHONE ENCOUNTER
Returned Michela's phone call and spoke about orders from last OV. Patient holding off currently on event monitor, as it was only for occasional dizziness spells. Patient planning to go in and have labs completed some point soon. All questions/concerns answered at this time, patient appreciative of information given.

## 2023-07-12 ENCOUNTER — APPOINTMENT (RX ONLY)
Dept: URBAN - METROPOLITAN AREA CLINIC 35 | Facility: CLINIC | Age: 73
Setting detail: DERMATOLOGY
End: 2023-07-12

## 2023-07-12 DIAGNOSIS — D22 MELANOCYTIC NEVI: ICD-10-CM

## 2023-07-12 DIAGNOSIS — L57.0 ACTINIC KERATOSIS: ICD-10-CM

## 2023-07-12 DIAGNOSIS — L81.4 OTHER MELANIN HYPERPIGMENTATION: ICD-10-CM

## 2023-07-12 DIAGNOSIS — L82.1 OTHER SEBORRHEIC KERATOSIS: ICD-10-CM

## 2023-07-12 DIAGNOSIS — Z85.828 PERSONAL HISTORY OF OTHER MALIGNANT NEOPLASM OF SKIN: ICD-10-CM

## 2023-07-12 DIAGNOSIS — L91.8 OTHER HYPERTROPHIC DISORDERS OF THE SKIN: ICD-10-CM

## 2023-07-12 PROBLEM — D22.61 MELANOCYTIC NEVI OF RIGHT UPPER LIMB, INCLUDING SHOULDER: Status: ACTIVE | Noted: 2023-07-12

## 2023-07-12 PROCEDURE — ? COSMETIC QUOTE

## 2023-07-12 PROCEDURE — ? OBSERVATION AND MEASURE

## 2023-07-12 PROCEDURE — 17003 DESTRUCT PREMALG LES 2-14: CPT

## 2023-07-12 PROCEDURE — ? LIQUID NITROGEN

## 2023-07-12 PROCEDURE — ? LIQUID NITROGEN (COSMETIC)

## 2023-07-12 PROCEDURE — ? COUNSELING

## 2023-07-12 PROCEDURE — 99213 OFFICE O/P EST LOW 20 MIN: CPT | Mod: 25

## 2023-07-12 PROCEDURE — 17000 DESTRUCT PREMALG LESION: CPT

## 2023-07-12 ASSESSMENT — LOCATION SIMPLE DESCRIPTION DERM
LOCATION SIMPLE: LEFT AXILLARY VAULT
LOCATION SIMPLE: SCALP
LOCATION SIMPLE: LEFT UPPER ARM
LOCATION SIMPLE: ANTERIOR SCALP
LOCATION SIMPLE: LEFT HAND
LOCATION SIMPLE: NOSE
LOCATION SIMPLE: LEFT EAR
LOCATION SIMPLE: RIGHT POSTERIOR UPPER ARM
LOCATION SIMPLE: RIGHT SHOULDER
LOCATION SIMPLE: RIGHT UPPER BACK
LOCATION SIMPLE: LEFT UPPER BACK
LOCATION SIMPLE: LEFT THIGH
LOCATION SIMPLE: RIGHT CLAVICULAR SKIN
LOCATION SIMPLE: RIGHT HAND

## 2023-07-12 ASSESSMENT — LOCATION DETAILED DESCRIPTION DERM
LOCATION DETAILED: LEFT AXILLARY VAULT
LOCATION DETAILED: LEFT RADIAL DORSAL HAND
LOCATION DETAILED: LEFT ANTERIOR LATERAL PROXIMAL THIGH
LOCATION DETAILED: RIGHT POSTERIOR SHOULDER
LOCATION DETAILED: RIGHT CLAVICULAR SKIN
LOCATION DETAILED: RIGHT RADIAL DORSAL HAND
LOCATION DETAILED: RIGHT MEDIAL UPPER BACK
LOCATION DETAILED: LEFT SUPERIOR HELIX
LOCATION DETAILED: RIGHT DISTAL POSTERIOR UPPER ARM
LOCATION DETAILED: MID-FRONTAL SCALP
LOCATION DETAILED: RIGHT ULNAR DORSAL HAND
LOCATION DETAILED: LEFT NASAL DORSUM
LOCATION DETAILED: RIGHT INFERIOR POSTAURICULAR SKIN
LOCATION DETAILED: LEFT SUPERIOR MEDIAL UPPER BACK
LOCATION DETAILED: LEFT ANTERIOR MEDIAL PROXIMAL UPPER ARM

## 2023-07-12 ASSESSMENT — LOCATION ZONE DERM
LOCATION ZONE: LEG
LOCATION ZONE: TRUNK
LOCATION ZONE: NOSE
LOCATION ZONE: HAND
LOCATION ZONE: ARM
LOCATION ZONE: AXILLAE
LOCATION ZONE: EAR
LOCATION ZONE: SCALP

## 2023-07-12 NOTE — PROCEDURE: COSMETIC QUOTE
Radiesse Price Per Syringe: 500
Notice: We have created a more complete Cosmetic Quote plan.  The procedure name is also Cosmetic Quote.  Please review the new plan and hide the Cosmetic Quote plan you do not want to use.
Detail Level: Generalized
Xeomin Price Per Unit: 15
Discount Percentage: 0

## 2023-07-12 NOTE — PROCEDURE: OBSERVATION
Detail Level: Detailed
Size Of Lesion: 1 cm x 9 mm
Morphology Per Location (Optional): Light tan patch

## 2023-07-12 NOTE — PROCEDURE: MIPS QUALITY
Detail Level: Detailed
Quality 110: Preventive Care And Screening: Influenza Immunization: Influenza Immunization previously received during influenza season
Quality 226: Preventive Care And Screening: Tobacco Use: Screening And Cessation Intervention: Patient screened for tobacco use and is an ex/non-smoker
Quality 130: Documentation Of Current Medications In The Medical Record: Current Medications Documented
Quality 431: Preventive Care And Screening: Unhealthy Alcohol Use - Screening: Patient not identified as an unhealthy alcohol user when screened for unhealthy alcohol use using a systematic screening method
Quality 402: Tobacco Use And Help With Quitting Among Adolescents: Patient screened for tobacco and is an ex-smoker

## 2023-07-12 NOTE — PROCEDURE: LIQUID NITROGEN (COSMETIC)
Detail Level: Detailed
Render Post-Care Instructions In Note?: no
Spray Paint Text: The liquid nitrogen was applied to the skin utilizing a spray paint frosting technique.
Post-Care Instructions: I reviewed with the patient in detail post-care instructions. Patient is to wear sunprotection, and avoid picking at any of the treated lesions. Pt may apply Vaseline to crusted or scabbing areas.
Billing Information: Bill by Static Price
Show Spray Paint Technique Variable?: Yes
Consent: The patient's consent was obtained including but not limited to risks of crusting, scabbing, blistering, scarring, darker or lighter pigmentary change, recurrence, incomplete removal and infection. The patient understands that the procedure is cosmetic in nature and is not covered by insurance.

## 2023-07-12 NOTE — PROCEDURE: LIQUID NITROGEN
Show Aperture Variable?: Yes
Render Post-Care Instructions In Note?: no
Detail Level: Detailed
Number Of Freeze-Thaw Cycles: 1 freeze-thaw cycle
Consent: The patient's consent was obtained including but not limited to risks of crusting, scabbing, blistering, scarring, darker or lighter pigmentary change, recurrence, incomplete removal and infection.
Duration Of Freeze Thaw-Cycle (Seconds): 10
Post-Care Instructions: I reviewed with the patient in detail post-care instructions. Patient is to wear sunprotection, and avoid picking at any of the treated lesions. Pt may apply Vaseline to crusted or scabbing areas.

## 2023-08-02 ENCOUNTER — HOSPITAL ENCOUNTER (OUTPATIENT)
Dept: LAB | Facility: MEDICAL CENTER | Age: 73
End: 2023-08-02
Attending: INTERNAL MEDICINE
Payer: MEDICARE

## 2023-08-02 DIAGNOSIS — R93.1 AGATSTON CAC SCORE 200-399: Chronic | ICD-10-CM

## 2023-08-02 DIAGNOSIS — E78.5 DYSLIPIDEMIA: ICD-10-CM

## 2023-08-02 DIAGNOSIS — I77.9 MILD CAROTID ARTERY DISEASE (HCC): ICD-10-CM

## 2023-08-02 LAB — CRP SERPL HS-MCNC: 0.7 MG/L (ref 0–3)

## 2023-08-02 PROCEDURE — 36415 COLL VENOUS BLD VENIPUNCTURE: CPT

## 2023-08-02 PROCEDURE — 86141 C-REACTIVE PROTEIN HS: CPT

## 2023-08-02 PROCEDURE — 83695 ASSAY OF LIPOPROTEIN(A): CPT

## 2023-08-04 LAB — LPA SERPL-MCNC: 93 MG/DL

## 2023-09-15 ENCOUNTER — OFFICE VISIT (OUTPATIENT)
Dept: URGENT CARE | Facility: CLINIC | Age: 73
End: 2023-09-15
Payer: MEDICARE

## 2023-09-15 VITALS
SYSTOLIC BLOOD PRESSURE: 120 MMHG | HEART RATE: 76 BPM | DIASTOLIC BLOOD PRESSURE: 60 MMHG | RESPIRATION RATE: 14 BRPM | BODY MASS INDEX: 25.71 KG/M2 | TEMPERATURE: 98.1 F | OXYGEN SATURATION: 97 % | HEIGHT: 66 IN | WEIGHT: 160 LBS

## 2023-09-15 DIAGNOSIS — U07.1 COVID-19 VIRUS INFECTION: ICD-10-CM

## 2023-09-15 LAB
FLUAV RNA SPEC QL NAA+PROBE: NEGATIVE
FLUBV RNA SPEC QL NAA+PROBE: NEGATIVE
RSV RNA SPEC QL NAA+PROBE: NEGATIVE
SARS-COV-2 RNA RESP QL NAA+PROBE: POSITIVE

## 2023-09-15 PROCEDURE — 0241U POCT CEPHEID COV-2, FLU A/B, RSV - PCR: CPT | Performed by: PHYSICIAN ASSISTANT

## 2023-09-15 PROCEDURE — 3074F SYST BP LT 130 MM HG: CPT | Performed by: PHYSICIAN ASSISTANT

## 2023-09-15 PROCEDURE — 3078F DIAST BP <80 MM HG: CPT | Performed by: PHYSICIAN ASSISTANT

## 2023-09-15 PROCEDURE — 99213 OFFICE O/P EST LOW 20 MIN: CPT | Performed by: PHYSICIAN ASSISTANT

## 2023-09-15 ASSESSMENT — ENCOUNTER SYMPTOMS
WHEEZING: 0
SORE THROAT: 1
COUGH: 1
DIARRHEA: 0
VOMITING: 0
SHORTNESS OF BREATH: 0
FEVER: 1
NAUSEA: 0
CHILLS: 1
ABDOMINAL PAIN: 0
HEADACHES: 1
DIZZINESS: 0

## 2023-09-15 ASSESSMENT — FIBROSIS 4 INDEX: FIB4 SCORE: 1.38

## 2023-09-15 NOTE — PROGRESS NOTES
Subjective     Michela Valdez is a 73 y.o. female who presents with Sore Throat (X 3 days, sore throat, tired, cough, headache. Requesting a covid test.)      URI   This is a new problem. The current episode started in the past 7 days (started 3-4 days ago). Progression since onset: Patient was having subjective fever the first 2 days but has not noticed anything in terms of fever over the last 48 hours.  Other symptoms are largely unchanged. Associated symptoms include congestion, coughing, headaches and a sore throat. Pertinent negatives include no abdominal pain, chest pain, diarrhea, dysuria, ear pain, nausea, vomiting or wheezing. Treatments tried: OTC [place] cold/flu. The treatment provided mild relief.       Review of Systems   Constitutional:  Positive for chills, fever and malaise/fatigue.   HENT:  Positive for congestion and sore throat. Negative for ear pain.    Respiratory:  Positive for cough. Negative for shortness of breath and wheezing.    Cardiovascular:  Negative for chest pain.   Gastrointestinal:  Negative for abdominal pain, diarrhea, nausea and vomiting.   Genitourinary:  Negative for dysuria.   Neurological:  Positive for headaches. Negative for dizziness.         PMH:  has a past medical history of Acute laryngitis (04/19/2022), Cataract, Dyslipidemia (10/13/2015), Elevated LFTs (05/16/2018), High cholesterol, History of osteopenia (05/16/2018), Hyperlipidemia, Postoperative hypothyroidism (10/13/2015), Prediabetes (05/16/2018), Snoring, Thyroid disease, and Urinary incontinence.  MEDS:   Current Outpatient Medications:     atorvastatin (LIPITOR) 80 MG tablet, Take 1 Tablet by mouth every evening. TAKE 1 TABLET BY MOUTH DAILY, Disp: 100 Tablet, Rfl: 3    levothyroxine (SYNTHROID) 88 MCG Tab, Take 1 Tablet by mouth every morning on an empty stomach. (Patient taking differently: Take 88 mcg by mouth every morning on an empty stomach. Take 2 tablets, by mouth on Sundays), Disp: 100 Tablet,  "Rfl: 3    Cholecalciferol (VITAMIN D3) 1000 units Cap, Take 2,000 Units by mouth., Disp: , Rfl:     Glucosamine-Chondroitin (MOVE FREE PO), Take 1,500 mg by mouth every day. Chondroitin, 200mg, Disp: , Rfl:     aspirin EC (ECOTRIN) 81 MG TBEC, Take 81 mg by mouth every day. HOLD OFF TAKING THIS UNTIL 2/12/12., Disp: , Rfl:     Semaglutide 7 MG Tab, Take 1 Tablet by mouth every day. (Patient not taking: Reported on 9/15/2023), Disp: 30 Tablet, Rfl: 2  ALLERGIES:   Allergies   Allergen Reactions    Penicillins     Aleve [Gnp Naproxen Sodium] Rash     Rash.     Penicillin G Vomiting     SURGHX:   Past Surgical History:   Procedure Laterality Date    CATARACT EXTRACTION WITH IOL Bilateral 2019    THYROIDECTOMY TOTAL  02/08/2012    Performed by ALEX CONTEH at SURGERY SAME DAY [facility] ORS    ABDOMINAL EXPLORATION      pyloric stenosis- as an infant    GYN SURGERY      D&C     SOCHX:  reports that she quit smoking about 43 years ago. Her smoking use included cigarettes. She started smoking about 47 years ago. She has a 2.0 pack-year smoking history. She has never used smokeless tobacco. She reports current alcohol use of about 3.0 - 4.2 oz of alcohol per week. She reports that she does not use drugs.  FH: Family history was reviewed, no pertinent findings to report      Objective     /60   Pulse 76   Temp 36.7 °C (98.1 °F) (Temporal)   Resp 14   Ht 1.676 m (5' 6\")   Wt 72.6 kg (160 lb)   SpO2 97%   BMI 25.82 kg/m²      Physical Exam  Constitutional:       Appearance: She is well-developed.   HENT:      Head: Normocephalic and atraumatic.      Right Ear: External ear normal.      Left Ear: External ear normal.   Eyes:      Conjunctiva/sclera: Conjunctivae normal.      Pupils: Pupils are equal, round, and reactive to light.   Cardiovascular:      Rate and Rhythm: Normal rate and regular rhythm.      Heart sounds: Normal heart sounds. No murmur heard.  Pulmonary:      Effort: Pulmonary effort is normal.    "   Breath sounds: Normal breath sounds. No wheezing.   Musculoskeletal:      Cervical back: Normal range of motion.   Lymphadenopathy:      Cervical: No cervical adenopathy.   Skin:     General: Skin is warm and dry.      Capillary Refill: Capillary refill takes less than 2 seconds.   Neurological:      Mental Status: She is alert and oriented to person, place, and time.   Psychiatric:         Behavior: Behavior normal.         Judgment: Judgment normal.           POCT CoV-2, Flu A/B, RSV by PCR - POSITIVE for COVID-19 virus    Assessment & Plan     1. COVID-19 virus infection  - POCT CoV-2, Flu A/B, RSV by PCR  - Nirmatrelvir&Ritonavir 300/100 20 x 150 MG & 10 x 100MG Tablet Therapy Pack; Take 300 mg nirmatrelvir (two 150 mg tablets) with 100 mg ritonavir (one 100 mg tablet) by mouth, with all three tablets taken together twice daily for 5 days.  Dispense: 30 Each; Refill: 0  - OTC cold/flu medications  -Supportive care also discussed to include the use of saline nasal rinses, steam inhalation, and the use of a cool-mist humidifier in the bedroom at night.  - PO fluids  - Rest  - Tylenol or ibuprofen as needed for fever > 100.4 F  - Self isolation instructions discussed  *She last took her atorvastatin last night.  She was advised to not start the atorvastatin again until she has been finished with the Paxlovid treatment for a full 5 days.            Differential Diagnosis, natural history, and supportive care discussed. Return to the Urgent Care or follow up with your PCP if symptoms fail to resolve, or for any new or worsening symptoms. Emergency room precautions discussed. Patient and/or family appears understanding of information.

## 2023-09-19 ENCOUNTER — TELEMEDICINE (OUTPATIENT)
Dept: MEDICAL GROUP | Facility: PHYSICIAN GROUP | Age: 73
End: 2023-09-19
Payer: MEDICARE

## 2023-09-19 VITALS — HEIGHT: 60 IN | WEIGHT: 162 LBS | BODY MASS INDEX: 31.8 KG/M2 | TEMPERATURE: 98.5 F | RESPIRATION RATE: 16 BRPM

## 2023-09-19 DIAGNOSIS — E78.5 DYSLIPIDEMIA: ICD-10-CM

## 2023-09-19 DIAGNOSIS — R15.1 FECAL SMEARING: ICD-10-CM

## 2023-09-19 DIAGNOSIS — U07.1 COVID-19 VIRUS INFECTION: ICD-10-CM

## 2023-09-19 DIAGNOSIS — M12.811 ROTATOR CUFF ARTHROPATHY OF RIGHT SHOULDER: ICD-10-CM

## 2023-09-19 DIAGNOSIS — R73.03 PREDIABETES: ICD-10-CM

## 2023-09-19 DIAGNOSIS — E89.0 POSTOPERATIVE HYPOTHYROIDISM: ICD-10-CM

## 2023-09-19 PROCEDURE — 99214 OFFICE O/P EST MOD 30 MIN: CPT | Mod: 95 | Performed by: INTERNAL MEDICINE

## 2023-09-19 ASSESSMENT — FIBROSIS 4 INDEX: FIB4 SCORE: 1.38

## 2023-09-19 NOTE — PROGRESS NOTES
Virtual Visit: Established Patient   This visit was conducted via Zoom using secure and encrypted videoconferencing technology.   The patient was in their home in the [facility].    The patient's identity was confirmed and verbal consent was obtained for this virtual visit.    Subjective:   CC:   Chief Complaint   Patient presents with    Follow-Up     Last Tuesday first symptoms  on Paxlovid  Has 1 more days   Going Thursday to [place] then [facility] for 3 days   Should she go       Michelamichele Valdez is a 73 y.o. female presenting for evaluation and management of:    Problem   Covid-19 Virus Infection    Diagnosed with Covid infection on 9/15/2023 in urgent care after she developed sore throat, headache, cough, and malaise. She is on Paxlovid and feeling better, she has 1 day of treatment remaining.  She plans to leave for a 3-day cruise from [place] to [place] this weekend.  She will be 9 days out from symptom onset.     Fecal Smearing    She reports issues with scant amount of fecal drainage, sometimes she does not have a warning and so she wears a pad.  Can be a drop in upwards to a teaspoon amount.  Does not happen all the time.  She feels like she has very good rectal tone and it should not be a strength issue.  She does deal some constipation and wonders if this is a manifestation of obstipation.  Not currently taking any stool softeners or laxatives.  She has diverticulosis throughout the colon.  She has eliminated caffeinated coffee.     Rotator Cuff Arthropathy of Right Shoulder    She has right shoulder pain and was felt to have an acute on chronic rotator cuff tear in spring 2022.  She saw Dr. Blevins with physiatry.  She is going to complete an MRI of the right shoulder in early October 2022.  She is interested in operative repair if indicated.    She was referred to Dr. Garcia and saw the PA twice but has not yet had an opportunity to discuss with the surgeon.     Prediabetes      Latest Reference Range & Units 03/09/23 10:32   Glycohemoglobin 4.0 - 5.6 % 6.2 (H)   Estim. Avg Glu mg/dL 131     She has history of prediabetes.  No prior use of glucose lowering medications.  We have discussed GLP1 agonist therapy for both prediabetes and help with weight loss, prescribed semaglutide in the past but I do not believe she started taking it.     Postoperative Hypothyroidism     Latest Reference Range & Units 03/09/23 10:32   TSH 0.380 - 5.330 uIU/mL 0.470   Free T-4 0.93 - 1.70 ng/dL 1.37     She reports prior thyroidectomy due to nodule found incidentally.  No recent dose adjustments of her levothyroxine.  She has had progressive weight gain since her thyroid was removed and feels are likely related.  She wonders if her dose needs to be adjusted.  Otherwise no signs or symptoms of overtreatment under treatment.    Current regimen: Levothyroxine 88 mcg 6 days/week and 2 tablets on Sundays      Dyslipidemia     Latest Reference Range & Units 10/17/22 09:42 03/09/23 10:32   Cholesterol,Tot 100 - 199 mg/dL 166 177   Triglycerides 0 - 149 mg/dL 145 188 (H)   HDL >=40 mg/dL 45 43   LDL <100 mg/dL 92 96     Has a history of elevated cholesterol.  Her father had early CAD.  She previously followed with Dr. Bloch of vascular medicine.    Current regimen: Atorvastatin 80 mg daily and aspirin 81 mg daily           ROS   Upper respiratory symptoms are improving, still occasional bowel leakage, worsening right shoulder pain    Current medicines (including changes today)  Current Outpatient Medications   Medication Sig Dispense Refill    Nirmatrelvir&Ritonavir 300/100 20 x 150 MG & 10 x 100MG Tablet Therapy Pack Take 300 mg nirmatrelvir (two 150 mg tablets) with 100 mg ritonavir (one 100 mg tablet) by mouth, with all three tablets taken together twice daily for 5 days. 30 Each 0    levothyroxine (SYNTHROID) 88 MCG Tab Take 1 Tablet by mouth every morning on an empty stomach. (Patient taking differently: Take 88  mcg by mouth every morning on an empty stomach. Take 2 tablets, by mouth on Sundays) 100 Tablet 3    Cholecalciferol (VITAMIN D3) 1000 units Cap Take 2,000 Units by mouth.      Glucosamine-Chondroitin (MOVE FREE PO) Take 1,500 mg by mouth every day. Chondroitin, 200mg      aspirin EC (ECOTRIN) 81 MG TBEC Take 81 mg by mouth every day. HOLD OFF TAKING THIS UNTIL 2/12/12.      atorvastatin (LIPITOR) 80 MG tablet Take 1 Tablet by mouth every evening. TAKE 1 TABLET BY MOUTH DAILY (Patient not taking: Reported on 9/19/2023) 100 Tablet 3     No current facility-administered medications for this visit.       Patient Active Problem List    Diagnosis Date Noted    COVID-19 virus infection 09/19/2023    Fecal smearing 06/14/2023    Agatston CAC score 200-399 340 in 2023     Laryngeal spasm 01/18/2023    Elevated alkaline phosphatase level 09/28/2022    Genitourinary syndrome of menopause 09/28/2022    Skin lesion- right hand, SK 09/28/2022    Rotator cuff arthropathy of right shoulder 09/28/2022    Hip pain- right > left 09/28/2022    Palpitations 09/28/2022    Night sweats 09/28/2022    BMI 26.0-26.9,adult 09/28/2022    Chronic pain of right knee 12/07/2021    Slow transit constipation with bloating 12/07/2021    Prediabetes 05/16/2018    History of osteopenia 05/16/2018    Mild carotid artery stenosis (HCC) 05/24/2016    Postoperative hypothyroidism 10/13/2015    Dyslipidemia 10/13/2015    Diverticular disease of colon 07/21/2015    Thyroid nodule 07/21/2015        Objective:   Temp 36.9 °C (98.5 °F)   Resp 16   Ht 1.524 m (5')   Wt 73.5 kg (162 lb)   BMI 31.64 kg/m²     Physical Exam:  Constitutional: Alert, no distress, well-groomed.  Skin: No rashes in visible areas.  Eye: Round. Conjunctiva clear, lids normal. No icterus.   ENMT: Lips pink without lesions, good dentition, moist mucous membranes. Phonation normal.  Neck: No masses, no thyromegaly. Moves freely without pain.  Respiratory: Unlabored respiratory effort,  no cough or audible wheeze  Psych: Alert and oriented, normal affect and mood.     Assessment and Plan:   The following treatment plan was discussed:     Problem List Items Addressed This Visit       COVID-19 virus infection     Sent problem, improving as expected, I think she is okay to attend her cruise that she will be 9 days out from onset of infection and will have completed her Paxlovid prescription.  She is essentially asymptomatic at this time.  She will continue to hold her atorvastatin until 5 days after completing Paxlovid.  She knows to monitor for rebound COVID.         Dyslipidemia     Chronic stable problem.  Plan to recheck this fall to ensure ongoing stability.  She is holding her statin for 10 days while on Paxlovid for treatment of COVID-19 infection.  When appropriate continue atorvastatin 80 mg daily and continue aspirin 81 mg daily.         Relevant Orders    VITAMIN B12    VITAMIN D,25 HYDROXY (DEFICIENCY)    Lipid Profile    Comp Metabolic Panel    CBC WITH DIFFERENTIAL    Fecal smearing     Chronic ongoing issue, continues to work with [facility] pelvic floor physical therapy and thinks it is helping.         Postoperative hypothyroidism     Chronic stable problem.  Continue medical therapy with levothyroxine 88 mcg daily with 2 tablets on Sundays and continue regular follow-up of thyroid labs to ensure stability.         Relevant Orders    FREE THYROXINE    TSH    Prediabetes     Chronic stable problem.  Prescribed this in June 2023, does not appear she ever started taking it.  Continue to monitor prediabetes to ensure no transition to type 2 diabetes.         Relevant Orders    HEMOGLOBIN A1C    Rotator cuff arthropathy of right shoulder     Chronic ongoing issue, now has been recommended to consider a patch through what sounds like an arthroscopic procedure to help with worsening right shoulder pain.  Will add comprehensive lab work for preoperative testing if indicated.                 Follow-up: Return in about 4 months (around 1/19/2024).         I spent a total of 34 minutes with record review, exam, communication with the patient, communication with other providers, and documentation of this encounter.    Archana Robertson,   Geriatric and Internal Medicine  [facility]

## 2023-09-29 DIAGNOSIS — E89.0 POSTOPERATIVE HYPOTHYROIDISM: ICD-10-CM

## 2023-09-29 RX ORDER — LEVOTHYROXINE SODIUM 88 UG/1
88 TABLET ORAL
Qty: 100 TABLET | Refills: 3 | Status: SHIPPED | OUTPATIENT
Start: 2023-09-29

## 2023-09-29 NOTE — TELEPHONE ENCOUNTER
Received request via: Patient    Was the patient seen in the last year in this department? Yes  9/19/2023  Does the patient have an active prescription (recently filled or refills available) for medication(s) requested? No    Does the patient have FPC Plus and need 100 day supply (blood pressure, diabetes and cholesterol meds only)? Yes, quantity updated to 100 days

## 2023-10-22 ENCOUNTER — APPOINTMENT (OUTPATIENT)
Dept: RADIOLOGY | Facility: MEDICAL CENTER | Age: 73
End: 2023-10-22
Attending: EMERGENCY MEDICINE
Payer: MEDICARE

## 2023-10-22 ENCOUNTER — HOSPITAL ENCOUNTER (OUTPATIENT)
Facility: MEDICAL CENTER | Age: 73
End: 2023-10-23
Attending: EMERGENCY MEDICINE | Admitting: HOSPITALIST
Payer: MEDICARE

## 2023-10-22 DIAGNOSIS — I10 PRIMARY HYPERTENSION: ICD-10-CM

## 2023-10-22 DIAGNOSIS — R41.82 ALTERED MENTAL STATUS, UNSPECIFIED ALTERED MENTAL STATUS TYPE: ICD-10-CM

## 2023-10-22 DIAGNOSIS — G08 DURAL SINUS THROMBOSIS: ICD-10-CM

## 2023-10-22 PROBLEM — G45.9 TIA (TRANSIENT ISCHEMIC ATTACK): Status: ACTIVE | Noted: 2023-10-22

## 2023-10-22 LAB
ALBUMIN SERPL BCP-MCNC: 4.8 G/DL (ref 3.2–4.9)
ALBUMIN/GLOB SERPL: 1.6 G/DL
ALP SERPL-CCNC: 134 U/L (ref 30–99)
ALT SERPL-CCNC: 35 U/L (ref 2–50)
AMPHET UR QL SCN: NEGATIVE
ANION GAP SERPL CALC-SCNC: 13 MMOL/L (ref 7–16)
APPEARANCE UR: CLEAR
AST SERPL-CCNC: 32 U/L (ref 12–45)
BARBITURATES UR QL SCN: NEGATIVE
BASOPHILS # BLD AUTO: 0.3 % (ref 0–1.8)
BASOPHILS # BLD: 0.03 K/UL (ref 0–0.12)
BENZODIAZ UR QL SCN: NEGATIVE
BILIRUB SERPL-MCNC: 0.6 MG/DL (ref 0.1–1.5)
BILIRUB UR QL STRIP.AUTO: NEGATIVE
BUN SERPL-MCNC: 17 MG/DL (ref 8–22)
BZE UR QL SCN: NEGATIVE
CALCIUM ALBUM COR SERPL-MCNC: 9 MG/DL (ref 8.5–10.5)
CALCIUM SERPL-MCNC: 9.6 MG/DL (ref 8.5–10.5)
CANNABINOIDS UR QL SCN: NEGATIVE
CHLORIDE SERPL-SCNC: 97 MMOL/L (ref 96–112)
CO2 SERPL-SCNC: 23 MMOL/L (ref 20–33)
COLOR UR: YELLOW
CREAT SERPL-MCNC: 0.52 MG/DL (ref 0.5–1.4)
CRP SERPL HS-MCNC: 0.48 MG/DL (ref 0–0.75)
EKG IMPRESSION: NORMAL
EOSINOPHIL # BLD AUTO: 0 K/UL (ref 0–0.51)
EOSINOPHIL NFR BLD: 0 % (ref 0–6.9)
ERYTHROCYTE [DISTWIDTH] IN BLOOD BY AUTOMATED COUNT: 41.2 FL (ref 35.9–50)
ERYTHROCYTE [SEDIMENTATION RATE] IN BLOOD BY WESTERGREN METHOD: 7 MM/HOUR (ref 0–25)
EST. AVERAGE GLUCOSE BLD GHB EST-MCNC: 131 MG/DL
ETHANOL BLD-MCNC: <10.1 MG/DL
FENTANYL UR QL: NEGATIVE
GFR SERPLBLD CREATININE-BSD FMLA CKD-EPI: 97 ML/MIN/1.73 M 2
GLOBULIN SER CALC-MCNC: 3 G/DL (ref 1.9–3.5)
GLUCOSE SERPL-MCNC: 134 MG/DL (ref 65–99)
GLUCOSE UR STRIP.AUTO-MCNC: NEGATIVE MG/DL
HBA1C MFR BLD: 6.2 % (ref 4–5.6)
HCG SERPL QL: NEGATIVE
HCT VFR BLD AUTO: 43.2 % (ref 37–47)
HGB BLD-MCNC: 15 G/DL (ref 12–16)
IMM GRANULOCYTES # BLD AUTO: 0.05 K/UL (ref 0–0.11)
IMM GRANULOCYTES NFR BLD AUTO: 0.5 % (ref 0–0.9)
KETONES UR STRIP.AUTO-MCNC: NEGATIVE MG/DL
LEUKOCYTE ESTERASE UR QL STRIP.AUTO: NEGATIVE
LYMPHOCYTES # BLD AUTO: 1.22 K/UL (ref 1–4.8)
LYMPHOCYTES NFR BLD: 11.7 % (ref 22–41)
MCH RBC QN AUTO: 30.8 PG (ref 27–33)
MCHC RBC AUTO-ENTMCNC: 34.7 G/DL (ref 32.2–35.5)
MCV RBC AUTO: 88.7 FL (ref 81.4–97.8)
METHADONE UR QL SCN: NEGATIVE
MICRO URNS: NORMAL
MONOCYTES # BLD AUTO: 0.28 K/UL (ref 0–0.85)
MONOCYTES NFR BLD AUTO: 2.7 % (ref 0–13.4)
NEUTROPHILS # BLD AUTO: 8.89 K/UL (ref 1.82–7.42)
NEUTROPHILS NFR BLD: 84.8 % (ref 44–72)
NITRITE UR QL STRIP.AUTO: NEGATIVE
NRBC # BLD AUTO: 0 K/UL
NRBC BLD-RTO: 0 /100 WBC (ref 0–0.2)
OPIATES UR QL SCN: NEGATIVE
OXYCODONE UR QL SCN: NEGATIVE
PCP UR QL SCN: NEGATIVE
PH UR STRIP.AUTO: 5.5 [PH] (ref 5–8)
PLATELET # BLD AUTO: 272 K/UL (ref 164–446)
PMV BLD AUTO: 9 FL (ref 9–12.9)
POTASSIUM SERPL-SCNC: 4.1 MMOL/L (ref 3.6–5.5)
PROPOXYPH UR QL SCN: NEGATIVE
PROT SERPL-MCNC: 7.8 G/DL (ref 6–8.2)
PROT UR QL STRIP: NEGATIVE MG/DL
RBC # BLD AUTO: 4.87 M/UL (ref 4.2–5.4)
RBC UR QL AUTO: NEGATIVE
SODIUM SERPL-SCNC: 133 MMOL/L (ref 135–145)
SP GR UR STRIP.AUTO: 1.02
TROPONIN T SERPL-MCNC: 9 NG/L (ref 6–19)
TSH SERPL DL<=0.005 MIU/L-ACNC: 1.22 UIU/ML (ref 0.38–5.33)
UROBILINOGEN UR STRIP.AUTO-MCNC: 0.2 MG/DL
VIT B12 SERPL-MCNC: 829 PG/ML (ref 211–911)
WBC # BLD AUTO: 10.5 K/UL (ref 4.8–10.8)

## 2023-10-22 PROCEDURE — 86140 C-REACTIVE PROTEIN: CPT

## 2023-10-22 PROCEDURE — 70498 CT ANGIOGRAPHY NECK: CPT

## 2023-10-22 PROCEDURE — 700117 HCHG RX CONTRAST REV CODE 255: Performed by: EMERGENCY MEDICINE

## 2023-10-22 PROCEDURE — 80307 DRUG TEST PRSMV CHEM ANLYZR: CPT

## 2023-10-22 PROCEDURE — 83036 HEMOGLOBIN GLYCOSYLATED A1C: CPT

## 2023-10-22 PROCEDURE — 82607 VITAMIN B-12: CPT

## 2023-10-22 PROCEDURE — 93005 ELECTROCARDIOGRAM TRACING: CPT

## 2023-10-22 PROCEDURE — 85025 COMPLETE CBC W/AUTO DIFF WBC: CPT

## 2023-10-22 PROCEDURE — 82077 ASSAY SPEC XCP UR&BREATH IA: CPT

## 2023-10-22 PROCEDURE — 80053 COMPREHEN METABOLIC PANEL: CPT

## 2023-10-22 PROCEDURE — 36415 COLL VENOUS BLD VENIPUNCTURE: CPT

## 2023-10-22 PROCEDURE — 93005 ELECTROCARDIOGRAM TRACING: CPT | Performed by: EMERGENCY MEDICINE

## 2023-10-22 PROCEDURE — 70496 CT ANGIOGRAPHY HEAD: CPT

## 2023-10-22 PROCEDURE — 700102 HCHG RX REV CODE 250 W/ 637 OVERRIDE(OP): Performed by: EMERGENCY MEDICINE

## 2023-10-22 PROCEDURE — 96375 TX/PRO/DX INJ NEW DRUG ADDON: CPT | Mod: XU

## 2023-10-22 PROCEDURE — 84703 CHORIONIC GONADOTROPIN ASSAY: CPT

## 2023-10-22 PROCEDURE — 81003 URINALYSIS AUTO W/O SCOPE: CPT

## 2023-10-22 PROCEDURE — 700111 HCHG RX REV CODE 636 W/ 250 OVERRIDE (IP): Performed by: HOSPITALIST

## 2023-10-22 PROCEDURE — 700105 HCHG RX REV CODE 258: Performed by: EMERGENCY MEDICINE

## 2023-10-22 PROCEDURE — 71045 X-RAY EXAM CHEST 1 VIEW: CPT

## 2023-10-22 PROCEDURE — 99223 1ST HOSP IP/OBS HIGH 75: CPT | Performed by: HOSPITALIST

## 2023-10-22 PROCEDURE — 84484 ASSAY OF TROPONIN QUANT: CPT

## 2023-10-22 PROCEDURE — 84443 ASSAY THYROID STIM HORMONE: CPT

## 2023-10-22 PROCEDURE — 99285 EMERGENCY DEPT VISIT HI MDM: CPT

## 2023-10-22 PROCEDURE — 85652 RBC SED RATE AUTOMATED: CPT

## 2023-10-22 PROCEDURE — G0378 HOSPITAL OBSERVATION PER HR: HCPCS

## 2023-10-22 PROCEDURE — A9270 NON-COVERED ITEM OR SERVICE: HCPCS | Performed by: EMERGENCY MEDICINE

## 2023-10-22 RX ORDER — ASPIRIN 81 MG/1
81 TABLET ORAL DAILY
Status: DISCONTINUED | OUTPATIENT
Start: 2023-10-23 | End: 2023-10-23 | Stop reason: HOSPADM

## 2023-10-22 RX ORDER — CHOLECALCIFEROL (VITAMIN D3) 125 MCG
5 CAPSULE ORAL NIGHTLY
Status: DISCONTINUED | OUTPATIENT
Start: 2023-10-22 | End: 2023-10-23 | Stop reason: HOSPADM

## 2023-10-22 RX ORDER — ACETAMINOPHEN 325 MG/1
650 TABLET ORAL EVERY 6 HOURS PRN
Status: DISCONTINUED | OUTPATIENT
Start: 2023-10-22 | End: 2023-10-23 | Stop reason: HOSPADM

## 2023-10-22 RX ORDER — ACETAMINOPHEN 325 MG/1
650 TABLET ORAL ONCE
Status: COMPLETED | OUTPATIENT
Start: 2023-10-22 | End: 2023-10-22

## 2023-10-22 RX ORDER — LABETALOL HYDROCHLORIDE 5 MG/ML
10 INJECTION, SOLUTION INTRAVENOUS EVERY 4 HOURS PRN
Status: DISCONTINUED | OUTPATIENT
Start: 2023-10-22 | End: 2023-10-23 | Stop reason: HOSPADM

## 2023-10-22 RX ORDER — SODIUM CHLORIDE, SODIUM LACTATE, POTASSIUM CHLORIDE, CALCIUM CHLORIDE 600; 310; 30; 20 MG/100ML; MG/100ML; MG/100ML; MG/100ML
1000 INJECTION, SOLUTION INTRAVENOUS ONCE
Status: COMPLETED | OUTPATIENT
Start: 2023-10-22 | End: 2023-10-22

## 2023-10-22 RX ORDER — METOCLOPRAMIDE HYDROCHLORIDE 5 MG/ML
10 INJECTION INTRAMUSCULAR; INTRAVENOUS ONCE
Status: DISCONTINUED | OUTPATIENT
Start: 2023-10-22 | End: 2023-10-23 | Stop reason: HOSPADM

## 2023-10-22 RX ORDER — DIPHENHYDRAMINE HCL 25 MG
25 TABLET ORAL EVERY 6 HOURS PRN
Status: DISCONTINUED | OUTPATIENT
Start: 2023-10-22 | End: 2023-10-23 | Stop reason: HOSPADM

## 2023-10-22 RX ORDER — DIPHENHYDRAMINE HYDROCHLORIDE 50 MG/ML
25 INJECTION INTRAMUSCULAR; INTRAVENOUS ONCE
Status: DISCONTINUED | OUTPATIENT
Start: 2023-10-22 | End: 2023-10-23 | Stop reason: HOSPADM

## 2023-10-22 RX ORDER — ATORVASTATIN CALCIUM 80 MG/1
80 TABLET, FILM COATED ORAL DAILY
Status: DISCONTINUED | OUTPATIENT
Start: 2023-10-23 | End: 2023-10-23 | Stop reason: HOSPADM

## 2023-10-22 RX ORDER — AMLODIPINE BESYLATE 5 MG/1
5 TABLET ORAL
Status: DISCONTINUED | OUTPATIENT
Start: 2023-10-23 | End: 2023-10-23 | Stop reason: HOSPADM

## 2023-10-22 RX ORDER — LEVOTHYROXINE SODIUM 88 UG/1
88 TABLET ORAL
Status: DISCONTINUED | OUTPATIENT
Start: 2023-10-23 | End: 2023-10-23 | Stop reason: HOSPADM

## 2023-10-22 RX ORDER — ONDANSETRON 2 MG/ML
4 INJECTION INTRAMUSCULAR; INTRAVENOUS EVERY 4 HOURS PRN
Status: DISCONTINUED | OUTPATIENT
Start: 2023-10-22 | End: 2023-10-23 | Stop reason: HOSPADM

## 2023-10-22 RX ORDER — ONDANSETRON 4 MG/1
4 TABLET, ORALLY DISINTEGRATING ORAL EVERY 4 HOURS PRN
Status: DISCONTINUED | OUTPATIENT
Start: 2023-10-22 | End: 2023-10-23 | Stop reason: HOSPADM

## 2023-10-22 RX ADMIN — SODIUM CHLORIDE, POTASSIUM CHLORIDE, SODIUM LACTATE AND CALCIUM CHLORIDE 1000 ML: 600; 310; 30; 20 INJECTION, SOLUTION INTRAVENOUS at 17:55

## 2023-10-22 RX ADMIN — IOHEXOL 95 ML: 350 INJECTION, SOLUTION INTRAVENOUS at 17:45

## 2023-10-22 RX ADMIN — ACETAMINOPHEN 650 MG: 325 TABLET, FILM COATED ORAL at 17:54

## 2023-10-22 RX ADMIN — LABETALOL HYDROCHLORIDE 10 MG: 5 INJECTION INTRAVENOUS at 21:40

## 2023-10-22 ASSESSMENT — FIBROSIS 4 INDEX
FIB4 SCORE: 1.49
FIB4 SCORE: 1.49

## 2023-10-22 ASSESSMENT — ENCOUNTER SYMPTOMS
STRIDOR: 0
TREMORS: 0
EYE PAIN: 0
VOMITING: 0
WEAKNESS: 0
PALPITATIONS: 0
COUGH: 0
MYALGIAS: 0
ORTHOPNEA: 0
PND: 0
SINUS PAIN: 0
DEPRESSION: 0
FALLS: 0
DOUBLE VISION: 0
SPUTUM PRODUCTION: 0
CHILLS: 0
HEADACHES: 0
NECK PAIN: 0
HALLUCINATIONS: 0
ABDOMINAL PAIN: 0
BRUISES/BLEEDS EASILY: 0
SPEECH CHANGE: 1
NAUSEA: 0
SORE THROAT: 0
FEVER: 0
HEMOPTYSIS: 0
FLANK PAIN: 0
DIARRHEA: 0
BLURRED VISION: 0
SHORTNESS OF BREATH: 0
POLYDIPSIA: 0
HEARTBURN: 0
DIAPHORESIS: 0
TINGLING: 0
BLOOD IN STOOL: 0
CLAUDICATION: 0
DIZZINESS: 1
CONSTIPATION: 0
PHOTOPHOBIA: 0
WHEEZING: 0
BACK PAIN: 0

## 2023-10-22 ASSESSMENT — PATIENT HEALTH QUESTIONNAIRE - PHQ9
1. LITTLE INTEREST OR PLEASURE IN DOING THINGS: NOT AT ALL
1. LITTLE INTEREST OR PLEASURE IN DOING THINGS: NOT AT ALL
SUM OF ALL RESPONSES TO PHQ9 QUESTIONS 1 AND 2: 0
2. FEELING DOWN, DEPRESSED, IRRITABLE, OR HOPELESS: NOT AT ALL
SUM OF ALL RESPONSES TO PHQ9 QUESTIONS 1 AND 2: 0
2. FEELING DOWN, DEPRESSED, IRRITABLE, OR HOPELESS: NOT AT ALL

## 2023-10-22 ASSESSMENT — LIFESTYLE VARIABLES
TOTAL SCORE: 0
HAVE YOU EVER FELT YOU SHOULD CUT DOWN ON YOUR DRINKING: NO
ON A TYPICAL DAY WHEN YOU DRINK ALCOHOL HOW MANY DRINKS DO YOU HAVE: 1
TOTAL SCORE: 0
CONSUMPTION TOTAL: POSITIVE
EVER HAD A DRINK FIRST THING IN THE MORNING TO STEADY YOUR NERVES TO GET RID OF A HANGOVER: NO
DOES PATIENT WANT TO STOP DRINKING: NO
TOTAL SCORE: 0
AVERAGE NUMBER OF DAYS PER WEEK YOU HAVE A DRINK CONTAINING ALCOHOL: 6
EVER FELT BAD OR GUILTY ABOUT YOUR DRINKING: NO
HOW MANY TIMES IN THE PAST YEAR HAVE YOU HAD 5 OR MORE DRINKS IN A DAY: 5
SUBSTANCE_ABUSE: 0
HAVE PEOPLE ANNOYED YOU BY CRITICIZING YOUR DRINKING: NO
ALCOHOL_USE: YES

## 2023-10-22 NOTE — ED PROVIDER NOTES
ER Provider Note    Scribed for Rowdy Ellis M.d. by Edis Odonnell. 10/22/2023  2:06 PM    Primary Care Provider: Archana Robertson D.O.    CHIEF COMPLAINT  Chief Complaint   Patient presents with    ALOC     Per daughter, pt has been extremely confused for the last day.          HPI/ROS  LIMITATION TO HISTORY   Select: : None  OUTSIDE HISTORIAN(S):  Family Daughter is present at bedside    Michela Valdez is a 73 y.o. female who presents to the ED for evaluation of altered level of consciousness onset yesterday. Per nursing, patient was brought in by EMS for concerns of being confused for the past day.Yesterday, patient was unable to recall events form the past week, and EMS reports that she was asking the same question multiple times while en rout. 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 Daughter reports that this morning she received a call from the patient saying that she could not remember the prior day, and asked her to check back in, in about a half hour. Upon calling her again, daughter states that she was slurring her words and had scattered speech. Daughter adds that her speech is improved at this time, but not at her baseline. Per daughter, patient was last normal two days ago. Michela adds that she is currently feeling lightheaded with a slight headache in the ED today, and she experienced similar symptoms to this yesterday. She denies any diarrhea, but reports that she has increased bowel movements recently. She denies any weakness to one arm/leg, nausea, vomiting, cough, rhinorrhea, dysuria, increased urinary frequency, recent infections/antibiotic use, new wounds or blisters, or any recent accidents or injuries. She denies any recent dietary or medication changes. She takes atorvastatin as prescribed, and states that she took her medication  today. Patient has a history of hyperlipidemia, for which she takes medication for. Michela had COVID four weeks ago, and was treated with paxlovid. Patient denies any drug or tobacco use, but admits to drinking about two glasses of wine per night.     PAST MEDICAL HISTORY  Past Medical History:   Diagnosis Date    Acute laryngitis 04/19/2022    Michela presents to clinic with 8 days of URI. Start as a cold with sinus pain and pressure and drainage. She now is left with residual cough which is very intrusive for her work. Cough is nonproductive. She is using OTC vitamins. She otherwise feels well. No fevers/chills, malaise, chest pain, productive cough, or GI symptoms. This feels similar to prior episodes of laryngitis. She has had good keyana    Cataract     esau IOL implants    Dyslipidemia 10/13/2015    Elevated LFTs 05/16/2018    High cholesterol     History of osteopenia 05/16/2018    Hyperlipidemia     Postoperative hypothyroidism 10/13/2015    Prediabetes 05/16/2018    Snoring     Thyroid disease     Urinary incontinence     stress incontinence, wears pad       SURGICAL HISTORY  Past Surgical History:   Procedure Laterality Date    CATARACT EXTRACTION WITH IOL Bilateral 2019    THYROIDECTOMY TOTAL  02/08/2012    [facility]    ABDOMINAL EXPLORATION      pyloric stenosis- as an infant    GYN SURGERY      D&C       FAMILY HISTORY  Family History   Problem Relation Age of Onset    Osteoporosis Mother     Hyperlipidemia Father     Heart Disease Father         CAD, MI     Anemia Sister     Cancer Brother         prostate    Heart Disease Brother         CABG       SOCIAL HISTORY   reports that she quit smoking about 43 years ago. Her smoking use included cigarettes. She started smoking about 47 years ago. She has a 2.0 pack-year smoking history. She has never used smokeless tobacco. She reports current alcohol use of about 3.0 - 4.2 oz of alcohol per week. She reports  that she does not use drugs.    CURRENT MEDICATIONS  Current Discharge Medication List        CONTINUE these medications which have NOT CHANGED    Details   levothyroxine (SYNTHROID) 88 MCG Tab TAKE 1 TABLET BY MOUTH EVERY DAY IN THE MORNING ON AN EMPTY STOMACH  Qty: 100 Tablet, Refills: 3    Associated Diagnoses: Postoperative hypothyroidism      atorvastatin (LIPITOR) 80 MG tablet Take 1 Tablet by mouth every evening. TAKE 1 TABLET BY MOUTH DAILY  Qty: 100 Tablet, Refills: 3    Associated Diagnoses: Dyslipidemia      Cholecalciferol (VITAMIN D3) 1000 units Cap Take 2,000 Units by mouth.      Glucosamine-Chondroitin (MOVE FREE PO) Take 2 Tablets by mouth every day. Chondroitin, 200mg      aspirin EC (ECOTRIN) 81 MG TBEC Take 81 mg by mouth every day.             ALLERGIES  Penicillins, Aleve [gnp naproxen sodium], and Penicillin g    PHYSICAL EXAM  BP (!) 169/79   Pulse 93   Temp 36.7 °C (98.1 °F) (Temporal)   Resp 18   SpO2 97%   Constitutional: Alert in no apparent distress.  HENT: No signs of trauma, Bilateral external ears normal, Nose normal. Uvula midline.   Eyes: Pupils are equal and reactive, Conjunctiva normal, Non-icteric.   Neck: Normal range of motion, No tenderness, Supple, No stridor.   Lymphatic: No lymphadenopathy noted.   Cardiovascular: Regular rate and rhythm, no murmurs.   Thorax & Lungs: Normal breath sounds, No respiratory distress, No wheezing, No chest tenderness.   Abdomen: Bowel sounds normal, Soft, No tenderness, No peritoneal signs, No masses, No pulsatile masses.   Skin: Warm, Dry, No erythema, No rash.   Back: No bony tenderness, No CVA tenderness.   Extremities: Intact distal pulses, No edema, No tenderness, No cyanosis.  Musculoskeletal: Good range of motion in all major joints. No tenderness to palpation or major deformities noted.   Neurologic: Cranial nerves II through XII intact.  5 out of 5 strength x4.  Sensation intact light touch.  Normal finger-nose-finger.  Normal  reflexes bilaterally.  No clonus. EOMI. PERRL. Repetitive questioning.   Psychiatric: Affect normal, Judgment normal, Mood normal.      DIAGNOSTIC STUDIES    Labs:   Labs Reviewed   CBC WITH DIFFERENTIAL - Abnormal; Notable for the following components:       Result Value    Neutrophils-Polys 84.80 (*)     Lymphocytes 11.70 (*)     Neutrophils (Absolute) 8.89 (*)     All other components within normal limits   COMP METABOLIC PANEL - Abnormal; Notable for the following components:    Sodium 133 (*)     Glucose 134 (*)     Alkaline Phosphatase 134 (*)     All other components within normal limits   HEMOGLOBIN A1C - Abnormal; Notable for the following components:    Glycohemoglobin 6.2 (*)     All other components within normal limits   HCG QUAL SERUM   DIAGNOSTIC ALCOHOL   URINALYSIS   URINE DRUG SCREEN   ESTIMATED GFR   TROPONIN   SED RATE   CRP QUANTITIVE (NON-CARDIAC)   TSH WITH REFLEX TO FT4   VITAMIN B12   POCT GLUCOSE        EKG:   I have independently interpreted this EKG  Results for orders placed or performed during the hospital encounter of 10/22/23   EKG   Result Value Ref Range    Report       [facility] Emergency Dept.    Test Date:  2023-10-22  Pt Name:    CORBY OLIVIA                  Department: ER  MRN:        [id number]                      Room:        12  Gender:     Female                       Technician: [id number]  :        [id number]                   Requested By:ER TRIAGE PROTOCOL  Order #:    [id number]                    Reading MD:    Measurements  Intervals                                Axis  Rate:       95                           P:          75  NM:         189                          QRS:        42  QRSD:       96                           T:          31  QT:         362  QTc:        455    Interpretive Statements  Sinus rhythm  Probable left atrial enlargement  Compared to ECG 2023 08:59:35  No significant changes           Radiology:   The attending emergency  physician has independently interpreted the diagnostic imaging associated with this visit and am waiting the final reading from the radiologist.   Preliminary interpretation is a follows:   Radiologist interpretation:   CT-CTA NECK WITH & W/O-POST PROCESSING   Final Result      1.  Negative for thrombosis or occlusion      2.  Mild bilateral internal carotid artery stenoses      3.  Anatomic variant as the right subclavian artery originates distal to the left subclavian artery and passes posterior to the trachea before exiting the right hemithorax      4.  Anatomic variant termination of the right vertebral artery and posterior inferior cerebellar artery      CT-CTA HEAD WITH & W/O-POST PROCESS   Final Result      1.  CT angiogram of the [place] within normal limits.      2.  Negative for thrombosis or occlusion      3.  Bilateral carotid origin of the posterior cerebral artery, anatomic variant      4.  Right vertebral artery terminates in the posterior inferior cerebellar artery, anatomic variant      DX-CHEST-PORTABLE (1 VIEW)   Final Result      Negative single view of the chest.      EC-ECHOCARDIOGRAM COMPLETE W/O CONT    (Results Pending)        COURSE & MEDICAL DECISION MAKING     ED Observation Status? No; Patient does not meet criteria for ED Observation.     INITIAL ASSESSMENT, COURSE AND PLAN  Care Narrative:     2:46 PM - Patient seen and examined at bedside. Patient is a 73 year old female who presents today for evaluation of altered level of consciousness onset yesterday. They have been experiencing associated lightheadedness, slurred speech, memory loss, and a headache, but denies any  weakness to one arm/leg, nausea, vomiting, diarrhea, cough, rhinorrhea, dysuria, diarrhea, increased urinary frequency, recent infections/antibiotic use, or any recent accidents or injuries. See HPI for further details. Discussed plan of care, including evaluation today via lab work and imaging of the brain for  evaluation of any possible brain bleeds. I explained that if we do not find significant findings on her diagnostic studies today, she will likely be admitted for a MRI of the brain and further evaluation. I explained that her symptoms may be secondary to pneumonia or UTI, but I will be evaluating for these more serious problems. Patient agrees to the plan of care. Ordered for EKG, Urine drug screen, UA, Diagnostic alcohol, HCG qual serum, CMP, CBC with differential, POCT glucose docked device, Troponin, DX-Chest, CT-CTA head with and without post process, and CT-CTA neck with and without post processing to evaluate her symptoms.        2:57 PM - The differential diagnoses include but are not limited to: UTI, Ghanshyam bleed, doubt pneumonia.  #Confusion, repetitive questioning, lightheadedness, headache    Chest X-Ray unremarkable for pneumonia  Alcohol level negative.  UA is pending.   CTH unremarkable  Not alteplase candidate given duration of sx, NIH 1    6:25 PM - I discussed the patient's case and the above findings with Dr. Padilla (hospitalist) who agrees to admit the patient.        DISPOSITION AND DISCUSSIONS  I have discussed management of the patient with the following physicians and JALYN's:  Dr. Padilla.    Barriers to care at this time, including but not limited to:  None are known at this time .       DISPOSITION:  Patient will be hospitalized by Dr. Padilla in guarded condition.     FINAL DIANGOSIS  1. Altered mental status, unspecified altered mental status type          The note accurately reflects work and decisions made by me.  Rowdy Ellis M.D.  10/22/2023  10:41 PM     Edis CHAVEZ (Vitor), am scribing for, and in the presence of, Rowdy Ellis M.D..    Electronically signed by: Edis Odonnell (Vitor), 10/22/2023    Rowdy CHAVEZ M.D. personally performed the services described in this documentation, as scribed by Edis Odonnell in my presence, and it is both accurate and  complete.

## 2023-10-22 NOTE — ED TRIAGE NOTES
Chief Complaint   Patient presents with    ALOC     Per daughter, pt has been extremely confused for the last day.      Pt BIB [facility] from home for concerns of AMS. Per daughter, pt was very confused yesterday and could not recall events from earlier in the week. EMS states pt had repetitive questioning. Pt arrives A&Ox3 - confused to event.     BP (!) 196/79   Pulse 93   Temp 36.7 °C (98.1 °F) (Temporal)   Resp 18   SpO2 97%

## 2023-10-23 ENCOUNTER — PHARMACY VISIT (OUTPATIENT)
Dept: PHARMACY | Facility: MEDICAL CENTER | Age: 73
End: 2023-10-23
Payer: COMMERCIAL

## 2023-10-23 ENCOUNTER — APPOINTMENT (OUTPATIENT)
Dept: CARDIOLOGY | Facility: MEDICAL CENTER | Age: 73
End: 2023-10-23
Attending: HOSPITALIST
Payer: MEDICARE

## 2023-10-23 ENCOUNTER — APPOINTMENT (OUTPATIENT)
Dept: RADIOLOGY | Facility: MEDICAL CENTER | Age: 73
End: 2023-10-23
Attending: STUDENT IN AN ORGANIZED HEALTH CARE EDUCATION/TRAINING PROGRAM
Payer: MEDICARE

## 2023-10-23 VITALS
BODY MASS INDEX: 26.22 KG/M2 | DIASTOLIC BLOOD PRESSURE: 64 MMHG | SYSTOLIC BLOOD PRESSURE: 128 MMHG | TEMPERATURE: 97.4 F | OXYGEN SATURATION: 92 % | WEIGHT: 163.14 LBS | HEIGHT: 66 IN | RESPIRATION RATE: 18 BRPM | HEART RATE: 74 BPM

## 2023-10-23 LAB
ALBUMIN SERPL BCP-MCNC: 4.2 G/DL (ref 3.2–4.9)
ALBUMIN/GLOB SERPL: 1.7 G/DL
ALP SERPL-CCNC: 110 U/L (ref 30–99)
ALT SERPL-CCNC: 27 U/L (ref 2–50)
ANION GAP SERPL CALC-SCNC: 13 MMOL/L (ref 7–16)
AST SERPL-CCNC: 27 U/L (ref 12–45)
BASOPHILS # BLD AUTO: 0.5 % (ref 0–1.8)
BASOPHILS # BLD: 0.04 K/UL (ref 0–0.12)
BILIRUB SERPL-MCNC: 0.6 MG/DL (ref 0.1–1.5)
BUN SERPL-MCNC: 14 MG/DL (ref 8–22)
CALCIUM ALBUM COR SERPL-MCNC: 8.8 MG/DL (ref 8.5–10.5)
CALCIUM SERPL-MCNC: 9 MG/DL (ref 8.5–10.5)
CHLORIDE SERPL-SCNC: 98 MMOL/L (ref 96–112)
CO2 SERPL-SCNC: 22 MMOL/L (ref 20–33)
CREAT SERPL-MCNC: 0.47 MG/DL (ref 0.5–1.4)
EOSINOPHIL # BLD AUTO: 0.01 K/UL (ref 0–0.51)
EOSINOPHIL NFR BLD: 0.1 % (ref 0–6.9)
ERYTHROCYTE [DISTWIDTH] IN BLOOD BY AUTOMATED COUNT: 41.1 FL (ref 35.9–50)
GFR SERPLBLD CREATININE-BSD FMLA CKD-EPI: 99 ML/MIN/1.73 M 2
GLOBULIN SER CALC-MCNC: 2.5 G/DL (ref 1.9–3.5)
GLUCOSE SERPL-MCNC: 119 MG/DL (ref 65–99)
HCT VFR BLD AUTO: 37.3 % (ref 37–47)
HGB BLD-MCNC: 12.5 G/DL (ref 12–16)
IMM GRANULOCYTES # BLD AUTO: 0.02 K/UL (ref 0–0.11)
IMM GRANULOCYTES NFR BLD AUTO: 0.2 % (ref 0–0.9)
LV EJECT FRACT  99904: 60
LV EJECT FRACT MOD 2C 99903: 71.66
LV EJECT FRACT MOD 4C 99902: 54.42
LV EJECT FRACT MOD BP 99901: 56.76
LYMPHOCYTES # BLD AUTO: 2.21 K/UL (ref 1–4.8)
LYMPHOCYTES NFR BLD: 25.1 % (ref 22–41)
MCH RBC QN AUTO: 30 PG (ref 27–33)
MCHC RBC AUTO-ENTMCNC: 33.5 G/DL (ref 32.2–35.5)
MCV RBC AUTO: 89.4 FL (ref 81.4–97.8)
MONOCYTES # BLD AUTO: 0.69 K/UL (ref 0–0.85)
MONOCYTES NFR BLD AUTO: 7.8 % (ref 0–13.4)
NEUTROPHILS # BLD AUTO: 5.84 K/UL (ref 1.82–7.42)
NEUTROPHILS NFR BLD: 66.3 % (ref 44–72)
NRBC # BLD AUTO: 0 K/UL
NRBC BLD-RTO: 0 /100 WBC (ref 0–0.2)
PLATELET # BLD AUTO: 264 K/UL (ref 164–446)
PMV BLD AUTO: 9.2 FL (ref 9–12.9)
POTASSIUM SERPL-SCNC: 3.9 MMOL/L (ref 3.6–5.5)
PROT SERPL-MCNC: 6.7 G/DL (ref 6–8.2)
RBC # BLD AUTO: 4.17 M/UL (ref 4.2–5.4)
SODIUM SERPL-SCNC: 133 MMOL/L (ref 135–145)
WBC # BLD AUTO: 8.8 K/UL (ref 4.8–10.8)

## 2023-10-23 PROCEDURE — 97535 SELF CARE MNGMENT TRAINING: CPT

## 2023-10-23 PROCEDURE — 700105 HCHG RX REV CODE 258: Performed by: HOSPITALIST

## 2023-10-23 PROCEDURE — 99239 HOSP IP/OBS DSCHRG MGMT >30: CPT | Performed by: STUDENT IN AN ORGANIZED HEALTH CARE EDUCATION/TRAINING PROGRAM

## 2023-10-23 PROCEDURE — 70553 MRI BRAIN STEM W/O & W/DYE: CPT

## 2023-10-23 PROCEDURE — 99204 OFFICE O/P NEW MOD 45 MIN: CPT | Performed by: STUDENT IN AN ORGANIZED HEALTH CARE EDUCATION/TRAINING PROGRAM

## 2023-10-23 PROCEDURE — 92523 SPEECH SOUND LANG COMPREHEN: CPT

## 2023-10-23 PROCEDURE — 700111 HCHG RX REV CODE 636 W/ 250 OVERRIDE (IP): Performed by: HOSPITALIST

## 2023-10-23 PROCEDURE — 96365 THER/PROPH/DIAG IV INF INIT: CPT | Mod: XU

## 2023-10-23 PROCEDURE — RXMED WILLOW AMBULATORY MEDICATION CHARGE: Performed by: STUDENT IN AN ORGANIZED HEALTH CARE EDUCATION/TRAINING PROGRAM

## 2023-10-23 PROCEDURE — G0378 HOSPITAL OBSERVATION PER HR: HCPCS

## 2023-10-23 PROCEDURE — 80053 COMPREHEN METABOLIC PANEL: CPT

## 2023-10-23 PROCEDURE — 700102 HCHG RX REV CODE 250 W/ 637 OVERRIDE(OP): Performed by: HOSPITALIST

## 2023-10-23 PROCEDURE — 93306 TTE W/DOPPLER COMPLETE: CPT

## 2023-10-23 PROCEDURE — A9270 NON-COVERED ITEM OR SERVICE: HCPCS | Performed by: HOSPITALIST

## 2023-10-23 PROCEDURE — 85025 COMPLETE CBC W/AUTO DIFF WBC: CPT

## 2023-10-23 PROCEDURE — 96375 TX/PRO/DX INJ NEW DRUG ADDON: CPT

## 2023-10-23 PROCEDURE — 700117 HCHG RX CONTRAST REV CODE 255: Performed by: STUDENT IN AN ORGANIZED HEALTH CARE EDUCATION/TRAINING PROGRAM

## 2023-10-23 PROCEDURE — 93306 TTE W/DOPPLER COMPLETE: CPT | Mod: 26 | Performed by: INTERNAL MEDICINE

## 2023-10-23 PROCEDURE — A9579 GAD-BASE MR CONTRAST NOS,1ML: HCPCS | Performed by: STUDENT IN AN ORGANIZED HEALTH CARE EDUCATION/TRAINING PROGRAM

## 2023-10-23 RX ORDER — AMLODIPINE BESYLATE 5 MG/1
5 TABLET ORAL DAILY
Qty: 30 TABLET | Refills: 0 | Status: SHIPPED | OUTPATIENT
Start: 2023-10-24 | End: 2023-10-24 | Stop reason: SDUPTHER

## 2023-10-23 RX ADMIN — ASPIRIN 81 MG: 81 TABLET, COATED ORAL at 05:25

## 2023-10-23 RX ADMIN — LEVOTHYROXINE SODIUM 88 MCG: 0.09 TABLET ORAL at 05:26

## 2023-10-23 RX ADMIN — ATORVASTATIN CALCIUM 80 MG: 80 TABLET, FILM COATED ORAL at 05:25

## 2023-10-23 RX ADMIN — THIAMINE HYDROCHLORIDE 100 MG: 100 INJECTION, SOLUTION INTRAMUSCULAR; INTRAVENOUS at 05:25

## 2023-10-23 RX ADMIN — GADOTERIDOL 15 ML: 279.3 INJECTION, SOLUTION INTRAVENOUS at 10:45

## 2023-10-23 RX ADMIN — AMLODIPINE BESYLATE 5 MG: 5 TABLET ORAL at 05:25

## 2023-10-23 ASSESSMENT — COGNITIVE AND FUNCTIONAL STATUS - GENERAL
MOBILITY SCORE: 24
SUGGESTED CMS G CODE MODIFIER MOBILITY: CH

## 2023-10-23 ASSESSMENT — PATIENT HEALTH QUESTIONNAIRE - PHQ9
1. LITTLE INTEREST OR PLEASURE IN DOING THINGS: NOT AT ALL
SUM OF ALL RESPONSES TO PHQ9 QUESTIONS 1 AND 2: 0
2. FEELING DOWN, DEPRESSED, IRRITABLE, OR HOPELESS: NOT AT ALL

## 2023-10-23 ASSESSMENT — GAIT ASSESSMENTS
DISTANCE (FEET): 150
GAIT LEVEL OF ASSIST: INDEPENDENT

## 2023-10-23 ASSESSMENT — PAIN DESCRIPTION - PAIN TYPE: TYPE: ACUTE PAIN

## 2023-10-23 NOTE — ASSESSMENT & PLAN NOTE
Patient is past the tPA window  Q4 hours neuro checks  I will order MRI brain  Check 2-D echo  Patient will be started on full dose aspirin and high intensity statin  Allow permissive hypertension  Check TSH, lipid panel and hemoglobin A1c  PTOT and ST evaluation

## 2023-10-23 NOTE — DISCHARGE PLANNING
HTH/SCP TCN chart review completed. Collaborated with JAKOB Florence prior to meeting with the pt. The most current review of medical record, knowledge of pt's PLOF and social support, LACE+ score of 48 was considered.  No 6 clicks scores.  Per Kardex, patient is ambulating 10 feet X 2 no AD.      Pt seen at bedside. Introduced TCN program. Provided education regarding post acute levels of care. Discussed HTH/SCP plan benefits (Meds to Beds, medical uber and [facility] transitional care). Pt verbalizes understanding.     Patient states she lives alone in a one level home and was independent with ADL's, IADL's, working (she works as a realtor), driving and ambulating (no AD).  She states she is vey active and plays pickle ball.  She denies any concerns with food, housing or transportation and states she is at her baseline level of function.  She is currently on 1 L/min O2 and is on RA at home.  She has family nearby that can assist if needed.  Patient will likely wean from O2 prior to discharge but TCN will monitor for O2 needs.      TCN will continue to follow and collaborate with discharge planning team as additional post acute needs arise. Thank you.     Completed today:  PT/OT anticipate no further needs on 10/23/23.    Choice obtained: None.  See above.    SCP with [facility].  Patient states she will schedule her own f/u at home when she has her calendar with her.

## 2023-10-23 NOTE — CONSULTS
Neurology Initial Consult H&P  Neurohospitalist Service, [facility]    Referring Physician: Kobe Hand M.D.    Chief Complaint   Patient presents with    ALOC     Per daughter, pt has been extremely confused for the last day.        HPI: Michela Valdez is a 73 y.o. F w/ PMHx of HLD, HTN, hypothyroidism who presents with acute onset of anterograde memory loss. Patient and family member at bedside report history. Patient and family report that patient has been under a lot of stress lately and still maintains full employment as a realtor. Patient reportedly had onset of symptoms sometime on Saturday and while talking with her daughter on Sunday it was noted that she could not recall what happened during house showings that occurred on Saturday. Patient also reportedly had slurred speech at the time. Son at bedside could not confirm that patient had or did not have perseverative speech. Patient was admitted for further workup. 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 Patient denied any history of head or neck trauma and never had a headache in the course of her presentation. Patient did have COVID-19 roughly two weeks ago which she recovered from. No symptoms of acute ischemia and specifically no reports of focal weakness, numbness, vision or speech (aside from above) changes. Aside from significant stress recently patient has had no recent concerns of complaints regarding her health. Patient was hypertensive on admission to SBP of 196 which was treated with oral anti-hypertensives and now is 120s-150s.     Review of systems: In addition to what is detailed in the HPI above, all other systems reviewed and are negative.    Past Medical History:    has a past medical history of Acute laryngitis (04/19/2022), Cataract, Dyslipidemia (10/13/2015), Elevated LFTs (05/16/2018), High cholesterol, History of osteopenia (05/16/2018),  Hyperlipidemia, Postoperative hypothyroidism (10/13/2015), Prediabetes (05/16/2018), Snoring, Thyroid disease, and Urinary incontinence.    FHx:  family history includes Anemia in her sister; Cancer in her brother; Heart Disease in her brother and father; Hyperlipidemia in her father; Osteoporosis in her mother.    SHx:   reports that she quit smoking about 43 years ago. Her smoking use included cigarettes. She started smoking about 47 years ago. She has a 2.0 pack-year smoking history. She has never used smokeless tobacco. She reports current alcohol use of about 3.0 - 4.2 oz of alcohol per week. She reports that she does not use drugs.    Allergies:  Allergies   Allergen Reactions    Naproxen Hives and Rash          Penicillins Rash     As a young child       Medications:    Current Facility-Administered Medications:     metoclopramide (Reglan) injection 10 mg, 10 mg, Intravenous, Once, Rowdy Ellis M.D.    diphenhydrAMINE (Benadryl) injection 25 mg, 25 mg, Intravenous, Once, Rowdy Ellis M.D.    melatonin tablet 5 mg, 5 mg, Oral, Nightly, Hansa Padilla M.D.    diphenhydrAMINE (Benadryl) tablet/capsule 25 mg, 25 mg, Oral, Q6HRS PRN, Hansa Padilla M.D.    acetaminophen (Tylenol) tablet 650 mg, 650 mg, Oral, Q6HRS PRN, Hansa Padilla M.D.    labetalol (Normodyne/Trandate) injection 10 mg, 10 mg, Intravenous, Q4HRS PRN, Hansa Padilla M.D., 10 mg at 10/22/23 2140    ondansetron (Zofran) syringe/vial injection 4 mg, 4 mg, Intravenous, Q4HRS PRN, Hansa Padilla M.D.    ondansetron (Zofran ODT) dispertab 4 mg, 4 mg, Oral, Q4HRS PRN, Hansa Padilla M.D.    aspirin EC tablet 81 mg, 81 mg, Oral, DAILY, Hansa Padilla M.D., 81 mg at 10/23/23 0525    atorvastatin (Lipitor) tablet 80 mg, 80 mg, Oral, DAILY, Hansa Padilla M.D., 80 mg at 10/23/23 0525    levothyroxine (Synthroid) tablet 88 mcg, 88 mcg, Oral, AM ES, Hansa Padilla M.D., 88 mcg at 10/23/23 0526    thiamine (B-1) IVPB 100 mg in 100 mL D5W (premix), 100 mg,  "Intravenous, DAILY, Hansa Padilla M.D., Stopped at 10/23/23 0555    amLODIPine (Norvasc) tablet 5 mg, 5 mg, Oral, Q DAY, Hansa Padilla M.D., 5 mg at 10/23/23 0525      NEUROLOGICAL EXAM:     MENTAL STATUS: AAOx3, attention and memory intact, fund of knowledge appropriate, could spell WORLD forward and backwards, completed serial 7s to 58, registered and recalled 3/3 items, knew current and previous president  LANG/SPEECH: Naming and repetition intact, fluent, follows simple, two-step, multi-step and complex commands.    CRANIAL NERVES:  II: Pupils equal and reactive, no VF deficits  III, IV, VI: EOM intact, no gaze preference or deviation, no nystagmus.  V: normal sensation in V1, V2, and V3 segments bilaterally  VII: no asymmetry, no nasolabial fold flattening  VIII: normal hearing to speech  IX, X: normal palatal elevation, no uvular deviation  XI: 5/5 head turn and 5/5 shoulder shrug bilaterally  XII: midline tongue protrusion  MOTOR:  5/5 muscle power in Rt shoulder abductors/adductors, elbow flexors/extensors, wrist flexors/extensors, finger abductors/adductors.  5/5 in Rt hipflexors/extensors, knee flexors/extensors, ankle dorsiflexors and planter flexors.    5/5 muscle power in Lt shoulder abductors/adductors, elbow flexors/extensors, wrist flexors/extensors, finger abductors/adductors.  5/5 in Lt hipflexors/extensors, knee flexors/extensors, ankle dorsiflexors and planter flexors.    REFLEXES: 2/4 throughout, bilateral flexor plantar response, no Grady's, no clonus  SENSORY: Normal to touch all limbs, no extinction to double sided stimulation (visual & tactile)  COORD: Normal finger to nose and heel to shin, no tremor, no dysmetria  GAIT: Deferred      Objective Data:    Labs:  No results found for: \"PROTHROMBTM\", \"INR\"   Lab Results   Component Value Date/Time    WBC 8.8 10/23/2023 01:23 AM    RBC 4.17 (L) 10/23/2023 01:23 AM    HEMOGLOBIN 12.5 10/23/2023 01:23 AM    HEMATOCRIT 37.3 10/23/2023 01:23 AM    " MCV 89.4 10/23/2023 01:23 AM    MCH 30.0 10/23/2023 01:23 AM    MCHC 33.5 10/23/2023 01:23 AM    MPV 9.2 10/23/2023 01:23 AM    NEUTSPOLYS 66.30 10/23/2023 01:23 AM    LYMPHOCYTES 25.10 10/23/2023 01:23 AM    MONOCYTES 7.80 10/23/2023 01:23 AM    EOSINOPHILS 0.10 10/23/2023 01:23 AM    BASOPHILS 0.50 10/23/2023 01:23 AM      Lab Results   Component Value Date/Time    SODIUM 133 (L) 10/23/2023 01:23 AM    POTASSIUM 3.9 10/23/2023 01:23 AM    CHLORIDE 98 10/23/2023 01:23 AM    CO2 22 10/23/2023 01:23 AM    GLUCOSE 119 (H) 10/23/2023 01:23 AM    BUN 14 10/23/2023 01:23 AM    CREATININE 0.47 (L) 10/23/2023 01:23 AM      Lab Results   Component Value Date/Time    CHOLSTRLTOT 177 03/09/2023 10:32 AM    LDL 96 03/09/2023 10:32 AM    HDL 43 03/09/2023 10:32 AM    TRIGLYCERIDE 188 (H) 03/09/2023 10:32 AM       Lab Results   Component Value Date/Time    ALKPHOSPHAT 110 (H) 10/23/2023 01:23 AM    ASTSGOT 27 10/23/2023 01:23 AM    ALTSGPT 27 10/23/2023 01:23 AM    TBILIRUBIN 0.6 10/23/2023 01:23 AM        Imaging/Testing:    I interpreted and/or reviewed the patient's neuroimaging    MR-BRAIN-WITH & W/O   Final Result         No acute process.      Age-related volume loss.      Subacute to chronic thrombus in the right sigmoid sinus and proximal right internal jugular vein at the skull base.      EC-ECHOCARDIOGRAM COMPLETE W/O CONT   Final Result      CT-CTA NECK WITH & W/O-POST PROCESSING   Final Result      1.  Negative for thrombosis or occlusion      2.  Mild bilateral internal carotid artery stenoses      3.  Anatomic variant as the right subclavian artery originates distal to the left subclavian artery and passes posterior to the trachea before exiting the right hemithorax      4.  Anatomic variant termination of the right vertebral artery and posterior inferior cerebellar artery      CT-CTA HEAD WITH & W/O-POST PROCESS   Final Result      1.  CT angiogram of the [place] within normal limits.      2.  Negative  for thrombosis or occlusion      3.  Bilateral carotid origin of the posterior cerebral artery, anatomic variant      4.  Right vertebral artery terminates in the posterior inferior cerebellar artery, anatomic variant      DX-CHEST-PORTABLE (1 VIEW)   Final Result      Negative single view of the chest.          Assessment and Plan:  Michela Valdez is a 73 y.o. F w/ PMHx of HLD, HTN, hypothyroidism who presents with acute onset of anterograde memory loss. 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 This finding is incidental and does not explain her underlying presenting symptom of acute anterograde memory loss. Work up for finding likely not indicated, a new onset hypercoagulable state in patient's age group very unlikely. In addition, patient's thrombus is asymptomatic and it is unclear when she developed the lesion. The most conservative approach would be to treat with anti-coagulation for three months and then repeat imaging to see if lesion resolves. 37% of patient's age greater than 65 have no etiology for their CVST and 0.08 percent of patient's hospitalized with COVID-19 have CVST (this patient does not fall under this category because she only previously had COVID-19), which could represent an underlying risk factor for CVST.    Problem list:      Recommendations:  -- Would recommend treatment with three months of anticoagulation with Eliquis 5 mg BID and follow up CTV/MRV with and without contrast as an outpatient to assess for resolution  -- Did offer patient and family alternative of reimaging w/ CTV/MRV with and without contrast in 1-2 weeks and pending those results decide on anti-coagulation as an outpatient  -- Neurology has no further recommendations; please call with questions/concerns    Eriberto Irby III, MD  Vascular Neurology, [facility]

## 2023-10-23 NOTE — PROGRESS NOTES
Transferred patient to unit via wheelchair. Patient ambulated with steady gait to bathroom and to bed. Patient resting in bed, NAD, A&O x4 but confused in conversation. Patient states no pain but reports a little dizziness. NIHSS and neuro check complete. BP elevated, continuing to reassess. POC discussed with patient and family, all questions addressed. Call light within reach.

## 2023-10-23 NOTE — H&P
Hospital Medicine History & Physical Note    Date of Service  10/22/2023    Primary Care Physician  Archana Robertson D.O.    Consultants      Specialist Names:     Code Status  Full Code    Chief Complaint  Chief Complaint   Patient presents with    ALOC     Per daughter, pt has been extremely confused for the last day.        History of Presenting Illness  Michela Valdez is a 73 y.o. female who presented 10/22/2023 with past medical history of hyperlipidemia, hypothyroidism, prediabetes who comes into the hospital with complaints of memory loss, confusion and slurred speech that started this morning when she woke up in bed.  Patient has been experiencing some symptoms for the past 2 days of memory loss.  She describes more retrograde amnesia rather than forming new memories.  She is normally high functioning grossly patient but has been unable to complete her job.  Recently patient has been experiencing dizziness in the morning.  She denies any headaches, photophobia, muscle weakness, nausea, vomiting, diarrhea.  She denies any changes in medications.  She did travel to [place] 2 weeks ago where she contracted COVID.  The patient did take Paxlovid.  She does have a family history of stroke and coronary artery disease.  Patient gets a carotid artery ultrasound every year since she has a strong family history.  She denies any recent trauma or falls.    EKG interpreted by me found normal sinus rhythm, LVH  Chest x-ray interpreted by me found no acute pulmonary process    I discussed the plan of care with patient.    Review of Systems  Review of Systems   Constitutional:  Negative for chills, diaphoresis, fever and malaise/fatigue.   HENT:  Negative for congestion, ear discharge, ear pain, hearing loss, nosebleeds, sinus pain, sore throat and tinnitus.    Eyes:  Negative for blurred vision, double vision, photophobia and pain.   Respiratory:  Negative for cough, hemoptysis, sputum production, shortness of breath,  wheezing and stridor.    Cardiovascular:  Negative for chest pain, palpitations, orthopnea, claudication, leg swelling and PND.   Gastrointestinal:  Negative for abdominal pain, blood in stool, constipation, diarrhea, heartburn, melena, nausea and vomiting.   Genitourinary:  Negative for dysuria, flank pain, frequency, hematuria and urgency.   Musculoskeletal:  Negative for back pain, falls, joint pain, myalgias and neck pain.   Skin:  Negative for itching and rash.   Neurological:  Positive for dizziness and speech change. Negative for tingling, tremors, weakness and headaches.   Endo/Heme/Allergies:  Negative for environmental allergies and polydipsia. Does not bruise/bleed easily.   Psychiatric/Behavioral:  Negative for depression, hallucinations, substance abuse and suicidal ideas.        Past Medical History   has a past medical history of Acute laryngitis (04/19/2022), Cataract, Dyslipidemia (10/13/2015), Elevated LFTs (05/16/2018), High cholesterol, History of osteopenia (05/16/2018), Hyperlipidemia, Postoperative hypothyroidism (10/13/2015), Prediabetes (05/16/2018), Snoring, Thyroid disease, and Urinary incontinence.    Surgical History   has a past surgical history that includes gyn surgery; thyroidectomy total (02/08/2012); abdominal exploration; and cataract extraction with iol (Bilateral, 2019).     Family History  family history includes Anemia in her sister; Cancer in her brother; Heart Disease in her brother and father; Hyperlipidemia in her father; Osteoporosis in her mother.   Family history reviewed with patient. There is no family history that is pertinent to the chief complaint.     Social History   reports that she quit smoking about 43 years ago. Her smoking use included cigarettes. She started smoking about 47 years ago. She has a 2.0 pack-year smoking history. She has never used smokeless tobacco. She reports current alcohol use of about 3.0 - 4.2 oz of alcohol per week. She reports that she  does not use drugs.    Allergies  Allergies   Allergen Reactions    Naproxen Hives and Rash          Penicillins Rash     As a young child       Medications  Prior to Admission Medications   Prescriptions Last Dose Informant Patient Reported? Taking?   Cholecalciferol (VITAMIN D3) 1000 units Cap unknown at unknown Patient, Family Member Yes No   Sig: Take 2,000 Units by mouth.   Glucosamine-Chondroitin (MOVE FREE PO) unknown at unknown Patient, Family Member Yes No   Sig: Take 2 Tablets by mouth every day. Chondroitin, 200mg   aspirin EC (ECOTRIN) 81 MG TBEC unknown at unknown Patient, Family Member Yes No   Sig: Take 81 mg by mouth every day.   atorvastatin (LIPITOR) 80 MG tablet unknown at unknown Patient, Family Member No No   Sig: Take 1 Tablet by mouth every evening. TAKE 1 TABLET BY MOUTH DAILY   Patient taking differently: Take 80 mg by mouth every day. TAKE 1 TABLET BY MOUTH DAILY   levothyroxine (SYNTHROID) 88 MCG Tab 10/22/2023 at 0700 Patient, Family Member No No   Sig: TAKE 1 TABLET BY MOUTH EVERY DAY IN THE MORNING ON AN EMPTY STOMACH      Facility-Administered Medications: None       Physical Exam  Temp:  [36.7 °C (98.1 °F)] 36.7 °C (98.1 °F)  Pulse:  [93-96] 96  Resp:  [16-18] 16  BP: (169-196)/(75-79) 179/75  SpO2:  [93 %-97 %] 93 %  Blood Pressure : (!) 179/75   Temperature: 36.7 °C (98.1 °F)   Pulse: 96   Respiration: 16   Pulse Oximetry: 93 %       Physical Exam  Vitals and nursing note reviewed.   Constitutional:       General: She is not in acute distress.     Appearance: Normal appearance. She is not ill-appearing, toxic-appearing or diaphoretic.   HENT:      Head: Normocephalic and atraumatic.      Nose: No congestion or rhinorrhea.      Mouth/Throat:      Pharynx: No oropharyngeal exudate or posterior oropharyngeal erythema.   Eyes:      General: No scleral icterus.  Neck:      Vascular: No carotid bruit or JVD.   Cardiovascular:      Rate and Rhythm: Normal rate and regular rhythm.       "Pulses: Normal pulses.      Heart sounds: Normal heart sounds. No murmur heard.     No friction rub. No gallop.   Pulmonary:      Effort: Pulmonary effort is normal. No respiratory distress.      Breath sounds: No stridor. No wheezing, rhonchi or rales.   Abdominal:      General: Abdomen is flat. There is no distension.      Palpations: There is no mass.      Tenderness: There is no abdominal tenderness. There is no left CVA tenderness, guarding or rebound.      Hernia: No hernia is present.   Musculoskeletal:         General: No swelling. Normal range of motion.      Cervical back: No rigidity. No muscular tenderness.      Right lower leg: No edema.      Left lower leg: No edema.   Lymphadenopathy:      Cervical: No cervical adenopathy.   Skin:     General: Skin is warm and dry.      Capillary Refill: Capillary refill takes more than 3 seconds.      Coloration: Skin is not jaundiced or pale.      Findings: No bruising or erythema.   Neurological:      Mental Status: She is alert.         Laboratory:  Recent Labs     10/22/23  1357   WBC 10.5   RBC 4.87   HEMOGLOBIN 15.0   HEMATOCRIT 43.2   MCV 88.7   MCH 30.8   MCHC 34.7   RDW 41.2   PLATELETCT 272   MPV 9.0     Recent Labs     10/22/23  1357   SODIUM 133*   POTASSIUM 4.1   CHLORIDE 97   CO2 23   GLUCOSE 134*   BUN 17   CREATININE 0.52   CALCIUM 9.6     Recent Labs     10/22/23  1357   ALTSGPT 35   ASTSGOT 32   ALKPHOSPHAT 134*   TBILIRUBIN 0.6   GLUCOSE 134*         No results for input(s): \"NTPROBNP\" in the last 72 hours.      Recent Labs     10/22/23  1357   TROPONINT 9       Imaging:  CT-CTA NECK WITH & W/O-POST PROCESSING   Final Result      1.  Negative for thrombosis or occlusion      2.  Mild bilateral internal carotid artery stenoses      3.  Anatomic variant as the right subclavian artery originates distal to the left subclavian artery and passes posterior to the trachea before exiting the right hemithorax      4.  Anatomic variant termination of the " right vertebral artery and posterior inferior cerebellar artery      CT-CTA HEAD WITH & W/O-POST PROCESS   Final Result      1.  CT angiogram of the [place] within normal limits.      2.  Negative for thrombosis or occlusion      3.  Bilateral carotid origin of the posterior cerebral artery, anatomic variant      4.  Right vertebral artery terminates in the posterior inferior cerebellar artery, anatomic variant      DX-CHEST-PORTABLE (1 VIEW)   Final Result      Negative single view of the chest.      EC-ECHOCARDIOGRAM COMPLETE W/O CONT    (Results Pending)       X-Ray:  I have personally reviewed the images and compared with prior images.  EKG:  I have personally reviewed the images and compared with prior images.    Assessment/Plan:  Justification for Admission Status  I anticipate this patient is appropriate for observation status at this time because TIA    Patient will need a Telemetry bed on MEDICAL service .  The need is secondary to TIA.    * TIA (transient ischemic attack)- (present on admission)  Assessment & Plan  Patient is past the tPA window  Q4 hours neuro checks  I will order MRI brain  Check 2-D echo  Patient will be started on full dose aspirin and high intensity statin  Allow permissive hypertension  Check TSH, lipid panel and hemoglobin A1c  PTOT and ST evaluation        Primary hypertension  Assessment & Plan  Uncontrolled  Start Norvasc 5 mg    Mild carotid artery stenosis (HCC)- (present on admission)  Assessment & Plan  Continue statins and aspirin    Dyslipidemia- (present on admission)  Assessment & Plan  Continue high-dose statins  With lipid panel    Postoperative hypothyroidism- (present on admission)  Assessment & Plan  Continue medications  Check TSH        VTE prophylaxis: SCDs/TEDs

## 2023-10-23 NOTE — PROGRESS NOTES
4 Eyes Skin Assessment Completed by Sania RN and Tiffany RN.    Head WDL  Ears WDL  Nose WDL  Mouth WDL  Neck WDL  Breast/Chest WDL  Shoulder Blades WDL  Spine WDL  (R) Arm/Elbow/Hand WDL  (L) Arm/Elbow/Hand WDL  Abdomen Scar  Groin WDL  Scrotum/Coccyx/Buttocks WDL  (R) Leg WDL  (L) Leg WDL  (R) Heel/Foot/Toe WDL  (L) Heel/Foot/Toe WDL          Devices In Places Tele Box and Pulse Ox      Interventions In Place Pillows    Possible Skin Injury No    Pictures Uploaded Into Epic N/A  Wound Consult Placed N/A  RN Wound Prevention Protocol Ordered No

## 2023-10-23 NOTE — THERAPY
Occupational Therapy Contact Note    Patient Name: Michela Valdez  Age:  73 y.o., Sex:  female  Medical Record #: [id number]  Today's Date: 10/23/2023    OT orders received. Per pt, she has been up self and has been independently completing ADLs and txfs; RN confirmed. Pt reports that majority of her symptoms have resolved expect for her memory which she describes as brain fog. Pt reports that she was independent with ADLs and IADLs prior to admission and she has family who live in the [facility] who can provide assist as needed. Pt reports no concerns regarding ability to safely complete ADLs and txfs independently after DC once medically clear. Pt has no acute OT needs at this time and anticipate she will have no further needs after DC.

## 2023-10-23 NOTE — PROGRESS NOTES
Updated MD on elevated BP and anxiety levels. Last /74. MD ordered to give the Labetalol at this time.

## 2023-10-23 NOTE — CARE PLAN
The patient is Stable - Low risk of patient condition declining or worsening    Shift Goals  Clinical Goals: Echo, MRI, NIHSS and neuro checks, BP monitoring  Patient Goals: Comfort, rest  Family Goals: Rest    POC discussed with patient of NIHSS and neuro checks, anxiety management, echo, MRI, and PT/OT. Patient verbalized understanding of treatment plan and education.     Problem: Neuro Status  Goal: Neuro status will remain stable or improve  Outcome: Progressing     Problem: Knowledge Deficit - Standard  Goal: Patient and family/care givers will demonstrate understanding of plan of care, disease process/condition, diagnostic tests and medications  Outcome: Progressing     Problem: Psychosocial  Goal: Patient's level of anxiety will decrease  Outcome: Progressing

## 2023-10-23 NOTE — ED NOTES
Med rec updated and complete. Allergies reviewed. Interviewed pt /family at bedside.  Pt  only able to verify that she took her levothyroxine . Pt unable to verify any other medications she last took.    No recent  outpatient  antibiotic use in last   30 days.      Home pharmacy  [facility] [phone]

## 2023-10-23 NOTE — CARE PLAN
The patient is Stable - Low risk of patient condition declining or worsening    Shift Goals  Clinical Goals: Echo, MRI, Pt/OT  Patient Goals: MRI, speak to MD  Family Goals: not present    Progress made toward(s) clinical / shift goals:    Problem: Knowledge Deficit - Standard  Goal: Patient and family/care givers will demonstrate understanding of plan of care, disease process/condition, diagnostic tests and medications  Description: Target End Date:  1-3 days or as soon as patient condition allows    Document in Patient Education    1.  Patient and family/caregiver oriented to unit, equipment, visitation policy and means for communicating concern  2.  Complete/review Learning Assessment  3.  Assess knowledge level of disease process/condition, treatment plan, diagnostic tests and medications  4.  Explain disease process/condition, treatment plan, diagnostic tests and medications  Outcome: Progressing       Patient is not progressing towards the following goals:

## 2023-10-23 NOTE — DISCHARGE PLANNING
Patient states 15 mg of Phentermine was not working.   She would like new script for 30 mg.  [facility]   Any questions, call Nikia [phone]    She was taking 2 of the 15 mg per doctor and this worked well.   Requested by DR Hand to find out co pay for Eliquis. Called [facility] and spoke with Marilee and co pay for Eliquis is $47.00. DR. Hand updated.

## 2023-10-23 NOTE — THERAPY
Speech Language Pathology   Cognitive Evaluation     Patient Name: Michela Valdez  AGE:  73 y.o., SEX:  female  Medical Record #: [id number]  Date of Service: 10/23/2023      History of Present Illness  Pt is a 73 y.o. female who presented 10/22/2023 w/ PMHx of hyperlipidemia, hypothyroidism, prediabetes who comes into the hospital with complaints of memory loss, confusion and slurred speech that started this morning when she woke up in bed.  Patient has been experiencing some symptoms for the past 2 days of memory loss.  She describes more retrograde amnesia rather than forming new memories.  She is normally high functioning grossly patient but has been unable to complete her job.     No hx of ST per EMR.     CT-CTA HEAD 10/22/23:   1.  CT angiogram of the [place] within normal limits.  2.  Negative for thrombosis or occlusion  3.  Bilateral carotid origin of the posterior cerebral artery, anatomic variant  4.  Right vertebral artery terminates in the posterior inferior cerebellar artery, anatomic variant    MRI BRAIN 10/23/22:   No acute process.  2. Age-related volume loss.  3. Subacute to chronic thrombus in the right sigmoid sinus and proximal right internal jugular vein at the skull base.    General Information  Vitals  O2 Delivery Device: None - Room Air  Level of Consciousness: Alert, Awake  Patient Behaviors: Anxious (Pleasant;Cooperative)  Orientation: Oriented x 4  Follows Directives: Yes    Prior Living Situation & Level of Function  Prior Services: None, Home-Independent  Lives with - Patient's Self Care Capacity: Alone and Able to Care For Self  Communication: WFL     Subjective  Pt cleared by RN for cognitive linguistic communication evaluation. Pt was received awake/alert conversing with family at bedside. Pt/family endorsed period of confusion and slurred speech. Per pt/family, slurred speech resolved in the ER. Pt/family with ongoing memory concerns, however, pt endorsing improvements today.  Reports consistent w/ retrograde amnesia- unable to recall the past 3 days, difficulty recalling the past 3 months.    Communication Domain(s)  Expressive Language: WFL  Receptive Language: WFL  Cognitive-Linguistic: WFL  Reading: WFL  Writing: WFL     Assessment  Standard and non-standardized measures were utilized for this evaluation.     Cognistat  The Cognistat is multidimensional screening measure of cognition designed to primarily diagnose cognitive impairments. Scores were yielded in the following domains: orientation, attention, language (repetition, naming subscales), verbal memory, calculation, and reasoning (similarities and judgments subtests). Pt’s results are as follows:    Orientation: Average (12)  Attention: Average (8)  Comprehension: Average (6)  Repetition: Average (12)  Naming: Average (8)  Memory: Average (11)  Calculations: Average (4)  Similarities: Average (8)  Judgement: Average (6)     *Not all brain lesions produce cognitive deficits that will be detected by cognitstat. Average Range scores, therefore, cannot be taken as evidence that brain pathology does not exist. Similarly, scores falling in the Mild, Moderate, or Severe range of impairment do not necessarily reflect brain dysfunction.    Auditory Comprehension   Informal measures to assess auditory comprehension were utilized during this evaluation including verbal presentation of one, two, and three step directions as well as simple/moderate/complex level of yes/no questions. Pt followed one step directions with 100% accuracy, two-step directions with 100% accuracy, and three-step directions with 100% accuracy. Pt was able to answer simple/moderate/complex yes/no questions with 100% accuracy.     Reading/Writing  Informal measures assessing both decoding/encoding abilities completed. Pt decoded at the paragraph level with 100% accuracy as evidenced via reading comprehension task. Pt was able to encode at the sentence level with 100%  accuracy.     Medication Management    Informal measures including a hypothetical medication management task were utilized to assess the pt’s ability to participate in functional iADL activities. Pt demonstrated WFL following of directions, problem solving, reasoning, and completed the task with 100% accuracy.      Comments:   Due to reported retrograde amnesia, SLP utilized spaced retrieval task to assess pt's recall at a higher level of functioning. Pt was able to recall items at a 3 minute delay with 100% accuracy, provided category prompt x1, after a 5 minute delay with 100% accuracy independently, and at a 15 minute delay with 100% accuracy independently.     Clinical Impressions  Pt is presenting with cognitive linguistic communication abilities WNL as evidenced via standardized/non-standardized evaluation measures. ST in the acute setting not indicated at this time due to the pt's performance on such measures. Pt w/ high PLOF, pt may benefit from trial of OP ST to target memory deficits should they not improve upon dc    NOTE: It is not within the scope of practice of Speech-Language Pathologists to determine patient capacity. Please defer to the physician or psych to complete this assessment.     Recommendations  Supervision Needs Upons Discharge: None  IADLs: N/A     SLP Treatment Plan  Treatment Plan: None Indicated  SLP Frequency: N/A - Evaluation Only  Estimated Duration: N/A - Evaluation Only    Anticipated Discharge Needs  Discharge Recommendations: Recommend outpatient speech therapy services (Pt w/ high PLOF, pt may benefit from trial of OP ST to target memory deficits should they not improve upon dc.)  Therapy Recommendations Upon DC: Cognitive-Linguistic Training, Patient / Family / Caregiver Education    Irina Cosme, SLP

## 2023-10-23 NOTE — DISCHARGE INSTRUCTIONS
Assessment and Plan:  Michela Valdez is a 73 y.o. F w/ PMHx of HLD, HTN, hypothyroidism who presents with acute onset of anterograde memory loss. 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 This finding is incidental and does not explain her underlying presenting symptom of acute anterograde memory loss. Work up for finding likely not indicated, a new onset hypercoagulable state in patient's age group very unlikely. In addition, patient's thrombus is asymptomatic and it is unclear when she developed the lesion. The most conservative approach would be to treat with anti-coagulation for three months and then repeat imaging to see if lesion resolves. 37% of patient's age greater than 65 have no etiology for their CVST and 0.08 percent of patient's hospitalized with COVID-19 have CVST (this patient does not fall under this category because she only previously had COVID-19), which could represent an underlying risk factor for CVST.     Problem list:        Recommendations:  -- Would recommend treatment with three months of anticoagulation with Eliquis 5 mg BID and follow up CTV/MRV with and without contrast as an outpatient to assess for resolution  -- Did offer patient and family alternative of reimaging w/ CTV/MRV with and without contrast in 1-2 weeks and pending those results decide on anti-coagulation as an outpatient  -- Neurology has no further recommendations; please call with questions/concerns     Eriberto Irby III, MD  Vascular Neurology, [facility]

## 2023-10-23 NOTE — THERAPY
Physical Therapy Contact Note    Patient Name: Michela Valdez  Age:  73 y.o., Sex:  female  Medical Record #: [id number]  Today's Date: 10/23/2023    PT consult received and chart reviewed. Pt admitted with episode of confusion noticed by a family member. Pt reports having had some memory issues recently and reports Covid-19 ~1 month ago, which possibly may be contributing. Pt denies LE changes and has been mobilizing independently on the unit. Pt provided with education on Be Fast acronym for stroke-like symptoms to be aware of. Otherwise, no acute PT needs at this time.       10/23/23 8975   Cognition    Level of Consciousness Alert   Comments Very pleasant and receptive to education. Endorses recent memory issues, seem to have been since she had Covid ~4 weeks ago per pt.   Gait Analysis   Gait Level Of Assist Independent   Assistive Device None   Distance (Feet) 150   How much difficulty does the patient currently have...   Turning over in bed (including adjusting bedclothes, sheets and blankets)? 4   Sitting down on and standing up from a chair with arms (e.g., wheelchair, bedside commode, etc.) 4   Moving from lying on back to sitting on the side of the bed? 4   How much help from another person does the patient currently need...   Moving to and from a bed to a chair (including a wheelchair)? 4   Need to walk in a hospital room? 4   Climbing 3-5 steps with a railing? 4   6 clicks Mobility Score 24   Education Group   Education Provided Role of Physical Therapist   Role of Physical Therapist Patient Response Patient;Acceptance;Explanation;Verbal Demonstration   Physical Therapy Initial Treatment Plan    Duration Other (See Comments)  (Edu only, no eval indicated.)   Anticipated Discharge Equipment and Recommendations   DC Equipment Recommendations None   Discharge Recommendations Anticipate that the patient will have no further physical therapy needs after discharge from the hospital   Interdisciplinary Plan of  Care Collaboration   IDT Collaboration with  Nursing;Occupational Therapist   Patient Position at End of Therapy In Bed;Call Light within Reach;Tray Table within Reach;Phone within Reach   Collaboration Comments RN updated

## 2023-10-24 ENCOUNTER — TELEPHONE (OUTPATIENT)
Dept: MEDICAL GROUP | Facility: PHYSICIAN GROUP | Age: 73
End: 2023-10-24
Payer: MEDICARE

## 2023-10-24 ENCOUNTER — OFFICE VISIT (OUTPATIENT)
Dept: MEDICAL GROUP | Facility: PHYSICIAN GROUP | Age: 73
End: 2023-10-24
Payer: MEDICARE

## 2023-10-24 ENCOUNTER — PATIENT OUTREACH (OUTPATIENT)
Dept: MEDICAL GROUP | Facility: PHYSICIAN GROUP | Age: 73
End: 2023-10-24
Payer: MEDICARE

## 2023-10-24 ENCOUNTER — PATIENT MESSAGE (OUTPATIENT)
Dept: MEDICAL GROUP | Facility: PHYSICIAN GROUP | Age: 73
End: 2023-10-24

## 2023-10-24 VITALS
SYSTOLIC BLOOD PRESSURE: 122 MMHG | TEMPERATURE: 97.9 F | OXYGEN SATURATION: 96 % | DIASTOLIC BLOOD PRESSURE: 60 MMHG | HEART RATE: 88 BPM | BODY MASS INDEX: 25.71 KG/M2 | RESPIRATION RATE: 16 BRPM | WEIGHT: 160 LBS | HEIGHT: 66 IN

## 2023-10-24 DIAGNOSIS — E78.5 DYSLIPIDEMIA: ICD-10-CM

## 2023-10-24 DIAGNOSIS — I10 PRIMARY HYPERTENSION: ICD-10-CM

## 2023-10-24 DIAGNOSIS — G45.4 TRANSIENT GLOBAL AMNESIA: ICD-10-CM

## 2023-10-24 DIAGNOSIS — Z09 HOSPITAL DISCHARGE FOLLOW-UP: ICD-10-CM

## 2023-10-24 DIAGNOSIS — G08 CEREBRAL VENOUS THROMBOSIS OF SIGMOID SINUS: ICD-10-CM

## 2023-10-24 PROCEDURE — 99496 TRANSJ CARE MGMT HIGH F2F 7D: CPT | Performed by: INTERNAL MEDICINE

## 2023-10-24 PROCEDURE — 3074F SYST BP LT 130 MM HG: CPT | Performed by: INTERNAL MEDICINE

## 2023-10-24 PROCEDURE — 3078F DIAST BP <80 MM HG: CPT | Performed by: INTERNAL MEDICINE

## 2023-10-24 RX ORDER — AMLODIPINE BESYLATE 5 MG/1
5 TABLET ORAL DAILY
Qty: 100 TABLET | Refills: 3 | Status: SHIPPED | OUTPATIENT
Start: 2023-10-24

## 2023-10-24 ASSESSMENT — FIBROSIS 4 INDEX: FIB4 SCORE: 1.48

## 2023-10-24 NOTE — PROGRESS NOTES
Transitional Care Management    This patient qualifies for a TCM visit, as they are being seen within the required time for CMS of 2 business days from discharge, a phone call by an RN is not required.  You can therefore see them as a TCM visit and charge appropriately.

## 2023-10-24 NOTE — TELEPHONE ENCOUNTER
1. Caller Name: Michela Valdez                          Call Back Number: [phone]        How would the patient prefer to be contacted with a response: Phone call OK to leave a detailed message    Patient called was in hospital on Sunday  Really wants to see you today.  Put on BP med and blood thinner   Possible clot     Do you want me to put her on Dr. Osborn schedule?

## 2023-10-24 NOTE — DISCHARGE SUMMARY
"Discharge Summary    CHIEF COMPLAINT ON ADMISSION  Chief Complaint   Patient presents with    ALOC     Per daughter, pt has been extremely confused for the last day.        Reason for Admission  Transient memory loss     Admission Date  10/22/2023    CODE STATUS  FULL    HPI & HOSPITAL COURSE  Per Dr. Padilla's HPI dated 10/22/2023:  \"Michela Valdez is a 73 y.o. female who presented 10/22/2023 with past medical history of hyperlipidemia, hypothyroidism, prediabetes who comes into the hospital with complaints of memory loss, confusion and slurred speech that started this morning when she woke up in bed.  Patient has been experiencing some symptoms for the past 2 days of memory loss.  She describes more retrograde amnesia rather than forming new memories.  She is normally high functioning grossly patient but has been unable to complete her job.  Recently patient has been experiencing dizziness in the morning.  She denies any headaches, photophobia, muscle weakness, nausea, vomiting, diarrhea.  She denies any changes in medications.  She did travel to [place] 2 weeks ago where she contracted COVID.  The patient did take Paxlovid.  She does have a family history of stroke and coronary artery disease.  Patient gets a carotid artery ultrasound every year since she has a strong family history.  She denies any recent trauma or falls.     EKG interpreted by me found normal sinus rhythm, LVH  Chest x-ray interpreted by me found no acute pulmonary process\"    The patient was admitted for transient memory loss and encephalopathy r/o acute CVA.    Hospital course under my care: afebrile and vitals wnl. Exam at bedside was grossly unremarkable with intact neuroexam though the patient has occasionally having hard time remembering details of last two days. She does seem to have improvement with times.  Labs and imagings findings discussed with patient. MRI done was notable for no acute process but noted subacute to chronic thrombus " in R sigmoid sinus and proximal R IJ vein. At this point, neurology was consulted - recommended anticoag and follow up scan vs just follow up scan. Pt was also seen by Speech therapy - she did quite well with Cognistat.     Clinical impression at this point with transient global amnesia - likely etiology is ischemic vs thrombosis. Pt is on ASA and Statin at home. Plan is to add Eliquis for thrombosis; also amlodipine to control BP. Deemed stable for DC at this point.     Therefore, she is discharged in fair and stable condition to home with close outpatient follow-up.    The patient recovered much more quickly than anticipated on admission.    Discharge Date  10/23/2023    FOLLOW UP ITEMS POST DISCHARGE  N/A    DISCHARGE DIAGNOSES  Principal Problem:    Transient global amnesia (POA: Yes)  Active Problems:    Postoperative hypothyroidism (POA: Yes)      Overview:  Latest Reference Range & Units 03/09/23 10:32       TSH 0.380 - 5.330 uIU/mL 0.470       Free T-4 0.93 - 1.70 ng/dL 1.37             She reports prior thyroidectomy due to nodule found incidentally.  No       recent dose adjustments of her levothyroxine.  She has had progressive       weight gain since her thyroid was removed and feels are likely related.        She wonders if her dose needs to be adjusted.  Otherwise no signs or       symptoms of overtreatment under treatment.            Current regimen: Levothyroxine 88 mcg 6 days/week and 2 tablets on Sundays           Dyslipidemia (POA: Yes)      Overview:  Latest Reference Range & Units 10/17/22 09:42 03/09/23 10:32       Cholesterol,Tot 100 - 199 mg/dL 166 177       Triglycerides 0 - 149 mg/dL 145 188 (H)       HDL >=40 mg/dL 45 43       LDL <100 mg/dL 92 96             Has a history of elevated cholesterol.  Her father had early CAD.  She       previously followed with Dr. Bloch of vascular medicine.            Current regimen: Atorvastatin 80 mg daily and aspirin 81 mg daily    Mild carotid artery  stenosis (HCC) (POA: Yes)      Overview: Carotid US (2022):      Right- Heterogeneous plaque of the bifurcation extending into the internal       carotid artery. <50% internal carotid artery stenosis. Plaque is smooth on       the surface and heterogeneous with mixed echogenicity.      Left- Very mild plaque of the carotid bifurcation. Doppler velocities are       normal throughout the carotid arteries. Plaque is smooth on the surface       and homogeneous with low echogenicity. The bilateral subclavian and       vertebral artery waveforms are antegrade and normal in character and       velocity.            She has known right greater than left mild carotid artery disease.  She is       followed vascular medicine in the past.  Her father had early CAD.  She is       on statin and low-dose aspirin.    Primary hypertension (POA: Unknown)  Resolved Problems:    * No resolved hospital problems. *      FOLLOW UP  Future Appointments   Date Time Provider Department Center   10/24/2023  3:40 PM [facility]   11/3/2023  3:00 PM [facility]   12/13/2023  4:20 PM [facility]     Archana Robertson D.O.  [address] [address]  [phone]    Schedule an appointment as soon as possible for a visit      Archana Robertson D.O.  [address] [address]  [phone]            MEDICATIONS ON DISCHARGE     Medication List        START taking these medications        Instructions   amLODIPine 5 MG Tabs  Commonly known as: Norvasc   Take 1 Tablet by mouth every day.  Dose: 5 mg     Eliquis 5mg Tabs  Generic drug: apixaban   Take 1 Tablet by mouth 2 times a day.  Dose: 5 mg            CHANGE how you take these medications        Instructions   atorvastatin 80 MG tablet  What changed: when to take this  Commonly known as: Lipitor   Take 1 Tablet by mouth every evening. TAKE 1 TABLET BY MOUTH DAILY  Dose:  80 mg            CONTINUE taking these medications        Instructions   aspirin EC 81 MG Tbec  Commonly known as: Ecotrin   Take 81 mg by mouth every day.  Dose: 81 mg     levothyroxine 88 MCG Tabs  Commonly known as: Synthroid   TAKE 1 TABLET BY MOUTH EVERY DAY IN THE MORNING ON AN EMPTY STOMACH  Dose: 88 mcg     MOVE FREE PO   Take 2 Tablets by mouth every day. Chondroitin, 200mg  Dose: 2 Tablet     Vitamin D3 25 MCG (1000 UT) Caps   Take 2,000 Units by mouth.  Dose: 2,000 Units              Allergies  Allergies   Allergen Reactions    Naproxen Hives and Rash          Penicillins Rash     As a young child       DIET  Cardiac      ACTIVITY  As tolerated.  Weight bearing as tolerated    CONSULTATIONS  Neurology    PROCEDURES  N/A    LABORATORY  Lab Results   Component Value Date    SODIUM 133 (L) 10/23/2023    POTASSIUM 3.9 10/23/2023    CHLORIDE 98 10/23/2023    CO2 22 10/23/2023    GLUCOSE 119 (H) 10/23/2023    BUN 14 10/23/2023    CREATININE 0.47 (L) 10/23/2023        Lab Results   Component Value Date    WBC 8.8 10/23/2023    HEMOGLOBIN 12.5 10/23/2023    HEMATOCRIT 37.3 10/23/2023    PLATELETCT 264 10/23/2023        Total time of the discharge process exceeds 36 minutes.

## 2023-10-25 DIAGNOSIS — G45.4 TRANSIENT GLOBAL AMNESIA: ICD-10-CM

## 2023-10-25 DIAGNOSIS — G08 CEREBRAL VENOUS THROMBOSIS OF SIGMOID SINUS: ICD-10-CM

## 2023-10-25 NOTE — PROGRESS NOTES
Subjective:     Michela Valdez is a 73 y.o. female who presents for Hospital Follow-up.    HPI:   Recently hospitalized for transient global amnesia secondary to recent venous sinus thrombosis.     Current medicines (including reconciliation performed today)  Current Outpatient Medications   Medication Sig Dispense Refill    amLODIPine (NORVASC) 5 MG Tab Take 1 Tablet by mouth every day. 100 Tablet 3    apixaban (ELIQUIS) 5mg Tab Take 1 Tablet by mouth 2 times a day. 200 Tablet 3    levothyroxine (SYNTHROID) 88 MCG Tab TAKE 1 TABLET BY MOUTH EVERY DAY IN THE MORNING ON AN EMPTY STOMACH 100 Tablet 3    atorvastatin (LIPITOR) 80 MG tablet Take 1 Tablet by mouth every evening. TAKE 1 TABLET BY MOUTH DAILY (Patient taking differently: Take 80 mg by mouth every day. TAKE 1 TABLET BY MOUTH DAILY) 100 Tablet 3    Cholecalciferol (VITAMIN D3) 1000 units Cap Take 2,000 Units by mouth.      Glucosamine-Chondroitin (MOVE FREE PO) Take 2 Tablets by mouth every day. Chondroitin, 200mg      aspirin EC (ECOTRIN) 81 MG TBEC Take 81 mg by mouth every day.       No current facility-administered medications for this visit.       Allergies:   Naproxen and Penicillins    Social History     Tobacco Use    Smoking status: Former     Current packs/day: 0.00     Average packs/day: 0.5 packs/day for 4.0 years (2.0 ttl pk-yrs)     Types: Cigarettes     Start date:      Quit date: 1980     Years since quittin.8    Smokeless tobacco: Never   Vaping Use    Vaping Use: Never used   Substance Use Topics    Alcohol use: Yes     Alcohol/week: 3.0 - 4.2 oz     Types: 5 - 7 Glasses of wine per week     Comment: glass of wine daily     Drug use: No       ROS:  Still with mild memory impairment/amnesia from recent event    Objective:     Vitals:    10/24/23 1531   BP: 122/60   BP Location: Right arm   Patient Position: Sitting   BP Cuff Size: Adult   Pulse: 88   Resp: 16   Temp: 36.6 °C (97.9 °F)   TempSrc: Temporal   SpO2: 96%   Weight: 72.6 kg  "(160 lb)   Height: 1.676 m (5' 6\")     Body mass index is 25.82 kg/m².    Physical Exam:  Physical Exam  Constitutional:       General: She is not in acute distress.     Appearance: Normal appearance. She is not ill-appearing.   HENT:      Right Ear: External ear normal.      Left Ear: External ear normal.   Eyes:      General: No scleral icterus.     Conjunctiva/sclera: Conjunctivae normal.   Cardiovascular:      Rate and Rhythm: Normal rate and regular rhythm.      Pulses: Normal pulses.      Heart sounds: No murmur heard.  Pulmonary:      Effort: Pulmonary effort is normal. No respiratory distress.      Breath sounds: No wheezing or rhonchi.   Abdominal:      General: Bowel sounds are normal.      Palpations: Abdomen is soft.   Musculoskeletal:      Right lower leg: No edema.      Left lower leg: No edema.   Skin:     General: Skin is warm and dry.      Findings: No rash.   Neurological:      Gait: Gait normal.   Psychiatric:         Mood and Affect: Mood normal.         Behavior: Behavior normal.         Thought Content: Thought content normal.         Judgment: Judgment normal.           Assessment and Plan:   Problem List Items Addressed This Visit       Cerebral venous thrombosis of sigmoid sinus     New decompensated problem, continue Eliquis 5 mg twice daily and tight blood pressure control with amlodipine 5 mg daily. Repeat CTV/MRV in 3 months.          Relevant Medications    amLODIPine (NORVASC) 5 MG Tab    apixaban (ELIQUIS) 5mg Tab    Other Relevant Orders    CT-CTV HEAD WITH & W/O POST PROCESS    MR-VENOGRAM (MRV) HEAD    Dyslipidemia     Chronic ongoing problem, continue atorvastatin 80 mg daily and baby aspirin. Recent vascular event requiring brief hospitalization.         Relevant Orders    Basic Metabolic Panel    Hospital discharge follow-up     New problem, reviewed hospital course and imaging with Michela and her daughter. Discussed repeat imaging in 3 months. They will also check blood pressure " regularly and update me with values now that she is on amlodipine, Rx renewed. Will also renew Eliquis and they will message me if any concerns with bruising or bleeding. Will also reach out to vascular medicine to see if they had any additional recommendations regarding anticoagulation.         Primary hypertension     New problem, may have been related to neurologic event, no prior history of HTN in clinic and echocardiogram with no LVH. Continue amlodipine 5 mg daily and monitor blood pressure daily at different times and message me if any side effects like edema or symptoms of orthostasis.         Relevant Medications    amLODIPine (NORVASC) 5 MG Tab    apixaban (ELIQUIS) 5mg Tab    Transient global amnesia     New problem likely related to recent imaging of sinus venous thrombosis. Cognition continues to improve, she will complete 90 days of Eliquis 5 mg twice daily and maintain amlodipine 5 mg daily. Repeat imaging in 3 months. Good support from her family and mental status continues to improve, thorough OT evaluation done in the hospital.            - Chart and discharge summary were reviewed.   - Hospitalization and results reviewed with patient.   - Medications reviewed including instructions regarding high risk medications, dosing and side effects.  - Recommended Services: No services needed at this time  - Advance directive/POLST on file?  No  working on it right now with her     Follow-up:Return in about 3 months (around 1/24/2024).    Face-to-face transitional care management services with HIGH (today's visit is within days post discharge & LACE+ score 59+) medical decision complexity were provided.     LACE+ Historical Score Over Time (0-28: Low, 29-58: Medium, 59+: High): 61    I spent a total of 34 minutes with record review, exam, communication with the patient, communication with other providers, and documentation of this encounter.    Archana Robertson, DO  Geriatric and Internal  Medicine  [facility]

## 2023-10-25 NOTE — ASSESSMENT & PLAN NOTE
New problem likely related to recent imaging of sinus venous thrombosis. Cognition continues to improve, she will complete 90 days of Eliquis 5 mg twice daily and maintain amlodipine 5 mg daily. Repeat imaging in 3 months. Good support from her family and mental status continues to improve, thorough OT evaluation done in the hospital.

## 2023-10-25 NOTE — ASSESSMENT & PLAN NOTE
Chronic ongoing problem, continue atorvastatin 80 mg daily and baby aspirin. Recent vascular event requiring brief hospitalization.

## 2023-10-25 NOTE — ASSESSMENT & PLAN NOTE
New decompensated problem, continue Eliquis 5 mg twice daily and tight blood pressure control with amlodipine 5 mg daily. Repeat CTV/MRV in 3 months.

## 2023-10-25 NOTE — ASSESSMENT & PLAN NOTE
New problem, may have been related to neurologic event, no prior history of HTN in clinic and echocardiogram with no LVH. Continue amlodipine 5 mg daily and monitor blood pressure daily at different times and message me if any side effects like edema or symptoms of orthostasis.

## 2023-10-25 NOTE — TELEPHONE ENCOUNTER
Good morning,    I am the current PCP for this patient, she shared with me that you were her PCP years ago and she thinks very highly of you.  She had a strange episode this past week of altered mental status prompting hospitalization.  Diagnosed with transient global amnesia and imaging showed a subacute/chronic venous sinus thrombosis and so she was discharged on 90 days of Eliquis and recommended to have repeat imaging with CTV/MRV in 3 months.  Only other recent history was COVID infection last month treated with Paxlovid. She is on statin/aspirin.    We wanted to see if you be willing to visit with her in the future to discuss additional work-up or recommendations from a vascular standpoint and she requested you specifically.    Thanks,    Dianne

## 2023-10-25 NOTE — ASSESSMENT & PLAN NOTE
New problem, reviewed hospital course and imaging with Michela and her daughter. Discussed repeat imaging in 3 months. They will also check blood pressure regularly and update me with values now that she is on amlodipine, Rx renewed. Will also renew Eliquis and they will message me if any concerns with bruising or bleeding. Will also reach out to vascular medicine to see if they had any additional recommendations regarding anticoagulation.

## 2023-10-31 ENCOUNTER — TELEPHONE (OUTPATIENT)
Dept: HEALTH INFORMATION MANAGEMENT | Facility: OTHER | Age: 73
End: 2023-10-31
Payer: MEDICARE

## 2023-11-03 ENCOUNTER — OFFICE VISIT (OUTPATIENT)
Dept: NEUROLOGY | Facility: MEDICAL CENTER | Age: 73
End: 2023-11-03
Attending: PSYCHIATRY & NEUROLOGY
Payer: MEDICARE

## 2023-11-03 ENCOUNTER — APPOINTMENT (OUTPATIENT)
Dept: MEDICAL GROUP | Facility: PHYSICIAN GROUP | Age: 73
End: 2023-11-03
Payer: MEDICARE

## 2023-11-03 VITALS
BODY MASS INDEX: 25.69 KG/M2 | HEIGHT: 66 IN | HEART RATE: 88 BPM | DIASTOLIC BLOOD PRESSURE: 68 MMHG | WEIGHT: 159.83 LBS | TEMPERATURE: 98.2 F | SYSTOLIC BLOOD PRESSURE: 110 MMHG | OXYGEN SATURATION: 97 %

## 2023-11-03 DIAGNOSIS — E87.1 HYPONATREMIA: ICD-10-CM

## 2023-11-03 DIAGNOSIS — G31.84 MILD COGNITIVE IMPAIRMENT: Primary | ICD-10-CM

## 2023-11-03 DIAGNOSIS — G08 CEREBRAL VENOUS THROMBOSIS OF SIGMOID SINUS: ICD-10-CM

## 2023-11-03 DIAGNOSIS — Z79.01 ANTICOAGULATED: ICD-10-CM

## 2023-11-03 DIAGNOSIS — Z86.16 HISTORY OF COVID-19: ICD-10-CM

## 2023-11-03 PROCEDURE — 3078F DIAST BP <80 MM HG: CPT | Performed by: PSYCHIATRY & NEUROLOGY

## 2023-11-03 PROCEDURE — 99212 OFFICE O/P EST SF 10 MIN: CPT | Performed by: PSYCHIATRY & NEUROLOGY

## 2023-11-03 PROCEDURE — 3074F SYST BP LT 130 MM HG: CPT | Performed by: PSYCHIATRY & NEUROLOGY

## 2023-11-03 PROCEDURE — G2212 PROLONG OUTPT/OFFICE VIS: HCPCS | Performed by: PSYCHIATRY & NEUROLOGY

## 2023-11-03 PROCEDURE — 99215 OFFICE O/P EST HI 40 MIN: CPT | Performed by: PSYCHIATRY & NEUROLOGY

## 2023-11-03 ASSESSMENT — FIBROSIS 4 INDEX: FIB4 SCORE: 1.48

## 2023-11-03 NOTE — PROGRESS NOTES
"Reason for Neurology Consult:  ischemic brain event vs TGA; history of COVID 19    Neuro Consult Note by Damian Bergeron MD reviewed from 10/23/2023.    History of present illness:    Michela Valdez 73 y.o. female  right handed woman who [place] and lives in [facility]) moving from [place].  She works full time as a Realtor    Problem List reviewed.  Covid testing positive a few months ago > about 5 days in duration \"like a cold\" (coughing and congestion) but without fever(s), sorethroat > took Paclovid x 3 days.    Memory changes have been noticed for over 1 year such as going into a room to get something which she self describes a minor nuisance in the sense of being able to recall why or what she  has gone into the room to get.      In terms of memory disturbance(s) in the last year there tends to be more tendency to forget people's names and movies per daughter.      \"T.G.A. 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 She had a period where she could not recall a period of not recalling of information at all. Her daughter on Sunday morning she was repetitive in speech and garbled> 911 was called and paramedics called. And triaged at  her house> [facility].    Her words and speech were not normal per her daughter (that Sunday).    Note from [facility] Reviewed today:    \"Michela Valdez is a 73 y.o. female who presented 10/22/2023 with past medical history of hyperlipidemia, hypothyroidism, prediabetes who comes into the hospital with complaints of memory loss, confusion and slurred speech that started this morning when she woke up in bed.  Patient has been experiencing some symptoms for the past 2 days of memory loss.  She describes " "more retrograde amnesia rather than forming new memories.  She is normally high functioning grossly patient but has been unable to complete her job.  Recently patient has been experiencing dizziness in the morning.  She denies any headaches, photophobia, muscle weakness, nausea, vomiting, diarrhea.  She denies any changes in medications.  She did travel to [place] 2 weeks ago where she contracted COVID.  The patient did take Paxlovid.  She does have a family history of stroke and coronary artery disease.  Patient gets a carotid artery ultrasound every year since she has a strong family history.  She denies any recent trauma or falls.     EKG interpreted by me found normal sinus rhythm, LVH  Chest x-ray interpreted by me found no acute pulmonary process\"     The patient was admitted for transient memory loss and encephalopathy r/o acute CVA.     Hospital course under my care: afebrile and vitals wnl. Exam at bedside was grossly unremarkable with intact neuroexam though the patient has occasionally having hard time remembering details of last two days. She does seem to have improvement with times.  Labs and imagings findings discussed with patient. MRI done was notable for no acute process but noted subacute to chronic thrombus in R sigmoid sinus and proximal R IJ vein. At this point, neurology was consulted - recommended anticoag and follow up scan vs just follow up scan. Pt was also seen by Speech therapy - she did quite well with Cognistat.      Clinical impression at this point with transient global amnesia - likely etiology is ischemic vs thrombosis. Pt is on ASA and Statin at home. Plan is to add Eliquis for thrombosis; also amlodipine to control BP. Deemed stable for DC at this point.      Therefore, she is discharged in fair and stable condition to home with close outpatient follow-up.   The patient recovered much more quickly than anticipated on admission.     Discharge Date  10/23/2023     FOLLOW UP ITEMS " POST DISCHARGE  N/A     DISCHARGE DIAGNOSES  Principal Problem:    Transient global amnesia (POA: Yes)  Active Problems:    Postoperative hypothyroidism (POA: Yes)      Overview:  Latest Reference Range & Units 23 10:32       TSH 0.380 - 5.330 uIU/mL 0.470       Free T-4 0.93 - 1.70 ng/dL 1.37      1/2 pack a day smoker for 5 years or so.  No significant alcohol use in adult life but drinks mostly 1-2 glasses a night at most.    Family Hx:    Parents: Father ( at age 53)- MI > significant smoker for many years  Mother:  at age 89 - smoker for many years.  5 siblings: mostly healthy ; 2 with HTN  No family history of dementia type issues known.      Patient Active Problem List    Diagnosis Date Noted    Cerebral venous thrombosis of sigmoid sinus 10/24/2023    Hospital discharge follow-up 10/24/2023    Transient global amnesia 10/22/2023    Primary hypertension 10/22/2023    COVID-19 virus infection 2023    Fecal smearing 2023    Agatston CAC score 200-399 340 in      Laryngeal spasm 2023    Elevated alkaline phosphatase level 2022    Genitourinary syndrome of menopause 2022    Skin lesion- right hand, SK 2022    Rotator cuff arthropathy of right shoulder 2022    Hip pain- right > left 2022    Palpitations 2022    Night sweats 2022    BMI 26.0-26.9,adult 2022    Chronic pain of right knee 2021    Slow transit constipation with bloating 2021    Prediabetes 2018    History of osteopenia 2018    Mild carotid artery stenosis (HCC) 2016    Postoperative hypothyroidism 10/13/2015    Dyslipidemia 10/13/2015    Diverticular disease of colon 2015    Thyroid nodule 2015       Past medical history:   Past Medical History:   Diagnosis Date    Acute laryngitis 2022    Michela presents to clinic with 8 days of URI. Start as a cold with sinus pain and pressure and drainage. She now is left with residual  cough which is very intrusive for her work. Cough is nonproductive. She is using OTC vitamins. She otherwise feels well. No fevers/chills, malaise, chest pain, productive cough, or GI symptoms. This feels similar to prior episodes of laryngitis. She has had good keyana    Cataract     esau IOL implants    Dyslipidemia 10/13/2015    Elevated LFTs 2018    High cholesterol     History of osteopenia 2018    Hyperlipidemia     Postoperative hypothyroidism 10/13/2015    Prediabetes 2018    Snoring     Thyroid disease     Urinary incontinence     stress incontinence, wears pad       Past surgical history:   Past Surgical History:   Procedure Laterality Date    CATARACT EXTRACTION WITH IOL Bilateral 2019    THYROIDECTOMY TOTAL  2012    Performed by ALEX CONTEH at [facility]    ABDOMINAL EXPLORATION      pyloric stenosis- as an infant    GYN SURGERY      D&C         Social history:   Social History     Socioeconomic History    Marital status:      Spouse name: Not on file    Number of children: Not on file    Years of education: Not on file    Highest education level: Not on file   Occupational History    Not on file   Tobacco Use    Smoking status: Former     Current packs/day: 0.00     Average packs/day: 0.5 packs/day for 4.0 years (2.0 ttl pk-yrs)     Types: Cigarettes     Start date:      Quit date:      Years since quittin.8    Smokeless tobacco: Never   Vaping Use    Vaping Use: Never used   Substance and Sexual Activity    Alcohol use: Yes     Alcohol/week: 3.0 - 4.2 oz     Types: 5 - 7 Glasses of wine per week     Comment: glass of wine daily     Drug use: No    Sexual activity: Not Currently   Other Topics Concern    Not on file   Social History Narrative    Not on file     Social Determinants of Health     Financial Resource Strain: Not on file   Food Insecurity: Not on file   Transportation Needs: Not on file   Physical Activity: Not on file  "  Stress: Not on file   Social Connections: Not on file   Intimate Partner Violence: Not on file   Housing Stability: Not on file       Family history:   Family History   Problem Relation Age of Onset    Osteoporosis Mother     Hyperlipidemia Father     Heart Disease Father         CAD, MI     Anemia Sister     Cancer Brother         prostate    Heart Disease Brother         CABG         Current medications:   Current Outpatient Medications   Medication    amLODIPine (NORVASC) 5 MG Tab    apixaban (ELIQUIS) 5mg Tab    levothyroxine (SYNTHROID) 88 MCG Tab    atorvastatin (LIPITOR) 80 MG tablet    Cholecalciferol (VITAMIN D3) 1000 units Cap    Glucosamine-Chondroitin (MOVE FREE PO)    aspirin EC (ECOTRIN) 81 MG TBEC     No current facility-administered medications for this visit.       Medication Allergy:  Allergies   Allergen Reactions    Naproxen Hives and Rash          Penicillins Rash     As a young child           Physical examination:   Vitals:    11/03/23 1012   BP: 110/68   BP Location: Left arm   Patient Position: Sitting   BP Cuff Size: Adult   Pulse: 88   Temp: 36.8 °C (98.2 °F)   TempSrc: Temporal   SpO2: 97%   Weight: 72.5 kg (159 lb 13.3 oz)   Height: 1.676 m (5' 5.98\")       Normal cephalic atraumatic.  There is full range of movement around the neck in all directions without restrictions or discrete pain evoked triggers.  No lower extremity edema.      Neurological  Exam:      [place] Cognitive Assessment (MOCA) Version 7.1    Years of Education: 2 years college    TOTAL SCORE: 30/30  (to be scanned into the MEDIA section in the E.M.R.)          Mental status: Awake, alert and fully oriented to person, place, time, and situation. Normal attention and concentration.  Did not appear/act combative,irritable,anxious,paranoid/delusional or aggressive to or with me.    Speech and language: Speech is fluent without errors, clear, intact to repetition, and intact to naming.     Follows 3 step motor commands " in sequence without significant delay and correctly.    Cranial nerve exam:  II: Pupils are equally round and reactive to light. Visual fields are intact by confrontation.  III, IV, VI: EOMI, no diplopia, no ptosis.  V: Sensation to light touch is normal over V1-3 distributions bilaterally.  .  VII: Facial movements are symmetrical. There is no facial droop. .  VIII: Hearing intact to soft speech and finger rub bilaterally  IX: Palate elevates symmetrically, uvula is midline. Dysarthria is not present.  XI: Shoulder shrug are symmetrical and strong.   XII: Tongue protrudes midline.      Motor exam:  Muscle tone is normal in all 4 limbs.    Muscle strength:    Neck Flexors/Extensors: 5/5       Right  Left  Deltoid   5/5  5/5      Biceps   5/5  5/5  Triceps              5/5  5/5   Wrist extensors 5/5  5/5  Wrist flexors  5/5  5/5     5/5  5/5  Interossei  5/5  5/5  Thenar (APB)  5/5  5/5   Hip flexors  5/5  5/5  Quadriceps  5/5  5/5    Hamstrings  5/5  5/5  Dorsiflexors  5/5  5/5  Plantarflexors  5/5  5/5  Toe extension  5/5  5/5    Reflexes:       Right  Left  Biceps   2/4  2/4  Triceps              2/4  2/4  Brachioradialis  2/4  2/4  Knee jerk  2/4  2/4  Ankle jerk  2/4  2/4     Frontal release signs are absent    bilaterally toes are downgoing to plantar stimulation..    Coordination (finger-to-nose, heel/knee/shin, rapid alternating movements) was normal.     There was no ataxia, no tremors, and no dysmetria.     Station and gait >   easily stands up from exam chair without retropulsion,veering,leaning,swaying (to either side).       Labs and Tests and NEUROIMAGING:     Status: Final result     Visible to patient: Yes (seen)     Next appt: 11/15/2023 at 11:00 AM in Vascular Medicine (Michael J Bloch, M.D.)     0 Result Notes  Details    Reading Physician Reading Date Result Priority   Carlos Dean M.D.  [phone] 10/22/2023 STAT     Narrative & Impression     10/22/2023 5:20 PM     HISTORY/REASON FOR  EXAM:  confusion        TECHNIQUE/EXAM DESCRIPTION:  CT angiogram of the [place] without and with contrast.  Initial precontrast images were obtained of the head from the skull base through the vertex.  Postcontrast images were obtained of the [place] following the power injection of   nonionic contrast at 5.0 mL/sec. Thin-section helical images were obtained with overlapping reconstruction interval. Coronal and sagittal multiplanar volume reformats were generated.  3D angiographic images were reviewed on PACS.  Maximum intensity   projection (MIP) images were generated and reviewed.     95 mL of Omnipaque 350 nonionic contrast was injected intravenously.     Low dose optimization technique was utilized for this CT exam including automated exposure control and adjustment of the mA and/or kV according to patient size.     COMPARISON:  None.     FINDINGS: There is a prior  The posterior circulation shows the distal vertebral arteries to be patent. The vertebrobasilar confluence is intact. The basilar artery is patent. No aneurysm or occlusive lesion is evident.     Both posterior cerebral artery or carotid in origin, anatomic variant.     The anterior circulation shows no stenotic or occlusive lesion. No aneurysm is evident about the [place].     CALVARIA:  The calvaria are normal in appearance.     BRAIN:  The brain parenchyma is normal in appearance. .           VENTRICULAR SYSTEM: Ventricular system is normal in size and position.     There is no hemorrhage or evidence for acute infarct.     There are no extra-axial fluid collection.     Sinuses:  The paranasal sinuses are clear.     The mastoid air cells and middle ear are clear.     ORBITS: The visualized orbital contents are normal in appearance.              3D angiographic/MIP images of the vasculature confirm the vascular findings as described above.     IMPRESSION:     1.  CT angiogram of the [place] within normal  limits.     2.  Negative for thrombosis or occlusion     3.  Bilateral carotid origin of the posterior cerebral artery, anatomic variant     4.  Right vertebral artery terminates in the posterior inferior cerebellar artery, anatomic variant           Exam Ended: 10/22/23  5:45 PM Last Resulted: 10/22/23  5:52 PM               Reading Physician Reading Date Result Priority   Carlos Dean M.D.  [phone] 10/22/2023 STAT     Narrative & Impression     10/22/2023 5:20 PM     HISTORY/REASON FOR EXAM:  confusion     TECHNIQUE/EXAM DESCRIPTION:  CT angiogram of the neck with contrast.     Postcontrast images were obtained of the neck from the great vessels through the skull base following the power injection of nonionic contrast at 5.0 mL/sec. Thin-section helical images were obtained with overlapping reconstruction interval. Coronal and   oblique multiplanar volume reformats were generated.     Cervical internal carotid artery percent stenosis is calculated using the standard method according to the NASCET criteria wherein a segment of uniform caliber mid or distal cervical internal carotid is used as the reference denominator.     3D angiographic images were reviewed on PACS.  Maximum intensity projection (MIP) images were generated and reviewed     95 mL of Omnipaque 350 nonionic contrast was injected intravenously.     Low dose optimization technique was utilized for this CT exam including automated exposure control and adjustment of the mA and/or kV according to patient size.     COMPARISON:  None.     FINDINGS:  Aortic arch: There is variant branching pattern with the right subclavian artery originating distal to the left subclavian artery passing posterior to the trachea before exiting the right hemithorax.     There is atherosclerotic plaque of the aorta.     Right common carotid artery: Patent     Right internal carotid artery: Atherosclerotic plaque without significant stenosis (less than 50%).     Left  common carotid artery is patent.     Left internal carotid artery: Atherosclerotic plaque without significant stenosis (less than 50%).     The right vertebral artery is patent without dissection or stenosis.     The left vertebral artery is patent without dissection or stenosis.     Vertebrobasilar confluence: The vertebrobasilar confluence appears normal.     Lung apices are clear     The soft tissues of the neck are within normal limits.     3D angiographic/MIP images of the vasculature confirm the vascular findings as described above.     IMPRESSION:     1.  Negative for thrombosis or occlusion     2.  Mild bilateral internal carotid artery stenoses     3.  Anatomic variant as the right subclavian artery originates distal to the left subclavian artery and passes posterior to the trachea before exiting the right hemithorax     4.  Anatomic variant termination of the right vertebral artery and posterior inferior cerebellar artery           Exam Ended: 10/22/23  5:47 PM Last Resulted: 10/22/23  5:54 PM             Echo     CONCLUSIONS  No prior study is available for comparison.   Normal left ventricular systolic function.  The left ventricular ejection fraction is visually estimated to be 60%.  No significant valvular abnormalities.   A definite cardiac source for a systemic thromboembolic event is not   identified, failure to do so does not exclude its presence. If   clinically indicated, a transesophageal study would be useful.      CORBY OLIVIA  Exam Date:         10/23/2023                      06:56  Exam Location:     Inpatient  Priority:          Routine    Narrative & Impression     10/23/2023 10:18 AM     HISTORY/REASON FOR EXAM: Memory loss        TECHNIQUE/EXAM DESCRIPTION:     T1 sagittal, T2 axial, flair coronal, T1 coronal, and diffusion-weighted axial images were obtained of the brain pre-contrast followed by T1 coronal and axial images post intravenous administration of 15 mL ProHance.      COMPARISON: CT brain, CT angiogram 10/22/2023     FINDINGS:     Age-related volume loss noted with prominent sulci, cisterns and ventricles. Ventricular dilatation is proportionate to the degree of cerebral volume loss. Diffusion-weighted images are within normal limits. No acute infarction is seen. Gradient-echo   images are within normal limits. There is no evidence of hemorrhage.  Nonspecific T2 hyperintensities are noted in the periventricular and deep white matter, most likely related to chronic microvascular ischemia.  Postcontrast images do not demonstrate any abnormal intracranial enhancement. There is a filling defect identified in the right sigmoid sinus and proximal internal jugular vein that demonstrates T2 hyperintensity. This most likely represents subacute to   chronic thrombus.  There are no extra axial collections.  Visualized intracranial arterial flow voids are within normal limits.  Bone marrow signal in the calvarium is within normal limits.  Included portions of the paranasal sinuses are within normal limits.  Included portions of the mastoid air cells are within normal limits.  Included portions of the orbits are within normal limits     IMPRESSION:        No acute process.     Age-related volume loss.     Subacute to chronic thrombus in the right sigmoid sinus and proximal right internal jugular vein at the skull base.           Exam Ended: 10/23/23 11:01 AM Last Resulted: 10/23/23 11:12 AM       Order Details     View Encounter     Lab and Collection Details     Routing     Result History               Ref Range & Units 12 d ago 7 mo ago 1 yr ago 4 yr ago   Vitamin B12 -True Cobalamin  211 - 911 pg/mL 829  856  1363 High   737      0 Result Notes      Component  Ref Range & Units 12 d ago   Stat C-Reactive Protein  0.00 - 0.75 mg/dL 0.48    Resulting Agency M          Component  Ref Range & Units 12 d ago   Sed Rate Westergren  0 - 25 mm/hour 7        Ref Range & Units 11 d ago  (10/23/23)  12 d ago  (10/22/23) 7 mo ago  (3/9/23) 7 mo ago  (3/9/23) 1 yr ago  (10/17/22) 1 yr ago  (3/23/22) 2 yr ago  (2/2/21)   Sodium  135 - 145 mmol/L 133 Low   133 Low    138  139  140  140    Potassium  3.6 - 5.5 mmol/L 3.9  4.1   4.1  4.5  4.3  4.7    Chloride  96 - 112 mmol/L 98  97   104  104  105  105    Co2  20 - 33 mmol/L 22  23   24  25  23  26    Anion Gap  7.0 - 16.0 13.0  13.0   10.0  10.0  12.0  9.0    Glucose  65 - 99 mg/dL 119 High   134 High    110 High   116 High   107 High   110 High     Bun  8 - 22 mg/dL 14  17   18  17  19  22    Creatinine  0.50 - 1.40 mg/dL 0.47 Low   0.52   0.61  0.68  0.64  0.66    Calcium  8.5 - 10.5 mg/dL 9.0  9.6   9.3  9.5  9.6  9.4    Correct Calcium  8.5 - 10.5 mg/dL 8.8  9.0  8.8        AST(SGOT)  12 - 45 U/L 27  32   33  19  28  29    ALT(SGPT)  2 - 50 U/L 27  35   46  29  26  43    Alkaline Phosphatase  30 - 99 U/L 110 High   134 High    112 High   125 High   103 High   96    Total Bilirubin  0.1 - 1.5 mg/dL 0.6  0.6   0.5  0.4  0.4  0.3    Albumin  3.2 - 4.9 g/dL 4.2  4.8   4.6  4.6  4.4  4.4    Total Protein  6.0 - 8.2 g/dL 6.7  7.8   7.3  7.5  7.0  7.0    Globulin  1.9 - 3.5 g/dL 2.5  3.0   2.7  2.9  2.6  2.6    A-G Ratio  g/dL 1.7  1.6   1.7  1.6  1.7  1.7    Resulting Agency M M M M M M M     Impression/Plans/Recommendations:    Mild Cognitive Impairment- for over 1 year.    MOCA Score today of 30/30 today with mild issues with word retrieval (words that begin with B).    Functional Activity Questionnaire score today of 3 per daughter.    Global Deterioration Score today in the 3 to 4 range per daughter.    Brain MRI without ischemic stroke regions but small sigmoid sinus clot (venous) in setting of prior and recent COVID.    2. Venous Sinus Clot- Sigmoid region.    On Eliquis and a high intensity statin.      Today discussed:    A. Brain Health Factors reviewed today and several books given     B. A follow up MR Venogram is scheduled in 3 months or so and  if normal  she will likely stop Eliquis and start ASA (81 mg)    C I do not feel that the description was clearly a TGA event because of speech/language changes noticed during that time by daughter.    D. Recheck Blood Sodium Level (last was 133 about 11 days ago)    I have performed  a history and physical exam and a directed /focused  ROS today.    Total time spent today or this patient's care was 60 minutes or so  and included reviewing  the diagnostic workup to date (such as labs and imaging as well as interpreting such tests relevant to this patient's neurological condition),  reviewing/obtaining separately obtained history (from patient and/or daughter )  for today's neurological problem(s) ,counseling and educating the patient and family member on issues related to cognition/memory and cognitive health factors and documenting  the clinical information in the EMR.    Follow up  in about 6 months or so.        Murphy Ortiz MD  [facility].

## 2023-11-08 ENCOUNTER — OFFICE VISIT (OUTPATIENT)
Dept: MEDICAL GROUP | Facility: PHYSICIAN GROUP | Age: 73
End: 2023-11-08
Payer: MEDICARE

## 2023-11-08 VITALS
SYSTOLIC BLOOD PRESSURE: 108 MMHG | HEIGHT: 66 IN | TEMPERATURE: 97 F | OXYGEN SATURATION: 96 % | BODY MASS INDEX: 25.55 KG/M2 | HEART RATE: 86 BPM | WEIGHT: 159 LBS | RESPIRATION RATE: 16 BRPM | DIASTOLIC BLOOD PRESSURE: 60 MMHG

## 2023-11-08 DIAGNOSIS — D68.69 SECONDARY HYPERCOAGULABLE STATE (HCC): ICD-10-CM

## 2023-11-08 DIAGNOSIS — G08 CEREBRAL VENOUS THROMBOSIS OF SIGMOID SINUS: ICD-10-CM

## 2023-11-08 DIAGNOSIS — E78.5 DYSLIPIDEMIA: ICD-10-CM

## 2023-11-08 DIAGNOSIS — G45.4 TRANSIENT GLOBAL AMNESIA: ICD-10-CM

## 2023-11-08 DIAGNOSIS — Z23 NEED FOR INFLUENZA VACCINATION: ICD-10-CM

## 2023-11-08 PROBLEM — Z79.01 ANTICOAGULATED: Status: RESOLVED | Noted: 2023-11-03 | Resolved: 2023-11-08

## 2023-11-08 PROCEDURE — 90662 IIV NO PRSV INCREASED AG IM: CPT | Performed by: INTERNAL MEDICINE

## 2023-11-08 PROCEDURE — G0008 ADMIN INFLUENZA VIRUS VAC: HCPCS | Performed by: INTERNAL MEDICINE

## 2023-11-08 PROCEDURE — 3074F SYST BP LT 130 MM HG: CPT | Performed by: INTERNAL MEDICINE

## 2023-11-08 PROCEDURE — 99214 OFFICE O/P EST MOD 30 MIN: CPT | Mod: 25 | Performed by: INTERNAL MEDICINE

## 2023-11-08 PROCEDURE — 3078F DIAST BP <80 MM HG: CPT | Performed by: INTERNAL MEDICINE

## 2023-11-08 RX ORDER — CHLORAL HYDRATE 500 MG
1000 CAPSULE ORAL DAILY
COMMUNITY

## 2023-11-08 ASSESSMENT — FIBROSIS 4 INDEX: FIB4 SCORE: 1.48

## 2023-11-09 NOTE — ASSESSMENT & PLAN NOTE
New problem, stable on Eliquis, will see vascular medicine next week. No bruising or bleeding complications.

## 2023-11-09 NOTE — ASSESSMENT & PLAN NOTE
Chronic stable problem, now holding aspirin 81 mg daily due use of Eliquis. Could consider adding back after she concludes Eliquis.

## 2023-11-09 NOTE — ASSESSMENT & PLAN NOTE
Recent problem, follow up imaging scheduled at 3 months for interval information, will see Dr. Bloch with vascular medicine next week. Continue Eliquis 5 mg twice daily in the interim.

## 2023-11-09 NOTE — ASSESSMENT & PLAN NOTE
Previous problem, unclear if truly TGA per neurology as she has slurred speech vs inappropriate speech at the time of the episode. Unlikely to be seizure. No further episodes since initial issue in late October.

## 2023-11-09 NOTE — PROGRESS NOTES
Subjective:   Chief Complaint/History of Present Illness:  Michela Valdez is a 73 y.o. female established patient who presents today to discuss medical problems as listed below. Michela is unaccompanied for today's visit.    Problem   Secondary Hypercoagulable State (Hcc)    She is on Eliquis for recent venous clot which led to a neurological event (?TGA). No bruising or bleeding issues.     Cerebral Venous Thrombosis of Sigmoid Sinus    MR brain (10/2023):  Subacute to chronic thrombus in the right sigmoid sinus and proximal right internal jugular vein at the skull base.    New finding during evaluation for acute mental status change, recommended Eliquis 5 mg twice daily for 3 months and follow up CTV/MRV at that time. Associated transient global amnesia which is improving.     Transient Global Amnesia    New diagnosis in Oct 2023, likely related to sinus venous thrombosis, recommended to proceed with 3 months of oral anticoagulation with follow up imaging. Shortly after discharge when I saw her memory and cognition were improved. Scheduled for repeat imaging in late January to follow up on venous clot. Will see vascular medicine next week.     Dyslipidemia     Latest Reference Range & Units 03/09/23 10:32   Cholesterol,Tot 100 - 199 mg/dL 177   Triglycerides 0 - 149 mg/dL 188 (H)   HDL >=40 mg/dL 43   LDL <100 mg/dL 96     Has a history of elevated cholesterol.  Her father had early CAD.  She previously followed with Dr. Bloch of vascular medicine.    Current regimen: Atorvastatin 80 mg daily   Previous regimen: above + aspirin 81 mg daily     Anticoagulated (Resolved)        Current Medications:  Current Outpatient Medications Ordered in Epic   Medication Sig Dispense Refill    Omega-3 Fatty Acids (FISH OIL) 1000 MG Cap capsule Take 1,000 mg by mouth 3 times a day with meals.      amLODIPine (NORVASC) 5 MG Tab Take 1 Tablet by mouth every day. 100 Tablet 3    apixaban (ELIQUIS) 5mg Tab Take 1 Tablet by mouth 2 times  "a day. 200 Tablet 3    levothyroxine (SYNTHROID) 88 MCG Tab TAKE 1 TABLET BY MOUTH EVERY DAY IN THE MORNING ON AN EMPTY STOMACH 100 Tablet 3    atorvastatin (LIPITOR) 80 MG tablet Take 1 Tablet by mouth every evening. TAKE 1 TABLET BY MOUTH DAILY (Patient taking differently: Take 80 mg by mouth every day. TAKE 1 TABLET BY MOUTH DAILY) 100 Tablet 3    Cholecalciferol (VITAMIN D3) 1000 units Cap Take 2,000 Units by mouth.      Glucosamine-Chondroitin (MOVE FREE PO) Take 2 Tablets by mouth every day. Chondroitin, 200mg       No current Epic-ordered facility-administered medications on file.          Objective:   Physical Exam:    Vitals: /60 (BP Location: Left arm, Patient Position: Sitting, BP Cuff Size: Adult)   Pulse 86   Temp 36.1 °C (97 °F) (Temporal)   Resp 16   Ht 1.676 m (5' 6\")   Wt 72.1 kg (159 lb)   SpO2 96%    BMI: Body mass index is 25.66 kg/m².  Physical Exam  Constitutional:       General: She is not in acute distress.     Appearance: Normal appearance. She is not ill-appearing.   Eyes:      General: No scleral icterus.     Conjunctiva/sclera: Conjunctivae normal.   Cardiovascular:      Rate and Rhythm: Normal rate and regular rhythm.      Pulses: Normal pulses.      Heart sounds: No murmur heard.  Pulmonary:      Effort: Pulmonary effort is normal. No respiratory distress.      Breath sounds: No wheezing, rhonchi or rales.   Musculoskeletal:      Right lower leg: No edema.      Left lower leg: No edema.   Lymphadenopathy:      Cervical: No cervical adenopathy.   Skin:     General: Skin is warm and dry.      Findings: No rash.   Neurological:      Gait: Gait normal.   Psychiatric:         Mood and Affect: Mood normal.         Behavior: Behavior normal.         Thought Content: Thought content normal.         Judgment: Judgment normal.               Assessment and Plan:   Michela is a 73 y.o. female with the following:  Problem List Items Addressed This Visit       Cerebral venous thrombosis of " sigmoid sinus     Recent problem, follow up imaging scheduled at 3 months for interval information, will see Dr. Bloch with vascular medicine next week. Continue Eliquis 5 mg twice daily in the interim.         Dyslipidemia     Chronic stable problem, now holding aspirin 81 mg daily due use of Eliquis. Could consider adding back after she concludes Eliquis.         Secondary hypercoagulable state (HCC)     New problem, stable on Eliquis, will see vascular medicine next week. No bruising or bleeding complications.         Transient global amnesia     Previous problem, unclear if truly TGA per neurology as she has slurred speech vs inappropriate speech at the time of the episode. Unlikely to be seizure. No further episodes since initial issue in late October.          Other Visit Diagnoses       Need for influenza vaccination        Relevant Orders    INFLUENZA VACCINE, HIGH DOSE (65+ ONLY) (Completed)               RTC: Return in about 3 months (around 2/8/2024).    I spent a total of 34 minutes with record review, exam, communication with the patient, communication with other providers, and documentation of this encounter.    PLEASE NOTE: This dictation was created using voice recognition software. I have made every reasonable attempt to correct obvious errors, but I expect that there are errors of grammar and possibly content that I did not discover before finalizing the note.      Archana Robertson, DO  Geriatric and Internal Medicine  [facility]

## 2023-11-14 DIAGNOSIS — E78.5 DYSLIPIDEMIA: ICD-10-CM

## 2023-11-14 RX ORDER — ATORVASTATIN CALCIUM 40 MG/1
40 TABLET, FILM COATED ORAL NIGHTLY
Qty: 100 TABLET | Refills: 3 | Status: SHIPPED | OUTPATIENT
Start: 2023-11-14 | End: 2024-01-05

## 2023-11-15 ENCOUNTER — OFFICE VISIT (OUTPATIENT)
Dept: VASCULAR LAB | Facility: MEDICAL CENTER | Age: 73
End: 2023-11-15
Attending: INTERNAL MEDICINE
Payer: MEDICARE

## 2023-11-15 DIAGNOSIS — E78.5 DYSLIPIDEMIA: ICD-10-CM

## 2023-11-15 DIAGNOSIS — G08 CEREBRAL VENOUS THROMBOSIS OF SIGMOID SINUS: ICD-10-CM

## 2023-11-15 DIAGNOSIS — E78.41 HIGH SERUM LIPOPROTEIN(A): ICD-10-CM

## 2023-11-15 DIAGNOSIS — R73.01 IMPAIRED FASTING BLOOD SUGAR: ICD-10-CM

## 2023-11-15 DIAGNOSIS — I10 PRIMARY HYPERTENSION: ICD-10-CM

## 2023-11-15 DIAGNOSIS — E89.0 POSTOPERATIVE HYPOTHYROIDISM: ICD-10-CM

## 2023-11-15 PROCEDURE — 99205 OFFICE O/P NEW HI 60 MIN: CPT | Performed by: INTERNAL MEDICINE

## 2023-11-15 RX ORDER — EZETIMIBE 10 MG/1
10 TABLET ORAL DAILY
Qty: 30 TABLET | Refills: 11 | Status: SHIPPED | OUTPATIENT
Start: 2023-11-15 | End: 2023-11-27 | Stop reason: SDUPTHER

## 2023-11-15 NOTE — PROGRESS NOTES
VASCULAR MEDICINE CLINIC - INITIAL VISIT  11/15/23     Michela Valdez is a 73 y.o. female who presents today  for vascular evaluation.     Subjective      HPI:  Patient referred for evaluation and management of cerebral venous thrombosis.   Patient had admission in october for acute confusional state and loss of memory.   Happened suddenly. Resolved in about 48 hours  No focal motor deficits.  No residual  Was found to have cerebral vein thrombosis on imaging  No source of embolism  Started on eliquis  Had previously been on asa  No bleeding  Good adherence  No previous h/o blood clots or cancer  No previous tia or cva  Has been on atorva 80 mg daily for many years  No known ascvd  Was hypertensive at presentation  Started on amlodipine in hospital  Tolerating without leg swelling  BPs 135-145/80s at home, but not always with great technique  Has prediabetes.  Never been on glucose lowering meds  No smoking  Had covid a few weeks prior to presentation    Past Medical History:   Diagnosis Date    Acute laryngitis 04/19/2022    Michela presents to clinic with 8 days of URI. Start as a cold with sinus pain and pressure and drainage. She now is left with residual cough which is very intrusive for her work. Cough is nonproductive. She is using OTC vitamins. She otherwise feels well. No fevers/chills, malaise, chest pain, productive cough, or GI symptoms. This feels similar to prior episodes of laryngitis. She has had good keyana    Cataract     esau IOL implants    Dyslipidemia 10/13/2015    Elevated LFTs 05/16/2018    High cholesterol     History of osteopenia 05/16/2018    Hyperlipidemia     Postoperative hypothyroidism 10/13/2015    Prediabetes 05/16/2018    Snoring     Thyroid disease     Urinary incontinence     stress incontinence, wears pad      Past Surgical History:   Procedure Laterality Date    CATARACT EXTRACTION WITH IOL Bilateral 2019    THYROIDECTOMY TOTAL  02/08/2012    Performed by ALEX CONTEH at [facility]GERY  [facility]    ABDOMINAL EXPLORATION      pyloric stenosis- as an infant    GYN SURGERY      D&C      Family History   Problem Relation Age of Onset    Osteoporosis Mother     Hyperlipidemia Father     Heart Disease Father         CAD, MI     Anemia Sister     Cancer Brother         prostate    Heart Disease Brother         CABG    Dad - fatal coronary disease in 50s    Social History     Tobacco Use    Smoking status: Former     Current packs/day: 0.00     Average packs/day: 0.5 packs/day for 4.0 years (2.0 ttl pk-yrs)     Types: Cigarettes     Start date:      Quit date:      Years since quittin.9    Smokeless tobacco: Never   Vaping Use    Vaping Use: Never used   Substance Use Topics    Alcohol use: Yes     Alcohol/week: 3.0 - 4.2 oz     Types: 5 - 7 Glasses of wine per week     Comment: glass of wine daily     Drug use: No      Current Outpatient Medications on File Prior to Visit   Medication Sig Dispense Refill    atorvastatin (LIPITOR) 40 MG Tab TAKE 1 TABLET BY MOUTH EVERY EVENING. TAKE 1 TABLET BY MOUTH DAILY 100 Tablet 3    Omega-3 Fatty Acids (FISH OIL) 1000 MG Cap capsule Take 1,000 mg by mouth 3 times a day with meals.      amLODIPine (NORVASC) 5 MG Tab Take 1 Tablet by mouth every day. 100 Tablet 3    apixaban (ELIQUIS) 5mg Tab Take 1 Tablet by mouth 2 times a day. 200 Tablet 3    levothyroxine (SYNTHROID) 88 MCG Tab TAKE 1 TABLET BY MOUTH EVERY DAY IN THE MORNING ON AN EMPTY STOMACH 100 Tablet 3    atorvastatin (LIPITOR) 80 MG tablet Take 1 Tablet by mouth every evening. TAKE 1 TABLET BY MOUTH DAILY (Patient taking differently: Take 80 mg by mouth every day. TAKE 1 TABLET BY MOUTH DAILY) 100 Tablet 3    Cholecalciferol (VITAMIN D3) 1000 units Cap Take 2,000 Units by mouth.      Glucosamine-Chondroitin (MOVE FREE PO) Take 2 Tablets by mouth every day. Chondroitin, 200mg       No current facility-administered medications on file prior to visit.        ALLERGIES  Naproxen and  "Penicillins     DIET AND EXERCISE:  Weight Change:stable  Diet: relatively HH  Exercise: mod regular            Objective     Objective:     There were no vitals filed for this visit.     Physical Exam  Constitutional:       General: She is not in acute distress.     Appearance: She is not diaphoretic.   HENT:      Head: Normocephalic and atraumatic.   Eyes:      General: No scleral icterus.     Conjunctiva/sclera: Conjunctivae normal.   Pulmonary:      Effort: Pulmonary effort is normal. No respiratory distress.   Skin:     Coloration: Skin is not pale.   Neurological:      Mental Status: She is alert and oriented to person, place, and time.      Cranial Nerves: No cranial nerve deficit.   Psychiatric:         Mood and Affect: Mood and affect normal.         Behavior: Behavior normal.          DATA REVIEW    Lab Results   Component Value Date/Time    CHOLSTRLTOT 177 03/09/2023 10:32 AM    LDL 96 03/09/2023 10:32 AM    HDL 43 03/09/2023 10:32 AM    TRIGLYCERIDE 188 (H) 03/09/2023 10:32 AM       Lab Results   Component Value Date/Time    SODIUM 133 (L) 10/23/2023 01:23 AM    POTASSIUM 3.9 10/23/2023 01:23 AM    CHLORIDE 98 10/23/2023 01:23 AM    CO2 22 10/23/2023 01:23 AM    GLUCOSE 119 (H) 10/23/2023 01:23 AM    BUN 14 10/23/2023 01:23 AM    CREATININE 0.47 (L) 10/23/2023 01:23 AM     Lab Results   Component Value Date/Time    ALKPHOSPHAT 110 (H) 10/23/2023 01:23 AM    ASTSGOT 27 10/23/2023 01:23 AM    ALTSGPT 27 10/23/2023 01:23 AM    TBILIRUBIN 0.6 10/23/2023 01:23 AM       Lab Results   Component Value Date/Time    HBA1C 6.2 (H) 10/22/2023 01:57 PM       No results found for: \"MICROALBCALC\", \"MALBCRT\", \"MALBEXCR\", \"DWVPCS83\", \"MICROALBUR\", \"MICRALB\", \"UMICROALBUM\", \"MICROALBTIM\"      Multiple imaging studies available in EMR and reviewed with patient at todays visit         Medical Decision Making:  Today's Assessment / Status / Plan:     1. Dyslipidemia  Comp Metabolic Panel    Lipid Profile    LIPOPROTEIN A    " ezetimibe (ZETIA) 10 MG Tab      2. Postoperative hypothyroidism  TSH    THYROXINE (TOTAL)      3. Primary hypertension  Comp Metabolic Panel    MICROALBUMIN CREAT RATIO URINE    CBC WITHOUT DIFFERENTIAL      4. Cerebral venous thrombosis of sigmoid sinus  FACTOR V LEIDEN MUTATION    FACTOR II DNA ANALYSIS    ANTICARDIOLIPIN AB IGG IGM    BETA-2 GLYCOPROTEIN I AB,G,A,M      5. Impaired fasting blood sugar  HEMOGLOBIN A1C      6. High serum lipoprotein(a)  ezetimibe (ZETIA) 10 MG Tab           Etiology of Established CVD if Present:     1) acute confusional state - TIA vs TGA. No stroke seen on imaging. No residual. No source of embolism or afib. Risk of future stroke described.   -911 with any tia or cva symptoms  -medical management per below  - follow up with neuro for further w/u and management  - 'rest' the brain - decrease stress, avoid anesthesia, limit etoh, plenty of sleep    2) cerebral vein thrombosis - unclear chronicity and unclear relation to TGA/TIA. Possibly related to previous covid. R/o hypercoag state  - medical management as below  - partial hypercoag w/u now and complete in future when safe to hold oac   - consider heme referral in future  - Will defer any indicated age appropriate screening for occult malignancy to pcp.  - follow up imaging per neuro    Lipid Management: Qualifies for Statin Therapy Based on 2018 ACC/AHA Guidelines: yes  Calculated 10-Year Risk of ASCVD: N/A  Currently on Statin: Yes  Strong FH of premature ASCVD  Subclinical athero as evidenced by mild carotid disease and elevated CAC score  Elevated lp(a) an independent risk factor for disease  Given above would aim for LDL <70 and nonHDL <100  Plan  - recommended family lipid and lp(a) screening  - continue atorva 80  - add ezetimibe 10   - recheck fasting lipid panel and lp(a) prior to next visit    Blood Pressure Management:  Acc[place] (2017) Blood Pressure Goal <130/80  BP high at presentation, but no previous h/o  hypertension   Unclear chronicity  Remains high at home, but decent in office  Aim for 'soft landing'  Plan:  - continue amlodipine 5 mg daily as monotherapy for now  - continue home bp monitoring with good technique  - recheck gfr/lytes, urine for ma  - consider intensification of therapy at next viist    Glycemic Status: Prediabetic  - continue lifestyle mod  - recheck fasting glucose and A1C    Anti-Platelet/Anti-Coagulant Tx: Yes  - continue eliquis for at least 3-6 mo  - since event happened on asa, would consider long term clopiodgrel when and if stops OAC    Smoking: continue complete avoidance of all tobacco products    Physical Activity: restart mod intensity exercise    Weight Management and Nutrition: recommended med style diet    Other:     - Hypothroidism - appears under good control. Continue current levo. 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 After that would recommend ortho consult with neuro prior to surgery. Avoid systemic nsaids or steroids while on anticoag.       Instructed to follow-up with PCP for remainder of adult medical needs: yes  We will partner with other providers in the management of established vascular disease and cardiometabolic risk factors.    Studies to Be Obtained: per neuro  Labs to Be Obtained: as above    Follow up in: 6 weeks    Total time: 62 min - chart review/prep, review of other providers' records, imaging/lab review, face-to-face time for history/examination, ordering, prescribing,  review of results/meds/ treatment plan with patient/family/caregiver, documentation in EMR, care coordination (as needed)     Michael J Bloch, M.D.     Cc:   JESSICA Ortiz

## 2023-11-16 ENCOUNTER — APPOINTMENT (OUTPATIENT)
Dept: LAB | Facility: MEDICAL CENTER | Age: 73
End: 2023-11-16
Payer: MEDICARE

## 2023-11-16 ENCOUNTER — TELEPHONE (OUTPATIENT)
Dept: VASCULAR LAB | Facility: MEDICAL CENTER | Age: 73
End: 2023-11-16
Payer: MEDICARE

## 2023-11-16 NOTE — TELEPHONE ENCOUNTER
Attempted to contact patient at [phone] to discuss [facility] and services/benefits offered. No answer, not able to leave voicemail.    Esther Ibrahim

## 2023-11-27 DIAGNOSIS — E78.5 DYSLIPIDEMIA: ICD-10-CM

## 2023-11-27 DIAGNOSIS — E78.41 HIGH SERUM LIPOPROTEIN(A): ICD-10-CM

## 2023-11-27 RX ORDER — EZETIMIBE 10 MG/1
10 TABLET ORAL DAILY
Qty: 90 TABLET | Refills: 3 | Status: SHIPPED | OUTPATIENT
Start: 2023-11-27

## 2023-12-08 ENCOUNTER — TELEPHONE (OUTPATIENT)
Dept: MEDICAL GROUP | Facility: PHYSICIAN GROUP | Age: 73
End: 2023-12-08
Payer: MEDICARE

## 2023-12-08 NOTE — TELEPHONE ENCOUNTER
Patient LVM about side effects from Rybelsus, states she has mostly GI problems. Acid reflux, nausea, upset stomach, loss of appetite. No diarrhea or vomiting and preexisting general constipation. Advised to go to UC or ER if symptoms return and or worsen

## 2023-12-12 NOTE — TELEPHONE ENCOUNTER
Unfortunately these are not unexpected side effects with house this medicine works so if the symptoms are very aggravating then will likely need to stop the medicine.

## 2023-12-13 PROCEDURE — RXMED WILLOW AMBULATORY MEDICATION CHARGE: Performed by: INTERNAL MEDICINE

## 2023-12-14 ENCOUNTER — PHARMACY VISIT (OUTPATIENT)
Dept: PHARMACY | Facility: MEDICAL CENTER | Age: 73
End: 2023-12-14
Payer: COMMERCIAL

## 2023-12-14 NOTE — TELEPHONE ENCOUNTER
Contact:  Phone number:[phone] (mobile)    Name of person spoken with and relationship to patient: SELF   Patient’s Adherence:  How patient is doing on medication: Very Well    How many missed doses and reason: 0 N/A    Any new medications: no    Any new conditions: no    Any new allergies: no    Any new side effects: no    Any new diagnoses: no    How many doses remaining: 10    Did patient want to speak with pharmacist: No   Delivery:  Delivery date and method: 12/14/2023 via     Needs by Date: 03/23/2024    Signature required: No     Any additional details for :  IT'S A GATED COMMUNITY, BUT PT WILL NOTIFY THE  TO LET THE  ENTER AND ONCE YOU GET IN TO THE PT'S HOME ADDRESS, PLEASE LEAVE AT FRONT DOOR    Teach Appointment Date:  N/A   Shipping Address:  [address] [place] [address] [place] [address]   Medication(name,strength and dose):  EZETIMIBE 10MG TABLETS   Copay:  $12.96   Payment Method:  Credit card on file   Supplies:  N/A   Additional Information:  Called and spoke to Pt to offer [facility] pharmacy services for her next fill for Ezetimibe. Pt previously decline to fill through [facility] due to convenience with her current pharmacy. Pt also paid for $11.76/30DS at her current pharmacy, and this liaison ran a test claim through [facility] pharmacy and her new copay is $12.96/9DS. Pt agreed accept our pharmacy services due to cost and convenience for delivery. Pt will be expecting to receive her prescription via  on 12/14. Pt has no further questions or concerns at this time.      CONSUELO Clements, PhT  Pharmacy Liaison (Rx Coordinator)  P: [phone]  12/13/2023 5:10 PM

## 2023-12-14 NOTE — TELEPHONE ENCOUNTER
Called and spoke to Pt regarding with setting her up for ezetimibe refills through [facility]. Per Pt, she would like to find out how much she did paid for her medication last time.     Called [facility] pharmacy @ [phone] and spoke with Eleazar Knutson to verify copay cost. Per Eamon, Pt paid for $11.76/30DS.     Called Pt back and LVM.     CONSUELO Clements, PhT  Pharmacy Liaison (Rx Coordinator)  P: [phone]  12/13/2023 4:25 PM

## 2023-12-15 DIAGNOSIS — E89.0 POSTOPERATIVE HYPOTHYROIDISM: ICD-10-CM

## 2023-12-15 RX ORDER — LEVOTHYROXINE SODIUM 88 UG/1
88 TABLET ORAL
Qty: 100 TABLET | Refills: 3 | OUTPATIENT
Start: 2023-12-15

## 2023-12-18 ENCOUNTER — TELEPHONE (OUTPATIENT)
Dept: VASCULAR LAB | Facility: MEDICAL CENTER | Age: 73
End: 2023-12-18
Payer: MEDICARE

## 2023-12-18 NOTE — TELEPHONE ENCOUNTER
Called and left message to reschedule follow-up appt with Dr Bloch on Jan 8 2024.     Openings on:  Jan 5 - 1PM-440PM     Please contact Shalonda Rodriguez, Med Ass't  for further questions.     Shalonda Rodriguez, Med Ass't  [facility]  Ph. [phone]  Fx. [phone]

## 2023-12-24 ENCOUNTER — APPOINTMENT (OUTPATIENT)
Dept: RADIOLOGY | Facility: MEDICAL CENTER | Age: 73
End: 2023-12-24
Attending: EMERGENCY MEDICINE
Payer: MEDICARE

## 2023-12-24 ENCOUNTER — HOSPITAL ENCOUNTER (EMERGENCY)
Facility: MEDICAL CENTER | Age: 73
End: 2023-12-24
Attending: EMERGENCY MEDICINE
Payer: MEDICARE

## 2023-12-24 VITALS
HEIGHT: 66 IN | SYSTOLIC BLOOD PRESSURE: 143 MMHG | OXYGEN SATURATION: 95 % | TEMPERATURE: 97.2 F | BODY MASS INDEX: 25.05 KG/M2 | RESPIRATION RATE: 18 BRPM | DIASTOLIC BLOOD PRESSURE: 61 MMHG | WEIGHT: 155.87 LBS | HEART RATE: 92 BPM

## 2023-12-24 DIAGNOSIS — G45.4 TRANSIENT GLOBAL AMNESIA: ICD-10-CM

## 2023-12-24 LAB
ABO + RH BLD: NORMAL
ABO GROUP BLD: NORMAL
ALBUMIN SERPL BCP-MCNC: 4.6 G/DL (ref 3.2–4.9)
ALBUMIN/GLOB SERPL: 1.6 G/DL
ALP SERPL-CCNC: 123 U/L (ref 30–99)
ALT SERPL-CCNC: 34 U/L (ref 2–50)
AMPHET UR QL SCN: NEGATIVE
ANION GAP SERPL CALC-SCNC: 13 MMOL/L (ref 7–16)
APPEARANCE UR: CLEAR
APTT PPP: 32.9 SEC (ref 24.7–36)
AST SERPL-CCNC: 34 U/L (ref 12–45)
BARBITURATES UR QL SCN: NEGATIVE
BASOPHILS # BLD AUTO: 0.3 % (ref 0–1.8)
BASOPHILS # BLD: 0.03 K/UL (ref 0–0.12)
BENZODIAZ UR QL SCN: NEGATIVE
BILIRUB SERPL-MCNC: 0.5 MG/DL (ref 0.1–1.5)
BILIRUB UR QL STRIP.AUTO: NEGATIVE
BLD GP AB SCN SERPL QL: NORMAL
BUN SERPL-MCNC: 18 MG/DL (ref 8–22)
BZE UR QL SCN: NEGATIVE
CALCIUM ALBUM COR SERPL-MCNC: 8.9 MG/DL (ref 8.5–10.5)
CALCIUM SERPL-MCNC: 9.4 MG/DL (ref 8.5–10.5)
CANNABINOIDS UR QL SCN: NEGATIVE
CHLORIDE SERPL-SCNC: 100 MMOL/L (ref 96–112)
CO2 SERPL-SCNC: 22 MMOL/L (ref 20–33)
COLOR UR: YELLOW
CREAT SERPL-MCNC: 0.49 MG/DL (ref 0.5–1.4)
EKG IMPRESSION: NORMAL
EOSINOPHIL # BLD AUTO: 0.03 K/UL (ref 0–0.51)
EOSINOPHIL NFR BLD: 0.3 % (ref 0–6.9)
ERYTHROCYTE [DISTWIDTH] IN BLOOD BY AUTOMATED COUNT: 41.9 FL (ref 35.9–50)
ETHANOL BLD-MCNC: <10.1 MG/DL
FENTANYL UR QL: NEGATIVE
GFR SERPLBLD CREATININE-BSD FMLA CKD-EPI: 98 ML/MIN/1.73 M 2
GLOBULIN SER CALC-MCNC: 2.8 G/DL (ref 1.9–3.5)
GLUCOSE SERPL-MCNC: 94 MG/DL (ref 65–99)
GLUCOSE UR STRIP.AUTO-MCNC: NEGATIVE MG/DL
HCT VFR BLD AUTO: 39.1 % (ref 37–47)
HGB BLD-MCNC: 13.9 G/DL (ref 12–16)
IMM GRANULOCYTES # BLD AUTO: 0.02 K/UL (ref 0–0.11)
IMM GRANULOCYTES NFR BLD AUTO: 0.2 % (ref 0–0.9)
INR PPP: 1.39 (ref 0.87–1.13)
KETONES UR STRIP.AUTO-MCNC: NEGATIVE MG/DL
LEUKOCYTE ESTERASE UR QL STRIP.AUTO: NEGATIVE
LYMPHOCYTES # BLD AUTO: 1.68 K/UL (ref 1–4.8)
LYMPHOCYTES NFR BLD: 16.7 % (ref 22–41)
MCH RBC QN AUTO: 31.3 PG (ref 27–33)
MCHC RBC AUTO-ENTMCNC: 35.5 G/DL (ref 32.2–35.5)
MCV RBC AUTO: 88.1 FL (ref 81.4–97.8)
METHADONE UR QL SCN: NEGATIVE
MICRO URNS: NORMAL
MONOCYTES # BLD AUTO: 0.6 K/UL (ref 0–0.85)
MONOCYTES NFR BLD AUTO: 6 % (ref 0–13.4)
NEUTROPHILS # BLD AUTO: 7.68 K/UL (ref 1.82–7.42)
NEUTROPHILS NFR BLD: 76.5 % (ref 44–72)
NITRITE UR QL STRIP.AUTO: NEGATIVE
NRBC # BLD AUTO: 0 K/UL
NRBC BLD-RTO: 0 /100 WBC (ref 0–0.2)
OPIATES UR QL SCN: NEGATIVE
OXYCODONE UR QL SCN: NEGATIVE
PCP UR QL SCN: NEGATIVE
PH UR STRIP.AUTO: 6 [PH] (ref 5–8)
PLATELET # BLD AUTO: 282 K/UL (ref 164–446)
PMV BLD AUTO: 8.8 FL (ref 9–12.9)
POTASSIUM SERPL-SCNC: 3.7 MMOL/L (ref 3.6–5.5)
PROPOXYPH UR QL SCN: NEGATIVE
PROT SERPL-MCNC: 7.4 G/DL (ref 6–8.2)
PROT UR QL STRIP: NEGATIVE MG/DL
PROTHROMBIN TIME: 17.2 SEC (ref 12–14.6)
RBC # BLD AUTO: 4.44 M/UL (ref 4.2–5.4)
RBC UR QL AUTO: NEGATIVE
RH BLD: NORMAL
SODIUM SERPL-SCNC: 135 MMOL/L (ref 135–145)
SP GR UR STRIP.AUTO: 1.01
TROPONIN T SERPL-MCNC: 9 NG/L (ref 6–19)
UROBILINOGEN UR STRIP.AUTO-MCNC: 0.2 MG/DL
WBC # BLD AUTO: 10 K/UL (ref 4.8–10.8)

## 2023-12-24 PROCEDURE — 36415 COLL VENOUS BLD VENIPUNCTURE: CPT

## 2023-12-24 PROCEDURE — 85025 COMPLETE CBC W/AUTO DIFF WBC: CPT

## 2023-12-24 PROCEDURE — 70450 CT HEAD/BRAIN W/O DYE: CPT

## 2023-12-24 PROCEDURE — 80053 COMPREHEN METABOLIC PANEL: CPT

## 2023-12-24 PROCEDURE — 99285 EMERGENCY DEPT VISIT HI MDM: CPT

## 2023-12-24 PROCEDURE — 85610 PROTHROMBIN TIME: CPT

## 2023-12-24 PROCEDURE — 70498 CT ANGIOGRAPHY NECK: CPT

## 2023-12-24 PROCEDURE — 0042T CT-CEREBRAL PERFUSION ANALYSIS: CPT

## 2023-12-24 PROCEDURE — 86900 BLOOD TYPING SEROLOGIC ABO: CPT

## 2023-12-24 PROCEDURE — 94760 N-INVAS EAR/PLS OXIMETRY 1: CPT

## 2023-12-24 PROCEDURE — 86850 RBC ANTIBODY SCREEN: CPT

## 2023-12-24 PROCEDURE — 86901 BLOOD TYPING SEROLOGIC RH(D): CPT

## 2023-12-24 PROCEDURE — 85730 THROMBOPLASTIN TIME PARTIAL: CPT

## 2023-12-24 PROCEDURE — 81003 URINALYSIS AUTO W/O SCOPE: CPT | Mod: XU

## 2023-12-24 PROCEDURE — 80307 DRUG TEST PRSMV CHEM ANLYZR: CPT

## 2023-12-24 PROCEDURE — 700117 HCHG RX CONTRAST REV CODE 255: Performed by: EMERGENCY MEDICINE

## 2023-12-24 PROCEDURE — 71045 X-RAY EXAM CHEST 1 VIEW: CPT

## 2023-12-24 PROCEDURE — 93005 ELECTROCARDIOGRAM TRACING: CPT | Performed by: EMERGENCY MEDICINE

## 2023-12-24 PROCEDURE — 70496 CT ANGIOGRAPHY HEAD: CPT

## 2023-12-24 PROCEDURE — 84484 ASSAY OF TROPONIN QUANT: CPT

## 2023-12-24 PROCEDURE — 82077 ASSAY SPEC XCP UR&BREATH IA: CPT

## 2023-12-24 RX ORDER — YEAST,DRIED (S. CEREVISIAE)
1 POWDER (GRAM) ORAL DAILY
COMMUNITY

## 2023-12-24 RX ADMIN — IOHEXOL 40 ML: 350 INJECTION, SOLUTION INTRAVENOUS at 14:10

## 2023-12-24 RX ADMIN — IOHEXOL 70 ML: 350 INJECTION, SOLUTION INTRAVENOUS at 14:12

## 2023-12-24 ASSESSMENT — FIBROSIS 4 INDEX: FIB4 SCORE: 1.48

## 2023-12-24 NOTE — ED TRIAGE NOTES
"Chief Complaint   Patient presents with    ALOC     Pt reports she went to bed last night at 2300 and woke up this morning at 0900 and she felt confused and called her daughter. Per EMS while they were assessing the pt the pt would forget events that had just occurred. Pt arrives AAOx4 with a GCS of 15. Pt negative for any unilateral weakness, slurred speech, or facial droop in triage. Pt has hx of a R IJ clot that was found in October of this year and currently anticoagulated. BGL  mg/dL.      BP (!) 168/82   Pulse 93   Temp 36.2 °C (97.2 °F) (Temporal)   Resp 16   Ht 1.676 m (5' 6\")   Wt 70.7 kg (155 lb 13.8 oz)   SpO2 97%   BMI 25.16 kg/m²     Pt ambulatory to triage for above.   "

## 2023-12-24 NOTE — ED PROVIDER NOTES
ED Provider Note    CHIEF COMPLAINT  Chief Complaint   Patient presents with    ALOC     Pt reports she went to bed last night at 2300 and woke up this morning at 0900 and she felt confused and called her daughter. Per EMS while they were assessing the pt the pt would forget events that had just occurred. Pt arrives AAOx4 with a GCS of 15. Pt negative for any unilateral weakness, slurred speech, or facial droop in triage. Pt has hx of a R IJ clot that was found in October of this year and currently anticoagulated. BGL  mg/dL.        EXTERNAL RECORDS REVIEWED  Inpatient Notes patient recently admitted in October for acute confusion.  Was ultimately diagnosed with transient global amnesia.  The patient was found to have a subacute clot in her IJ.  She was anticoagulated.    HPI/ROS  LIMITATION TO HISTORY   Select: Altered mental status / Confusion  OUTSIDE HISTORIAN(S):  Family daughter and son provided additional history of lightheadedness    Michela Valdez is a 73 y.o. female who presents to the ED with confusion.  The patient was recently admitted to the hospital in October secondary to confusion, was found to have a clot in her right IJ, did not know if that was the cause of the patient's confusion.  The patient states she went to bed normal last night, woke up this morning and is confused.  She was having difficulty figuring out what day it was where she was, no other numbness, tingling, weakness.  No new or different medicines, no chest pain, shortness of breath, abdominal pains, nausea vomiting, she does have a slight cough.    PAST MEDICAL HISTORY   has a past medical history of Acute laryngitis (04/19/2022), Cataract, Dyslipidemia (10/13/2015), Elevated LFTs (05/16/2018), High cholesterol, History of osteopenia (05/16/2018), Hyperlipidemia, Postoperative hypothyroidism (10/13/2015), Prediabetes (05/16/2018), Snoring, Thyroid disease, and Urinary incontinence.    SURGICAL HISTORY   has a past surgical  "history that includes gyn surgery; thyroidectomy total (2012); abdominal exploration; and cataract extraction with iol (Bilateral, 2019).    FAMILY HISTORY  Family History   Problem Relation Age of Onset    Osteoporosis Mother     Hyperlipidemia Father     Heart Disease Father         CAD, MI     Anemia Sister     Cancer Brother         prostate    Heart Disease Brother         CABG       SOCIAL HISTORY  Social History     Tobacco Use    Smoking status: Former     Current packs/day: 0.00     Average packs/day: 0.5 packs/day for 4.0 years (2.0 ttl pk-yrs)     Types: Cigarettes     Start date:      Quit date:      Years since quittin.0    Smokeless tobacco: Never   Vaping Use    Vaping Use: Never used   Substance and Sexual Activity    Alcohol use: Yes     Alcohol/week: 3.0 - 4.2 oz     Types: 5 - 7 Glasses of wine per week     Comment: glass of wine daily     Drug use: No    Sexual activity: Not Currently       CURRENT MEDICATIONS  Home Medications       Reviewed by Eleazar Chávez (Pharmacy Tech) on 23 at 1438  Med List Status: Complete     Medication Last Dose Status   amLODIPine (NORVASC) 5 MG Tab 2023 Active   apixaban (ELIQUIS) 5mg Tab 2023 Active   atorvastatin (LIPITOR) 40 MG Tab 2023 Active   Cholecalciferol (VITAMIN D3) 1000 units Cap 2023 Active   ezetimibe (ZETIA) 10 MG Tab 2023 Active   Glucos-Chond-Hyal Ac-Ca Fructo (MOVE FREE [facility]) Tab 2023 Active   levothyroxine (SYNTHROID) 88 MCG Tab 2023 Active   Omega-3 Fatty Acids (FISH OIL) 1000 MG Cap capsule 2023 Active                    ALLERGIES  Allergies   Allergen Reactions    Naproxen Hives and Rash          Penicillins Rash     As a young child       PHYSICAL EXAM  VITAL SIGNS: BP (!) 143/61   Pulse 92   Temp 36.2 °C (97.2 °F)   Resp 18   Ht 1.676 m (5' 6\")   Wt 70.7 kg (155 lb 13.8 oz)   SpO2 95%   BMI 25.16 kg/m²    Well-developed well-nourished " 73-year-old female who appears in no distress  Atraumatic, normocephalic, oropharynx is clear  Neck is supple  Clear to auscultation  Regular rhythm  Abdomen soft nontender  Neuro awake alert speech is clear cranials 2 through 12 grossly intact muscle strength is 5-5 throughout, normal speech normal vision seems to have appropriate memory    DIAGNOSTIC STUDIES / PROCEDURES  Results for orders placed or performed during the hospital encounter of 12/24/23   CBC With Differential   Result Value Ref Range    WBC 10.0 4.8 - 10.8 K/uL    RBC 4.44 4.20 - 5.40 M/uL    Hemoglobin 13.9 12.0 - 16.0 g/dL    Hematocrit 39.1 37.0 - 47.0 %    MCV 88.1 81.4 - 97.8 fL    MCH 31.3 27.0 - 33.0 pg    MCHC 35.5 32.2 - 35.5 g/dL    RDW 41.9 35.9 - 50.0 fL    Platelet Count 282 164 - 446 K/uL    MPV 8.8 (L) 9.0 - 12.9 fL    Neutrophils-Polys 76.50 (H) 44.00 - 72.00 %    Lymphocytes 16.70 (L) 22.00 - 41.00 %    Monocytes 6.00 0.00 - 13.40 %    Eosinophils 0.30 0.00 - 6.90 %    Basophils 0.30 0.00 - 1.80 %    Immature Granulocytes 0.20 0.00 - 0.90 %    Nucleated RBC 0.00 0.00 - 0.20 /100 WBC    Neutrophils (Absolute) 7.68 (H) 1.82 - 7.42 K/uL    Lymphs (Absolute) 1.68 1.00 - 4.80 K/uL    Monos (Absolute) 0.60 0.00 - 0.85 K/uL    Eos (Absolute) 0.03 0.00 - 0.51 K/uL    Baso (Absolute) 0.03 0.00 - 0.12 K/uL    Immature Granulocytes (abs) 0.02 0.00 - 0.11 K/uL    NRBC (Absolute) 0.00 K/uL   Comp Metabolic Panel   Result Value Ref Range    Sodium 135 135 - 145 mmol/L    Potassium 3.7 3.6 - 5.5 mmol/L    Chloride 100 96 - 112 mmol/L    Co2 22 20 - 33 mmol/L    Anion Gap 13.0 7.0 - 16.0    Glucose 94 65 - 99 mg/dL    Bun 18 8 - 22 mg/dL    Creatinine 0.49 (L) 0.50 - 1.40 mg/dL    Calcium 9.4 8.5 - 10.5 mg/dL    Correct Calcium 8.9 8.5 - 10.5 mg/dL    AST(SGOT) 34 12 - 45 U/L    ALT(SGPT) 34 2 - 50 U/L    Alkaline Phosphatase 123 (H) 30 - 99 U/L    Total Bilirubin 0.5 0.1 - 1.5 mg/dL    Albumin 4.6 3.2 - 4.9 g/dL    Total Protein 7.4 6.0 - 8.2  g/dL    Globulin 2.8 1.9 - 3.5 g/dL    A-G Ratio 1.6 g/dL   Diagnostic Alcohol   Result Value Ref Range    Diagnostic Alcohol <10.1 <10.1 mg/dL   Urinalysis    Specimen: Urine   Result Value Ref Range    Color Yellow     Character Clear     Specific Gravity 1.013 <1.035    Ph 6.0 5.0 - 8.0    Glucose Negative Negative mg/dL    Ketones Negative Negative mg/dL    Protein Negative Negative mg/dL    Bilirubin Negative Negative    Urobilinogen, Urine 0.2 Negative    Nitrite Negative Negative    Leukocyte Esterase Negative Negative    Occult Blood Negative Negative    Micro Urine Req see below    Urine Drug Screen (Triage)   Result Value Ref Range    Amphetamines Urine Negative Negative    Barbiturates Negative Negative    Benzodiazepines Negative Negative    Cocaine Metabolite Negative Negative    Fentanyl, Urine Negative Negative    Methadone Negative Negative    Opiates Negative Negative    Oxycodone Negative Negative    Phencyclidine -Pcp Negative Negative    Propoxyphene Negative Negative    Cannabinoid Metab Negative Negative   PROTHROMBIN TIME   Result Value Ref Range    PT 17.2 (H) 12.0 - 14.6 sec    INR 1.39 (H) 0.87 - 1.13   APTT   Result Value Ref Range    APTT 32.9 24.7 - 36.0 sec   COD (ADULT)   Result Value Ref Range    ABO Grouping Only A     Rh Grouping Only POS     Antibody Screen-Cod NEG    TROPONIN   Result Value Ref Range    Troponin T 9 6 - 19 ng/L   ABO Rh Confirm   Result Value Ref Range    ABO Rh Confirm A POS    ESTIMATED GFR   Result Value Ref Range    GFR (CKD-EPI) 98 >60 mL/min/1.73 m 2   EKG (NOW)   Result Value Ref Range    Report       [facility] Emergency Dept.    Test Date:  2023  Pt Name:    CORBY OLIVIA                  Department: ER  MRN:        [id number]                      Room:  Gender:     Female                       Technician: [id number]  :        [id number]                   Requested By:WALT MAHMOOD  Order #:    [id number]                    Reading  MD:    Measurements  Intervals                                Axis  Rate:       87                           P:          71  TX:         171                          QRS:        33  QRSD:       97                           T:          21  QT:         382  QTc:        460    Interpretive Statements  Sinus rhythm  Probable left ventricular hypertrophy  Compared to ECG 10/22/2023 13:39:22  No significant changes          RADIOLOGY  I have independently interpreted the diagnostic imaging associated with this visit and am waiting the final reading from the radiologist.   My preliminary interpretation is as follows: No pneumonia  Radiologist interpretation:   DX-CHEST-PORTABLE (1 VIEW)   Final Result      1.  There is no acute cardiopulmonary process.      CT-CTA NECK WITH & W/O-POST PROCESSING   Final Result      1.  Mild bilateral atherosclerosis with less than 50% ICA stenosis.      CT-CTA HEAD WITH & W/O-POST PROCESS   Final Result      1.  CT angiogram of the [place] demonstrating no large vessel occlusion.      CT-CEREBRAL PERFUSION ANALYSIS   Final Result      1.  Cerebral blood flow less than 30% likely representing completed infarct = 0 mL.      2.  T Max more than 6 seconds likely representing combination of completed infarct and ischemia = 0 mL.      3.  Mismatched volume likely representing ischemic brain/penumbra = None      4.  Please note that the cerebral perfusion was performed on the limited brain tissue around the basal ganglia region. Infarct/ischemia outside the CT perfusion sections can be missed in this study.      CT-HEAD W/O   Final Result      1.  There is no acute hemorrhage or infarct.      2.  White matter lucencies most consistent with small vessel ischemic change versus demyelination versus gliosis.               COURSE & MEDICAL DECISION MAKING    ED Observation Status?  No    INITIAL ASSESSMENT, COURSE AND PLAN  Care Narrative: Patient with altered mental status, some confusion,  she does not seem to be confused at this time.  No focal neurologic deficits.  I will get a stroke workup.  Patient's NIH is 0 at this time.    Reviewed the patient's medical records, when the patient was admitted to the hospital and was seen to have the internal jugular and venous sinus thrombosis, the patient was felt to have transient global amnesia and that the thrombosis was not linked to the transient global amnesia.  The patient CT scans are unremarkable here, she seems much improved here.  I believe she is having another episode of transient global amnesia.  I do not see any signs or symptoms of a stroke.  She had a full workup for stroke/syncope last hospitalization.  This time I believe the patient can be discharged home, family is comfortable with this, they will have somebody with her to make sure she does not get more confusion.  DISPOSITION AND DISCUSSIONS  Escalation of care considered, and ultimately not performed:acute inpatient care management, however at this time, the patient is most appropriate for outpatient management    FINAL DIAGNOSIS  1. Transient global amnesia           Electronically signed by: Quinten Ferrari M.D., 12/24/2023 2:03 PM

## 2023-12-24 NOTE — ED NOTES
Discharge education provided. Patient verbalizes understanding. Patient A/0x4 and ambulatory to the lobby with a steady gait. Patient discharged home to self car with family

## 2023-12-24 NOTE — ED NOTES
Med rec updated and complete. Allergies reviewed.   Interviewed pt at bedside.  Pt confirmed name and date of birth.  No antibiotic use in last 30 days.  Last dose of eliquis 12/24/23.      Home pharmacy  [facility] = [phone]

## 2023-12-26 ENCOUNTER — TELEPHONE (OUTPATIENT)
Dept: MEDICAL GROUP | Facility: PHYSICIAN GROUP | Age: 73
End: 2023-12-26
Payer: MEDICARE

## 2024-01-03 ENCOUNTER — OFFICE VISIT (OUTPATIENT)
Dept: MEDICAL GROUP | Facility: PHYSICIAN GROUP | Age: 74
End: 2024-01-03
Payer: MEDICARE

## 2024-01-03 VITALS
BODY MASS INDEX: 25.2 KG/M2 | DIASTOLIC BLOOD PRESSURE: 66 MMHG | RESPIRATION RATE: 16 BRPM | HEART RATE: 85 BPM | WEIGHT: 156.8 LBS | HEIGHT: 66 IN | SYSTOLIC BLOOD PRESSURE: 110 MMHG | TEMPERATURE: 98.7 F | OXYGEN SATURATION: 96 %

## 2024-01-03 DIAGNOSIS — G08 CEREBRAL VENOUS THROMBOSIS OF SIGMOID SINUS: ICD-10-CM

## 2024-01-03 DIAGNOSIS — G45.4 TRANSIENT GLOBAL AMNESIA: ICD-10-CM

## 2024-01-03 DIAGNOSIS — M12.811 ROTATOR CUFF ARTHROPATHY OF RIGHT SHOULDER: ICD-10-CM

## 2024-01-03 DIAGNOSIS — I77.9 MILD CAROTID ARTERY DISEASE (HCC): ICD-10-CM

## 2024-01-03 PROCEDURE — 3074F SYST BP LT 130 MM HG: CPT | Performed by: INTERNAL MEDICINE

## 2024-01-03 PROCEDURE — 3078F DIAST BP <80 MM HG: CPT | Performed by: INTERNAL MEDICINE

## 2024-01-03 PROCEDURE — 99214 OFFICE O/P EST MOD 30 MIN: CPT | Performed by: INTERNAL MEDICINE

## 2024-01-03 ASSESSMENT — FIBROSIS 4 INDEX: FIB4 SCORE: 1.55

## 2024-01-03 ASSESSMENT — PATIENT HEALTH QUESTIONNAIRE - PHQ9: CLINICAL INTERPRETATION OF PHQ2 SCORE: 0

## 2024-01-04 NOTE — ASSESSMENT & PLAN NOTE
Chronic ongoing problem, will complete MRI of the shoulder and 2 weeks then has plans for shoulder replacement in February.  Question on timing of surgery with recent neurologic episodes.

## 2024-01-04 NOTE — ASSESSMENT & PLAN NOTE
Recent issue in October after COVID infection treatment with Paxlovid, will complete 3 months on Eliquis 5 mg twice daily for venous thrombosis of the sigmoid sinus.  Fortunately she had a second neurologic event, still unclear if this is TGA versus TIA.  Will see vascular medicine and neurology later this week.  Continue current medical therapy in the interim but there may be consideration to add antiplatelet back to her regiment.

## 2024-01-04 NOTE — PROGRESS NOTES
Subjective:   Chief Complaint/History of Present Illness:  Michela Valdez is a 73 y.o. female established patient who presents today to discuss medical problems as listed below. Michela is accompanied by her daughter for today's visit.    Problem   Cerebral Venous Thrombosis of Sigmoid Sinus    MR brain (10/2023):  Subacute to chronic thrombus in the right sigmoid sinus and proximal right internal jugular vein at the skull base.    New finding during evaluation for acute mental status change, recommended Eliquis 5 mg twice daily for 3 months and follow up CTV/MRV at that time. Associated transient global amnesia which is improving.     Transient global amnesia vs transient ischemic attack    New diagnosis in Oct 2023, likely related to sinus venous thrombosis, recommended to proceed with 3 months of oral anticoagulation with follow up imaging. Shortly after discharge when I saw her memory and cognition were improved. Scheduled for repeat imaging in late January to follow up on venous clot. Will see vascular medicine next week.    Unfortunately, she sustained a second neurologic episode approximately 8 weeks after the October event on December 24, 2023.  Very similar as it started early in the morning and her daughter could tell on the phone almost immediately that she was having abnormal thought process.  She came to keep an eye on her and thought it may have been improving but then at 1 point her head slumped to the side and she was out of it for 20 second period.  Follow-up with vascular medicine and neurology scheduled on Friday.     Rotator Cuff Arthropathy of Right Shoulder    She has right shoulder pain and was felt to have an acute on chronic rotator cuff tear in spring 2022.  She saw Dr. Blevins with physiatry.  She is going to complete an MRI of the right shoulder in early October 2022.  She is interested in operative repair if indicated.    Follow-up MRI of the shoulder on January 15 2023 with plans for  "elective surgery in February 2024 with Dr. Garcia.     Mild carotid artery stenosis (HCC)    Carotid US (2022):  Right- Heterogeneous plaque of the bifurcation extending into the internal carotid artery. <50% internal carotid artery stenosis. Plaque is smooth on the surface and heterogeneous with mixed echogenicity.  Left- Very mild plaque of the carotid bifurcation. Doppler velocities are normal throughout the carotid arteries. Plaque is smooth on the surface and homogeneous with low echogenicity. The bilateral subclavian and vertebral artery waveforms are antegrade and normal in character and velocity.    She has known right greater than left mild carotid artery disease.  She is followed vascular medicine in the past.  Her father had early CAD.  She is on statin and low-dose aspirin.          Current Medications:  Current Outpatient Medications Ordered in Epic   Medication Sig Dispense Refill    Glucos-Chond-Hyal Ac-Ca Fructo (MOVE FREE [facility]) Tab Take 1 Tablet by mouth every day.      ezetimibe (ZETIA) 10 MG Tab Take 1 Tablet by mouth every day. 90 Tablet 3    atorvastatin (LIPITOR) 40 MG Tab TAKE 1 TABLET BY MOUTH EVERY EVENING. TAKE 1 TABLET BY MOUTH DAILY 100 Tablet 3    Omega-3 Fatty Acids (FISH OIL) 1000 MG Cap capsule Take 1,000 mg by mouth every day.      amLODIPine (NORVASC) 5 MG Tab Take 1 Tablet by mouth every day. 100 Tablet 3    apixaban (ELIQUIS) 5mg Tab Take 1 Tablet by mouth 2 times a day. 200 Tablet 3    levothyroxine (SYNTHROID) 88 MCG Tab TAKE 1 TABLET BY MOUTH EVERY DAY IN THE MORNING ON AN EMPTY STOMACH 100 Tablet 3    Cholecalciferol (VITAMIN D3) 1000 units Cap Take 2,000 Units by mouth. 1000 units x 2 capsules = 2000 units       No current Epic-ordered facility-administered medications on file.          Objective:   Physical Exam:    Vitals: /66   Pulse 85   Temp 37.1 °C (98.7 °F) (Temporal)   Resp 16   Ht 1.676 m (5' 6\")   Wt 71.1 kg (156 lb 12.8 oz)   SpO2 96% "    BMI: Body mass index is 25.31 kg/m².  Physical Exam  Constitutional:       General: She is not in acute distress.     Appearance: Normal appearance. She is not ill-appearing.   HENT:      Mouth/Throat:      Comments: Normal phonation  Eyes:      General: No scleral icterus.     Conjunctiva/sclera: Conjunctivae normal.   Cardiovascular:      Rate and Rhythm: Normal rate.   Pulmonary:      Effort: Pulmonary effort is normal. No respiratory distress.   Skin:     General: Skin is warm and dry.   Neurological:      Gait: Gait normal.   Psychiatric:         Mood and Affect: Mood normal.         Behavior: Behavior normal.         Thought Content: Thought content normal.         Judgment: Judgment normal.          Assessment and Plan:   Michela is a 73 y.o. female with the following:  Problem List Items Addressed This Visit       Cerebral venous thrombosis of sigmoid sinus     Recent issue in October after COVID infection treatment with Paxlovid, will complete 3 months on Eliquis 5 mg twice daily for venous thrombosis of the sigmoid sinus.  Fortunately she had a second neurologic event, still unclear if this is TGA versus TIA.  Will see vascular medicine and neurology later this week.  Continue current medical therapy in the interim but there may be consideration to add antiplatelet back to her regiment.         Mild carotid artery stenosis (HCC)     Chronic ongoing problem, continue atorvastatin 40 mg daily and ezetimibe 10 mg daily, now on Eliquis in place of aspirin, may have rolled add back aspirin with recent neurologic episodes.  Will follow-up with vascular medicine and neurology later this week.         Rotator cuff arthropathy of right shoulder     Chronic ongoing problem, will complete MRI of the shoulder and 2 weeks then has plans for shoulder replacement in February.  Question on timing of surgery with recent neurologic episodes.         Transient global amnesia vs transient ischemic attack     Chronic  decompensated issue, still not entirely clear whether this was transient global amnesia versus a transient ischemic attack.  More confusing is that the central venous thrombosis of the sigmoid sinus developed approximately 2 weeks after her COVID infection and appears to be related.  She has met with Dr. Borrero of vascular medicine and is on atorvastatin 40 mg daily and Zetia 10 mg daily.  She was also started on Eliquis 5 mg twice daily and will complete at least 12 weeks of therapy with follow-up brain imaging planned in about 3 weeks.  Question is whether we need any arterial coverage with aspirin or Plavix if is concerned that this is TIA related.  Also question timing of elective shoulder replacement which she is currently scheduled in February.               RTC: Return in about 4 weeks (around 1/31/2024).    I spent a total of 34 minutes with record review, exam, communication with the patient, communication with other providers, and documentation of this encounter.    PLEASE NOTE: This dictation was created using voice recognition software. I have made every reasonable attempt to correct obvious errors, but I expect that there are errors of grammar and possibly content that I did not discover before finalizing the note.      Archana Robertson, DO  Geriatric and Internal Medicine  [facility]

## 2024-01-04 NOTE — ASSESSMENT & PLAN NOTE
Chronic decompensated issue, still not entirely clear whether this was transient global amnesia versus a transient ischemic attack.  More confusing is that the central venous thrombosis of the sigmoid sinus developed approximately 2 weeks after her COVID infection and appears to be related.  She has met with Dr. Borrero of vascular medicine and is on atorvastatin 40 mg daily and Zetia 10 mg daily.  She was also started on Eliquis 5 mg twice daily and will complete at least 12 weeks of therapy with follow-up brain imaging planned in about 3 weeks.  Question is whether we need any arterial coverage with aspirin or Plavix if is concerned that this is TIA related.  Also question timing of elective shoulder replacement which she is currently scheduled in February.

## 2024-01-04 NOTE — ASSESSMENT & PLAN NOTE
Chronic ongoing problem, continue atorvastatin 40 mg daily and ezetimibe 10 mg daily, now on Eliquis in place of aspirin, may have rolled add back aspirin with recent neurologic episodes.  Will follow-up with vascular medicine and neurology later this week.

## 2024-01-05 ENCOUNTER — OFFICE VISIT (OUTPATIENT)
Dept: NEUROLOGY | Facility: MEDICAL CENTER | Age: 74
End: 2024-01-05
Attending: PSYCHIATRY & NEUROLOGY
Payer: MEDICARE

## 2024-01-05 ENCOUNTER — OFFICE VISIT (OUTPATIENT)
Dept: VASCULAR LAB | Facility: MEDICAL CENTER | Age: 74
End: 2024-01-05
Attending: INTERNAL MEDICINE
Payer: MEDICARE

## 2024-01-05 VITALS
SYSTOLIC BLOOD PRESSURE: 128 MMHG | HEART RATE: 88 BPM | BODY MASS INDEX: 24.77 KG/M2 | DIASTOLIC BLOOD PRESSURE: 72 MMHG | TEMPERATURE: 97.8 F | HEIGHT: 66 IN | OXYGEN SATURATION: 95 % | WEIGHT: 154.1 LBS

## 2024-01-05 VITALS
BODY MASS INDEX: 24.75 KG/M2 | WEIGHT: 154 LBS | DIASTOLIC BLOOD PRESSURE: 71 MMHG | HEART RATE: 81 BPM | HEIGHT: 66 IN | SYSTOLIC BLOOD PRESSURE: 120 MMHG

## 2024-01-05 DIAGNOSIS — E78.5 DYSLIPIDEMIA: ICD-10-CM

## 2024-01-05 DIAGNOSIS — R41.0 TRANSIENT CONFUSION: ICD-10-CM

## 2024-01-05 DIAGNOSIS — G31.84 MILD COGNITIVE IMPAIRMENT: Primary | ICD-10-CM

## 2024-01-05 PROCEDURE — 99214 OFFICE O/P EST MOD 30 MIN: CPT | Performed by: INTERNAL MEDICINE

## 2024-01-05 PROCEDURE — 99212 OFFICE O/P EST SF 10 MIN: CPT

## 2024-01-05 PROCEDURE — 3074F SYST BP LT 130 MM HG: CPT | Performed by: PSYCHIATRY & NEUROLOGY

## 2024-01-05 PROCEDURE — 3074F SYST BP LT 130 MM HG: CPT | Performed by: INTERNAL MEDICINE

## 2024-01-05 PROCEDURE — 3078F DIAST BP <80 MM HG: CPT | Performed by: INTERNAL MEDICINE

## 2024-01-05 PROCEDURE — 99212 OFFICE O/P EST SF 10 MIN: CPT | Performed by: PSYCHIATRY & NEUROLOGY

## 2024-01-05 PROCEDURE — 99215 OFFICE O/P EST HI 40 MIN: CPT | Performed by: PSYCHIATRY & NEUROLOGY

## 2024-01-05 PROCEDURE — 3078F DIAST BP <80 MM HG: CPT | Performed by: PSYCHIATRY & NEUROLOGY

## 2024-01-05 RX ORDER — ATORVASTATIN CALCIUM 80 MG/1
80 TABLET, FILM COATED ORAL NIGHTLY
Qty: 30 TABLET | Refills: 11 | Status: SHIPPED | OUTPATIENT
Start: 2024-01-05 | End: 2024-01-30

## 2024-01-05 ASSESSMENT — FIBROSIS 4 INDEX
FIB4 SCORE: 1.55
FIB4 SCORE: 1.55

## 2024-01-05 NOTE — PROGRESS NOTES
"VASCULAR MEDICINE CLINIC - Follow up VISIT  24    Michela Valdez is a 73 y.o. female who presents today  for vascular evaluation.     Subjective      HPI:  Here for f/u of cerebral vein thrombosis  Had another episode of confusion in xmas pipe, which resolved without intervention  No focal neurologic complaints  As planned with neurology later today  Went to ED  Negative w/u for stroke  No residual  Remains on eliquis  No bleeding  Good adherence  No previous h/o blood clots or cancer  No previous tia or cva  No myalgias on atorvastatin  No known ascvd  Tolerating amlodipine  Most home readings less than 130/80  No leg swelling       Family History   Problem Relation Age of Onset    Osteoporosis Mother     Hyperlipidemia Father     Heart Disease Father         CAD, MI     Anemia Sister     Cancer Brother         prostate    Heart Disease Brother         CABG    Dad - fatal coronary disease in 50s    Social History     Tobacco Use    Smoking status: Former     Current packs/day: 0.00     Average packs/day: 0.5 packs/day for 4.0 years (2.0 ttl pk-yrs)     Types: Cigarettes     Start date:      Quit date:      Years since quittin.0    Smokeless tobacco: Never   Vaping Use    Vaping Use: Never used   Substance Use Topics    Alcohol use: Yes     Alcohol/week: 3.0 - 4.2 oz     Types: 5 - 7 Glasses of wine per week     Comment: glass of wine daily     Drug use: No           DIET AND EXERCISE:  Weight Change:stable  Diet: relatively HH  Exercise: mod regular            Objective     Objective:     Vitals:    24 1405   BP: 120/71   BP Location: Left arm   Patient Position: Sitting   BP Cuff Size: Adult   Pulse: 81   Weight: 69.9 kg (154 lb)   Height: 1.676 m (5' 6\")        Physical Exam  Constitutional:       General: She is not in acute distress.     Appearance: She is not diaphoretic.   HENT:      Head: Normocephalic and atraumatic.   Eyes:      General: No scleral icterus.     Conjunctiva/sclera: " "Conjunctivae normal.   Pulmonary:      Effort: Pulmonary effort is normal. No respiratory distress.   Skin:     Coloration: Skin is not pale.   Neurological:      Mental Status: She is alert and oriented to person, place, and time.      Cranial Nerves: No cranial nerve deficit.   Psychiatric:         Mood and Affect: Mood and affect normal.         Behavior: Behavior normal.          DATA REVIEW    Lab Results   Component Value Date/Time    CHOLSTRLTOT 177 03/09/2023 10:32 AM    LDL 96 03/09/2023 10:32 AM    HDL 43 03/09/2023 10:32 AM    TRIGLYCERIDE 188 (H) 03/09/2023 10:32 AM       Lab Results   Component Value Date/Time    SODIUM 135 12/24/2023 01:50 PM    POTASSIUM 3.7 12/24/2023 01:50 PM    CHLORIDE 100 12/24/2023 01:50 PM    CO2 22 12/24/2023 01:50 PM    GLUCOSE 94 12/24/2023 01:50 PM    BUN 18 12/24/2023 01:50 PM    CREATININE 0.49 (L) 12/24/2023 01:50 PM     Lab Results   Component Value Date/Time    ALKPHOSPHAT 123 (H) 12/24/2023 01:50 PM    ASTSGOT 34 12/24/2023 01:50 PM    ALTSGPT 34 12/24/2023 01:50 PM    TBILIRUBIN 0.5 12/24/2023 01:50 PM       Lab Results   Component Value Date/Time    HBA1C 6.2 (H) 10/22/2023 01:57 PM       No results found for: \"MICROALBCALC\", \"MALBCRT\", \"MALBEXCR\", \"JEIZBI15\", \"MICROALBUR\", \"MICRALB\", \"UMICROALBUM\", \"MICROALBTIM\"      Multiple imaging studies available in EMR and reviewed with patient at todays visit         Medical Decision Making:  Today's Assessment / Status / Plan:     No diagnosis found.       Etiology of Established CVD if Present:     1) acute confusional state -recurrent -  no stroke seen on imaging. No residual. No source of embolism or afib. Risk of future stroke described.   of  -911 with any tia or cva symptoms  -medical management per below  - follow up with neuro for further w/u and management  - 'rest' the brain - decrease stress, avoid anesthesia, limit etoh, plenty of sleep  -EEG per neuro    2) cerebral vein thrombosis - unclear chronicity and " unclear relation to TGA/TIA. Possibly related to previous covid. R/o hypercoag state  - medical management as below  - partial hypercoag w/u now and complete in future when safe to hold oac   - consider heme referral in future  - Will defer any indicated age appropriate screening for occult malignancy to pcp.  - follow up imaging per neuro    Lipid Management: Qualifies for Statin Therapy Based on 2018 ACC/AHA Guidelines: yes  Calculated 10-Year Risk of ASCVD: N/A  Currently on Statin: Yes  Strong FH of premature ASCVD  Subclinical athero as evidenced by mild carotid disease and elevated CAC score  Elevated lp(a) an independent risk factor for disease  Given above would aim for LDL <70 and nonHDL <100  Unclear level control  Plan  - recommended family lipid and lp(a) screening  - continue atorva 80  -Continue ezetimibe 10 -started at last visit  - recheck fasting lipid panel and lipoprotein a prior to next visit    Blood Pressure Management:  [facility] (2017) Blood Pressure Goal <130/80  BP high at presentation, but no previous h/o hypertension   Unclear chronicity  Reasonable control both in the office and at home  Plan:  - continue amlodipine 5 mg daily as monotherapy for now  - continue home bp monitoring with good technique  - recheck gfr/lytes, urine for ma  - consider intensification of therapy at next viist    Glycemic Status: Prediabetic  - continue lifestyle mod  - recheck fasting glucose and A1c prior to next visit    Anti-Platelet/Anti-Coagulant Tx: Yes  - continue eliquis for at least 3-6 mo  -Restart antiplatelet therapy when off DOAC    Smoking: continue complete avoidance of all tobacco products    Physical Activity: restart mod intensity exercise    Weight Management and Nutrition: recommended med style diet    Other:     - Hypothroidism - appears under good control. Continue current levo. 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 After that would  recommend ortho consult with neuro prior to surgery. Avoid systemic nsaids or steroids while on anticoag.       Instructed to follow-up with PCP for remainder of adult medical needs: yes  We will partner with other providers in the management of established vascular disease and cardiometabolic risk factors.    Studies to Be Obtained: MR venogram and EEG per neuro  Labs to Be Obtained: Beta-2 microglobulin, anticardiolipin antibodies, PT GM, factor V Leiden, CBC, A1c, TSH, T4, urine for albumin, lipid panel, lipoprotein a, CMP prior to next visit    Follow up in: 6 weeks    Michael J Bloch, M.D.

## 2024-01-05 NOTE — PROGRESS NOTES
"Neuro follow up:    Seen by me on 12/24/2023    Michela Valdez 73 y.o. female  right handed woman who [place] and lives in [facility]) moving from [place].  She works full time as a Realtor     Problem List reviewed.    Covid testing positive a few months ago > about 5 days in duration \"like a cold\" (coughing and congestion) but without fever(s), sorethroat > took Paclovid x 3 days.     Memory changes have been noticed for over 1 year such as going into a room to get something which she self describes a minor nuisance in the sense of being able to recall why or what she  has gone into the room to get.       In terms of memory disturbance(s) in the last year there tends to be more tendency to forget people's names and movies per daughter.     Had another episode on 12/24/2023:    I reviewed Dr. Quinten Ferrari MD's note from [facility] ED note.    Daughter called Michela on Christmas Eve to plan the evening. At that time Michela was feeling fine and seemed ok that day.  Daughter was on the phone with Michela and did not know what day it was and there was a \"little bit of memory loss\". There was not clear repeating of information or asking questions and over and over. When daughter went over to house and was able to communicate appropriately.  10:00 am was when daughter called. When her daughter got there, Michela's head slowly drooped for 3 seconds and her limbs per \"trembling\" for 1-2 minutes. This entire episode lasted 3 to 4 hours.   She does being recall being in the ambulance.     \"T.G.A. 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 She had a period where she could not recall a period of not recalling of information at all. Her daughter on Sunday " "morning she was repetitive in speech and garbled> 911 was called and paramedics called. And triaged at  her house> [facility].     Her words and speech were not normal per her daughter (that Sunday).     Note from [facility] Reviewed today:     \"Michela Valdez is a 73 y.o. female who presented 10/22/2023 with past medical history of hyperlipidemia, hypothyroidism, prediabetes who comes into the hospital with complaints of memory loss, confusion and slurred speech that started this morning when she woke up in bed.  Patient has been experiencing some symptoms for the past 2 days of memory loss.  She describes more retrograde amnesia rather than forming new memories.  She is normally high functioning grossly patient but has been unable to complete her job.  Recently patient has been experiencing dizziness in the morning.  She denies any headaches, photophobia, muscle weakness, nausea, vomiting, diarrhea.  She denies any changes in medications.  She did travel to [place] 2 weeks ago where she contracted COVID.  The patient did take Paxlovid.  She does have a family history of stroke and coronary artery disease.  Patient gets a carotid artery ultrasound every year since she has a strong family history.  She denies any recent trauma or falls.     EKG interpreted by me found normal sinus rhythm, LVH  Chest x-ray interpreted by me found no acute pulmonary process\"     The patient was admitted for transient memory loss and encephalopathy r/o acute CVA.     Hospital course under my care: afebrile and vitals wnl. Exam at bedside was grossly unremarkable with intact neuroexam though the patient has occasionally having hard time remembering details of last two days. She does seem to have improvement with times.  Labs and imagings findings discussed with patient. MRI done was notable for no acute process but noted subacute to chronic thrombus in R sigmoid sinus and proximal R IJ vein. At this point, neurology was consulted - " recommended anticoag and follow up scan vs just follow up scan. Pt was also seen by Speech therapy - she did quite well with Cognistat.      Clinical impression at this point with transient global amnesia - likely etiology is ischemic vs thrombosis. Pt is on ASA and Statin at home. Plan is to add Eliquis for thrombosis; also amlodipine to control BP. Deemed stable for DC at this point.      Therefore, she is discharged in fair and stable condition to home with close outpatient follow-up.   The patient recovered much more quickly than anticipated on admission.    Patient Active Problem List    Diagnosis Date Noted    Secondary hypercoagulable state (HCC) 11/08/2023    Mild cognitive impairment 11/03/2023    History of COVID-19 11/03/2023    Hyponatremia 11/03/2023    Cerebral venous thrombosis of sigmoid sinus 10/24/2023    Hospital discharge follow-up 10/24/2023    Transient global amnesia vs transient ischemic attack 10/22/2023    Primary hypertension 10/22/2023    COVID-19 virus infection 09/19/2023    Fecal smearing 06/14/2023    Agatston CAC score 200-399 340 in 2023     Laryngeal spasm 01/18/2023    Elevated alkaline phosphatase level 09/28/2022    Genitourinary syndrome of menopause 09/28/2022    Skin lesion- right hand, SK 09/28/2022    Rotator cuff arthropathy of right shoulder 09/28/2022    Hip pain- right > left 09/28/2022    Palpitations 09/28/2022    Night sweats 09/28/2022    BMI 26.0-26.9,adult 09/28/2022    Chronic pain of right knee 12/07/2021    Slow transit constipation with bloating 12/07/2021    Prediabetes 05/16/2018    History of osteopenia 05/16/2018    Mild carotid artery stenosis (HCC) 05/24/2016    Postoperative hypothyroidism 10/13/2015    Dyslipidemia 10/13/2015    Diverticular disease of colon 07/21/2015    Thyroid nodule 07/21/2015       Past medical history:   Past Medical History:   Diagnosis Date    Acute laryngitis 04/19/2022    Michela presents to clinic with 8 days of URI. Start as  a cold with sinus pain and pressure and drainage. She now is left with residual cough which is very intrusive for her work. Cough is nonproductive. She is using OTC vitamins. She otherwise feels well. No fevers/chills, malaise, chest pain, productive cough, or GI symptoms. This feels similar to prior episodes of laryngitis. She has had good keyana    Cataract     esau IOL implants    Dyslipidemia 10/13/2015    Elevated LFTs 2018    High cholesterol     History of osteopenia 2018    Hyperlipidemia     Postoperative hypothyroidism 10/13/2015    Prediabetes 2018    Snoring     Thyroid disease     Urinary incontinence     stress incontinence, wears pad       Past surgical history:   Past Surgical History:   Procedure Laterality Date    CATARACT EXTRACTION WITH IOL Bilateral 2019    THYROIDECTOMY TOTAL  2012    Performed by ALEX CONTEH at SURGERY SAME DAY [facility] ORS    ABDOMINAL EXPLORATION      pyloric stenosis- as an infant    GYN SURGERY      D&C         Social history:   Social History     Socioeconomic History    Marital status:      Spouse name: Not on file    Number of children: Not on file    Years of education: Not on file    Highest education level: Not on file   Occupational History    Not on file   Tobacco Use    Smoking status: Former     Current packs/day: 0.00     Average packs/day: 0.5 packs/day for 4.0 years (2.0 ttl pk-yrs)     Types: Cigarettes     Start date:      Quit date:      Years since quittin.0    Smokeless tobacco: Never   Vaping Use    Vaping Use: Never used   Substance and Sexual Activity    Alcohol use: Yes     Alcohol/week: 3.0 - 4.2 oz     Types: 5 - 7 Glasses of wine per week     Comment: glass of wine daily     Drug use: No    Sexual activity: Not Currently   Other Topics Concern    Not on file   Social History Narrative    Not on file     Social Determinants of Health     Financial Resource Strain: Not on file   Food Insecurity: Not on  "file   Transportation Needs: Not on file   Physical Activity: Not on file   Stress: Not on file   Social Connections: Not on file   Intimate Partner Violence: Not on file   Housing Stability: Not on file       Family history:   Family History   Problem Relation Age of Onset    Osteoporosis Mother     Hyperlipidemia Father     Heart Disease Father         CAD, MI     Anemia Sister     Cancer Brother         prostate    Heart Disease Brother         CABG         Current medications:   Current Outpatient Medications   Medication    atorvastatin (LIPITOR) 80 MG tablet    Glucos-Chond-Hyal Ac-Ca Fructo (MOVE FREE JOINT HEALTH ADVANCE) Tab    ezetimibe (ZETIA) 10 MG Tab    Omega-3 Fatty Acids (FISH OIL) 1000 MG Cap capsule    amLODIPine (NORVASC) 5 MG Tab    apixaban (ELIQUIS) 5mg Tab    levothyroxine (SYNTHROID) 88 MCG Tab    Cholecalciferol (VITAMIN D3) 1000 units Cap     No current facility-administered medications for this visit.       Medication Allergy:  Allergies   Allergen Reactions    Naproxen Hives and Rash          Penicillins Rash     As a young child           Physical examination:   Vitals:    01/05/24 1503   BP: 128/72   BP Location: Left arm   Patient Position: Sitting   BP Cuff Size: Adult   Pulse: 88   Temp: 36.6 °C (97.8 °F)   TempSrc: Temporal   SpO2: 95%   Weight: 69.9 kg (154 lb 1.6 oz)   Height: 1.676 m (5' 6\")       Normal cephalic atraumatic.  There is full range of movement around the neck in all directions without restrictions or discrete pain evoked triggers.  No lower extremity edema.      Neurological  Exam:      Mental status: Awake, alert and fully oriented to person, place, time, and situation. Normal attention and concentration.  Did not appear/act combative,irritable,anxious,paranoid/delusional or aggressive to or with me.    Speech and language: Speech is fluent without errors, clear, intact to repetition, and intact to naming.     Follows 3 step motor commands in sequence without " significant delay and correctly.    Cranial nerve exam:  II: Pupils are equally round and reactive to light. Visual fields are intact by confrontation.  III, IV, VI: EOMI, no diplopia, no ptosis.  V: Sensation to light touch is normal over V1-3 distributions bilaterally.  .  VII: Facial movements are symmetrical. There is no facial droop. .  VIII: Hearing intact to soft speech and finger rub bilaterally  IX: Palate elevates symmetrically, uvula is midline. Dysarthria is not present.  XI: Shoulder shrug are symmetrical and strong.   XII: Tongue protrudes midline.      Motor exam:  Muscle tone is normal in all 4 limbs.    Muscle strength:    Neck Flexors/Extensors: 5/5       Right  Left  Deltoid   5/5  5/5      Biceps   5/5  5/5  Triceps              5/5  5/5   Wrist extensors 5/5  5/5  Wrist flexors  5/5  5/5     5/5  5/5  Interossei  5/5  5/5  Thenar (APB)  5/5  5/5   Hip flexors  5/5  5/5  Quadriceps  5/5  5/5    Hamstrings  5/5  5/5  Dorsiflexors  5/5  5/5  Plantarflexors  5/5  5/5  Toe extension  5/5  5/5      Reflexes:       Right  Left  Biceps   2/4  2/4  Triceps              2/4  2/4  Brachioradialis 2/4  2/4  Knee jerk  2/4  2/4  Ankle jerk  2/4  2/4     Frontal release signs are absent    bilaterally toes are downgoing to plantar stimulation..    Coordination (finger-to-nose, heel/knee/shin, rapid alternating movements) was normal.     There was no ataxia, no tremors, and no dysmetria.     Station and gait were normal.     Easily stands up from exam chair without retropulsion,veering,leaning,swaying (to either side).     Component  Ref Range & Units 12 d ago  (12/24/23) 2 mo ago  (10/23/23) 2 mo ago  (10/22/23) 10 mo ago  (3/9/23) 10 mo ago  (3/9/23) 1 yr ago  (10/17/22) 1 yr ago  (3/23/22)   Sodium  135 - 145 mmol/L 135 133 Low  133 Low   138 139 140   Potassium  3.6 - 5.5 mmol/L 3.7 3.9 4.1  4.1 4.5 4.3   Chloride  96 - 112 mmol/L 100 98 97  104 104 105   Co2  20 - 33 mmol/L 22 22 23  24 25 23   Anion  Gap  7.0 - 16.0 13.0 13.0 13.0  10.0 10.0 12.0   Glucose  65 - 99 mg/dL 94 119 High  134 High   110 High  116 High  107 High    Bun  8 - 22 mg/dL 18 14 17  18 17 19   Creatinine  0.50 - 1.40 mg/dL 0.49 Low  0.47 Low  0.52  0.61 0.68 0.64   Calcium  8.5 - 10.5 mg/dL 9.4 9.0 9.6  9.3 9.5 9.6   Correct Calcium  8.5 - 10.5 mg/dL 8.9 8.8 9.0 8.8      AST(SGOT)  12 - 45 U/L 34 27 32  33 19 28   ALT(SGPT)  2 - 50 U/L 34 27 35  46 29 26   Alkaline Phosphatase  30 - 99 U/L 123 High  110 High  134 High   112 High  125 High  103 High    Total Bilirubin  0.1 - 1.5 mg/dL 0.5 0.6 0.6  0.5 0.4 0.4   Albumin  3.2 - 4.9 g/dL 4.6 4.2 4.8  4.6 4.6 4.4   Total Protein  6.0 - 8.2 g/dL 7.4 6.7 7.8  7.3 7.5 7.0   Globulin  1.9 - 3.5 g/dL 2.8 2.5 3.0  2.7 2.9 2.6   A-G Ratio  g/dL 1.6 1.7 1.6  1.7 1.6 1.7   [facility]               12/24/2023 2:08 PM     HISTORY/REASON FOR EXAM: Emergency Medical Condition ? Stroke.  Intermittent confusion. History of right internal jugular vein thrombus.     TECHNIQUE/EXAM DESCRIPTION:  CT angiogram of the neck without and with contrast.     Initial precontrast images were obtained from the great vessels through the skull base.  Postcontrast images were obtained of the neck from the great vessels through the skull base following the power injection of nonionic contrast at 5.0 mL/sec.   Thin-section helical images were obtained with overlapping reconstruction interval. Coronal and oblique multiplanar volume reformats were generated..     70 mL of Omnipaque 350 nonionic contrast was injected intravenously.     Cervical internal carotid artery percent stenosis is calculated using the standard method according to the NASCET criteria wherein a segment of uniform caliber mid or distal cervical internal carotid is used as the reference denominator.     Low dose optimization technique was utilized for this CT exam including automated exposure control and adjustment of the mA and/or kV according to  patient size.     COMPARISON:  10/22/2023     FINDINGS:  Aortic arch: There is a left aortic arch with aberrant right subclavian artery and swelling noted.     There is atherosclerotic plaque of the aorta.     Right common carotid artery: Patent     Right internal carotid artery: Atherosclerotic plaque without significant stenosis (less than 50%).     Left common carotid artery is patent.     Left internal carotid artery: Atherosclerotic plaque without significant stenosis (less than 50%).     The right vertebral artery is patent without dissection or stenosis.     The left vertebral artery is patent without dissection or stenosis. Left artery appears to end in a PICA as was previously mentioned.     Vertebrobasilar confluence: The vertebrobasilar confluence appears normal.     There is again a small filling defect in the right jugular foramen with recanalization.     Lung apices are unchanged with a left upper lobe micronodule. There is dependent groundglass opacity likely atelectasis     The soft tissues of the neck are within normal limits.     Bones imaged. Multilevel degenerative change.     3D angiographic/MIP images of the vasculature confirm the vascular findings as described above.     IMPRESSION:     1.  Mild bilateral atherosclerosis with less than 50% ICA stenosis.           Exam Ended: 12/24/23  2:20 PM Last Resulted: 12/24/23  2:32 PM           12/24/2023 2:08 PM     HISTORY/REASON FOR EXAM:  Emergency Medical Condition ? Stroke  Intermittent confusion. History of right internal jugular vein thrombus.     TECHNIQUE/EXAM DESCRIPTION:  CT angiogram of the [place] without and with contrast.  Initial precontrast images were obtained of the head from the skull base through the vertex.  Postcontrast images were obtained of the [place] following the power injection of   nonionic contrast at 5.0 mL/sec. Thin-section helical images were obtained with overlapping reconstruction interval.  Coronal and sagittal multiplanar volume reformats were generated.  3D angiographic images were reviewed on PACS.  Maximum intensity   projection (MIP) images were generated and reviewed.     80 mL of Omnipaque 350 nonionic contrast was injected intravenously.     Low dose optimization technique was utilized for this CT exam including automated exposure control and adjustment of the mA and/or kV according to patient size.     COMPARISON:  None.     FINDINGS:  The posterior circulation shows the distal vertebral arteries to be patent. The vertebrobasilar confluence is intact. The basilar artery is patent. No aneurysm or occlusive lesion is evident.     The anterior circulation shows no stenotic or occlusive lesion. No aneurysm is evident about the [place].     The brain is unremarkable.     There is no abnormal enhancement. Dural venous sinuses are patent. There is a small filling defect in portions of the right jugular fossa with some recanalization.     3D angiographic/MIP images of the vasculature confirm the vascular findings as described above.     IMPRESSION:     1.  CT angiogram of the [place] demonstrating no large vessel occlusion.           Exam Ended: 12/24/23  2:19 PM Last Resulted: 12/24/23  2:27 PM             Mild Cognitive Impairment- for over 1 year.    Memory type symptoms have been stable in the last several months per Michela and daughter.    MOCA Score today of 30/30 today with mild issues with word retrieval (words that begin with B).     Functional Activity Questionnaire score today of 3 per daughter.     Global Deterioration Score today in the 3 to 4 range per daughter.     Brain MRI without ischemic stroke regions but small sigmoid sinus clot (venous) in setting of prior and recent COVID.     2. Venous Sinus Clot- Sigmoid region based on MRI (Brain)      On Eliquis at this time and a high intensity statin.        Today discussed:     A. She has a follow up MR Venogram   or so  and  if normal she will likely stop Eliquis.      B   Had a long discussion with Michela and daughter today and it's still to me not clearly a  TGA event because of speech/language changes noticed during that time by daughter.     C.  24 Ambulatory EEG test to be done to determine if there are any interictal (focal) discharge areas that could portend a seizure etiology.       We spent 46 minutes today reviewing the above issues.

## 2024-01-08 ENCOUNTER — HOSPITAL ENCOUNTER (OUTPATIENT)
Dept: LAB | Facility: MEDICAL CENTER | Age: 74
End: 2024-01-08
Attending: PSYCHIATRY & NEUROLOGY
Payer: MEDICARE

## 2024-01-08 ENCOUNTER — HOSPITAL ENCOUNTER (OUTPATIENT)
Dept: LAB | Facility: MEDICAL CENTER | Age: 74
End: 2024-01-08
Attending: INTERNAL MEDICINE
Payer: MEDICARE

## 2024-01-08 DIAGNOSIS — E78.5 DYSLIPIDEMIA: ICD-10-CM

## 2024-01-08 DIAGNOSIS — I10 PRIMARY HYPERTENSION: ICD-10-CM

## 2024-01-08 DIAGNOSIS — R73.01 IMPAIRED FASTING BLOOD SUGAR: ICD-10-CM

## 2024-01-08 DIAGNOSIS — G08 CEREBRAL VENOUS THROMBOSIS OF SIGMOID SINUS: ICD-10-CM

## 2024-01-08 DIAGNOSIS — E89.0 POSTOPERATIVE HYPOTHYROIDISM: ICD-10-CM

## 2024-01-08 LAB
ALBUMIN SERPL BCP-MCNC: 4.6 G/DL (ref 3.2–4.9)
ALBUMIN/GLOB SERPL: 1.6 G/DL
ALP SERPL-CCNC: 102 U/L (ref 30–99)
ALT SERPL-CCNC: 33 U/L (ref 2–50)
ANION GAP SERPL CALC-SCNC: 12 MMOL/L (ref 7–16)
AST SERPL-CCNC: 27 U/L (ref 12–45)
BILIRUB SERPL-MCNC: 0.4 MG/DL (ref 0.1–1.5)
BUN SERPL-MCNC: 19 MG/DL (ref 8–22)
CALCIUM ALBUM COR SERPL-MCNC: 8.9 MG/DL (ref 8.5–10.5)
CALCIUM SERPL-MCNC: 9.4 MG/DL (ref 8.5–10.5)
CHLORIDE SERPL-SCNC: 104 MMOL/L (ref 96–112)
CHOLEST SERPL-MCNC: 128 MG/DL (ref 100–199)
CO2 SERPL-SCNC: 24 MMOL/L (ref 20–33)
CREAT SERPL-MCNC: 0.5 MG/DL (ref 0.5–1.4)
ERYTHROCYTE [DISTWIDTH] IN BLOOD BY AUTOMATED COUNT: 44.1 FL (ref 35.9–50)
EST. AVERAGE GLUCOSE BLD GHB EST-MCNC: 117 MG/DL
GFR SERPLBLD CREATININE-BSD FMLA CKD-EPI: 97 ML/MIN/1.73 M 2
GLOBULIN SER CALC-MCNC: 2.9 G/DL (ref 1.9–3.5)
GLUCOSE SERPL-MCNC: 119 MG/DL (ref 65–99)
HBA1C MFR BLD: 5.7 % (ref 4–5.6)
HCT VFR BLD AUTO: 42.4 % (ref 37–47)
HDLC SERPL-MCNC: 47 MG/DL
HGB BLD-MCNC: 14 G/DL (ref 12–16)
LDLC SERPL CALC-MCNC: 65 MG/DL
MCH RBC QN AUTO: 30 PG (ref 27–33)
MCHC RBC AUTO-ENTMCNC: 33 G/DL (ref 32.2–35.5)
MCV RBC AUTO: 90.8 FL (ref 81.4–97.8)
PLATELET # BLD AUTO: 342 K/UL (ref 164–446)
PMV BLD AUTO: 10 FL (ref 9–12.9)
POTASSIUM SERPL-SCNC: 4.6 MMOL/L (ref 3.6–5.5)
PROT SERPL-MCNC: 7.5 G/DL (ref 6–8.2)
RBC # BLD AUTO: 4.67 M/UL (ref 4.2–5.4)
SODIUM SERPL-SCNC: 140 MMOL/L (ref 135–145)
SODIUM SERPL-SCNC: 141 MMOL/L (ref 135–145)
T4 SERPL-MCNC: 7.7 UG/DL (ref 4–12)
TRIGL SERPL-MCNC: 81 MG/DL (ref 0–149)
TSH SERPL DL<=0.005 MIU/L-ACNC: 1.57 UIU/ML (ref 0.38–5.33)
WBC # BLD AUTO: 5.8 K/UL (ref 4.8–10.8)

## 2024-01-08 PROCEDURE — 80053 COMPREHEN METABOLIC PANEL: CPT

## 2024-01-08 PROCEDURE — 84295 ASSAY OF SERUM SODIUM: CPT | Mod: XU

## 2024-01-08 PROCEDURE — 84443 ASSAY THYROID STIM HORMONE: CPT

## 2024-01-08 PROCEDURE — 86146 BETA-2 GLYCOPROTEIN ANTIBODY: CPT

## 2024-01-08 PROCEDURE — 83036 HEMOGLOBIN GLYCOSYLATED A1C: CPT

## 2024-01-08 PROCEDURE — 80061 LIPID PANEL: CPT

## 2024-01-08 PROCEDURE — 86147 CARDIOLIPIN ANTIBODY EA IG: CPT

## 2024-01-08 PROCEDURE — 82570 ASSAY OF URINE CREATININE: CPT

## 2024-01-08 PROCEDURE — 83695 ASSAY OF LIPOPROTEIN(A): CPT

## 2024-01-08 PROCEDURE — 84436 ASSAY OF TOTAL THYROXINE: CPT

## 2024-01-08 PROCEDURE — 82043 UR ALBUMIN QUANTITATIVE: CPT

## 2024-01-08 PROCEDURE — 81240 F2 GENE: CPT

## 2024-01-08 PROCEDURE — 36415 COLL VENOUS BLD VENIPUNCTURE: CPT

## 2024-01-08 PROCEDURE — 81241 F5 GENE: CPT

## 2024-01-08 PROCEDURE — 85027 COMPLETE CBC AUTOMATED: CPT

## 2024-01-09 LAB
CARDIOLIPIN IGG SER IA-ACNC: <10 GPL
CARDIOLIPIN IGM SER IA-ACNC: 14 MPL
CREAT UR-MCNC: 57.3 MG/DL
MICROALBUMIN UR-MCNC: <1.2 MG/DL
MICROALBUMIN/CREAT UR: NORMAL MG/G (ref 0–30)

## 2024-01-10 ENCOUNTER — APPOINTMENT (RX ONLY)
Dept: URBAN - METROPOLITAN AREA CLINIC 35 | Facility: CLINIC | Age: 74
Setting detail: DERMATOLOGY
End: 2024-01-10

## 2024-01-10 DIAGNOSIS — L738 OTHER SPECIFIED DISEASES OF HAIR AND HAIR FOLLICLES: ICD-10-CM

## 2024-01-10 DIAGNOSIS — Z85.828 PERSONAL HISTORY OF OTHER MALIGNANT NEOPLASM OF SKIN: ICD-10-CM

## 2024-01-10 DIAGNOSIS — L81.4 OTHER MELANIN HYPERPIGMENTATION: ICD-10-CM | Status: UNCHANGED

## 2024-01-10 DIAGNOSIS — L82.0 INFLAMED SEBORRHEIC KERATOSIS: ICD-10-CM

## 2024-01-10 DIAGNOSIS — L663 OTHER SPECIFIED DISEASES OF HAIR AND HAIR FOLLICLES: ICD-10-CM

## 2024-01-10 DIAGNOSIS — D22 MELANOCYTIC NEVI: ICD-10-CM

## 2024-01-10 DIAGNOSIS — L73.9 FOLLICULAR DISORDER, UNSPECIFIED: ICD-10-CM

## 2024-01-10 DIAGNOSIS — L82.1 OTHER SEBORRHEIC KERATOSIS: ICD-10-CM

## 2024-01-10 DIAGNOSIS — L91.8 OTHER HYPERTROPHIC DISORDERS OF THE SKIN: ICD-10-CM

## 2024-01-10 PROBLEM — L02.32 FURUNCLE OF BUTTOCK: Status: ACTIVE | Noted: 2024-01-10

## 2024-01-10 PROBLEM — D22.61 MELANOCYTIC NEVI OF RIGHT UPPER LIMB, INCLUDING SHOULDER: Status: ACTIVE | Noted: 2024-01-10

## 2024-01-10 LAB
B2 GLYCOPROT1 IGG SERPL IA-ACNC: <10 SGU
B2 GLYCOPROT1 IGM SERPL IA-ACNC: 12 SMU
LPA SERPL-MCNC: 109 MG/DL

## 2024-01-10 PROCEDURE — ? ADDITIONAL NOTES

## 2024-01-10 PROCEDURE — ? LIQUID NITROGEN

## 2024-01-10 PROCEDURE — 17110 DESTRUCTION B9 LES UP TO 14: CPT

## 2024-01-10 PROCEDURE — 99213 OFFICE O/P EST LOW 20 MIN: CPT | Mod: 25

## 2024-01-10 PROCEDURE — ? COUNSELING

## 2024-01-10 PROCEDURE — ? COSMETIC QUOTE

## 2024-01-10 PROCEDURE — ? OBSERVATION AND MEASURE

## 2024-01-10 ASSESSMENT — LOCATION DETAILED DESCRIPTION DERM
LOCATION DETAILED: LEFT SUPERIOR MEDIAL UPPER BACK
LOCATION DETAILED: LEFT ANTERIOR MEDIAL PROXIMAL UPPER ARM
LOCATION DETAILED: LEFT ANTERIOR LATERAL PROXIMAL THIGH
LOCATION DETAILED: MID-FRONTAL SCALP
LOCATION DETAILED: LEFT MEDIAL SUPERIOR CHEST
LOCATION DETAILED: RIGHT CLAVICULAR SKIN
LOCATION DETAILED: LEFT BUTTOCK
LOCATION DETAILED: LEFT SUPERIOR HELIX
LOCATION DETAILED: RIGHT POSTERIOR SHOULDER
LOCATION DETAILED: RIGHT INFERIOR POSTAURICULAR SKIN
LOCATION DETAILED: RIGHT DISTAL POSTERIOR UPPER ARM
LOCATION DETAILED: RIGHT MEDIAL UPPER BACK
LOCATION DETAILED: LEFT AXILLARY VAULT
LOCATION DETAILED: LEFT CENTRAL POSTAURICULAR SKIN

## 2024-01-10 ASSESSMENT — LOCATION SIMPLE DESCRIPTION DERM
LOCATION SIMPLE: LEFT UPPER ARM
LOCATION SIMPLE: RIGHT UPPER BACK
LOCATION SIMPLE: RIGHT POSTERIOR UPPER ARM
LOCATION SIMPLE: RIGHT SHOULDER
LOCATION SIMPLE: LEFT THIGH
LOCATION SIMPLE: LEFT EAR
LOCATION SIMPLE: LEFT BUTTOCK
LOCATION SIMPLE: SCALP
LOCATION SIMPLE: LEFT AXILLARY VAULT
LOCATION SIMPLE: ANTERIOR SCALP
LOCATION SIMPLE: RIGHT CLAVICULAR SKIN
LOCATION SIMPLE: LEFT UPPER BACK
LOCATION SIMPLE: CHEST

## 2024-01-10 ASSESSMENT — LOCATION ZONE DERM
LOCATION ZONE: EAR
LOCATION ZONE: AXILLAE
LOCATION ZONE: LEG
LOCATION ZONE: TRUNK
LOCATION ZONE: SCALP
LOCATION ZONE: ARM

## 2024-01-10 NOTE — PROCEDURE: LIQUID NITROGEN
Render Post-Care Instructions In Note?: no
Detail Level: Detailed
Duration Of Freeze Thaw-Cycle (Seconds): 10
Show Spray Paint Technique Variable?: Yes
Post-Care Instructions: I reviewed with the patient in detail post-care instructions. Patient is to wear sunprotection, and avoid picking at any of the treated lesions. Pt may apply Vaseline to crusted or scabbing areas.
Spray Paint Text: The liquid nitrogen was applied to the skin utilizing a spray paint frosting technique.
Medical Necessity Information: It is in your best interest to select a reason for this procedure from the list below. All of these items fulfill various CMS LCD requirements except the new and changing color options.
Consent: The patient's consent was obtained including but not limited to risks of crusting, scabbing, blistering, scarring, darker or lighter pigmentary change, recurrence, incomplete removal and infection.
Application Tool (Optional): Cry-AC
Number Of Freeze-Thaw Cycles: 2 freeze-thaw cycles
Medical Necessity Clause: This procedure was medically necessary because the lesions that were treated were:

## 2024-01-10 NOTE — HPI: FULL BODY SKIN EXAMINATION
How Severe Are Your Spot(S)?: mild
What Type Of Note Output Would You Prefer (Optional)?: Standard Output
What Is The Reason For Today's Visit?: Annual Full Body Skin Examination with No Concerns
What Is The Reason For Today's Visit? (Being Monitored For X): concerning skin lesions on a periodic basis
Additional History: Left buttock red bump, denies pain, itching or bleeding.

## 2024-01-10 NOTE — PROCEDURE: MIPS QUALITY
Detail Level: Detailed
Quality 110: Preventive Care And Screening: Influenza Immunization: Influenza Immunization previously received during influenza season
Quality 226: Preventive Care And Screening: Tobacco Use: Screening And Cessation Intervention: Patient screened for tobacco use and is an ex/non-smoker
Quality 130: Documentation Of Current Medications In The Medical Record: Current Medications Documented
Quality 402: Tobacco Use And Help With Quitting Among Adolescents: Patient screened for tobacco and never smoked

## 2024-01-10 NOTE — PROCEDURE: ADDITIONAL NOTES
Detail Level: Simple
Render Risk Assessment In Note?: no
Additional Notes: Has information for cosmetic removal with Telma.

## 2024-01-11 LAB — F2 C.20210G>A GENO BLD/T: NEGATIVE

## 2024-01-12 LAB — F5 P.R506Q BLD/T QL: NEGATIVE

## 2024-01-17 ENCOUNTER — NON-PROVIDER VISIT (OUTPATIENT)
Dept: NEUROLOGY | Facility: MEDICAL CENTER | Age: 74
End: 2024-01-17
Attending: STUDENT IN AN ORGANIZED HEALTH CARE EDUCATION/TRAINING PROGRAM
Payer: MEDICARE

## 2024-01-17 DIAGNOSIS — R41.0 TRANSIENT CONFUSION: ICD-10-CM

## 2024-01-17 PROCEDURE — 95708 EEG WO VID EA 12-26HR UNMNTR: CPT | Performed by: STUDENT IN AN ORGANIZED HEALTH CARE EDUCATION/TRAINING PROGRAM

## 2024-01-17 PROCEDURE — 95719 EEG PHYS/QHP EA INCR W/O VID: CPT | Performed by: STUDENT IN AN ORGANIZED HEALTH CARE EDUCATION/TRAINING PROGRAM

## 2024-01-17 PROCEDURE — 95700 EEG CONT REC W/VID EEG TECH: CPT | Performed by: STUDENT IN AN ORGANIZED HEALTH CARE EDUCATION/TRAINING PROGRAM

## 2024-01-17 PROCEDURE — 99999 PR NO CHARGE: CPT | Performed by: PSYCHIATRY & NEUROLOGY

## 2024-01-17 NOTE — PROCEDURES
AMBULATORY ELECTROENCEPHALOGRAM REPORT      REFERRING PROVIDER:  Murphy Ortiz M.D.  [address] [facility] [place] [id number]  DOS: 01/17/2024   DURATION OF RECORDING: (23 hours and 16 minutes)    INDICATION:  Michela Valdez 73 y.o. female presenting with   cognitive impairment.    CURRENT OUTPATIENT MEDICATION LIST:   Current Outpatient Medications   Medication Instructions    amLODIPine (NORVASC) 5 mg, Oral, DAILY    apixaban (ELIQUIS) 5 mg, Oral, 2 TIMES DAILY    atorvastatin (LIPITOR) 80 mg, Oral, NIGHTLY    ezetimibe (ZETIA) 10 mg, Oral, DAILY    fish oil 1,000 mg, Oral, DAILY    Glucos-Chond-Hyal Ac-Ca Fructo (MOVE FREE [facility] ADVANCE) Tab 1 Tablet, Oral, DAILY    levETIRAcetam (KEPPRA) 500 mg, Oral, 2 TIMES DAILY    levothyroxine (SYNTHROID) 88 mcg, Oral, EACH MORNING ON EMPTY STOMACH    Vitamin D3 2,000 Units, Oral, 1000 units x 2 capsules = 2000 units       TECHNIQUE:   The EEG was set up and taken down by a Neurodiagnostic technologist who performed education to patient and staff.  A minimum but not limited to 23 electrodes and 23 channel recording was setup and performed by Neurodiagnostic technologist. Impedances, electrode integrity, and technical impressions were documented a minimum of every 2-24 hour period by a Neurodiagnostic Technologist and reviewed by Interpreting Physician.    DESCRIPTION OF THE RECORD:  During maximal wakefulness, the background was continuous and showed a 9-10 Hz posterior dominant rhythm.  Reactivity and state changes were present.  During drowsiness, theta/delta frequencies were seen.    Sleep was captured and was characterized by diffuse background delta/theta activity with a loss of myogenic artifact.  N2 sleep transients in the form of sleep spindles and vertex waves were seen in the leads over the central regions.     ICTAL AND INTERICTAL FINDINGS:   Interictal  1) Early after the onset of the sleep state, there are frequent left temporal sharp waves. These  are not observed in wakefulness.   2) Occasional right temporal sharp waves in the sleep state    Ictal  1) 03:18:50    2) 06:40:07    3) 11:01:27      Clinical: no reported symptoms  EEG: right temporal lobe rhythmic theta with evolution to slower frequencies prior to abrupt offset      ACTIVATION PROCEDURES:   Hyperventilation was performed by the patient for a total of 3 minutes. The technician performing the test noted good effort. This technique did not elicited any abnormalities on EEG. , Intermittent Photic stimulation was performed in a stepwise fashion from 1 to 30 Hz and did not elicited any abnormalities on EEG. , and NA    EKG: Sampling of the EKG recording showed sinus rhythm    EVENTS:  Push button events and/or ambulatory diary events: none    INTERPRETATION:  Abnormal ambulatory EEG recording in the awake and drowsy/sleep state(s):  1) Frequent left temporal sharp waves at onset of sleep state  2) Occasional right temporal sharp waves in the sleep state  3) Three electrographic seizures arising from the right temporal lobe are captured. Electrographically there is rhythmic theta of the right temporal region with evolution to slower frequencies prior to abrupt offset.   4) Clinical Events: none reported    This EEG is diagnostic of a focal epilepsy, with three right temporal lobe focal seizures observed. Interictally there are epileptiform abnormalities in both the right and left temporal regions. Clinical and radiologic correlation recommended.       Gracy Navarro M.D.   Diplomate, Neurology with Special Qualification in Epilepsy, American Board of Psychiatry and Neurology   of Clinical Neurology, [facility] Medicine  Level III Epilepsy Center, Department of Neurology at [facility]   1/19/2024

## 2024-01-18 ENCOUNTER — NON-PROVIDER VISIT (OUTPATIENT)
Dept: NEUROLOGY | Facility: MEDICAL CENTER | Age: 74
End: 2024-01-18
Attending: STUDENT IN AN ORGANIZED HEALTH CARE EDUCATION/TRAINING PROGRAM
Payer: MEDICARE

## 2024-01-18 PROCEDURE — 99999 PR NO CHARGE: CPT | Performed by: PSYCHIATRY & NEUROLOGY

## 2024-01-19 ENCOUNTER — DOCUMENTATION (OUTPATIENT)
Dept: NEUROLOGY | Facility: MEDICAL CENTER | Age: 74
End: 2024-01-19
Payer: MEDICARE

## 2024-01-19 ENCOUNTER — TELEPHONE (OUTPATIENT)
Dept: NEUROLOGY | Facility: MEDICAL CENTER | Age: 74
End: 2024-01-19
Payer: MEDICARE

## 2024-01-19 DIAGNOSIS — G40.109 LOCALIZATION-RELATED EPILEPSY (HCC): ICD-10-CM

## 2024-01-19 RX ORDER — LEVETIRACETAM 500 MG/1
500 TABLET ORAL 2 TIMES DAILY
Qty: 180 TABLET | Refills: 6 | Status: SHIPPED | OUTPATIENT
Start: 2024-01-19 | End: 2025-12-04

## 2024-01-19 NOTE — PROGRESS NOTES
Initial EEG report by Gracy Navarro MD showed 2 right temporal lobe seizures during sleep.    Spoke with Michela's daughter about this issue morning.    I will start Michela on Keppra (500 mg PO BID)> 1000 mg a day and have her get a blood level of the Keppra in about 7 days from now.    I will recommend no driving for now too.

## 2024-01-19 NOTE — TELEPHONE ENCOUNTER
Received New Start PA request via [employer]  for Levetiracetam (Keppra) 500 MG Tabs. (Quantity:180, Day Supply:90) - Copay $30.00 (No PA req'd)     Insurance: [employer] ([employer])  Member ID:  [id number]  BIN: [id number]  PCN: [id number]  Group: [id number]     Ran Test claim via [place] & medication Pays for a $30.00 copay. Will outreach to patient to offer specialty pharmacy services and or release to preferred pharmacy

## 2024-01-24 ENCOUNTER — APPOINTMENT (OUTPATIENT)
Dept: RADIOLOGY | Facility: MEDICAL CENTER | Age: 74
End: 2024-01-24
Attending: INTERNAL MEDICINE
Payer: MEDICARE

## 2024-01-29 ENCOUNTER — HOSPITAL ENCOUNTER (OUTPATIENT)
Dept: RADIOLOGY | Facility: MEDICAL CENTER | Age: 74
End: 2024-01-29
Attending: INTERNAL MEDICINE
Payer: MEDICARE

## 2024-01-29 DIAGNOSIS — G08 CEREBRAL VENOUS THROMBOSIS OF SIGMOID SINUS: ICD-10-CM

## 2024-01-29 PROCEDURE — 70544 MR ANGIOGRAPHY HEAD W/O DYE: CPT

## 2024-01-30 ENCOUNTER — HOSPITAL ENCOUNTER (OUTPATIENT)
Dept: LAB | Facility: MEDICAL CENTER | Age: 74
End: 2024-01-30
Attending: PSYCHIATRY & NEUROLOGY
Payer: MEDICARE

## 2024-01-30 ENCOUNTER — OFFICE VISIT (OUTPATIENT)
Dept: MEDICAL GROUP | Facility: PHYSICIAN GROUP | Age: 74
End: 2024-01-30
Payer: MEDICARE

## 2024-01-30 VITALS
HEART RATE: 85 BPM | RESPIRATION RATE: 12 BRPM | TEMPERATURE: 97.3 F | DIASTOLIC BLOOD PRESSURE: 70 MMHG | WEIGHT: 150.4 LBS | OXYGEN SATURATION: 96 % | BODY MASS INDEX: 24.17 KG/M2 | SYSTOLIC BLOOD PRESSURE: 126 MMHG | HEIGHT: 66 IN

## 2024-01-30 DIAGNOSIS — G40.109 LOCALIZATION-RELATED EPILEPSY (HCC): ICD-10-CM

## 2024-01-30 DIAGNOSIS — M12.811 ROTATOR CUFF ARTHROPATHY OF RIGHT SHOULDER: ICD-10-CM

## 2024-01-30 DIAGNOSIS — G45.4 TRANSIENT GLOBAL AMNESIA: ICD-10-CM

## 2024-01-30 DIAGNOSIS — D68.69 SECONDARY HYPERCOAGULABLE STATE (HCC): ICD-10-CM

## 2024-01-30 DIAGNOSIS — E78.5 DYSLIPIDEMIA: ICD-10-CM

## 2024-01-30 DIAGNOSIS — G08 CEREBRAL VENOUS THROMBOSIS OF SIGMOID SINUS: ICD-10-CM

## 2024-01-30 PROCEDURE — 99214 OFFICE O/P EST MOD 30 MIN: CPT | Performed by: INTERNAL MEDICINE

## 2024-01-30 PROCEDURE — 3078F DIAST BP <80 MM HG: CPT | Performed by: INTERNAL MEDICINE

## 2024-01-30 PROCEDURE — 3074F SYST BP LT 130 MM HG: CPT | Performed by: INTERNAL MEDICINE

## 2024-01-30 PROCEDURE — 80177 DRUG SCRN QUAN LEVETIRACETAM: CPT

## 2024-01-30 PROCEDURE — 36415 COLL VENOUS BLD VENIPUNCTURE: CPT

## 2024-01-30 RX ORDER — ATORVASTATIN CALCIUM 80 MG/1
80 TABLET, FILM COATED ORAL EVERY EVENING
Qty: 90 TABLET | Refills: 4 | Status: SHIPPED | OUTPATIENT
Start: 2024-01-30

## 2024-01-30 ASSESSMENT — FIBROSIS 4 INDEX: FIB4 SCORE: 1.03

## 2024-01-30 NOTE — ASSESSMENT & PLAN NOTE
Recent issue, inciting cause most likely COVID-19 infection, she is established with vascular medicine has been started on apixaban 5 mg twice daily, follow-up MR venogram pending completion.

## 2024-01-30 NOTE — ASSESSMENT & PLAN NOTE
Constellation of neurologic events starting October 2023 after she had COVID-19 infection.  Initially felt to be TGA versus TIA and she unfortunately sustained a second event 8 weeks later despite initiation of anticoagulation for central venous sinus thrombosis.  EEG per neurology positive for 3 episodes of activity over a 24-hour period.  She is on Keppra 500 mg twice daily and will plan to have her level checked this week.  She has follow-up with neurology next week.  Will abstain from driving for at least 90 days or until otherwise cleared by Dr. Ortiz or one of the other neurologists.

## 2024-01-30 NOTE — ASSESSMENT & PLAN NOTE
Neck ongoing problem, continues to have significant discomfort, she is taking 2 Advil in the morning and is aware of interaction with apixaban.  Vies her to reach out to orthopedics to see if she could try another injection since her surgery will likely be delayed another 3 months at least until we can get to the bottom of the new onset seizure activity and figure out a plan for her anticoagulation.

## 2024-01-30 NOTE — ASSESSMENT & PLAN NOTE
Chronic ongoing problem, will continue apixaban 5 mg twice daily at this time I will discuss with vascular medicine and potentially vascular surgery pending results of MR venogram.

## 2024-01-30 NOTE — PROGRESS NOTES
Subjective:   Chief Complaint/History of Present Illness:  Michela Valdez is a 73 y.o. female established patient who presents today to discuss medical problems as listed below. Michela is accompanied by her daughter for today's visit.    Problem   Secondary Hypercoagulable State (Hcc)    She is on Eliquis for recent venous clot which led to a neurological event (?TGA). No bruising or bleeding issues.     Cerebral Venous Thrombosis of Sigmoid Sinus    MR brain (10/2023):  Subacute to chronic thrombus in the right sigmoid sinus and proximal right internal jugular vein at the skull base.    New finding during evaluation for acute mental status change, recommended Eliquis 5 mg twice daily for 3 months and follow up CTV/MRV at that time. Associated transient global amnesia which is improving.  Tested positive for seizure activity on EEG and now started on Keppra.     TGA vs TIA- now with +seizure on EEG    New diagnosis in Oct 2023, likely related to sinus venous thrombosis, recommended to proceed with 3 months of oral anticoagulation with follow up imaging. Shortly after discharge when I saw her memory and cognition were improved. Scheduled for repeat imaging in late January to follow up on venous clot. Will see vascular medicine next week.    Unfortunately, she sustained a second neurologic episode approximately 8 weeks after the October event on December 24, 2023.  Very similar as it started early in the morning and her daughter could tell on the phone almost immediately that she was having abnormal thought process.  She came to keep an eye on her and thought it may have been improving but then at 1 point her head slumped to the side and she was out of it for 20 second period.      Underwent 24-hour EEG and had 3 episodes of seizure activity noted and was started on Keppra 500 mg twice daily.  She has follow-up with neurology next week and vascular medicine later this month.  She is aware of 90-day driving  "restriction.     Rotator Cuff Arthropathy of Right Shoulder    She has right shoulder pain and was felt to have an acute on chronic rotator cuff tear in spring 2022.  She saw Dr. Blevins with physiatry.  She is going to complete an MRI of the right shoulder in early October 2022.  She is interested in operative repair if indicated.    Follow-up MRI of the shoulder on January 15 2023 with plans for elective surgery in February 2024 with Dr. Garcia.  This has been bumped out until likely April 2024 due to other medical concerns that need to be addressed first.          Current Medications:  Current Outpatient Medications Ordered in Epic   Medication Sig Dispense Refill    atorvastatin (LIPITOR) 80 MG tablet TAKE 1 TABLET BY MOUTH EVERY DAY IN THE EVENING 90 Tablet 4    levETIRAcetam (KEPPRA) 500 MG Tab Take 1 Tablet by mouth 2 times a day. 180 Tablet 6    Glucos-Chond-Hyal Ac-Ca Fructo (MOVE FREE [facility] ADVANCE) Tab Take 1 Tablet by mouth every day.      ezetimibe (ZETIA) 10 MG Tab Take 1 Tablet by mouth every day. 90 Tablet 3    Omega-3 Fatty Acids (FISH OIL) 1000 MG Cap capsule Take 1,000 mg by mouth every day.      amLODIPine (NORVASC) 5 MG Tab Take 1 Tablet by mouth every day. 100 Tablet 3    apixaban (ELIQUIS) 5mg Tab Take 1 Tablet by mouth 2 times a day. 200 Tablet 3    levothyroxine (SYNTHROID) 88 MCG Tab TAKE 1 TABLET BY MOUTH EVERY DAY IN THE MORNING ON AN EMPTY STOMACH 100 Tablet 3    Cholecalciferol (VITAMIN D3) 1000 units Cap Take 2,000 Units by mouth. 1000 units x 2 capsules = 2000 units       No current Epic-ordered facility-administered medications on file.          Objective:   Physical Exam:    Vitals: /70 (BP Location: Right arm, Patient Position: Sitting, BP Cuff Size: Adult)   Pulse 85   Temp 36.3 °C (97.3 °F) (Temporal)   Resp 12   Ht 1.676 m (5' 6\")   Wt 68.2 kg (150 lb 6.4 oz)   SpO2 96%    BMI: Body mass index is 24.28 kg/m².  Physical Exam  Constitutional:       General: " She is not in acute distress.     Appearance: Normal appearance. She is not ill-appearing.   HENT:      Right Ear: External ear normal.      Left Ear: External ear normal.   Eyes:      General: No scleral icterus.     Conjunctiva/sclera: Conjunctivae normal.   Cardiovascular:      Rate and Rhythm: Normal rate.   Pulmonary:      Effort: Pulmonary effort is normal. No respiratory distress.   Lymphadenopathy:      Cervical: No cervical adenopathy.   Skin:     General: Skin is warm and dry.      Findings: No rash.   Neurological:      Gait: Gait normal.   Psychiatric:         Mood and Affect: Mood normal.         Behavior: Behavior normal.         Thought Content: Thought content normal.         Judgment: Judgment normal.      Comments: Anxious about recent seizure diagnosis and no longer being able to drive          Assessment and Plan:   Michela is a 73 y.o. female with the following:  Problem List Items Addressed This Visit       Cerebral venous thrombosis of sigmoid sinus     Recent issue, inciting cause most likely COVID-19 infection, she is established with vascular medicine has been started on apixaban 5 mg twice daily, follow-up MR venogram pending completion.         Rotator cuff arthropathy of right shoulder     Neck ongoing problem, continues to have significant discomfort, she is taking 2 Advil in the morning and is aware of interaction with apixaban.  Vies her to reach out to orthopedics to see if she could try another injection since her surgery will likely be delayed another 3 months at least until we can get to the bottom of the new onset seizure activity and figure out a plan for her anticoagulation.         Secondary hypercoagulable state (HCC)     Chronic ongoing problem, will continue apixaban 5 mg twice daily at this time I will discuss with vascular medicine and potentially vascular surgery pending results of MR venogram.         TGA vs TIA- now with +seizure on EEG     Constellation of neurologic  events starting October 2023 after she had COVID-19 infection.  Initially felt to be TGA versus TIA and she unfortunately sustained a second event 8 weeks later despite initiation of anticoagulation for central venous sinus thrombosis.  EEG per neurology positive for 3 episodes of activity over a 24-hour period.  She is on Keppra 500 mg twice daily and will plan to have her level checked this week.  She has follow-up with neurology next week.  Will abstain from driving for at least 90 days or until otherwise cleared by Dr. Ortiz or one of the other neurologists.               RTC: Return in about 3 months (around 4/30/2024).    I spent a total of 32 minutes with record review, exam, communication with the patient, communication with other providers, and documentation of this encounter.    PLEASE NOTE: This dictation was created using voice recognition software. I have made every reasonable attempt to correct obvious errors, but I expect that there are errors of grammar and possibly content that I did not discover before finalizing the note.      Archana Robertson, DO  Geriatric and Internal Medicine  [facility]

## 2024-02-01 LAB — LEVETIRACETAM SERPL-MCNC: 15 UG/ML (ref 10–40)

## 2024-02-09 ENCOUNTER — OFFICE VISIT (OUTPATIENT)
Dept: NEUROLOGY | Facility: MEDICAL CENTER | Age: 74
End: 2024-02-09
Attending: PSYCHIATRY & NEUROLOGY
Payer: MEDICARE

## 2024-02-09 VITALS
BODY MASS INDEX: 25.47 KG/M2 | DIASTOLIC BLOOD PRESSURE: 66 MMHG | WEIGHT: 158.51 LBS | HEART RATE: 76 BPM | SYSTOLIC BLOOD PRESSURE: 122 MMHG | OXYGEN SATURATION: 96 % | HEIGHT: 66 IN | RESPIRATION RATE: 14 BRPM

## 2024-02-09 DIAGNOSIS — G08 CEREBRAL VENOUS THROMBOSIS OF SIGMOID SINUS: ICD-10-CM

## 2024-02-09 DIAGNOSIS — G31.84 MILD COGNITIVE IMPAIRMENT: ICD-10-CM

## 2024-02-09 DIAGNOSIS — G40.909 SEIZURE DISORDER (HCC): ICD-10-CM

## 2024-02-09 PROCEDURE — 99215 OFFICE O/P EST HI 40 MIN: CPT | Performed by: PSYCHIATRY & NEUROLOGY

## 2024-02-09 PROCEDURE — 99212 OFFICE O/P EST SF 10 MIN: CPT

## 2024-02-09 ASSESSMENT — FIBROSIS 4 INDEX: FIB4 SCORE: 1.03

## 2024-02-09 NOTE — PROCEDURES
"Neuro follow up:    Last formally seen by me in 1/2024.    EEG results reviewed with Michela and daughter today from 1/17/2024.    Presently taking Keppra 500 mg PO BID (1000 mg a day).    She remains on Eliquis (anti coagulation)    Anxiety seems reduced per daughter today while on Keppra and this seems consistent.    Otherwise    Michela A Spear 73 y.o. female  right handed woman who [place] and lives in [facility]) moving from [place].  She works full time as a Realtor     Problem List reviewed.     Covid testing positive a few months ago > about 5 days in duration \"like a cold\" (coughing and congestion) but without fever(s), sorethroat > took Paclovid x 3 days.     Memory changes have been noticed for over 1 year such as going into a room to get something which she self describes a minor nuisance in the sense of being able to recall why or what she  has gone into the room to get.       In terms of memory disturbance(s) in the last year there tends to be more tendency to forget people's names and movies per daughter.     Had another episode on 12/24/2023:     I reviewed Dr. Quinten Ferrari MD's note from [facility] ED note.     Daughter called Michela on [address] to plan the evening. At that time Michela was feeling fine and seemed ok that day.  Daughter was on the phone with Michela and did not know what day it was and there was a \"little bit of memory loss\". There was not clear repeating of information or asking questions and over and over. When daughter went over to house and was able to communicate appropriately.  10:00 am was when daughter called. When her daughter got there, Michela's head slowly drooped for 3 seconds and her limbs per \"trembling\" for 1-2 minutes. This entire episode lasted 3 to 4 hours.   She does being recall being in the ambulance.     \"T.G.A. 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 She had a period where she could not recall a period of not recalling of information at all. Her daughter on Sunday morning she was repetitive in speech and garbled> 911 was called and paramedics called. And triaged at  her house> [facility].     Her words and speech were not normal per her daughter (that Sunday).     Note from [facility] Reviewed today:     \"Michela Valdez is a 73 y.o. female who presented 10/22/2023 with past medical history of hyperlipidemia, hypothyroidism, prediabetes who comes into the hospital with complaints of memory loss, confusion and slurred speech that started this morning when she woke up in bed.  Patient has been experiencing some symptoms for the past 2 days of memory loss.  She describes more retrograde amnesia rather than forming new memories.  She is normally high functioning grossly patient but has been unable to complete her job.  Recently patient has been experiencing dizziness in the morning.  She denies any headaches, photophobia, muscle weakness, nausea, vomiting, diarrhea.  She denies any changes in medications.  She did travel to [place] 2 weeks ago where she contracted COVID.  The patient did take Paxlovid.  She does have a family history of stroke and coronary artery disease.  Patient gets a carotid artery ultrasound every year since she has a strong family history.  She denies any recent trauma or falls.     EKG interpreted by me found normal sinus rhythm, LVH  Chest x-ray interpreted by me found no acute pulmonary process\"     The patient was admitted for transient memory loss and encephalopathy r/o acute CVA.     Hospital course under my care: afebrile and vitals wnl. Exam at bedside was grossly unremarkable with intact neuroexam though the patient has occasionally having hard time remembering " details of last two days. She does seem to have improvement with times.  Labs and imagings findings discussed with patient. MRI done was notable for no acute process but noted subacute to chronic thrombus in R sigmoid sinus and proximal R IJ vein. At this point, neurology was consulted - recommended anticoag and follow up scan vs just follow up scan. Pt was also seen by Speech therapy - she did quite well with Cognistat.      Clinical impression at this point with transient global amnesia - likely etiology is ischemic vs thrombosis. Pt is on ASA and Statin at home. Plan is to add Eliquis for thrombosis; also amlodipine to control BP. Deemed stable for DC at this point.      Therefore, she is discharged in fair and stable condition to home with close outpatient follow-up.   The patient recovered much more quickly than anticipated on admission.       There are no features to suggest Keppra side effects at this point- ongoing dizziness,nausea,gait instability, visual disturbance(s) or recurrent or transient memory disturbances (last event known since December 24 2023)      Procedure Note    AMBULATORY ELECTROENCEPHALOGRAM REPORT        REFERRING PROVIDER:  Murphy Ortiz M.D.  [address] [place] [address]  [place] [id number]  DOS: 01/17/2024   DURATION OF RECORDING: (23 hours and 16 minutes)     INDICATION:  Michela Valdez 73 y.o. female presenting with   cognitive impairment.     CURRENT OUTPATIENT MEDICATION LIST:        Current Outpatient Medications   Medication Instructions    amLODIPine (NORVASC) 5 mg, Oral, DAILY    apixaban (ELIQUIS) 5 mg, Oral, 2 TIMES DAILY    atorvastatin (LIPITOR) 80 mg, Oral, NIGHTLY    ezetimibe (ZETIA) 10 mg, Oral, DAILY    fish oil 1,000 mg, Oral, DAILY    Glucos-Chond-Hyal Ac-Ca Fructo (MOVE FREE [facility] ADVANCE) Tab 1 Tablet, Oral, DAILY    levETIRAcetam (KEPPRA) 500 mg, Oral, 2 TIMES DAILY    levothyroxine (SYNTHROID) 88 mcg, Oral, EACH MORNING ON EMPTY STOMACH    Vitamin D3 2,000  Units, Oral, 1000 units x 2 capsules = 2000 units         TECHNIQUE:   The EEG was set up and taken down by a Neurodiagnostic technologist who performed education to patient and staff.  A minimum but not limited to 23 electrodes and 23 channel recording was setup and performed by Neurodiagnostic technologist. Impedances, electrode integrity, and technical impressions were documented a minimum of every 2-24 hour period by a Neurodiagnostic Technologist and reviewed by Interpreting Physician.     DESCRIPTION OF THE RECORD:  During maximal wakefulness, the background was continuous and showed a 9-10 Hz posterior dominant rhythm.  Reactivity and state changes were present.  During drowsiness, theta/delta frequencies were seen.     Sleep was captured and was characterized by diffuse background delta/theta activity with a loss of myogenic artifact.  N2 sleep transients in the form of sleep spindles and vertex waves were seen in the leads over the central regions.      ICTAL AND INTERICTAL FINDINGS:   Interictal  1) Early after the onset of the sleep state, there are frequent left temporal sharp waves. These are not observed in wakefulness.   2) Occasional right temporal sharp waves in the sleep state     Ictal  1) 03:18:50                    2) 06:40:07                    3) 11:01:27                       Clinical: no reported symptoms  EEG: right temporal lobe rhythmic theta with evolution to slower frequencies prior to abrupt offset        ACTIVATION PROCEDURES:   Hyperventilation was performed by the patient for a total of 3 minutes. The technician performing the test noted good effort. This technique did not elicited any abnormalities on EEG. , Intermittent Photic stimulation was performed in a stepwise fashion from 1 to 30 Hz and did not elicited any abnormalities on EEG. , and NA     EKG: Sampling of the EKG recording showed sinus rhythm     EVENTS:  Push button events and/or ambulatory diary events: none      INTERPRETATION:  Abnormal ambulatory EEG recording in the awake and drowsy/sleep state(s):  1) Frequent left temporal sharp waves at onset of sleep state  2) Occasional right temporal sharp waves in the sleep state  3) Three electrographic seizures arising from the right temporal lobe are captured. Electrographically there is rhythmic theta of the right temporal region with evolution to slower frequencies prior to abrupt offset.   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 Interictally there are epileptiform abnormalities in both the right and left temporal regions. Clinical and radiologic correlation recommended.         Gracy Navarro M.D.   Diplomate, Neurology with Special Qualification in Epilepsy, American Board of Psychiatry and Neurology   of Clinical Neurology, [facility] Medicine  Level III Epilepsy Center, Department of Neurology at [facility]   1/19/2024          Last Resulted: 01/17/24  1:00 PM            Last obvious amnestic event December 24 2023 and the 1st event was October 22nd 2023.        Labs:    1/8/2024: Blood work reviewed:  anticoagulation blood work reviewed.    Plans:    A. Continue Keppra 500 mg PO BID (1000 mg a day).  B. Will recheck Keppra level at end of Feb 2024 and if Keppra level over say 13 or so, will turn in DMV form(s) that have tentatively been filled out ad signed today by Michela.    C. Continue anticoagulation given venous sinus clot.    D. Brain Health factors reviewed today.    Today visit time was 42 minutes and we reviewed above issues.

## 2024-02-09 NOTE — PROGRESS NOTES
"Neuro follow up:     Last formally seen by me in 1/2024.     EEG results reviewed with Michela and daughter today from 1/17/2024.     Presently taking Keppra 500 mg PO BID (1000 mg a day).     She remains on Eliquis (anti coagulation)     Anxiety seems reduced per daughter today while on Keppra and this seems consistent.     Otherwise    Michela A Spear 73 y.o. female  right handed woman who grew [place] and lives in [facility]) moving from [place].  She works full time as a Realtor     Problem List reviewed.     Covid testing positive a few months ago > about 5 days in duration \"like a cold\" (coughing and congestion) but without fever(s), sorethroat > took Paclovid x 3 days.     Memory changes have been noticed for over 1 year such as going into a room to get something which she self describes a minor nuisance in the sense of being able to recall why or what she  has gone into the room to get.       In terms of memory disturbance(s) in the last year there tends to be more tendency to forget people's names and movies per daughter.     Had another episode on 12/24/2023:     I reviewed Dr. Quinten Ferrari MD's note from [facility] ED note.     Daughter called Michela on [address] to plan the evening. At that time Micheal was feeling fine and seemed ok that day.  Daughter was on the phone with Michela and did not know what day it was and there was a \"little bit of memory loss\". There was not clear repeating of information or asking questions and over and over. When daughter went over to house and was able to communicate appropriately.  10:00 am was when daughter called. When her daughter got there, Michela's head slowly drooped for 3 seconds and her limbs per \"trembling\" for 1-2 minutes. This entire episode lasted 3 to 4 hours.   She does being recall being in the ambulance.     \"T.G.A. 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 She had a period where she could not recall a period of not recalling of information at all. Her daughter on Sunday morning she was repetitive in speech and garbled> 911 was called and paramedics called. And triaged at  her house> [facility].     Her words and speech were not normal per her daughter (that Sunday).     Note from [facility] Reviewed today:     \"Michela Valdez is a 73 y.o. female who presented 10/22/2023 with past medical history of hyperlipidemia, hypothyroidism, prediabetes who comes into the hospital with complaints of memory loss, confusion and slurred speech that started this morning when she woke up in bed.  Patient has been experiencing some symptoms for the past 2 days of memory loss.  She describes more retrograde amnesia rather than forming new memories.  She is normally high functioning grossly patient but has been unable to complete her job.  Recently patient has been experiencing dizziness in the morning.  She denies any headaches, photophobia, muscle weakness, nausea, vomiting, diarrhea.  She denies any changes in medications.  She did travel to [place] 2 weeks ago where she contracted COVID.  The patient did take Paxlovid.  She does have a family history of stroke and coronary artery disease.  Patient gets a carotid artery ultrasound every year since she has a strong family history.  She denies any recent trauma or falls.     EKG interpreted by me found normal sinus rhythm, LVH  Chest x-ray interpreted by me found no acute pulmonary process\"     The patient was admitted for transient memory loss and encephalopathy r/o acute CVA.     Hospital course under my care: afebrile and vitals wnl. Exam at bedside was grossly unremarkable with intact neuroexam though the patient has occasionally having hard time remembering " details of last two days. She does seem to have improvement with times.  Labs and imagings findings discussed with patient. MRI done was notable for no acute process but noted subacute to chronic thrombus in R sigmoid sinus and proximal R IJ vein. At this point, neurology was consulted - recommended anticoag and follow up scan vs just follow up scan. Pt was also seen by Speech therapy - she did quite well with Cognistat.      Clinical impression at this point with transient global amnesia - likely etiology is ischemic vs thrombosis. Pt is on ASA and Statin at home. Plan is to add Eliquis for thrombosis; also amlodipine to control BP. Deemed stable for DC at this point.      Therefore, she is discharged in fair and stable condition to home with close outpatient follow-up.   The patient recovered much more quickly than anticipated on admission.        There are no features to suggest Keppra side effects at this point- ongoing dizziness,nausea,gait instability, visual disturbance(s) or recurrent or transient memory disturbances (last event known since December 24 2023)        Procedure Note     AMBULATORY ELECTROENCEPHALOGRAM REPORT        REFERRING PROVIDER:  Murphy Ortiz M.D.  [address] [place] [address]  [place] [id number]  DOS: 01/17/2024   DURATION OF RECORDING: (23 hours and 16 minutes)     INDICATION:  Michela Valdez 73 y.o. female presenting with   cognitive impairment.     CURRENT OUTPATIENT MEDICATION LIST:           Current Outpatient Medications   Medication Instructions    amLODIPine (NORVASC) 5 mg, Oral, DAILY    apixaban (ELIQUIS) 5 mg, Oral, 2 TIMES DAILY    atorvastatin (LIPITOR) 80 mg, Oral, NIGHTLY    ezetimibe (ZETIA) 10 mg, Oral, DAILY    fish oil 1,000 mg, Oral, DAILY    Glucos-Chond-Hyal Ac-Ca Fructo (MOVE FREE [facility] ADVANCE) Tab 1 Tablet, Oral, DAILY    levETIRAcetam (KEPPRA) 500 mg, Oral, 2 TIMES DAILY    levothyroxine (SYNTHROID) 88 mcg, Oral, EACH MORNING ON EMPTY STOMACH    Vitamin D3  2,000 Units, Oral, 1000 units x 2 capsules = 2000 units         TECHNIQUE:   The EEG was set up and taken down by a Neurodiagnostic technologist who performed education to patient and staff.  A minimum but not limited to 23 electrodes and 23 channel recording was setup and performed by Neurodiagnostic technologist. Impedances, electrode integrity, and technical impressions were documented a minimum of every 2-24 hour period by a Neurodiagnostic Technologist and reviewed by Interpreting Physician.     DESCRIPTION OF THE RECORD:  During maximal wakefulness, the background was continuous and showed a 9-10 Hz posterior dominant rhythm.  Reactivity and state changes were present.  During drowsiness, theta/delta frequencies were seen.     Sleep was captured and was characterized by diffuse background delta/theta activity with a loss of myogenic artifact.  N2 sleep transients in the form of sleep spindles and vertex waves were seen in the leads over the central regions.      ICTAL AND INTERICTAL FINDINGS:   Interictal  1) Early after the onset of the sleep state, there are frequent left temporal sharp waves. These are not observed in wakefulness.   2) Occasional right temporal sharp waves in the sleep state     Ictal  1) 03:18:50                    2) 06:40:07                    3) 11:01:27                       Clinical: no reported symptoms  EEG: right temporal lobe rhythmic theta with evolution to slower frequencies prior to abrupt offset        ACTIVATION PROCEDURES:   Hyperventilation was performed by the patient for a total of 3 minutes. The technician performing the test noted good effort. This technique did not elicited any abnormalities on EEG. , Intermittent Photic stimulation was performed in a stepwise fashion from 1 to 30 Hz and did not elicited any abnormalities on EEG. , and NA     EKG: Sampling of the EKG recording showed sinus rhythm     EVENTS:  Push button events and/or ambulatory diary events: none      INTERPRETATION:  Abnormal ambulatory EEG recording in the awake and drowsy/sleep state(s):  1) Frequent left temporal sharp waves at onset of sleep state  2) Occasional right temporal sharp waves in the sleep state  3) Three electrographic seizures arising from the right temporal lobe are captured. Electrographically there is rhythmic theta of the right temporal region with evolution to slower frequencies prior to abrupt offset.   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 Interictally there are epileptiform abnormalities in both the right and left temporal regions. Clinical and radiologic correlation recommended.         Gracy Navarro M.D.   Diplomate, Neurology with Special Qualification in Epilepsy, American Board of Psychiatry and Neurology   of Clinical Neurology, [facility] Medicine  Level III Epilepsy Center, Department of Neurology at [facility]   1/19/2024           Last Resulted: 01/17/24  1:00 PM               Last obvious amnestic event December 24 2023 and the 1st event was October 22nd 2023.           Labs:     1/8/2024: Blood work reviewed:  anticoagulation blood work reviewed.     Plans:     A. Continue Keppra 500 mg PO BID (1000 mg a day).  B. Will recheck Keppra level at end of Feb 2024 and if Keppra level over say 13 or so, will turn in DMV form(s) that have tentatively been filled out ad signed today by Michela.     C. Continue anticoagulation given venous sinus clot.     D. Brain Health factors reviewed today.     Today visit time was 42 minutes and we reviewed above issues.

## 2024-02-15 ENCOUNTER — TELEPHONE (OUTPATIENT)
Dept: VASCULAR LAB | Facility: MEDICAL CENTER | Age: 74
End: 2024-02-15
Payer: MEDICARE

## 2024-02-15 NOTE — TELEPHONE ENCOUNTER
Established patient  Chart prep for upcoming appointment with Dr. Bloch    Any pending/incomplete orders from last visit? No, all orders completed.  Were any records requested?  No  Correct appointment type (New/Established/virtual)? Yes  Referral up to date? Yes  Referral attached to appointment (renewals and New patients only)? N/A (established with up-to-date referral)    Shalonda Rodriguez, Med Ass't  [facility]  Ph. [phone]  Fx. [phone]

## 2024-02-22 ENCOUNTER — OFFICE VISIT (OUTPATIENT)
Dept: CARDIOLOGY | Facility: MEDICAL CENTER | Age: 74
End: 2024-02-22
Attending: INTERNAL MEDICINE
Payer: MEDICARE

## 2024-02-22 VITALS
WEIGHT: 158 LBS | HEART RATE: 91 BPM | SYSTOLIC BLOOD PRESSURE: 120 MMHG | HEIGHT: 66 IN | DIASTOLIC BLOOD PRESSURE: 70 MMHG | BODY MASS INDEX: 25.39 KG/M2

## 2024-02-22 DIAGNOSIS — G40.909 SEIZURE DISORDER (HCC): ICD-10-CM

## 2024-02-22 DIAGNOSIS — I77.9 MILD CAROTID ARTERY DISEASE (HCC): ICD-10-CM

## 2024-02-22 DIAGNOSIS — I10 PRIMARY HYPERTENSION: ICD-10-CM

## 2024-02-22 DIAGNOSIS — E78.5 DYSLIPIDEMIA: ICD-10-CM

## 2024-02-22 DIAGNOSIS — G08 CEREBRAL VENOUS THROMBOSIS OF SIGMOID SINUS: ICD-10-CM

## 2024-02-22 PROCEDURE — 99214 OFFICE O/P EST MOD 30 MIN: CPT | Performed by: INTERNAL MEDICINE

## 2024-02-22 PROCEDURE — 99212 OFFICE O/P EST SF 10 MIN: CPT

## 2024-02-22 PROCEDURE — 3074F SYST BP LT 130 MM HG: CPT | Performed by: INTERNAL MEDICINE

## 2024-02-22 PROCEDURE — 3078F DIAST BP <80 MM HG: CPT | Performed by: INTERNAL MEDICINE

## 2024-02-22 ASSESSMENT — FIBROSIS 4 INDEX: FIB4 SCORE: 1.03

## 2024-02-22 NOTE — PROGRESS NOTES
"VASCULAR MEDICINE CLINIC - Follow up VISIT  24    Michela Valdez is a 73 y.o. female who presents today  for vascular evaluation.     Subjective      HPI:  Here for f/u of cerebral vein thrombosis  No episodes of confusion since last visit  Was diagnosed with sz based on EEG.   No focal neurologic complaints  Has close f/u with neuro  Tolerating keppra  Daughter and she think she is more clear headed  Still on eliquis  No bleeding  No headache  Did have a fall - but she is convinced that she tripped  No myalgias on atorvastatin  Still on amlodipine  BP at home usually 130s/80s  No leg swelling  Same thyroid dose - better energy       Family History   Problem Relation Age of Onset    Osteoporosis Mother     Hyperlipidemia Father     Heart Disease Father         CAD, MI     Anemia Sister     Cancer Brother         prostate    Heart Disease Brother         CABG    Dad - fatal coronary disease in 50s    Social History     Tobacco Use    Smoking status: Former     Current packs/day: 0.00     Average packs/day: 0.5 packs/day for 4.0 years (2.0 ttl pk-yrs)     Types: Cigarettes     Start date:      Quit date:      Years since quittin.1    Smokeless tobacco: Never   Vaping Use    Vaping Use: Never used   Substance Use Topics    Alcohol use: Yes     Alcohol/week: 3.0 - 4.2 oz     Types: 5 - 7 Glasses of wine per week     Comment: glass of wine daily     Drug use: No      DIET AND EXERCISE:  Weight Change:stable  Diet: relatively HH  Exercise: mod regular          Objective     Objective:     Vitals:    24 1525   BP: 120/70   BP Location: Left arm   Patient Position: Sitting   BP Cuff Size: Adult   Pulse: 91   Weight: 71.7 kg (158 lb)   Height: 1.676 m (5' 6\")        Physical Exam  Constitutional:       General: She is not in acute distress.     Appearance: She is not diaphoretic.   HENT:      Head: Normocephalic and atraumatic.   Eyes:      General: No scleral icterus.     Conjunctiva/sclera: " Conjunctivae normal.   Pulmonary:      Effort: Pulmonary effort is normal. No respiratory distress.   Skin:     Coloration: Skin is not pale.   Neurological:      Mental Status: She is alert and oriented to person, place, and time.      Cranial Nerves: No cranial nerve deficit.   Psychiatric:         Mood and Affect: Mood and affect normal.         Behavior: Behavior normal.          DATA REVIEW    Lab Results   Component Value Date/Time    CHOLSTRLTOT 128 01/08/2024 06:44 AM    LDL 65 01/08/2024 06:44 AM    HDL 47 01/08/2024 06:44 AM    TRIGLYCERIDE 81 01/08/2024 06:44 AM     Lp(a) 109 mg/dl    Lab Results   Component Value Date/Time    SODIUM 141 01/08/2024 06:46 AM    POTASSIUM 4.6 01/08/2024 06:44 AM    CHLORIDE 104 01/08/2024 06:44 AM    CO2 24 01/08/2024 06:44 AM    GLUCOSE 119 (H) 01/08/2024 06:44 AM    BUN 19 01/08/2024 06:44 AM    CREATININE 0.50 01/08/2024 06:44 AM     Lab Results   Component Value Date/Time    ALKPHOSPHAT 102 (H) 01/08/2024 06:44 AM    ASTSGOT 27 01/08/2024 06:44 AM    ALTSGPT 33 01/08/2024 06:44 AM    TBILIRUBIN 0.4 01/08/2024 06:44 AM       Lab Results   Component Value Date/Time    HBA1C 5.7 (H) 01/08/2024 06:44 AM       Lab Results   Component Value Date/Time    MALBCRT see below 01/08/2024 12:29 PM    MICROALBUR <1.2 01/08/2024 12:29 PM       Beta 2 glycoprotein IgG and IgM - normal  ACLA - IgG normal; IgM mildly elevated  FVL - neg  PTGM - neg      CAC score jan 2023  LMA - 64.8  LCX - 0.0  LAD - 16.5  RCA - 258.7  PDA - 0.0  Total Calcium Score: 340.0    CTA head and neck oct 2023  1.  Negative for thrombosis or occlusion  2.  Mild bilateral internal carotid artery stenoses  3.  Anatomic variant as the right subclavian artery originates distal to the left subclavian artery and passes posterior to the trachea before exiting the right hemithorax  4.  Anatomic variant termination of the right vertebral artery and posterior inferior cerebellar artery    Echo oct 2023  No prior study is  available for comparison.   Normal left ventricular systolic function.  The left ventricular ejection fraction is visually estimated to be 60%.  No significant valvular abnormalities.   A definite cardiac source for a systemic thromboembolic event is not   identified, failure to do so does not exclude its presence. If   clinically indicated, a transesophageal study would be useful.     MRI brain oct 2023  No acute process.  Age-related volume loss.  Subacute to chronic thrombus in the right sigmoid sinus and proximal right internal jugular vein at the skull base.    CTA head and neck dec 2023  Ctangiogram of the [place] demonstrating no large vessel occlusion.   Mild bilateral atherosclerosis with less than 50% ICA stenosis.     MRV jan 2024  Stable appearance of filling defects in the right transverse and sigmoid junction and the right sigmoid sinus extending into the right internal jugular vein at the skull base.  Defect within the left internal jugular vein at the skull base noted, which may represent flow artifact or new thrombus in this location. Consider follow-up with contrast-enhanced CTA/CT venogram of the head and neck.        Medical Decision Making:  Today's Assessment / Status / Plan:     1. Dyslipidemia        2. Seizure disorder (HCC)        3. Primary hypertension        4. Mild carotid artery stenosis (HCC)        5. Cerebral venous thrombosis of sigmoid sinus  Referral to Hematology Oncology           Etiology of Established CVD if Present:     1) acute confusional state -recurrent - newly diagnosed sz disorder -  no stroke seen on imaging. No residual. No source of embolism or afib.  -911 with any tia or cva symptoms  -continue keppra per neuro  -medical management per below  -follow up with neuro for further w/u and management  -'rest' the brain - decrease stress, avoid anesthesia, limit etoh, plenty of sleep    2) cerebral vein thrombosis - unclear chronicity and unclear relation to  TGA/TIA. Possibly related to previous covid.   FVL and PTGM normal  Normal anti-beta 2 ab, but mildly elevated IgM acla  - medical management as below  - referal to hematology  - Will defer any indicated age appropriate screening for occult malignancy to pcp.  - repeat MRV in 3 mo pending further recs from neuro    Lipid Management: Qualifies for Statin Therapy Based on 2018 ACC/AHA Guidelines: yes  Calculated 10-Year Risk of ASCVD: N/A  Currently on Statin: Yes  Strong FH of premature ASCVD  Subclinical athero as evidenced by mild carotid disease and elevated CAC score  Elevated lp(a) an independent risk factor for disease  Given above would aim for LDL <70 and nonHDL <100  Reasonable control on most recent blood work  Plan  - recommended family lipid and lp(a) screening  - continue atorva 80  -Continue ezetimibe 10   - recheck fasting lipid in future    Blood Pressure Management:  Acc[place] (2017) Blood Pressure Goal <130/80  BP high at presentation, but no previous h/o hypertension   Unclear chronicity  Reasonable control at least in office  Home readings remain high  GFR and lytes stable  No albuminuria  No LVH  Plan:  - continue amlodipine 5 mg daily as monotherapy for now  - continue home bp monitoring with good technique  - consider abpm in future    Glycemic Status: Prediabetic  A1c 5.7 mild and stable  - continue lifestyle mod  - recheck fasting glucose and A1c in future    Anti-Platelet/Anti-Coagulant Tx: Yes  - continue eliquis for at least 6 mo  - await heme recs  -Restart antiplatelet therapy when off DOAC    Smoking: continue complete avoidance of all tobacco products    Physical Activity: continue mod intensity exercise    Weight Management and Nutrition: recommended med style diet    Other:     - Hypothroidism - appears under good control based on symptoms and recent blood work. Continue current levo.     - DJD shoulder - avoid anesthesia for now. After that would recommend ortho consult with neuro  prior to surgery. Avoid systemic nsaids or steroids while on anticoag.     Instructed to follow-up with PCP for remainder of adult medical needs: yes  We will partner with other providers in the management of established vascular disease and cardiometabolic risk factors.    Studies to Be Obtained: Likely MR venogram in may 2024 pending further recs from neuro    Labs to Be Obtained: none    Follow up in: 8 weeks    Time: 32 min - chart review/prep, review of other providers' records, imaging/lab review, face-to-face time for history/examination, ordering, prescribing,  review of results/meds/ treatment plan with patient/family/caregiver, documentation in EMR, care coordination (as needed)     Michael J Bloch, M.D.     Cc: Dr. Greer

## 2024-02-26 ENCOUNTER — HOSPITAL ENCOUNTER (OUTPATIENT)
Dept: LAB | Facility: MEDICAL CENTER | Age: 74
End: 2024-02-26
Attending: PSYCHIATRY & NEUROLOGY
Payer: MEDICARE

## 2024-02-26 DIAGNOSIS — G40.909 SEIZURE DISORDER (HCC): ICD-10-CM

## 2024-02-26 PROCEDURE — 80177 DRUG SCRN QUAN LEVETIRACETAM: CPT

## 2024-02-26 PROCEDURE — 36415 COLL VENOUS BLD VENIPUNCTURE: CPT

## 2024-02-28 LAB — LEVETIRACETAM SERPL-MCNC: 20 UG/ML (ref 10–40)

## 2024-03-14 ENCOUNTER — TELEPHONE (OUTPATIENT)
Dept: PHARMACY | Facility: MEDICAL CENTER | Age: 74
End: 2024-03-14
Payer: MEDICARE

## 2024-03-14 NOTE — TELEPHONE ENCOUNTER
Contact:   [phone]    Michela Valdez     Phone number: [phone]    Name of person spoken with and relationship to patient: Michela, self   Patient’s Adherence:            How patient is doing on medication:  well    How many missed doses and reason: 0    Any new medications: no    Any new conditions: no    Any new allergies: no    Any new side effects: no     Any new diagnoses: no     How many doses remaining:  she had approx 30-40 on hand before picking up at [facility]. Adherence Questions completed    Did patient want to speak with pharmacist: no  Delivery:            Delivery date and method:  patient already picked up at [facility] retail    Needs by Date:  na    Signature required:  no    Any additional details for :  na  Teach Appointment Date:  na  Shipping Address:  [facility], [place] [address]  Medication(name, strength and dose):  ezetimibe 10 MG Tabs qd  Copay: na  Payment Method: na  Supplies:  na  Additional info:  BIANCA Abernathy. She stated doing well on the medication and confirmed picking up at [facility]. She is not sure if will be continuing on with [facility] or staying at [facility]. She will determine on next fill and also stated it depended on which place will be cheaper. She has an upcoming appt with Dr Bloch soon and will get more details on the medications she will be on. She stated she has been severely itchy and is getting red patches from all the scratching. She would like to speak top a [facility], Reached out to liaison to have [facility] call patient regarding this. No further questions/concerns at this time

## 2024-03-28 PROBLEM — U07.1 COVID-19 VIRUS INFECTION: Status: RESOLVED | Noted: 2023-09-19 | Resolved: 2024-03-28

## 2024-03-28 PROBLEM — Z09 HOSPITAL DISCHARGE FOLLOW-UP: Status: RESOLVED | Noted: 2023-10-24 | Resolved: 2024-03-28

## 2024-04-02 ENCOUNTER — APPOINTMENT (OUTPATIENT)
Dept: MEDICAL GROUP | Facility: PHYSICIAN GROUP | Age: 74
End: 2024-04-02
Payer: MEDICARE

## 2024-04-02 VITALS
HEIGHT: 66 IN | HEART RATE: 74 BPM | DIASTOLIC BLOOD PRESSURE: 56 MMHG | RESPIRATION RATE: 12 BRPM | SYSTOLIC BLOOD PRESSURE: 108 MMHG | WEIGHT: 157 LBS | TEMPERATURE: 97.4 F | OXYGEN SATURATION: 98 % | BODY MASS INDEX: 25.23 KG/M2

## 2024-04-02 DIAGNOSIS — G08 CEREBRAL VENOUS THROMBOSIS OF SIGMOID SINUS: ICD-10-CM

## 2024-04-02 DIAGNOSIS — R15.1 FECAL SMEARING: ICD-10-CM

## 2024-04-02 DIAGNOSIS — Z12.31 BREAST CANCER SCREENING BY MAMMOGRAM: ICD-10-CM

## 2024-04-02 DIAGNOSIS — G40.909 SEIZURE DISORDER (HCC): ICD-10-CM

## 2024-04-02 DIAGNOSIS — G45.4 TRANSIENT GLOBAL AMNESIA: ICD-10-CM

## 2024-04-02 PROCEDURE — 3078F DIAST BP <80 MM HG: CPT | Performed by: INTERNAL MEDICINE

## 2024-04-02 PROCEDURE — 3074F SYST BP LT 130 MM HG: CPT | Performed by: INTERNAL MEDICINE

## 2024-04-02 PROCEDURE — 99214 OFFICE O/P EST MOD 30 MIN: CPT | Performed by: INTERNAL MEDICINE

## 2024-04-02 ASSESSMENT — FIBROSIS 4 INDEX: FIB4 SCORE: 1.03

## 2024-04-02 NOTE — ASSESSMENT & PLAN NOTE
Chronic ongoing issue, did have some improvement with floor physical therapy, will renew that for this year.  She needs to have a colonoscopy and will plan to get this done in the next few months and then pending those results discussed refer to colorectal surgery for additional evaluation to see if there is abnormality with muscular tone, sphincter, etc.  Also may benefit from registered dietitian to investigate her foods in more detail and determine if there is for improvement there with reducing the fecal smearing.

## 2024-04-02 NOTE — PROGRESS NOTES
Subjective:   Chief Complaint/History of Present Illness:  Michela Valdez is a 73 y.o. female established patient who presents today to discuss medical problems as listed below. Michela is unaccompanied for today's visit.    Problem   Seizure Disorder (Hcc)    Abnormal EEG > [facility] 1/2024> 3 electrographic seizures (no clinical correlates or awareness occurring aroud these times)     Cerebral Venous Thrombosis of Sigmoid Sinus    MR brain (10/2023):  Subacute to chronic thrombus in the right sigmoid sinus and proximal right internal jugular vein at the skull base.    New finding during evaluation for acute mental status change, recommended Eliquis 5 mg twice daily for 3 months and follow up CTV/MRV at that time. Associated transient global amnesia which is improving.  Tested positive for seizure activity on EEG and now started on Keppra.     TGA vs TIA- now with +seizure on EEG    New diagnosis in Oct 2023, likely related to sinus venous thrombosis, recommended to proceed with 3 months of oral anticoagulation with follow up imaging. Shortly after discharge when I saw her memory and cognition were improved. Scheduled for repeat imaging in late January to follow up on venous clot. Will see vascular medicine next week.    Unfortunately, she sustained a second neurologic episode approximately 8 weeks after the October event on December 24, 2023.  Very similar as it started early in the morning and her daughter could tell on the phone almost immediately that she was having abnormal thought process.  She came to keep an eye on her and thought it may have been improving but then at 1 point her head slumped to the side and she was out of it for 20 second period.      Underwent 24-hour EEG and had 3 episodes of seizure activity noted and was started on Keppra 500 mg twice daily.  She has follow-up with neurology next week and vascular medicine later this month.  She is aware of 90-day driving restriction.     Fecal Smearing     "She reports issues with scant amount of fecal drainage, sometimes she does not have a warning and so she wears a pad.  Can be a drop in upwards to a teaspoon amount.  Does not happen all the time.  She feels like she has very good rectal tone and it should not be a strength issue.  She does deal some constipation and wonders if this is a manifestation of obstipation.  Not currently taking any stool softeners or laxatives.  She has diverticulosis throughout the colon.  She has eliminated caffeinated coffee.          Current Medications:  Current Outpatient Medications Ordered in Epic   Medication Sig Dispense Refill    atorvastatin (LIPITOR) 80 MG tablet TAKE 1 TABLET BY MOUTH EVERY DAY IN THE EVENING 90 Tablet 4    levETIRAcetam (KEPPRA) 500 MG Tab Take 1 Tablet by mouth 2 times a day. 180 Tablet 6    Glucos-Chond-Hyal Ac-Ca Fructo (MOVE FREE [facility] ADVANCE) Tab Take 1 Tablet by mouth every day.      ezetimibe (ZETIA) 10 MG Tab Take 1 Tablet by mouth every day. 90 Tablet 3    amLODIPine (NORVASC) 5 MG Tab Take 1 Tablet by mouth every day. 100 Tablet 3    apixaban (ELIQUIS) 5mg Tab Take 1 Tablet by mouth 2 times a day. 200 Tablet 3    levothyroxine (SYNTHROID) 88 MCG Tab TAKE 1 TABLET BY MOUTH EVERY DAY IN THE MORNING ON AN EMPTY STOMACH 100 Tablet 3    Cholecalciferol (VITAMIN D3) 1000 units Cap Take 2,000 Units by mouth. 1000 units x 2 capsules = 2000 units      Omega-3 Fatty Acids (FISH OIL) 1000 MG Cap capsule Take 1,000 mg by mouth every day. (Patient not taking: Reported on 4/2/2024)       No current [facility]-ordered facility-administered medications on file.          Objective:   Physical Exam:    Vitals: /56 (BP Location: Right arm, Patient Position: Sitting, BP Cuff Size: Adult)   Pulse 74   Temp 36.3 °C (97.4 °F) (Temporal)   Resp 12   Ht 1.676 m (5' 6\")   Wt 71.2 kg (157 lb)   SpO2 98%    BMI: Body mass index is 25.34 kg/m².  Physical Exam  Constitutional:       General: She is not in acute " distress.     Appearance: Normal appearance. She is not ill-appearing.   HENT:      Right Ear: External ear normal.      Left Ear: External ear normal.   Eyes:      General: No scleral icterus.     Conjunctiva/sclera: Conjunctivae normal.   Cardiovascular:      Rate and Rhythm: Normal rate.   Pulmonary:      Effort: Pulmonary effort is normal. No respiratory distress.   Skin:     General: Skin is warm and dry.      Findings: No rash.   Neurological:      Gait: Gait normal.   Psychiatric:         Mood and Affect: Mood normal.         Behavior: Behavior normal.         Thought Content: Thought content normal.         Judgment: Judgment normal.          Assessment and Plan:   Michela is a 73 y.o. female with the following:  Problem List Items Addressed This Visit       Cerebral venous thrombosis of sigmoid sinus     Noted after COVID infection in fall 2023, then a few separate episodes of transient global amnesia as well as positive seizure activity on EEG testing.  Established with vascular medicine and neurology.  She is on blood thinners and antiepileptic drugs.  May still consider evaluation with epileptologist to confirm that diagnosis and is also pending referral to hematology to discuss further evaluation of cerebral venous thrombosis, she remains on Eliquis 5 mg twice daily without issue.         Fecal smearing     Chronic ongoing issue, did have some improvement with floor physical therapy, will renew that for this year.  She needs to have a colonoscopy and will plan to get this done in the next few months and then pending those results discussed refer to colorectal surgery for additional evaluation to see if there is abnormality with muscular tone, sphincter, etc.  Also may benefit from registered dietitian to investigate her foods in more detail and determine if there is for improvement there with reducing the fecal smearing.         Relevant Orders    Referral to Physical Therapy    Seizure disorder (HCC)      New diagnosis of EEG testing in early 2024.  She is on Keppra 500 mg twice daily and Keppra level has increased from 15-20.  Not having any mood disturbance on Keppra.  There is if she will need additional EEG or evaluation with epileptologist.  She is going to see neurology and vascular medicine later this month.         TGA vs TIA- now with +seizure on EEG     Recent issue, has positive seizure activity on EEG, unclear if this is related to TGA from the fall.  Is following with vascular medicine with referral pending hematology, remains on Eliquis 5 mg twice daily.  No subsequent confusional episodes.  Also following with neurology and doing fine on Keppra with appropriate lab follow-ups, wonders about with the future will hold and if any additional confirmation is needed for her diagnosis.          Other Visit Diagnoses       Breast cancer screening by mammogram        Relevant Orders    MA-SCREENING MAMMO BILAT W/TOMOSYNTHESIS W/CAD               RTC: Return in about 4 months (around 8/2/2024).    I spent a total of 34 minutes with record review, exam, communication with the patient, communication with other providers, and documentation of this encounter.    PLEASE NOTE: This dictation was created using voice recognition software. I have made every reasonable attempt to correct obvious errors, but I expect that there are errors of grammar and possibly content that I did not discover before finalizing the note.      Archana Robertson, DO  Geriatric and Internal Medicine  [facility]

## 2024-04-02 NOTE — ASSESSMENT & PLAN NOTE
Noted after COVID infection in fall 2023, then a few separate episodes of transient global amnesia as well as positive seizure activity on EEG testing.  Established with vascular medicine and neurology.  She is on blood thinners and antiepileptic drugs.  May still consider evaluation with epileptologist to confirm that diagnosis and is also pending referral to hematology to discuss further evaluation of cerebral venous thrombosis, she remains on Eliquis 5 mg twice daily without issue.

## 2024-04-02 NOTE — ASSESSMENT & PLAN NOTE
New diagnosis of EEG testing in early 2024.  She is on Keppra 500 mg twice daily and Keppra level has increased from 15-20.  Not having any mood disturbance on Keppra.  There is if she will need additional EEG or evaluation with epileptologist.  She is going to see neurology and vascular medicine later this month.

## 2024-04-02 NOTE — ASSESSMENT & PLAN NOTE
Recent issue, has positive seizure activity on EEG, unclear if this is related to TGA from the fall.  Is following with vascular medicine with referral pending hematology, remains on Eliquis 5 mg twice daily.  No subsequent confusional episodes.  Also following with neurology and doing fine on Keppra with appropriate lab follow-ups, wonders about with the future will hold and if any additional confirmation is needed for her diagnosis.

## 2024-04-03 ENCOUNTER — APPOINTMENT (OUTPATIENT)
Dept: FAMILY PLANNING/WOMEN'S HEALTH CLINIC | Facility: PHYSICIAN GROUP | Age: 74
End: 2024-04-03
Attending: FAMILY MEDICINE
Payer: MEDICARE

## 2024-04-03 ENCOUNTER — TELEPHONE (OUTPATIENT)
Dept: VASCULAR LAB | Facility: MEDICAL CENTER | Age: 74
End: 2024-04-03

## 2024-04-03 DIAGNOSIS — G31.9 CEREBRAL ATROPHY (HCC): ICD-10-CM

## 2024-04-03 DIAGNOSIS — G40.909 SEIZURE DISORDER (HCC): ICD-10-CM

## 2024-04-03 DIAGNOSIS — D68.69 SECONDARY HYPERCOAGULABLE STATE (HCC): ICD-10-CM

## 2024-04-03 DIAGNOSIS — I77.9 MILD CAROTID ARTERY DISEASE (HCC): ICD-10-CM

## 2024-04-03 NOTE — TELEPHONE ENCOUNTER
Established patient  Chart prep for upcoming appointment with Dr. Bloch    Any pending/incomplete orders from last visit? No, all orders completed.  Was patient called and reminded to complete pending orders? N/A orders complete  Were any records requested?  No  Correct appointment type (New/Established/virtual)? Yes  Referral up to date? Yes  Referral attached to appointment (renewals and New patients only)? N/A (established with up-to-date referral)      Shalonda Rodriguez, Med Ass't  [facility]  Ph. [phone]  Fx. [phone]

## 2024-04-08 ENCOUNTER — HOSPITAL ENCOUNTER (OUTPATIENT)
Dept: LAB | Facility: MEDICAL CENTER | Age: 74
End: 2024-04-08
Attending: PSYCHIATRY & NEUROLOGY
Payer: MEDICARE

## 2024-04-08 DIAGNOSIS — G40.909 SEIZURE DISORDER (HCC): ICD-10-CM

## 2024-04-08 PROCEDURE — 36415 COLL VENOUS BLD VENIPUNCTURE: CPT

## 2024-04-08 PROCEDURE — 80177 DRUG SCRN QUAN LEVETIRACETAM: CPT

## 2024-04-09 ENCOUNTER — OFFICE VISIT (OUTPATIENT)
Dept: NEUROLOGY | Facility: MEDICAL CENTER | Age: 74
End: 2024-04-09
Attending: PSYCHIATRY & NEUROLOGY
Payer: MEDICARE

## 2024-04-09 VITALS
OXYGEN SATURATION: 96 % | HEIGHT: 66 IN | SYSTOLIC BLOOD PRESSURE: 122 MMHG | TEMPERATURE: 97.1 F | HEART RATE: 92 BPM | BODY MASS INDEX: 25.44 KG/M2 | WEIGHT: 158.29 LBS | DIASTOLIC BLOOD PRESSURE: 58 MMHG

## 2024-04-09 DIAGNOSIS — G40.109 LOCALIZATION-RELATED EPILEPSY (HCC): ICD-10-CM

## 2024-04-09 PROCEDURE — 99215 OFFICE O/P EST HI 40 MIN: CPT | Performed by: PSYCHIATRY & NEUROLOGY

## 2024-04-09 PROCEDURE — 3074F SYST BP LT 130 MM HG: CPT | Performed by: PSYCHIATRY & NEUROLOGY

## 2024-04-09 PROCEDURE — 3078F DIAST BP <80 MM HG: CPT | Performed by: PSYCHIATRY & NEUROLOGY

## 2024-04-09 PROCEDURE — 99212 OFFICE O/P EST SF 10 MIN: CPT | Performed by: PSYCHIATRY & NEUROLOGY

## 2024-04-09 ASSESSMENT — FIBROSIS 4 INDEX: FIB4 SCORE: 1.03

## 2024-04-09 NOTE — PROGRESS NOTES
"Neuro follow up:     Last formally seen by me in 2/2024.     EEG results reviewed with Michela and daughter today from 1/17/2024.     Presently taking Keppra 500 mg PO BID (1000 mg a day).     She remains on Eliquis (anti coagulation)     Anxiety seems reduced per daughter today while on Keppra and this seems consistent.     Otherwise  Michela A Spear 73 y.o. female  right handed woman who [place] and lives in [facility]) moving from [place].  She works full time as a Realtor     Problem List re-reviewed.     Covid testing positive a few months ago > about 5 days in duration \"like a cold\" (coughing and congestion) but without fever(s), sorethroat > took Paclovid x 3 days.     Memory changes have been noticed for over 1 year such as going into a room to get something which she self describes a minor nuisance in the sense of being able to recall why or what she  has gone into the room to get.       In terms of memory disturbance(s) in the last year there tends to be more tendency to forget people's names and movies per daughter.     Here last  episode on 12/24/2023:     I reviewed Dr. Quinten Ferrari MD's note from [facility] ED note.     Daughter called Michela on [address] to plan the evening. At that time Michela was feeling fine and seemed ok that day.  Daughter was on the phone with Michela and did not know what day it was and there was a \"little bit of memory loss\". There was not clear repeating of information or asking questions and over and over. When daughter went over to house and was able to communicate appropriately.  10:00 am was when daughter called. When her daughter got there, Michela's head slowly drooped for 3 seconds and her limbs per \"trembling\" for 1-2 minutes. This entire episode lasted 3 to 4 hours.   She does being recall being in the ambulance.     \"T.G.A. 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 She had a period where she could not recall a period of not recalling of information at all. Her daughter on Sunday morning she was repetitive in speech and garbled> 911 was called and paramedics called. And triaged at  her house> [facility].     Her words and speech were not normal per her daughter (that Sunday).     Note from [facility] Reviewed today:     \"Michela Valdez is a 73 y.o. female who presented 10/22/2023 with past medical history of hyperlipidemia, hypothyroidism, prediabetes who comes into the hospital with complaints of memory loss, confusion and slurred speech that started this morning when she woke up in bed.  Patient has been experiencing some symptoms for the past 2 days of memory loss.  She describes more retrograde amnesia rather than forming new memories.  She is normally high functioning grossly patient but has been unable to complete her job.  Recently patient has been experiencing dizziness in the morning.  She denies any headaches, photophobia, muscle weakness, nausea, vomiting, diarrhea.  She denies any changes in medications.  She did travel to [place] 2 weeks ago where she contracted COVID.  The patient did take Paxlovid.  She does have a family history of stroke and coronary artery disease.  Patient gets a carotid artery ultrasound every year since she has a strong family history.  She denies any recent trauma or falls.     EKG interpreted by me found normal sinus rhythm, LVH  Chest x-ray interpreted by me found no acute pulmonary process\"     The patient was admitted for transient memory loss and encephalopathy r/o acute CVA.     Hospital course under my care: afebrile and vitals wnl. Exam at bedside was grossly unremarkable with intact neuroexam though the patient has occasionally having hard time remembering " details of last two days. She does seem to have improvement with times.  Labs and imagings findings discussed with patient. MRI done was notable for no acute process but noted subacute to chronic thrombus in R sigmoid sinus and proximal R IJ vein. At this point, neurology was consulted - recommended anticoag and follow up scan vs just follow up scan. Pt was also seen by Speech therapy - she did quite well with Cognistat.      Clinical impression at this point with transient global amnesia - likely etiology is ischemic vs thrombosis. Pt is on ASA and Statin at home. Plan is to add Eliquis for thrombosis; also amlodipine to control BP. Deemed stable for DC at this point.      Therefore, she is discharged in fair and stable condition to home with close outpatient follow-up.   The patient recovered much more quickly than anticipated on admission.        There have been no  no features to suggest Keppra side effects at this point- ongoing dizziness,nausea,gait instability, visual disturbance(s) or recurrent or transient memory disturbances (last event known since December 24 2023).    Last obvious amnestic event December 24 2023 and the 1st event was October 22nd 2023.    KEPPRA  Order: [id number]   Status: Final result       Visible to patient: Yes (seen)       Next appt: 04/11/2024 at 02:20 PM in Cardiology (Michael J Bloch, M.D.)    0 Result Notes       1 Patient Communication       Component  Ref Range & Units 1 mo ago 2 mo ago   Keppra  10 - 40 ug/mL 20 15 CM   Comment: INTERPRETIVE INFORMATION: Keppra (Levetiracetam)  Therapeutic Range:  10-40 ug/mL  Toxic:  Not well Established  Pharmacokinetics of levetiracetam are affected by renal function.  Adverse effects may include somnolence, weakness, headache and  vomiting.  This levetiracetam (Keppra) immunoassay uses the Zlio  reagents, which has known cross-reactivity with the drug  brivaracetam (Briviact) and may report inaccurate results.  Patients  transitioning from levetiracetam to brivaracetam or those  who are using both medications should not monitor drug  concentrations with the [employer] assay. These patients  should be monitored using a validated chromatographic methodology  that distinguishes between drugs to determine drug concentrations.  Performed By: [facility]  [address] [place] [address]  : Murphy Craven MD, PhD  CLIA Number: [id number]           Reading Physician Reading Date Result Priority   Carlos Dean M.D.  [phone] 10/22/2023 STAT     Narrative & Impression     10/22/2023 5:20 PM     HISTORY/REASON FOR EXAM:  confusion     TECHNIQUE/EXAM DESCRIPTION:  CT angiogram of the neck with contrast.     Postcontrast images were obtained of the neck from the great vessels through the skull base following the power injection of nonionic contrast at 5.0 mL/sec. Thin-section helical images were obtained with overlapping reconstruction interval. Coronal and   oblique multiplanar volume reformats were generated.     Cervical internal carotid artery percent stenosis is calculated using the standard method according to the NASCET criteria wherein a segment of uniform caliber mid or distal cervical internal carotid is used as the reference denominator.     3D angiographic images were reviewed on PACS.  Maximum intensity projection (MIP) images were generated and reviewed     95 mL of Omnipaque 350 nonionic contrast was injected intravenously.     Low dose optimization technique was utilized for this CT exam including automated exposure control and adjustment of the mA and/or kV according to patient size.     COMPARISON:  None.     FINDINGS:  Aortic arch: There is variant branching pattern with the right subclavian artery originating distal to the left subclavian artery passing posterior to the trachea before exiting the right hemithorax.     There is atherosclerotic plaque of the aorta.     Right  common carotid artery: Patent     Right internal carotid artery: Atherosclerotic plaque without significant stenosis (less than 50%).     Left common carotid artery is patent.     Left internal carotid artery: Atherosclerotic plaque without significant stenosis (less than 50%).     The right vertebral artery is patent without dissection or stenosis.     The left vertebral artery is patent without dissection or stenosis.     Vertebrobasilar confluence: The vertebrobasilar confluence appears normal.     Lung apices are clear     The soft tissues of the neck are within normal limits.     3D angiographic/MIP images of the vasculature confirm the vascular findings as described above.     IMPRESSION:     1.  Negative for thrombosis or occlusion     2.  Mild bilateral internal carotid artery stenoses     3.  Anatomic variant as the right subclavian artery originates distal to the left subclavian artery and passes posterior to the trachea before exiting the right hemithorax     4.  Anatomic variant termination of the right vertebral artery and posterior inferior cerebellar artery           Exam Ended: 10/22/23  5:47 PM Last Resulted: 10/22/23  5:54 PM              10/23/2023 10:18 AM     HISTORY/REASON FOR EXAM: Memory loss        TECHNIQUE/EXAM DESCRIPTION:     T1 sagittal, T2 axial, flair coronal, T1 coronal, and diffusion-weighted axial images were obtained of the brain pre-contrast followed by T1 coronal and axial images post intravenous administration of 15 mL ProHance.     COMPARISON: CT brain, CT angiogram 10/22/2023     FINDINGS:     Age-related volume loss noted with prominent sulci, cisterns and ventricles. Ventricular dilatation is proportionate to the degree of cerebral volume loss. Diffusion-weighted images are within normal limits. No acute infarction is seen. Gradient-echo   images are within normal limits. There is no evidence of hemorrhage.  Nonspecific T2 hyperintensities are noted in the periventricular  and deep white matter, most likely related to chronic microvascular ischemia.  Postcontrast images do not demonstrate any abnormal intracranial enhancement. There is a filling defect identified in the right sigmoid sinus and proximal internal jugular vein that demonstrates T2 hyperintensity. This most likely represents subacute to   chronic thrombus.  There are no extra axial collections.  Visualized intracranial arterial flow voids are within normal limits.  Bone marrow signal in the calvarium is within normal limits.  Included portions of the paranasal sinuses are within normal limits.  Included portions of the mastoid air cells are within normal limits.  Included portions of the orbits are within normal limits     IMPRESSION:        No acute process.     Age-related volume loss.     Subacute to chronic thrombus in the right sigmoid sinus and proximal right internal jugular vein at the skull base.           Exam Ended: 10/23/23 11:01 AM Last Resulted: 10/23/23 11:12 AM              Labs:    FACTOR V LEIDEN MUTATION  Order: [id number]   Status: Final result       Visible to patient: Yes (seen)       Next appt: 04/11/2024 at 02:20 PM in Cardiology (Michael J Bloch, M.D.)       Dx: Cerebral venous thrombosis of sigmoid...    0 Result Notes      Component 3 mo ago   V Leiden Factor Negative   Comment: Indication for testing: Assess genetic risk for thrombosis.  NEGATIVE: The factor V Leiden variant, c.1601G>A; p.[id number], was  not detected. This does not exclude a genetic cause for  thrombophilia. If this individual has had a previous venous  thromboembolism, this negative result is unlikely to significantly  reduce the risk for recurrence; thus, future clinical management  to reduce recurrence should not be altered.    This result has been reviewed and approved by Carina Gómez,  Ph.D.  BACKGROUND INFORMATION: Factor V Leiden (F5) R506Q Mutation  CHARACTERISTICS: Venous thromboembolism (VTE) is  multifactorial  caused by a combination of genetic and environmental factors. The  Factor V Leiden (FVL) variant is the most common cause of  inherited VTEs, accounting for over 90 percent of activated  protein C (APC) resistance. Because the FVL variant eliminates the  APC cleavage site, factor V is inactivated slower, thus persisting  longer in blood circulation, leading to more thrombin production.  Other genetic risk factors for VTE include, male sex and variants  in antithrombin, protein C, protein S, or factor XIII. Non-genetic  risk factors include, age, smoking, prolonged immobilization,  malignant neoplasms, surgery, pregnancy, oral contraceptives,  estrogen replacement therapy, tamoxifen and raloxifene therapy.  INCIDENCE OF FACTOR V LEIDEN VARIANT: Approximately 5 percent of  Caucasians, 2 percent of Hispanics, 1 percent of  Americans  and 0.5 percent of Asians are  heterozygous; homozygosity occurs in 1 in 1500 Caucasians.  INHERITANCE: Semi-dominant; both heterozygotes and homozygotes are  at increased risk for VTE.  PENETRANCE: Lifetime risk of VTE is 10 percent for heterozygotes  and 80 percent of homozygotes.  CAUSE: The pathogenic gain of function in the F5 gene variant  c.1601G>A (p.Eux258Bht). Legacy nomenclature: R506Q (1691G>A)  CLINICAL SENSITIVITY: 20-50 percent of individuals with an  isolated VTE have the FVL variant.  METHODOLOGY: Polymerase chain reaction and fluorescence monitoring.  ANALYTICAL SENSITIVITY AND SPECIFICITY: 99 percent.  LIMITATIONS: Diagnostic errors can occur due to rare sequence  variations. F5 gene mutations, other than p.Jrw969Xom, will not be  detected.  This test was developed and its performance characteristics  determined by [facility]. It has not been cleared or  approved by the US Food and Drug Administration. This test was  performed in a CLIA certified laboratory and is intended for  clinical purposes.  Counseling and informed consent are  recommended for genetic  testing. Consent forms are available online.  Performed By: [facility]  [address] [place] [address]  : Murphy Craven MD, PhD  CLIA Number: [id number]          BETA-2 GLYCOPROTEIN AB,IGG,IGM  Order: [id number]   Status: Final result       Visible to patient: Yes (seen)       Next appt: 04/11/2024 at 02:20 PM in Cardiology (Michael J Bloch, M.D.)    0 Result Notes      Component  Ref Range & Units 3 mo ago   Beta-2 Glycoprotein I Ab Igg  <=20 SGU <10   Beta-2 Glycoprotein I Igm  <=20 SMU 12   Comment: INTERPRETIVE INFORMATION: L7Zozkpateplms I, IgG and IgM Antibody  The persistent presence of IgG and/or IgM beta 2 glycoprotein I  (B2GPI) antibodies is a laboratory criterion for the diagnosis of  antiphospholipid syndrome (APS). Persistence is defined as  moderate or high levels of IgG and/or IgM B2GPI antibodies  detected in two or more specimens drawn at least 12 weeks apart (J  Throm Haemost. 2006;4:295-306). B2GPI results greater than 20 SGU  (IgG) and/or SMU (IgM) are considered positive based on the cutoff  values established for this test. International reference  materials and consensus units for anti-B2GPI antibodies have not  been established (Clin Kisha Acta. 2012;413(1-2):358-60; Arthritis  Rheum. 2012;64(1):1-10.); results can be variable between  different commercial immunoassays and cannot be compared. Strong  clinical correlation is recommended for a diagnosis of APS. Low  positive IgG and IgM B2GPI antibody levels should be interpreted  in light of APS-specific clinical manifestations and/or other  criteria phospholipid antibody tests.  Performed By: [facility]  [address] [place] [address]  : Murphy Craven MD, PhD  CLIA Number: [id number]   Resulting Agency [facility]              Narrative  Performed by: FARHANA  Fast 8-10 hours, OK to drink water as needed during fast,  take medications per your  provider's instructions.  Request patient fasting?->Yes  Fasting Instructions:->Fast 8-10 hours, OK to drink water as  needed during fast, take medications per your provider's  instruction.      Specimen Collected: 01/08/24  6:44 AM Last Resulted: 01/10/24  5:42 AM             ANTICARDIOLIPIN AB IGG IGM  Order: [id number]   Status: Final result       Visible to patient: Yes (seen)       Next appt: 04/11/2024 at 02:20 PM in Cardiology (Michael J Bloch, M.D.)       Dx: Cerebral venous thrombosis of sigmoid...    0 Result Notes      Component  Ref Range & Units 3 mo ago   Anti-Cariolipin Ab Igg  <=14 GPL <10   Comment: INTERPRETIVE INFORMATION: Anti-Cardiolipin IgG Ab  <=14 GPL: Negative  15-19 GPL: Indeterminate  20-80 GPL: Low to Moderately Positive  81 GPL or above: High Positive  The persistent presence of IgG and/or IgM cardiolipin (CL)  antibodies in moderate or high levels (greater than 40 GPL and/or  greater  than 40 MPL units) is a laboratory criterion for the  diagnosis of antiphospholipid syndrome (APS). Persistence is  defined as moderate or high levels of IgG and/or IgM CL antibodies  detected in two or more specimens drawn at least 12 weeks apart (J  Throm Haemost. 2006;4:295-306). Lower positive levels of IgG  and/or IgM CL antibodies (above cutoff but less than 40 GPL and/or  less than 40 MPL units) may occur in patients with the clinical  symptoms of APS; therefore, the actual significance of these  levels is undefined. Results should not be used alone for  diagnosis and must be interpreted in light of APS-specific  clinical manifestations and/or other criteria phospholipid  antibody tests.   Anti-Cardiolipin Ab Igm  <=12 MPL 14 High    Comment: INTERPRETIVE INFORMATION: Anti-Cardiolipin IgM  <=12 MPL: Negative  13-19 MPL: Indeterminate  20-80 MPL: Low to Moderately Positive  81 MPL or above: High Positive  The persistent presence of IgG and/or IgM cardiolipin (CL)  antibodies in moderate or high levels  (greater than 40 GPL and/or  greater than 40 MPL units) is a laboratory criterion for the  diagnosis of antiphospholipid syndrome (APS). Persistence is  defined as moderate or high levels of IgG and/or IgM CL antibodies  detected  in two or more specimens drawn at least 12 weeks apart  (J Throm Haemost. 2006;4:295-306). Lower positive levels of IgG  and/or IgM CL antibodies (above cutoff but less than 40 GPL and/or  less than 40 MPL units) may occur in patients with the clinical  symptoms of APS; therefore, the actual significance of these  levels is undefined. Results should not be used alone for  diagnosis and must be interpreted in light of APS-specific  clinical manifestations and/or other criteria phospholipid  antibody tests.  Performed By: [facility]  [address] [place] [id number]  : Murphy Craven MD, PhD  CLIA Number: [id number]   Resulting Agency [facility]              Narrative  Performed by: [facility]  Fast 8-10 hours, OK to drink water as needed during fast,  take medications per your provider's instructions.  Request patient fasting?->Yes  Fasting Instructions:->Fast 8-10 hours, OK to drink water as  needed during fast, take medications per your provider's  instruction.      Specimen Collected: 01/08/24  6:44 AM Last Resulted: 01/09/24  9:05 PM               FACTOR II DNA ANALYSIS  Order: [id number]   Status: Final result       Visible to patient: Yes (seen)       Next appt: 04/11/2024 at 02:20 PM in Cardiology (Michael J Bloch, M.D.)       Dx: Cerebral venous thrombosis of sigmoid...    0 Result Notes      Component 3 mo ago   Factor Ii Dna Analysis Pt Gene Negative   Comment: Indication for testing: Assess genetic risk for thrombosis.  NEGATIVE: The Factor II, prothrombin [id number] mutation, was not  detected.  Other causes of elevated prothrombin levels and  hereditary forms of venous thrombosis have not been excluded.  Recommendations:  If clinically indicated, testing  for other  inherited or acquired thrombophilic disorders is recommended  including DNA testing for the factor V Leiden mutation,  measurement of total plasma homocysteine concentration,  serological assays for anticardiolipin antibodies, multiple  phospholipid-dependent coagulation assays for lupus inhibitor,  protein C activity, protein S activity or free protein S antigen,  and antithrombin activity.    This result has been reviewed and approved by Carina Gómez,  Ph.D.  BACKGROUND INFORMATION: Prothrombin (F2) c.*97G>A  (R15739J) Pathogenic Variant  CHARACTERISTICS: The Factor II, c.*97G>A (V06456W) pathogenic  variant is a common genetic risk factor for venous thrombosis  associated with elevated prothrombin levels leading to increased  rates of thrombin generation and excessive growth of fibrin clots.  The expression of Factor II thrombophilia is impacted by  coexisting genetic thrombophilic disorders, acquired thrombophilic  disorders (eg, malignancy, hyperhomocysteinemia, high factor VIII  levels), and circumstances including: pregnancy, oral  contraceptive use, hormone replacement therapy, selective estrogen  receptor modulators, travel, central venous catheters, surgery,  and organ transplantation.  INCIDENCE: Approximately 2 percent of Caucasians and 0.3 percent  of  Americans are heterozygous; homozygosity occurs in 1 in  10,000 individuals.  INHERITANCE: Incomplete autosomal dominant.  PENETRANCE: The risk of thrombosis is increased 2-4 fold for  heterozygotes and further increased for homozygotes.  CAUSE: Homozygosity or heterozygosity for F2 c.*97G>A (J34016N).  PATHOGENIC VARIANT TESTED: F2 c.*97G>A (F96837I).  CLINICAL SENSITIVITY FOR VENOUS THROMBOSIS: Approximately 10  percent.  METHODOLOGY: Polymerase chain reaction and fluorescence monitoring.  ANALYTICAL SENSITIVITY AND SPECIFICITY: 99 percent.  LIMITATIONS: Diagnostic errors can occur due to rare sequence  variations. F2 gene variants,  other than c.*97G>A (M28900I), will  not be detected.  This test was developed and its performance characteristics  determined by [facility]. It has not been cleared or  approved by the US Food and Drug Administration. This test was  performed in a CLIA certified laboratory and is intended for  clinical purposes.  Counseling and informed consent are recommended for genetic  testing. Consent forms are available online.  Performed By: [facility]  [address] [place] [id number]  : Murphy Craven MD, PhD  CLIA Number: [id number]   Resulting Agency [facility]              Associated Genomic Indicators     Add Indicator    Narrative  Performed by: [facility]  Fast 8-10 hours, OK to drink water as needed during fast,  take medications per your provider's instructions.  Request patient fasting?->Yes  Fasting Instructions:->Fast 8-10 hours, OK to drink water as  needed during fast, take medications per your provider's  instruction.      Specimen Collected: 01/08/24  6:44 AM             2/26/2024:  Keppra blood level checked.     1/8/2024: Blood work reviewed:  anticoagulation blood work reviewed.     Plans:     A. Continue Keppra 500 mg PO BID (1000 mg a day) and has strict compliance to this medication.    B.  Keppra level pending  at end of Feb 2024 and if Keppra level over say 13 or so, will turn in DMV form(s) that have tentatively been filled out ad signed today by Michela.     C. Continue anticoagulation given venous sinus clot and planning to have right shoulder surgery.     D. Brain Health factors reviewed today.    E. She is seeing an Hematologist at the end of this month.     Today visit time was 42 minutes and we reviewed above issues.

## 2024-04-10 LAB — LEVETIRACETAM SERPL-MCNC: 14 UG/ML (ref 10–40)

## 2024-04-11 ENCOUNTER — OFFICE VISIT (OUTPATIENT)
Dept: CARDIOLOGY | Facility: MEDICAL CENTER | Age: 74
End: 2024-04-11
Attending: INTERNAL MEDICINE
Payer: MEDICARE

## 2024-04-11 VITALS
WEIGHT: 158 LBS | DIASTOLIC BLOOD PRESSURE: 72 MMHG | HEART RATE: 83 BPM | HEIGHT: 66 IN | BODY MASS INDEX: 25.39 KG/M2 | SYSTOLIC BLOOD PRESSURE: 107 MMHG

## 2024-04-11 DIAGNOSIS — E78.5 DYSLIPIDEMIA: ICD-10-CM

## 2024-04-11 DIAGNOSIS — G08 CEREBRAL VENOUS THROMBOSIS OF SIGMOID SINUS: ICD-10-CM

## 2024-04-11 DIAGNOSIS — I10 PRIMARY HYPERTENSION: ICD-10-CM

## 2024-04-11 PROCEDURE — 99214 OFFICE O/P EST MOD 30 MIN: CPT | Performed by: INTERNAL MEDICINE

## 2024-04-11 PROCEDURE — 3074F SYST BP LT 130 MM HG: CPT | Performed by: INTERNAL MEDICINE

## 2024-04-11 PROCEDURE — 99212 OFFICE O/P EST SF 10 MIN: CPT

## 2024-04-11 PROCEDURE — 3078F DIAST BP <80 MM HG: CPT | Performed by: INTERNAL MEDICINE

## 2024-04-11 ASSESSMENT — FIBROSIS 4 INDEX: FIB4 SCORE: 1.03

## 2024-04-11 NOTE — ASSESSMENT & PLAN NOTE
Chronic, Stable. BP in office today is 118/60. She does check her BP at home occasionally. She currently takes amlodipine 5 mg daily. Follow up with PCP for continued monitoring and management.

## 2024-04-11 NOTE — ASSESSMENT & PLAN NOTE
Chronic, stable. Last lipid panel in Jan 2024 was WNL. She currently takes atorvastatin 80 mg daily. We discussed her dietary/lifestyle regimen. Follow up with PCP for continued monitoring and management.

## 2024-04-11 NOTE — ASSESSMENT & PLAN NOTE
Chronic, stable. Last A1c in Jan 2024 was 5.7. We discussed her dietary/lifestyle regimen. Follow up with PCP for continued monitoring.

## 2024-04-11 NOTE — ASSESSMENT & PLAN NOTE
Stable. Currently taking levothyroxine 88 mcg daily as prescribed. No complications. TSH is stable.

## 2024-04-11 NOTE — ASSESSMENT & PLAN NOTE
Newly discovered problem. She is taking Keppra 500 mg BID. Tolerating well. Following with Neurology.

## 2024-04-11 NOTE — PROGRESS NOTES
"VASCULAR MEDICINE CLINIC - Follow up VISIT  24    Michela Valdez is a 73 y.o. female who presents today  for vascular evaluation.     Subjective      HPI:  Here for f/u of cerebral vein thrombosis  No episodes of confusion since last visit  Was diagnosed with sz based on EEG.   No focal neurologic complaints  Has close f/u with neuro  Tolerating keppra  Feels great  Still on eliquis  No bleeding  No headache  No myalgias on atorvastatin  Still on amlodipine  Not checking bp at home  No leg swelling  Same thyroid dose - better energy  Has hematology consultation scheduled for end of month    Family History   Problem Relation Age of Onset    Osteoporosis Mother     Hyperlipidemia Father     Heart Disease Father         CAD, MI     Anemia Sister     Cancer Brother         prostate    Heart Disease Brother         CABG    Dad - fatal coronary disease in 50s    Social History     Tobacco Use    Smoking status: Former     Current packs/day: 0.00     Average packs/day: 0.5 packs/day for 4.0 years (2.0 ttl pk-yrs)     Types: Cigarettes     Start date:      Quit date:      Years since quittin.3    Smokeless tobacco: Never   Vaping Use    Vaping Use: Never used   Substance Use Topics    Alcohol use: Yes     Alcohol/week: 3.0 oz     Types: 5 Glasses of wine per week     Comment: glass of wine daily     Drug use: No      DIET AND EXERCISE:  Weight Change:stable  Diet: relatively HH  Exercise: mod regular        Objective     Objective:     Vitals:    24 1428   BP: 107/72   BP Location: Left arm   Patient Position: Sitting   BP Cuff Size: Adult   Pulse: 83   Weight: 71.7 kg (158 lb)   Height: 1.676 m (5' 6\")      Physical Exam  Vitals reviewed.   Constitutional:       General: She is not in acute distress.     Appearance: She is not diaphoretic.   HENT:      Head: Normocephalic and atraumatic.   Eyes:      General: No scleral icterus.     Conjunctiva/sclera: Conjunctivae normal.   Neck:      Vascular: No " carotid bruit.   Cardiovascular:      Rate and Rhythm: Normal rate and regular rhythm.      Heart sounds: Normal heart sounds. No murmur heard.  Pulmonary:      Effort: Pulmonary effort is normal. No respiratory distress.      Breath sounds: Normal breath sounds. No wheezing or rales.   Musculoskeletal:      Right lower leg: No edema.      Left lower leg: No edema.   Skin:     Coloration: Skin is not pale.   Neurological:      General: No focal deficit present.      Mental Status: She is alert and oriented to person, place, and time.      Cranial Nerves: No cranial nerve deficit.      Coordination: Coordination normal.      Gait: Gait is intact. Gait normal.   Psychiatric:         Mood and Affect: Mood and affect normal.         Behavior: Behavior normal.          DATA REVIEW    Lab Results   Component Value Date/Time    CHOLSTRLTOT 128 01/08/2024 06:44 AM    LDL 65 01/08/2024 06:44 AM    HDL 47 01/08/2024 06:44 AM    TRIGLYCERIDE 81 01/08/2024 06:44 AM     Lp(a) 109 mg/dl    Lab Results   Component Value Date/Time    SODIUM 141 01/08/2024 06:46 AM    POTASSIUM 4.6 01/08/2024 06:44 AM    CHLORIDE 104 01/08/2024 06:44 AM    CO2 24 01/08/2024 06:44 AM    GLUCOSE 119 (H) 01/08/2024 06:44 AM    BUN 19 01/08/2024 06:44 AM    CREATININE 0.50 01/08/2024 06:44 AM     Lab Results   Component Value Date/Time    ALKPHOSPHAT 102 (H) 01/08/2024 06:44 AM    ASTSGOT 27 01/08/2024 06:44 AM    ALTSGPT 33 01/08/2024 06:44 AM    TBILIRUBIN 0.4 01/08/2024 06:44 AM       Lab Results   Component Value Date/Time    HBA1C 5.7 (H) 01/08/2024 06:44 AM       Lab Results   Component Value Date/Time    MALBCRT see below 01/08/2024 12:29 PM    MICROALBUR <1.2 01/08/2024 12:29 PM       Beta 2 glycoprotein IgG and IgM - normal  ACLA - IgG normal; IgM mildly elevated  FVL - neg  PTGM - neg      CAC score jan 2023  LMA - 64.8  LCX - 0.0  LAD - 16.5  RCA - 258.7  PDA - 0.0  Total Calcium Score: 340.0    CTA head and neck oct 2023  1.  Negative for  thrombosis or occlusion  2.  Mild bilateral internal carotid artery stenoses  3.  Anatomic variant as the right subclavian artery originates distal to the left subclavian artery and passes posterior to the trachea before exiting the right hemithorax  4.  Anatomic variant termination of the right vertebral artery and posterior inferior cerebellar artery    Echo oct 2023  No prior study is available for comparison.   Normal left ventricular systolic function.  The left ventricular ejection fraction is visually estimated to be 60%.  No significant valvular abnormalities.   A definite cardiac source for a systemic thromboembolic event is not   identified, failure to do so does not exclude its presence. If   clinically indicated, a transesophageal study would be useful.     MRI brain oct 2023  No acute process.  Age-related volume loss.  Subacute to chronic thrombus in the right sigmoid sinus and proximal right internal jugular vein at the skull base.    CTA head and neck dec 2023  Ctangiogram of the [place] demonstrating no large vessel occlusion.   Mild bilateral atherosclerosis with less than 50% ICA stenosis.     MRV jan 2024  Stable appearance of filling defects in the right transverse and sigmoid junction and the right sigmoid sinus extending into the right internal jugular vein at the skull base.  Defect within the left internal jugular vein at the skull base noted, which may represent flow artifact or new thrombus in this location. Consider follow-up with contrast-enhanced CTA/CT venogram of the head and neck.        Medical Decision Making:  Today's Assessment / Status / Plan:     1. Dyslipidemia        2. Primary hypertension        3. Cerebral venous thrombosis of sigmoid sinus  MR-VENOGRAM (MRV) HEAD           Etiology of Established CVD if Present:     1) acute confusional state -recurrent - newly diagnosed sz disorder -  no stroke seen on imaging. No residual. No source of embolism or afib.  -911  with any tia or cva symptoms  -continue keppra per neuro  -medical management per below  -follow up with neuro for further w/u and management    2) cerebral vein thrombosis - unclear chronicity and unclear relation to TGA/TIA. Possibly related to previous covid.   FVL and PTGM normal  Normal anti-beta 2 ab, but mildly elevated IgM acla  - medical management as below  -Await hematology recommendations  - Will defer any indicated age appropriate screening for occult malignancy to pcp.  - repeat MRV in May    Lipid Management: Qualifies for Statin Therapy Based on 2018 ACC/AHA Guidelines: yes  Calculated 10-Year Risk of ASCVD: N/A  Currently on Statin: Yes  Strong FH of premature ASCVD  Subclinical athero as evidenced by mild carotid disease and elevated CAC score  Elevated lp(a) an independent risk factor for disease  Given above would aim for LDL <70 and nonHDL <100  Reasonable control on most recent blood work  Plan  - recommended family lipid and lp(a) screening  - continue atorva 80  -Continue ezetimibe 10   - recheck fasting lipid in future    Blood Pressure Management:  [place] (2017) Blood Pressure Goal <130/80  BP high at presentation, but no previous h/o hypertension   Unclear chronicity  Reasonable control at least in office  Home readings remain high  GFR and lytes stable  No albuminuria  No LVH  Plan:  - continue amlodipine 5 mg daily as monotherapy for now  - continue home bp monitoring with good technique    Glycemic Status: Prediabetic  A1c 5.7 mild and stable  - continue lifestyle mod  - recheck fasting glucose and A1c in future    Anti-Platelet/Anti-Coagulant Tx: Yes  - continue eliquis for at least 6 mo  - await heme recs  -Restart antiplatelet therapy if and when when off DOAC    Smoking: continue complete avoidance of all tobacco products    Physical Activity: continue mod intensity exercise    Weight Management and Nutrition: recommended med style diet    Other:     - Hypothroidism - appears under  good control based on symptoms and recent blood work. Continue current levo.     - DJD shoulder -considering surgery in June.  Would continue oac through the perioperative period     Instructed to follow-up with PCP for remainder of adult medical needs: yes  We will partner with other providers in the management of established vascular disease and cardiometabolic risk factors.    Studies to Be Obtained: MR venogram in may 2024-ordered today  Labs to Be Obtained: none    Follow up in: 8 weeks    Michael J Bloch, M.D.     Cc: Dr. Greer

## 2024-04-12 ENCOUNTER — OFFICE VISIT (OUTPATIENT)
Dept: FAMILY PLANNING/WOMEN'S HEALTH CLINIC | Facility: PHYSICIAN GROUP | Age: 74
End: 2024-04-12
Attending: FAMILY MEDICINE
Payer: MEDICARE

## 2024-04-12 VITALS
BODY MASS INDEX: 25.39 KG/M2 | HEIGHT: 66 IN | WEIGHT: 158 LBS | SYSTOLIC BLOOD PRESSURE: 118 MMHG | DIASTOLIC BLOOD PRESSURE: 60 MMHG

## 2024-04-12 DIAGNOSIS — R73.03 PREDIABETES: ICD-10-CM

## 2024-04-12 DIAGNOSIS — E89.0 POSTOPERATIVE HYPOTHYROIDISM: ICD-10-CM

## 2024-04-12 DIAGNOSIS — I10 PRIMARY HYPERTENSION: ICD-10-CM

## 2024-04-12 DIAGNOSIS — G40.909 SEIZURE DISORDER (HCC): ICD-10-CM

## 2024-04-12 DIAGNOSIS — D68.69 SECONDARY HYPERCOAGULABLE STATE (HCC): ICD-10-CM

## 2024-04-12 DIAGNOSIS — E78.5 DYSLIPIDEMIA: ICD-10-CM

## 2024-04-12 PROCEDURE — 3078F DIAST BP <80 MM HG: CPT

## 2024-04-12 PROCEDURE — 3074F SYST BP LT 130 MM HG: CPT

## 2024-04-12 PROCEDURE — G0439 PPPS, SUBSEQ VISIT: HCPCS

## 2024-04-12 PROCEDURE — 1126F AMNT PAIN NOTED NONE PRSNT: CPT

## 2024-04-12 SDOH — ECONOMIC STABILITY: INCOME INSECURITY: IN THE LAST 12 MONTHS, WAS THERE A TIME WHEN YOU WERE NOT ABLE TO PAY THE MORTGAGE OR RENT ON TIME?: NO

## 2024-04-12 SDOH — ECONOMIC STABILITY: INCOME INSECURITY: HOW HARD IS IT FOR YOU TO PAY FOR THE VERY BASICS LIKE FOOD, HOUSING, MEDICAL CARE, AND HEATING?: NOT HARD AT ALL

## 2024-04-12 SDOH — ECONOMIC STABILITY: HOUSING INSECURITY: IN THE LAST 12 MONTHS, HOW MANY PLACES HAVE YOU LIVED?: 1

## 2024-04-12 SDOH — ECONOMIC STABILITY: FOOD INSECURITY: WITHIN THE PAST 12 MONTHS, YOU WORRIED THAT YOUR FOOD WOULD RUN OUT BEFORE YOU GOT MONEY TO BUY MORE.: NEVER TRUE

## 2024-04-12 SDOH — ECONOMIC STABILITY: HOUSING INSECURITY
IN THE LAST 12 MONTHS, WAS THERE A TIME WHEN YOU DID NOT HAVE A STEADY PLACE TO SLEEP OR SLEPT IN A SHELTER (INCLUDING NOW)?: NO

## 2024-04-12 SDOH — ECONOMIC STABILITY: TRANSPORTATION INSECURITY
IN THE PAST 12 MONTHS, HAS THE LACK OF TRANSPORTATION KEPT YOU FROM MEDICAL APPOINTMENTS OR FROM GETTING MEDICATIONS?: NO

## 2024-04-12 SDOH — ECONOMIC STABILITY: TRANSPORTATION INSECURITY
IN THE PAST 12 MONTHS, HAS LACK OF TRANSPORTATION KEPT YOU FROM MEETINGS, WORK, OR FROM GETTING THINGS NEEDED FOR DAILY LIVING?: NO

## 2024-04-12 SDOH — ECONOMIC STABILITY: FOOD INSECURITY: WITHIN THE PAST 12 MONTHS, THE FOOD YOU BOUGHT JUST DIDN'T LAST AND YOU DIDN'T HAVE MONEY TO GET MORE.: NEVER TRUE

## 2024-04-12 ASSESSMENT — ACTIVITIES OF DAILY LIVING (ADL): BATHING_REQUIRES_ASSISTANCE: 0

## 2024-04-12 ASSESSMENT — ENCOUNTER SYMPTOMS: GENERAL WELL-BEING: EXCELLENT

## 2024-04-12 ASSESSMENT — PATIENT HEALTH QUESTIONNAIRE - PHQ9: CLINICAL INTERPRETATION OF PHQ2 SCORE: 0

## 2024-04-12 ASSESSMENT — FIBROSIS 4 INDEX: FIB4 SCORE: 1.03

## 2024-04-12 ASSESSMENT — PAIN SCALES - GENERAL: PAINLEVEL: NO PAIN

## 2024-04-12 NOTE — PROGRESS NOTES
Comprehensive Health Assessment Program     Michela Valdez is a 73 y.o. here for her comprehensive health assessment.    Patient Active Problem List    Diagnosis Date Noted    Seizure disorder (HCC) 02/09/2024    Transient confusion 01/05/2024    Secondary hypercoagulable state (HCC) 11/08/2023    Mild cognitive impairment 11/03/2023    History of COVID-19 11/03/2023    Hyponatremia 11/03/2023    Cerebral venous thrombosis of sigmoid sinus 10/24/2023    TGA vs TIA- now with +seizure on EEG 10/22/2023    Primary hypertension 10/22/2023    Fecal smearing 06/14/2023    Agatston CAC score 200-399 340 in 2023     Laryngeal spasm 01/18/2023    Elevated alkaline phosphatase level 09/28/2022    Genitourinary syndrome of menopause 09/28/2022    Skin lesion- right hand, SK 09/28/2022    Rotator cuff arthropathy of right shoulder 09/28/2022    Hip pain- right > left 09/28/2022    Palpitations 09/28/2022    Night sweats 09/28/2022    BMI 25.0-25.9,adult 09/28/2022    Chronic pain of right knee 12/07/2021    Slow transit constipation with bloating 12/07/2021    Prediabetes 05/16/2018    History of osteopenia 05/16/2018    Mild carotid artery stenosis (HCC) 05/24/2016    Postoperative hypothyroidism 10/13/2015    Dyslipidemia 10/13/2015    Diverticular disease of colon 07/21/2015    Thyroid nodule 07/21/2015       Current Outpatient Medications   Medication Sig Dispense Refill    atorvastatin (LIPITOR) 80 MG tablet TAKE 1 TABLET BY MOUTH EVERY DAY IN THE EVENING 90 Tablet 4    levETIRAcetam (KEPPRA) 500 MG Tab Take 1 Tablet by mouth 2 times a day. 180 Tablet 6    Glucos-Chond-Hyal Ac-Ca Fructo (MOVE FREE JOINT HEALTH ADVANCE) Tab Take 1 Tablet by mouth every day.      ezetimibe (ZETIA) 10 MG Tab Take 1 Tablet by mouth every day. 90 Tablet 3    amLODIPine (NORVASC) 5 MG Tab Take 1 Tablet by mouth every day. 100 Tablet 3    apixaban (ELIQUIS) 5mg Tab Take 1 Tablet by mouth 2 times a day. 200 Tablet 3    levothyroxine  (SYNTHROID) 88 MCG Tab TAKE 1 TABLET BY MOUTH EVERY DAY IN THE MORNING ON AN EMPTY STOMACH 100 Tablet 3    Cholecalciferol (VITAMIN D3) 1000 units Cap Take 2,000 Units by mouth. 1000 units x 2 capsules = 2000 units       No current facility-administered medications for this visit.          Current supplements as per medication list.     Allergies:   Naproxen and Penicillins  Social History     Tobacco Use    Smoking status: Former     Current packs/day: 0.00     Average packs/day: 0.5 packs/day for 4.0 years (2.0 ttl pk-yrs)     Types: Cigarettes     Start date:      Quit date:      Years since quittin.3    Smokeless tobacco: Never   Vaping Use    Vaping Use: Never used   Substance Use Topics    Alcohol use: Yes     Alcohol/week: 3.0 oz     Types: 5 Glasses of wine per week     Comment: glass of wine daily     Drug use: No     Family History   Problem Relation Age of Onset    Osteoporosis Mother     Hyperlipidemia Father     Heart Disease Father         CAD, MI     Anemia Sister     Cancer Brother         prostate    Heart Disease Brother         CABG     Michela  has a past medical history of Acute laryngitis (2022), Cataract, Dyslipidemia (10/13/2015), Elevated LFTs (2018), High cholesterol, History of osteopenia (2018), Hyperlipidemia, Postoperative hypothyroidism (10/13/2015), Prediabetes (2018), Snoring, Thyroid disease, and Urinary incontinence.   Past Surgical History:   Procedure Laterality Date    CATARACT EXTRACTION WITH IOL Bilateral 2019    THYROIDECTOMY TOTAL  2012    Performed by ALEX CONTEH at SURGERY SAME DAY [facility]S    ABDOMINAL EXPLORATION      pyloric stenosis- as an infant    GYN SURGERY      D&C       Screening:  In the last six months have you experienced any leakage of urine? Yes, she does wear a pad. States it is only a little. She does experience fecal incontinence occasionally too, and she is having a colonoscopy to evaluate.      Depression Screening  Little interest or pleasure in doing things?  0 - not at all  Feeling down, depressed , or hopeless? 0 - not at all  Patient Health Questionnaire Score: 0     If depressive symptoms identified deferred to follow up visit unless specifically addressed in assessment and plan.    Interpretation of PHQ-9 Total Score   Score Severity   1-4 No Depression   5-9 Mild Depression   10-14 Moderate Depression   15-19 Moderately Severe Depression   20-27 Severe Depression    Screening for Cognitive Impairment  Do you or any of your friends or family members have any concern about your memory? No, She did have a few episodes of TGA and still does not remember anything from those moments.  Three Minute Recall (Leader, Season, Table) 3/3    Dev clock face with all 12 numbers and set the hands to show 10 minutes after 11.  Yes    Cognitive concerns identified deferred for follow up unless specifically addressed in assessment and plan.    Fall Risk Assessment  Has the patient had two or more falls in the last year or any fall with injury in the last year?  No She is  working on her balance.     Safety Assessment  Do you always wear your seatbelt?  Yes  Any changes to home needed to function safely? No  Difficulty hearing.  Yes She does not wear hearing aids. Discussed yearly hearing test through her insurance.   Patient counseled about all safety risks that were identified.    Functional Assessment ADLs  Are there any barriers preventing you from cooking for yourself or meeting nutritional needs?  No.    Are there any barriers preventing you from driving safely or obtaining transportation?  No.    Are there any barriers preventing you from using a telephone or calling for help?  No    Are there any barriers preventing you from shopping?  No.    Are there any barriers preventing you from taking care of your own finances?  No    Are there any barriers preventing you from managing your medications?  No    Are  there any barriers preventing you from showering, bathing or dressing yourself? No    Are there any barriers preventing you from doing housework or laundry? No  Are there any barriers preventing you from using the toilet?No  Are you currently engaging in any exercise or physical activity?  Yes.  She works out 2x week. Plays pickleball.     Self-Assessment of Health  What is your perception of your health? Excellent  Do you sleep more than six hours a night? Yes  In the past 7 days, how much did pain keep you from doing your normal work? None  Do you spend quality time with family or friends (virtually or in person)? Yes  Do you usually eat a heart healthy diet that constists of a variety of fruits, vegetables, whole grains and fiber? Yes  Do you eat foods high in fat and/or Fast Food more than three times per week? No    Advance Care Planning  Do you have an Advance Directive, Living Will, Durable Power of , or POLST? Yes  Advance Directive Living Will     is on file      Health Maintenance Summary            Overdue - COVID-19 Vaccine (5 - 2023-24 season) Overdue since 9/1/2023      10/05/2022  Imm Admin: PFIZER BIVALENT SARS-COV-2 VACCINE (12+)    09/25/2021  Imm Admin: PFIZER PURPLE CAP SARS-COV-2 VACCINATION (12+)    02/19/2021  Imm Admin: PFIZER PURPLE CAP SARS-COV-2 VACCINATION (12+)    01/26/2021  Imm Admin: PFIZER PURPLE CAP SARS-COV-2 VACCINATION (12+)              Colorectal Cancer Screening (Colonoscopy - Every 5 Years) Next due on 7/1/2024 07/01/2019  REFERRAL TO GI FOR COLONOSCOPY    10/13/2015  Colonoscopy (Postponed - was done this year, pt bring in  results to pcp)    08/05/2014  REFERRAL TO GI FOR COLONOSCOPY              Annual Wellness Visit (Yearly) Next due on 4/12/2025 04/12/2024  Level of Service: ID ANNUAL WELLNESS VISIT-INCLUDES PPPS SUBSEQUE*    03/21/2023  Level of Service: ID ANNUAL WELLNESS VISIT-INCLUDES PPPS SUBSEQUE*    09/28/2022  Level of Service: ID ANNUAL  WELLNESS VISIT-INCLUDES PPPS SUBSEQUE*    03/15/2022  Level of Service: [place] ANNUAL WELLNESS VISIT-INCLUDES PPPS SUBSEQUE*    09/02/2021  Level of Service: [place] ANNUAL WELLNESS VISIT-INCLUDES PPPS SUBSEQUE*    Only the first 5 history entries have been loaded, but more history exists.              Bone Density Scan (Every 5 Years) Next due on 5/19/2026 05/19/2021  DS-BONE DENSITY STUDY (DEXA)    03/27/2019  DS-BONE DENSITY STUDY (DEXA)    06/16/2016  DS-BONE DENSITY STUDY (DEXA)    09/22/2008  DS-BONE DENSITY STUDY (DEXA)              IMM DTaP/Tdap/Td Vaccine (2 - Td or Tdap) Next due on 10/24/2026      10/24/2016  Imm Admin: Tdap Vaccine              Zoster (Shingles) Vaccines (Series Information) Completed      05/09/2020  Imm Admin: Zoster Vaccine Recombinant (RZV) (SHINGRIX)    02/15/2020  Imm Admin: Zoster Vaccine Recombinant (RZV) (SHINGRIX)    09/29/2019  Imm Admin: Zoster Vaccine Recombinant (RZV) (SHINGRIX)              Pneumococcal Vaccine: 65+ Years (Series Information) Completed      12/03/2020  Imm Admin: Pneumococcal polysaccharide vaccine (PPSV-23)    11/12/2019  Imm Admin: Pneumococcal Conjugate Vaccine (Prevnar/PCV-13)              Hepatitis C Screening  Completed      02/02/2021  Hepatitis C Antibody component of HEP C VIRUS ANTIBODY              Influenza Vaccine (Series Information) Completed      11/08/2023  Imm Admin: Influenza Vaccine Adult HD    09/28/2022  Imm Admin: Influenza Vaccine Adult HD    10/06/2021  Imm Admin: Influenza (IM) Preservative Free - HISTORICAL DATA    09/29/2019  Imm Admin: Influenza Vaccine Adult HD    11/28/2018  Imm Admin: Influenza Vaccine Adult HD    Only the first 5 history entries have been loaded, but more history exists.              Hepatitis A Vaccine (Hep A) (Series Information) Aged Out      No completion history exists for this topic.              Hepatitis B Vaccine (Hep B) (Series Information) Aged Out      No completion history exists for this topic.  "             HPV Vaccines (Series Information) Aged Out      No completion history exists for this topic.              Polio Vaccine (Inactivated Polio) (Series Information) Aged Out      No completion history exists for this topic.              Meningococcal Immunization (Series Information) Aged Out      No completion history exists for this topic.              Discontinued - Mammogram  Scheduled for 4/16/2024        Frequency changed to Never automatically (Topic No Longer Applies)    04/14/2022  MA-SCREENING MAMMO BILAT W/TOMOSYNTHESIS W/CAD    10/14/2020  MA-SCREENING MAMMO BILAT W/TOMOSYNTHESIS W/CAD    10/04/2018  MA-SCREEN MAMMO W/CAD-BILAT    10/24/2017  [id number]    Only the first 5 history entries have been loaded, but more history exists.                    Patient Care Team:  Archana Robertson D.O. as PCP - General (Internal Medicine)  Archana Robertson D.O. as PCP - [facility] Paneled (Internal Medicine)  ZACHARY Cadet D.P.M. (Podiatry)  Clinton John M.D. as Consulting Physician (Gastroenterology)  Vivian Ruff (Inactive) as [facility]   Erendira Blevins MD (Sports Medicine)  Lili Jin (Optometry)      Financial Resource Strain: Low Risk  (4/12/2024)    Overall Financial Resource Strain (CARDIA)     Difficulty of Paying Living Expenses: Not hard at all      Transportation Needs: No Transportation Needs (4/12/2024)    PRAPARE - Transportation     Lack of Transportation (Medical): No     Lack of Transportation (Non-Medical): No      Food Insecurity: No Food Insecurity (4/12/2024)    Hunger Vital Sign     Worried About Running Out of Food in the Last Year: Never true     Ran Out of Food in the Last Year: Never true        Encounter Vitals  Blood Pressure : 118/60  Weight: 71.7 kg (158 lb)  Height: 167.6 cm (5' 6\")  BMI (Calculated): 25.5  Pain Score: No pain     Alert, oriented in no acute distress.  Eye contact is good, speech goal directed, " affect calm.    Assessment and Plan. The following treatment and monitoring plan is recommended:  Dyslipidemia  Chronic, stable. Last lipid panel in Jan 2024 was WNL. She currently takes atorvastatin 80 mg daily. We discussed her dietary/lifestyle regimen. Follow up with PCP for continued monitoring and management.    Postoperative hypothyroidism  Stable. Currently taking levothyroxine 88 mcg daily as prescribed. No complications. TSH is stable.     Prediabetes  Chronic, stable. Last A1c in Jan 2024 was 5.7. We discussed her dietary/lifestyle regimen. Follow up with PCP for continued monitoring.      Primary hypertension  Chronic, Stable. BP in office today is 118/60. She does check her BP at home occasionally. She currently takes amlodipine 5 mg daily. Follow up with PCP for continued monitoring and management.      Secondary hypercoagulable state (HCC)  Chronic, stable. She was found to have a blood clot on the imaging done to evaluate her TGA episodes in November. She is on Eliquis 5 mg daily. Follow up with PCP at least annually for continued monitoring and management.      Seizure disorder (HCC)  Newly discovered problem. She is taking Keppra 500 mg BID. Tolerating well. Following with Neurology.    Services suggested: No services needed at this time  Health Care Screening: Age-appropriate preventive services recommended by USPTF and ACIP covered by Medicare were discussed today. Services ordered if indicated and agreed upon by the patient.  Referrals offered: Community-based lifestyle interventions to reduce health risks and promote self-management and wellness, fall prevention, nutrition, physical activity, tobacco-use cessation, weight loss, and mental health services as per orders if indicated.    Discussion today about general wellness and lifestyle habits:    Prevent falls and reduce trip hazards; Cautioned about securing or removing rugs.  Have a working fire alarm and carbon monoxide detector.  Engage  in regular physical activity and social activities.    Follow-up: Return for appointment with Primary Care Provider as needed..

## 2024-04-16 ENCOUNTER — APPOINTMENT (OUTPATIENT)
Dept: RADIOLOGY | Facility: MEDICAL CENTER | Age: 74
End: 2024-04-16
Attending: INTERNAL MEDICINE
Payer: MEDICARE

## 2024-04-16 DIAGNOSIS — Z12.31 BREAST CANCER SCREENING BY MAMMOGRAM: ICD-10-CM

## 2024-04-16 PROCEDURE — 77067 SCR MAMMO BI INCL CAD: CPT

## 2024-04-22 ENCOUNTER — TELEPHONE (OUTPATIENT)
Dept: VASCULAR LAB | Facility: MEDICAL CENTER | Age: 74
End: 2024-04-22
Payer: MEDICARE

## 2024-04-22 NOTE — TELEPHONE ENCOUNTER
Caller:   Areli    Office Name, phone number, fax number:   [facility]  Fax - [phone]    Procedure Name: Colonoscopy    Procedure Scheduled Date: 6/3/24    Callback Number: [phone]    Thank you,   Alexandra SANDERSON

## 2024-04-22 NOTE — TELEPHONE ENCOUNTER
Called Areli at [facility].  She will resend Clearance to our vascular fx# [id number].    Pending response.    Shalonda Rodriguez, Med Ass't  [facility]  Ph. [phone]  Fx. [phone]

## 2024-05-01 ENCOUNTER — TELEPHONE (OUTPATIENT)
Dept: VASCULAR LAB | Facility: MEDICAL CENTER | Age: 74
End: 2024-05-01
Payer: MEDICARE

## 2024-05-01 NOTE — TELEPHONE ENCOUNTER
Clearance sent to [facility] for holding Eliquis for colonoscopy.    Jessie Bolaños APRMARIA E  [facility]

## 2024-05-10 ENCOUNTER — TELEPHONE (OUTPATIENT)
Dept: VASCULAR LAB | Facility: MEDICAL CENTER | Age: 74
End: 2024-05-10
Payer: MEDICARE

## 2024-05-10 NOTE — TELEPHONE ENCOUNTER
Called and left message to reschedule f/u on 06/13 with Dr Bloch.    We have openings on 05/30 (same time @ 220PM)  Please notify Shalonda Rodriguez, Med Ass't    Shalonda Rodriguez, Med Ass't  [facility]  Ph. [phone]  Fx. [phone]

## 2024-05-10 NOTE — TELEPHONE ENCOUNTER
Caller: Michela Valdez      Topic/issue: Scarlet was returing our call and she was confirming the rescheduling of her appointment to 5/30/24 at 2:20 pm       Callback Number: [phone]        Thank you    -Hector JO

## 2024-05-15 ENCOUNTER — APPOINTMENT (OUTPATIENT)
Dept: RADIOLOGY | Facility: MEDICAL CENTER | Age: 74
End: 2024-05-15
Attending: INTERNAL MEDICINE
Payer: MEDICARE

## 2024-05-15 DIAGNOSIS — G08 CEREBRAL VENOUS THROMBOSIS OF SIGMOID SINUS: ICD-10-CM

## 2024-05-24 ENCOUNTER — APPOINTMENT (RX ONLY)
Dept: URBAN - METROPOLITAN AREA CLINIC 20 | Facility: CLINIC | Age: 74
Setting detail: DERMATOLOGY
End: 2024-05-24

## 2024-05-24 DIAGNOSIS — L82.1 OTHER SEBORRHEIC KERATOSIS: ICD-10-CM

## 2024-05-24 DIAGNOSIS — L91.8 OTHER HYPERTROPHIC DISORDERS OF THE SKIN: ICD-10-CM

## 2024-05-24 PROCEDURE — ? LIQUID NITROGEN (COSMETIC)

## 2024-05-24 PROCEDURE — ? SKIN TAG REMOVAL (COSMETIC)

## 2024-05-24 ASSESSMENT — LOCATION DETAILED DESCRIPTION DERM
LOCATION DETAILED: RIGHT INFERIOR ANTERIOR NECK
LOCATION DETAILED: RIGHT MEDIAL SUPERIOR CHEST
LOCATION DETAILED: RIGHT ANTERIOR SHOULDER
LOCATION DETAILED: RIGHT MEDIAL BREAST 2-3:00 REGION
LOCATION DETAILED: LEFT LATERAL SUPERIOR CHEST
LOCATION DETAILED: LEFT INFERIOR MEDIAL UPPER BACK
LOCATION DETAILED: LEFT RIB CAGE
LOCATION DETAILED: RIGHT CLAVICULAR SKIN
LOCATION DETAILED: EPIGASTRIC SKIN
LOCATION DETAILED: INFERIOR THORACIC SPINE
LOCATION DETAILED: RIGHT LATERAL SUPERIOR CHEST
LOCATION DETAILED: LEFT ANTERIOR SHOULDER

## 2024-05-24 ASSESSMENT — LOCATION SIMPLE DESCRIPTION DERM
LOCATION SIMPLE: RIGHT CLAVICULAR SKIN
LOCATION SIMPLE: RIGHT SHOULDER
LOCATION SIMPLE: CHEST
LOCATION SIMPLE: UPPER BACK
LOCATION SIMPLE: ABDOMEN
LOCATION SIMPLE: LEFT SHOULDER
LOCATION SIMPLE: LEFT UPPER BACK
LOCATION SIMPLE: RIGHT ANTERIOR NECK
LOCATION SIMPLE: RIGHT BREAST

## 2024-05-24 ASSESSMENT — LOCATION ZONE DERM
LOCATION ZONE: ARM
LOCATION ZONE: NECK
LOCATION ZONE: TRUNK

## 2024-05-24 NOTE — PROCEDURE: SKIN TAG REMOVAL (COSMETIC)
Anesthesia Volume In Cc: 0
Detail Level: Detailed
Removed With: scissors
Consent: Written consent obtained and the risks of skin tag removal was reviewed with the patient including but not limited to bleeding, pigmentary change, infection, pain, and remote possibility of scarring.

## 2024-05-24 NOTE — PROCEDURE: LIQUID NITROGEN (COSMETIC)
Show Spray Paint Technique Variable?: Yes
Detail Level: Simple
Post-Care Instructions: I reviewed with the patient in detail post-care instructions. Patient is to wear sunprotection, and avoid picking at any of the treated lesions. Pt may apply Vaseline to crusted or scabbing areas.
Consent: The patient's consent was obtained including but not limited to risks of crusting, scabbing, blistering, scarring, darker or lighter pigmentary change, recurrence, incomplete removal and infection. The patient understands that the procedure is cosmetic in nature and is not covered by insurance.
Spray Paint Technique: No
Price (Use Numbers Only, No Special Characters Or $): 160
Billing Information: Bill by Static Price
Spray Paint Text: The liquid nitrogen was applied to the skin utilizing a spray paint frosting technique.
Price (Use Numbers Only, No Special Characters Or $): 0

## 2024-05-30 ENCOUNTER — OFFICE VISIT (OUTPATIENT)
Dept: CARDIOLOGY | Facility: MEDICAL CENTER | Age: 74
End: 2024-05-30
Attending: INTERNAL MEDICINE
Payer: MEDICARE

## 2024-05-30 VITALS
WEIGHT: 156 LBS | HEART RATE: 80 BPM | HEIGHT: 66 IN | DIASTOLIC BLOOD PRESSURE: 65 MMHG | SYSTOLIC BLOOD PRESSURE: 116 MMHG | BODY MASS INDEX: 25.07 KG/M2

## 2024-05-30 DIAGNOSIS — I10 PRIMARY HYPERTENSION: ICD-10-CM

## 2024-05-30 DIAGNOSIS — E89.0 POSTOPERATIVE HYPOTHYROIDISM: ICD-10-CM

## 2024-05-30 DIAGNOSIS — G08 CEREBRAL VENOUS THROMBOSIS OF SIGMOID SINUS: ICD-10-CM

## 2024-05-30 DIAGNOSIS — G40.909 SEIZURE DISORDER (HCC): ICD-10-CM

## 2024-05-30 DIAGNOSIS — E78.5 DYSLIPIDEMIA: ICD-10-CM

## 2024-05-30 ASSESSMENT — FIBROSIS 4 INDEX: FIB4 SCORE: 1.03

## 2024-05-30 NOTE — PROGRESS NOTES
"VASCULAR MEDICINE CLINIC - Follow up VISIT  24    Michela Valdez is a 73 y.o. female who presents today  for vascular evaluation.     Subjective      HPI:  Here for f/u of cerebral vein thrombosis  No episodes of confusion since last visit  No obvious seizure recurrence  No focal neurologic complaints  Has close f/u with neuro  Tolerating keppra  Feels great  Still on eliquis  No bleeding  No headache  No myalgias on atorvastatin  Still on amlodipine  Not checking bp at home  No leg swelling  Same thyroid dose - better energy  Saw hematology who recommended continued anticoagulation for now and repeat blood work    Family History   Problem Relation Age of Onset    Osteoporosis Mother     Hyperlipidemia Father     Heart Disease Father         CAD, MI     Anemia Sister     Cancer Brother         prostate    Heart Disease Brother         CABG    Dad - fatal coronary disease in 50s    Social History     Tobacco Use    Smoking status: Former     Current packs/day: 0.00     Average packs/day: 0.5 packs/day for 4.0 years (2.0 ttl pk-yrs)     Types: Cigarettes     Start date:      Quit date:      Years since quittin.4    Smokeless tobacco: Never   Vaping Use    Vaping status: Never Used   Substance Use Topics    Alcohol use: Yes     Alcohol/week: 3.0 oz     Types: 5 Glasses of wine per week     Comment: glass of wine daily     Drug use: No      DIET AND EXERCISE:  Weight Change:stable  Diet: relatively HH  Exercise: mod regular        Objective     Objective:     Vitals:    24 1419   BP: 116/65   BP Location: Left arm   Patient Position: Sitting   BP Cuff Size: Adult   Pulse: 80   Weight: 70.8 kg (156 lb)   Height: 1.676 m (5' 6\")      Physical Exam  Vitals reviewed.   Constitutional:       General: She is not in acute distress.     Appearance: She is not diaphoretic.   HENT:      Head: Normocephalic and atraumatic.   Eyes:      General: No scleral icterus.     Conjunctiva/sclera: Conjunctivae " normal.   Neck:      Vascular: No carotid bruit.   Cardiovascular:      Rate and Rhythm: Normal rate and regular rhythm.      Heart sounds: Normal heart sounds. No murmur heard.  Pulmonary:      Effort: Pulmonary effort is normal. No respiratory distress.      Breath sounds: Normal breath sounds. No wheezing or rales.   Musculoskeletal:      Right lower leg: No edema.      Left lower leg: No edema.   Skin:     Coloration: Skin is not pale.   Neurological:      General: No focal deficit present.      Mental Status: She is alert and oriented to person, place, and time.      Cranial Nerves: No cranial nerve deficit.      Coordination: Coordination normal.      Gait: Gait is intact. Gait normal.   Psychiatric:         Mood and Affect: Mood and affect normal.         Behavior: Behavior normal.          DATA REVIEW    Lab Results   Component Value Date/Time    CHOLSTRLTOT 128 01/08/2024 06:44 AM    LDL 65 01/08/2024 06:44 AM    HDL 47 01/08/2024 06:44 AM    TRIGLYCERIDE 81 01/08/2024 06:44 AM     Lp(a) 109 mg/dl    Lab Results   Component Value Date/Time    SODIUM 141 01/08/2024 06:46 AM    POTASSIUM 4.6 01/08/2024 06:44 AM    CHLORIDE 104 01/08/2024 06:44 AM    CO2 24 01/08/2024 06:44 AM    GLUCOSE 119 (H) 01/08/2024 06:44 AM    BUN 19 01/08/2024 06:44 AM    CREATININE 0.50 01/08/2024 06:44 AM     Lab Results   Component Value Date/Time    ALKPHOSPHAT 102 (H) 01/08/2024 06:44 AM    ASTSGOT 27 01/08/2024 06:44 AM    ALTSGPT 33 01/08/2024 06:44 AM    TBILIRUBIN 0.4 01/08/2024 06:44 AM       Lab Results   Component Value Date/Time    HBA1C 5.7 (H) 01/08/2024 06:44 AM       Lab Results   Component Value Date/Time    MALBCRT see below 01/08/2024 12:29 PM    MICROALBUR <1.2 01/08/2024 12:29 PM       Beta 2 glycoprotein IgG and IgM - normal  ACLA - IgG normal; IgM mildly elevated  FVL - neg  PTGM - neg      CAC score jan 2023  LMA - 64.8  LCX - 0.0  LAD - 16.5  RCA - 258.7  PDA - 0.0  Total Calcium Score: 340.0    CTA head and  neck oct 2023  1.  Negative for thrombosis or occlusion  2.  Mild bilateral internal carotid artery stenoses  3.  Anatomic variant as the right subclavian artery originates distal to the left subclavian artery and passes posterior to the trachea before exiting the right hemithorax  4.  Anatomic variant termination of the right vertebral artery and posterior inferior cerebellar artery    Echo oct 2023  No prior study is available for comparison.   Normal left ventricular systolic function.  The left ventricular ejection fraction is visually estimated to be 60%.  No significant valvular abnormalities.   A definite cardiac source for a systemic thromboembolic event is not   identified, failure to do so does not exclude its presence. If   clinically indicated, a transesophageal study would be useful.     MRI brain oct 2023  No acute process.  Age-related volume loss.  Subacute to chronic thrombus in the right sigmoid sinus and proximal right internal jugular vein at the skull base.    CTA head and neck dec 2023  Ctangiogram of the [place] [facility] [place] demonstrating no large vessel occlusion.   Mild bilateral atherosclerosis with less than 50% ICA stenosis.     MRV jan 2024  Stable appearance of filling defects in the right transverse and sigmoid junction and the right sigmoid sinus extending into the right internal jugular vein at the skull base.  Defect within the left internal jugular vein at the skull base noted, which may represent flow artifact or new thrombus in this location. Consider follow-up with contrast-enhanced CTA/CT venogram of the head and neck.    MRV head may 2024  1.  There is linear filling defect in the right sigmoid sinus and visualized jugular bulb likely representing chronic partial thrombus. There has been no significant interval change in the previous CT angiogram dated 12/24/2023 and previous MRI dated   10/23/2023.  2.  There is no evidence of acute thrombosis.        Medical Decision Making:   Today's Assessment / Status / Plan:     1. Dyslipidemia  Comp Metabolic Panel    Lipid Profile    LIPOPROTEIN A    APOLIPOPROTEIN B      2. Primary hypertension        3. Cerebral venous thrombosis of sigmoid sinus        4. Seizure disorder (HCC)        5. Postoperative hypothyroidism  TSH    THYROXINE (TOTAL)           Etiology of Established CVD if Present:     1) acute confusional state -recurrent - newly diagnosed sz disorder -  no stroke seen on imaging. No residual. No source of embolism or afib.  Plan:  -911 with any tia or cva symptoms  -continue keppra per neuro  -medical management per below  -follow up with neuro for further w/u and management    2) cerebral vein thrombosis - unclear chronicity and unclear relation to TGA/TIA.   Possibly related to previous covid.   FVL and PTGM normal.    Stable or improved on follow-up imaging  Normal anti-beta 2 ab, but mildly elevated IgM acla  Lipoprotein a may be prothrombotic as well  Plan:  - medical management as below  -Continue anticoagulation pending follow-up with hematology  - Will defer any indicated age appropriate screening for occult malignancy to pcp.    Lipid Management: Qualifies for Statin Therapy Based on 2018 ACC/AHA Guidelines: yes  Calculated 10-Year Risk of ASCVD: N/A  Currently on Statin: Yes  Strong FH of premature ASCVD  Subclinical athero as evidenced by mild carotid disease and elevated CAC score  Very elevated lp(a) an independent risk factor for disease  Given above would aim for LDL <70 and nonHDL <100  Reasonable control on most recent blood work  Plan  - recommended family lipid and lp(a) screening  - continue atorva 80  -Continue ezetimibe 10   - recheck fasting lipid panel along with lipoprotein a and apolipoprotein B prior to next visit    Blood Pressure Management:  [facility] (2017) Blood Pressure Goal <130/80  BP high at presentation, but no previous h/o hypertension   Unclear chronicity  Reasonable control both at home and in the  office  GFR and lytes stable  No albuminuria  No LVH  Plan:  - continue amlodipine 5 mg daily as monotherapy for now  - continue home bp monitoring with good technique    Glycemic Status: Prediabetic  A1c 5.7 mild and stable  - continue lifestyle mod  - recheck fasting glucose and A1c in future    Anti-Platelet/Anti-Coagulant Tx: Yes  - continue eliquis 5 mg twice daily pending hematology follow-up  -Could consider dose reduction to 2.5 mg twice daily in the future  -Repeat blood work per heme  -Restart antiplatelet therapy if and when when off DOAC  -Report any bleeding  -Reasonable to stop for 2 days for procedures    Smoking: continue complete avoidance of all tobacco products    Physical Activity: continue mod intensity exercise    Weight Management and Nutrition: recommended med style diet    Other:     - Hypothroidism - appears under good control based on symptoms and recent blood work. Continue current levo.  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  Reasonable to hold anticoagulation for a couple of days, but would restart it soon as possible and continue oac at least through the perioperative period     Instructed to follow-up with PCP for remainder of adult medical needs: yes  We will partner with other providers in the management of established vascular disease and cardiometabolic risk factors.    Studies to Be Obtained: none  Labs to Be Obtained: As above prior to next visit    Follow up in: 6 months    Michael J Bloch, M.D.     Cc: Dr. Greer; Dr. Howe

## 2024-06-11 ENCOUNTER — TELEPHONE (OUTPATIENT)
Dept: MEDICAL GROUP | Facility: PHYSICIAN GROUP | Age: 74
End: 2024-06-11
Payer: MEDICARE

## 2024-06-11 NOTE — TELEPHONE ENCOUNTER
1. Caller Name: Michela Valdez                          Call Back Number: [phone] (home)         How would the patient prefer to be contacted with a response: Phone call OK to leave a detailed message    Patient called to let you know she is having a colonoscopy this Friday  Or endoscopy?

## 2024-06-18 ENCOUNTER — OFFICE VISIT (OUTPATIENT)
Dept: MEDICAL GROUP | Facility: PHYSICIAN GROUP | Age: 74
End: 2024-06-18
Payer: MEDICARE

## 2024-06-18 VITALS
WEIGHT: 155.6 LBS | TEMPERATURE: 97.6 F | OXYGEN SATURATION: 96 % | BODY MASS INDEX: 25.01 KG/M2 | HEART RATE: 84 BPM | SYSTOLIC BLOOD PRESSURE: 106 MMHG | DIASTOLIC BLOOD PRESSURE: 64 MMHG | HEIGHT: 66 IN | RESPIRATION RATE: 12 BRPM

## 2024-06-18 DIAGNOSIS — G08 CEREBRAL VENOUS THROMBOSIS OF SIGMOID SINUS: ICD-10-CM

## 2024-06-18 DIAGNOSIS — E89.0 POSTOPERATIVE HYPOTHYROIDISM: ICD-10-CM

## 2024-06-18 DIAGNOSIS — E78.5 DYSLIPIDEMIA: ICD-10-CM

## 2024-06-18 DIAGNOSIS — I10 PRIMARY HYPERTENSION: ICD-10-CM

## 2024-06-18 DIAGNOSIS — G40.909 SEIZURE DISORDER (HCC): ICD-10-CM

## 2024-06-18 DIAGNOSIS — K57.30 DIVERTICULAR DISEASE OF COLON: ICD-10-CM

## 2024-06-18 DIAGNOSIS — R73.03 PREDIABETES: ICD-10-CM

## 2024-06-18 DIAGNOSIS — R15.1 FECAL SMEARING: ICD-10-CM

## 2024-06-18 PROBLEM — E87.1 HYPONATREMIA: Status: RESOLVED | Noted: 2023-11-03 | Resolved: 2024-06-18

## 2024-06-18 PROCEDURE — 3078F DIAST BP <80 MM HG: CPT | Performed by: INTERNAL MEDICINE

## 2024-06-18 PROCEDURE — G2211 COMPLEX E/M VISIT ADD ON: HCPCS | Performed by: INTERNAL MEDICINE

## 2024-06-18 PROCEDURE — 3074F SYST BP LT 130 MM HG: CPT | Performed by: INTERNAL MEDICINE

## 2024-06-18 PROCEDURE — 99214 OFFICE O/P EST MOD 30 MIN: CPT | Performed by: INTERNAL MEDICINE

## 2024-06-18 ASSESSMENT — FIBROSIS 4 INDEX: FIB4 SCORE: 1.03

## 2024-06-19 NOTE — ASSESSMENT & PLAN NOTE
Chronic and ongoing problem, following with neurology, tolerating levetiracetam 500 mg twice daily without issue.  Continue follow-up with Dr. Ortiz as recommended.

## 2024-06-19 NOTE — ASSESSMENT & PLAN NOTE
Previous problem, improved, no longer appears to need pharmacotherapy for hypertension, could have been situational at the time of the TGA, central venous sinus thrombosis, and seizure.  Will monitor with home blood pressure readings and she will bring those into clinic follow-up in 6 weeks.  If she needs to go back on pharmacotherapy and add losartan 25 mg daily but in the meantime she will stop the amlodipine which is causing edema and slight overtreatment of blood pressure.

## 2024-06-19 NOTE — ASSESSMENT & PLAN NOTE
Chronic and ongoing problem, noted to have decreased sphincter tone at time of colonoscopy. No improvement following bowel prep for colonoscopy, advised her to continue with fiber to help with formed stool.  She will get back in with pelvic floor PT which is scheduled next week and discussed she may ultimately need colorectal surgery and fecal MR enterography for additional evaluation.

## 2024-06-19 NOTE — ASSESSMENT & PLAN NOTE
Previous problem, unclear if this is related to transient global amnesia and seizure activity, and is to continue on Eliquis 5 mg twice daily.  She met with hematology who has additional blood testing to look for hypercoagulability state including anticardiolipin and lupus anticoagulant.

## 2024-06-19 NOTE — PROGRESS NOTES
Subjective:   Chief Complaint/History of Present Illness:  Michela Valdez is a 73 y.o. female established patient who presents today to discuss medical problems as listed below. Michela is unaccompanied for today's visit.    Problem   Seizure Disorder (Hcc)    Abnormal EEG > [facility] 1/2024> 3 electrographic seizures (no clinical correlates or awareness occurring aroud these times)     Cerebral Venous Thrombosis of Sigmoid Sinus    MR brain (10/2023):  Subacute to chronic thrombus in the right sigmoid sinus and proximal right internal jugular vein at the skull base.    New finding during evaluation for acute mental status change, recommended Eliquis 5 mg twice daily for 3 months and follow up CTV/MRV at that time. Associated transient global amnesia which is improving.  Tested positive for seizure activity on EEG and now started on Keppra.     Primary hypertension_off pharmacotherapy    New problem during hospitalization for TGA and venous sinus thrombosis in Oct 2023. Amlodipine added, she is taking without issue. EKG suggested LVH, echocardiogram showed no LVH.    Pressure running low in clinic today at 106/64, will do trial holding amlodipine as she is also experiencing right lower extremity edema and she will bring blood pressure readings for follow-up visit.  If she does go on pharmacotherapy would initiate losartan 25 mg daily in place of the amlodipine.     Fecal Smearing    She reports issues with scant amount of fecal drainage, sometimes she does not have a warning and so she wears a pad.  Can be a drop in upwards to a teaspoon amount.  Does not happen all the time.  She feels like she has very good rectal tone and it should not be a strength issue.  She does deal some constipation and wonders if this is a manifestation of obstipation.  Not currently taking any stool softeners or laxatives.  She has diverticulosis throughout the colon.  She has eliminated caffeinated coffee.    Follow-up colonoscopy noted  decreased sphincter tone and tortuous colon with pandiverticulosis.  Agreeable to going back to pelvic floor PT, has not met with a colorectal surgeon in the past.     Prediabetes     Latest Reference Range & Units 01/08/24 06:44   Glycohemoglobin 4.0 - 5.6 % 5.7 (H)   Estim. Avg Glu mg/dL 117     She has history of prediabetes with A1c ranging 5.7-6.2.  No prior use of glucose lowering medications.  We have discussed GLP1 agonist therapy for both prediabetes and help with weight loss, prescribed semaglutide in the past but I do not believe she started taking it.     Postoperative Hypothyroidism     Latest Reference Range & Units 01/08/24 06:44   TSH 0.380 - 5.330 uIU/mL 1.570   Thyroxine -T4 4.0 - 12.0 ug/dL 7.7     She reports prior thyroidectomy due to nodule found incidentally.  No recent dose adjustments of her levothyroxine.  She has had progressive weight gain since her thyroid was removed and feels are likely related.  She wonders if her dose needs to be adjusted.  Otherwise no signs or symptoms of overtreatment under treatment.    Current regimen: Levothyroxine 88 mcg 6 days/week and 2 tablets on Sundays      Dyslipidemia     Latest Reference Range & Units 03/09/23 10:32   Cholesterol,Tot 100 - 199 mg/dL 177   Triglycerides 0 - 149 mg/dL 188 (H)   HDL >=40 mg/dL 43   LDL <100 mg/dL 96     Has a history of elevated cholesterol.  Her father had early CAD.  She previously followed with Dr. Bloch of vascular medicine.    Current regimen: Atorvastatin 80 mg daily   Previous regimen: above + aspirin 81 mg daily     Diverticular Disease of Colon    Colonoscopy completed July 2019.  This noted diverticulosis throughout the entire colon.  She also had one area of a nonbleeding angiodysplastic lesion.    She reports challenges with constipation and tried Colace for 1 week with some improvement. No known history of diverticulitis.     Hyponatremia (Resolved)        Current Medications:  Current Outpatient Medications  "Ordered in [facility]   Medication Sig Dispense Refill    atorvastatin (LIPITOR) 80 MG tablet TAKE 1 TABLET BY MOUTH EVERY DAY IN THE EVENING 90 Tablet 4    levETIRAcetam (KEPPRA) 500 MG Tab Take 1 Tablet by mouth 2 times a day. 180 Tablet 6    Glucos-Chond-Hyal Ac-Ca Fructo (MOVE FREE JOINT HEALTH ADVANCE) Tab Take 2 Tablets by mouth every day.      ezetimibe (ZETIA) 10 MG Tab Take 1 Tablet by mouth every day. 90 Tablet 3    apixaban (ELIQUIS) 5mg Tab Take 1 Tablet by mouth 2 times a day. 200 Tablet 3    levothyroxine (SYNTHROID) 88 MCG Tab TAKE 1 TABLET BY MOUTH EVERY DAY IN THE MORNING ON AN EMPTY STOMACH 100 Tablet 3    Cholecalciferol (VITAMIN D3) 1000 units Cap Take 2,000 Units by mouth. 1000 units x 2 capsules = 2000 units       No current Epic-ordered facility-administered medications on file.          Objective:   Physical Exam:    Vitals: /64 (BP Location: Right arm, Patient Position: Sitting, BP Cuff Size: Adult)   Pulse 84   Temp 36.4 °C (97.6 °F) (Temporal)   Resp 12   Ht 1.676 m (5' 6\")   Wt 70.6 kg (155 lb 9.6 oz)   SpO2 96%    BMI: Body mass index is 25.11 kg/m².  Physical Exam  Constitutional:       General: She is not in acute distress.     Appearance: Normal appearance. She is not ill-appearing.   HENT:      Right Ear: External ear normal.      Left Ear: External ear normal.   Eyes:      General: No scleral icterus.     Conjunctiva/sclera: Conjunctivae normal.   Cardiovascular:      Rate and Rhythm: Normal rate and regular rhythm.      Pulses: Normal pulses.   Pulmonary:      Effort: Pulmonary effort is normal. No respiratory distress.      Breath sounds: No wheezing or rhonchi.   Abdominal:      General: Bowel sounds are normal.      Palpations: Abdomen is soft.   Musculoskeletal:      Right lower leg: Edema present.      Left lower leg: No edema.   Skin:     General: Skin is warm and dry.   Neurological:      Gait: Gait normal.   Psychiatric:         Mood and Affect: Mood normal.         " Behavior: Behavior normal.         Thought Content: Thought content normal.         Judgment: Judgment normal.            Assessment and Plan:   Michela is a 73 y.o. female with the following:  Problem List Items Addressed This Visit       Cerebral venous thrombosis of sigmoid sinus     Previous problem, unclear if this is related to transient global amnesia and seizure activity, and is to continue on Eliquis 5 mg twice daily.  She met with hematology who has additional blood testing to look for hypercoagulability state including anticardiolipin and lupus anticoagulant.         Diverticular disease of colon     Chronic ongoing problem, continues to have diverticulosis noted in sigmoid, descending, transverse, and ascending colon.         Dyslipidemia     Chronic ongoing problem, established with vascular medicine, will recheck cholesterol to ensure stability, continue medical therapy with atorvastatin 80 mg daily.         Relevant Orders    CBC WITH DIFFERENTIAL    VITAMIN D,25 HYDROXY (DEFICIENCY)    VITAMIN B12    Fecal smearing     Chronic and ongoing problem, noted to have decreased sphincter tone at time of colonoscopy. No improvement following bowel prep for colonoscopy, advised her to continue with fiber to help with formed stool.  She will get back in with pelvic floor PT which is scheduled next week and discussed she may ultimately need colorectal surgery and fecal MR enterography for additional evaluation.         Postoperative hypothyroidism     Ongoing problem, continue medical therapy with levothyroxine 88 mcg 6 days/week and 2 tablets on Sundays.  Update TSH and free T4 to ensure ongoing lab stability.         Relevant Orders    TSH    FREE THYROXINE    Prediabetes     Chronic and improved problem, A1c down to prediabetic range, no indication for pharmacotherapy at this time.  Continue periodic A1c to ensure relative stability.         Relevant Orders    HEMOGLOBIN A1C    Primary hypertension_off  pharmacotherapy     Previous problem, improved, no longer appears to need pharmacotherapy for hypertension, could have been situational at the time of the TGA, central venous sinus thrombosis, and seizure.  Will monitor with home blood pressure readings and she will bring those into clinic follow-up in 6 weeks.  If she needs to go back on pharmacotherapy and add losartan 25 mg daily but in the meantime she will stop the amlodipine which is causing edema and slight overtreatment of blood pressure.         Seizure disorder (HCC)     Chronic and ongoing problem, following with neurology, tolerating levetiracetam 500 mg twice daily without issue.  Continue follow-up with Dr. Ortiz as recommended.           Billing : secondary to the complexity of this patient's illnesses and their interactions.  All problems listed were discussed during the office visit, medications were evaluated and complexities were discussed as well as plan for the future.     RTC: Return in about 6 weeks (around 7/30/2024).    I spent a total of 32 minutes with record review, exam, communication with the patient, communication with other providers, and documentation of this encounter.    PLEASE NOTE: This dictation was created using voice recognition software. I have made every reasonable attempt to correct obvious errors, but I expect that there are errors of grammar and possibly content that I did not discover before finalizing the note.      Archana Robertson, DO  Geriatric and Internal Medicine  [facility]

## 2024-06-19 NOTE — ASSESSMENT & PLAN NOTE
Chronic ongoing problem, established with vascular medicine, will recheck cholesterol to ensure stability, continue medical therapy with atorvastatin 80 mg daily.

## 2024-06-19 NOTE — ASSESSMENT & PLAN NOTE
Chronic and improved problem, A1c down to prediabetic range, no indication for pharmacotherapy at this time.  Continue periodic A1c to ensure relative stability.

## 2024-06-19 NOTE — ASSESSMENT & PLAN NOTE
Chronic ongoing problem, continues to have diverticulosis noted in sigmoid, descending, transverse, and ascending colon.

## 2024-06-19 NOTE — ASSESSMENT & PLAN NOTE
Ongoing problem, continue medical therapy with levothyroxine 88 mcg 6 days/week and 2 tablets on Sundays.  Update TSH and free T4 to ensure ongoing lab stability.

## 2024-07-03 ENCOUNTER — HOSPITAL ENCOUNTER (OUTPATIENT)
Dept: LAB | Facility: MEDICAL CENTER | Age: 74
End: 2024-07-03
Attending: INTERNAL MEDICINE
Payer: MEDICARE

## 2024-07-03 DIAGNOSIS — R73.03 PREDIABETES: ICD-10-CM

## 2024-07-03 DIAGNOSIS — E78.5 DYSLIPIDEMIA: ICD-10-CM

## 2024-07-03 DIAGNOSIS — E89.0 POSTOPERATIVE HYPOTHYROIDISM: ICD-10-CM

## 2024-07-03 LAB
25(OH)D3 SERPL-MCNC: 31 NG/ML (ref 30–100)
ALBUMIN SERPL BCP-MCNC: 4.4 G/DL (ref 3.2–4.9)
ALBUMIN/GLOB SERPL: 1.5 G/DL
ALP SERPL-CCNC: 121 U/L (ref 30–99)
ALT SERPL-CCNC: 24 U/L (ref 2–50)
ANION GAP SERPL CALC-SCNC: 12 MMOL/L (ref 7–16)
AST SERPL-CCNC: 25 U/L (ref 12–45)
BASOPHILS # BLD AUTO: 0.8 % (ref 0–1.8)
BASOPHILS # BLD: 0.06 K/UL (ref 0–0.12)
BILIRUB SERPL-MCNC: 0.4 MG/DL (ref 0.1–1.5)
BUN SERPL-MCNC: 19 MG/DL (ref 8–22)
CALCIUM ALBUM COR SERPL-MCNC: 9.3 MG/DL (ref 8.5–10.5)
CALCIUM SERPL-MCNC: 9.6 MG/DL (ref 8.5–10.5)
CHLORIDE SERPL-SCNC: 105 MMOL/L (ref 96–112)
CHOLEST SERPL-MCNC: 126 MG/DL (ref 100–199)
CO2 SERPL-SCNC: 22 MMOL/L (ref 20–33)
CREAT SERPL-MCNC: 0.53 MG/DL (ref 0.5–1.4)
D DIMER PPP IA.FEU-MCNC: 0.49 UG/ML (FEU) (ref 0–0.5)
EOSINOPHIL # BLD AUTO: 0.12 K/UL (ref 0–0.51)
EOSINOPHIL NFR BLD: 1.6 % (ref 0–6.9)
ERYTHROCYTE [DISTWIDTH] IN BLOOD BY AUTOMATED COUNT: 43.5 FL (ref 35.9–50)
EST. AVERAGE GLUCOSE BLD GHB EST-MCNC: 126 MG/DL
FASTING STATUS PATIENT QL REPORTED: NORMAL
GFR SERPLBLD CREATININE-BSD FMLA CKD-EPI: 96 ML/MIN/1.73 M 2
GLOBULIN SER CALC-MCNC: 2.9 G/DL (ref 1.9–3.5)
GLUCOSE SERPL-MCNC: 114 MG/DL (ref 65–99)
HBA1C MFR BLD: 6 % (ref 4–5.6)
HCT VFR BLD AUTO: 41.5 % (ref 37–47)
HDLC SERPL-MCNC: 43 MG/DL
HGB BLD-MCNC: 13.7 G/DL (ref 12–16)
IMM GRANULOCYTES # BLD AUTO: 0.01 K/UL (ref 0–0.11)
IMM GRANULOCYTES NFR BLD AUTO: 0.1 % (ref 0–0.9)
LDLC SERPL CALC-MCNC: 64 MG/DL
LYMPHOCYTES # BLD AUTO: 1.8 K/UL (ref 1–4.8)
LYMPHOCYTES NFR BLD: 24 % (ref 22–41)
MCH RBC QN AUTO: 30.8 PG (ref 27–33)
MCHC RBC AUTO-ENTMCNC: 33 G/DL (ref 32.2–35.5)
MCV RBC AUTO: 93.3 FL (ref 81.4–97.8)
MONOCYTES # BLD AUTO: 0.48 K/UL (ref 0–0.85)
MONOCYTES NFR BLD AUTO: 6.4 % (ref 0–13.4)
NEUTROPHILS # BLD AUTO: 5.04 K/UL (ref 1.82–7.42)
NEUTROPHILS NFR BLD: 67.1 % (ref 44–72)
NRBC # BLD AUTO: 0 K/UL
NRBC BLD-RTO: 0 /100 WBC (ref 0–0.2)
PLATELET # BLD AUTO: 286 K/UL (ref 164–446)
PMV BLD AUTO: 11.4 FL (ref 9–12.9)
POTASSIUM SERPL-SCNC: 4.4 MMOL/L (ref 3.6–5.5)
PROT SERPL-MCNC: 7.3 G/DL (ref 6–8.2)
RBC # BLD AUTO: 4.45 M/UL (ref 4.2–5.4)
SODIUM SERPL-SCNC: 139 MMOL/L (ref 135–145)
T4 SERPL-MCNC: 8.9 UG/DL (ref 4–12)
TRIGL SERPL-MCNC: 97 MG/DL (ref 0–149)
TSH SERPL DL<=0.005 MIU/L-ACNC: 0.41 UIU/ML (ref 0.38–5.33)
VIT B12 SERPL-MCNC: 594 PG/ML (ref 211–911)
WBC # BLD AUTO: 7.5 K/UL (ref 4.8–10.8)

## 2024-07-03 PROCEDURE — 82172 ASSAY OF APOLIPOPROTEIN: CPT

## 2024-07-03 PROCEDURE — 83695 ASSAY OF LIPOPROTEIN(A): CPT

## 2024-07-03 PROCEDURE — 84443 ASSAY THYROID STIM HORMONE: CPT

## 2024-07-03 PROCEDURE — 83036 HEMOGLOBIN GLYCOSYLATED A1C: CPT

## 2024-07-03 PROCEDURE — 82607 VITAMIN B-12: CPT

## 2024-07-03 PROCEDURE — 85613 RUSSELL VIPER VENOM DILUTED: CPT

## 2024-07-03 PROCEDURE — 85730 THROMBOPLASTIN TIME PARTIAL: CPT

## 2024-07-03 PROCEDURE — 80061 LIPID PANEL: CPT

## 2024-07-03 PROCEDURE — 85025 COMPLETE CBC W/AUTO DIFF WBC: CPT

## 2024-07-03 PROCEDURE — 82306 VITAMIN D 25 HYDROXY: CPT

## 2024-07-03 PROCEDURE — 85379 FIBRIN DEGRADATION QUANT: CPT

## 2024-07-03 PROCEDURE — 80053 COMPREHEN METABOLIC PANEL: CPT

## 2024-07-03 PROCEDURE — 36415 COLL VENOUS BLD VENIPUNCTURE: CPT

## 2024-07-03 PROCEDURE — 85610 PROTHROMBIN TIME: CPT

## 2024-07-03 PROCEDURE — 84436 ASSAY OF TOTAL THYROXINE: CPT

## 2024-07-03 PROCEDURE — 86147 CARDIOLIPIN ANTIBODY EA IG: CPT | Mod: 91

## 2024-07-05 LAB
APO B100 SERPL-MCNC: 74 MG/DL (ref 60–117)
LPA SERPL-MCNC: 107 MG/DL

## 2024-07-06 LAB
CARDIOLIPIN IGA SER IA-ACNC: <10 APL
CARDIOLIPIN IGG SER IA-ACNC: <10 GPL
CARDIOLIPIN IGM SER IA-ACNC: 11 MPL

## 2024-07-09 ENCOUNTER — TELEPHONE (OUTPATIENT)
Dept: VASCULAR LAB | Facility: MEDICAL CENTER | Age: 74
End: 2024-07-09
Payer: MEDICARE

## 2024-07-10 ENCOUNTER — HOSPITAL ENCOUNTER (OUTPATIENT)
Facility: MEDICAL CENTER | Age: 74
End: 2024-07-10
Attending: INTERNAL MEDICINE
Payer: MEDICARE

## 2024-07-10 PROCEDURE — 85613 RUSSELL VIPER VENOM DILUTED: CPT | Mod: 91

## 2024-07-10 PROCEDURE — 85730 THROMBOPLASTIN TIME PARTIAL: CPT

## 2024-07-10 PROCEDURE — 85610 PROTHROMBIN TIME: CPT

## 2024-07-10 PROCEDURE — 85520 HEPARIN ASSAY: CPT

## 2024-07-11 ENCOUNTER — APPOINTMENT (RX ONLY)
Dept: URBAN - METROPOLITAN AREA CLINIC 35 | Facility: CLINIC | Age: 74
Setting detail: DERMATOLOGY
End: 2024-07-11

## 2024-07-11 DIAGNOSIS — Z85.828 PERSONAL HISTORY OF OTHER MALIGNANT NEOPLASM OF SKIN: ICD-10-CM

## 2024-07-11 DIAGNOSIS — D22 MELANOCYTIC NEVI: ICD-10-CM

## 2024-07-11 DIAGNOSIS — L82.1 OTHER SEBORRHEIC KERATOSIS: ICD-10-CM

## 2024-07-11 DIAGNOSIS — L82.0 INFLAMED SEBORRHEIC KERATOSIS: ICD-10-CM

## 2024-07-11 DIAGNOSIS — L57.0 ACTINIC KERATOSIS: ICD-10-CM

## 2024-07-11 DIAGNOSIS — L81.4 OTHER MELANIN HYPERPIGMENTATION: ICD-10-CM

## 2024-07-11 PROBLEM — D22.61 MELANOCYTIC NEVI OF RIGHT UPPER LIMB, INCLUDING SHOULDER: Status: ACTIVE | Noted: 2024-07-11

## 2024-07-11 PROCEDURE — ? LIQUID NITROGEN

## 2024-07-11 PROCEDURE — 17110 DESTRUCTION B9 LES UP TO 14: CPT

## 2024-07-11 PROCEDURE — 99213 OFFICE O/P EST LOW 20 MIN: CPT | Mod: 25

## 2024-07-11 PROCEDURE — ? COUNSELING

## 2024-07-11 PROCEDURE — ? OBSERVATION AND MEASURE

## 2024-07-11 PROCEDURE — ? PARING HYPERKERATOTIC LESION

## 2024-07-11 PROCEDURE — 17000 DESTRUCT PREMALG LESION: CPT | Mod: 59

## 2024-07-11 PROCEDURE — 17003 DESTRUCT PREMALG LES 2-14: CPT | Mod: 59

## 2024-07-11 ASSESSMENT — LOCATION SIMPLE DESCRIPTION DERM
LOCATION SIMPLE: CHEST
LOCATION SIMPLE: LEFT EAR
LOCATION SIMPLE: RIGHT UPPER BACK
LOCATION SIMPLE: LEFT UPPER ARM
LOCATION SIMPLE: LEFT THIGH
LOCATION SIMPLE: POSTERIOR SCALP
LOCATION SIMPLE: LEFT UPPER BACK
LOCATION SIMPLE: RIGHT POSTERIOR UPPER ARM
LOCATION SIMPLE: SCALP
LOCATION SIMPLE: ANTERIOR SCALP
LOCATION SIMPLE: RIGHT SHOULDER

## 2024-07-11 ASSESSMENT — LOCATION DETAILED DESCRIPTION DERM
LOCATION DETAILED: RIGHT INFERIOR POSTAURICULAR SKIN
LOCATION DETAILED: LEFT MEDIAL SUPERIOR CHEST
LOCATION DETAILED: LEFT CENTRAL POSTAURICULAR SKIN
LOCATION DETAILED: POSTERIOR MID-PARIETAL SCALP
LOCATION DETAILED: RIGHT MEDIAL UPPER BACK
LOCATION DETAILED: RIGHT POSTERIOR SHOULDER
LOCATION DETAILED: LEFT SUPERIOR HELIX
LOCATION DETAILED: LEFT ANTERIOR MEDIAL PROXIMAL UPPER ARM
LOCATION DETAILED: MID-FRONTAL SCALP
LOCATION DETAILED: RIGHT DISTAL POSTERIOR UPPER ARM
LOCATION DETAILED: LEFT MID-UPPER BACK
LOCATION DETAILED: LEFT ANTERIOR LATERAL DISTAL THIGH
LOCATION DETAILED: LEFT SUPERIOR MEDIAL UPPER BACK
LOCATION DETAILED: LEFT ANTERIOR LATERAL PROXIMAL THIGH

## 2024-07-11 ASSESSMENT — LOCATION ZONE DERM
LOCATION ZONE: TRUNK
LOCATION ZONE: ARM
LOCATION ZONE: LEG
LOCATION ZONE: EAR
LOCATION ZONE: SCALP

## 2024-07-11 NOTE — PROCEDURE: PARING HYPERKERATOTIC LESION
Medical Necessity Information: LCD Guidelines vary from payer to payer. Please check with your payer's policy to determine medical necessity. Many payers require at least 1 Class A indication, 2 Class B indications or 1 Class B and 2 Class C to qualify for insurance payment.
Paring Method: curette
Medical Necessity Clause: This procedure was medically necessary because lesion was hyperkeratotic

## 2024-07-11 NOTE — PROCEDURE: LIQUID NITROGEN
Consent: The patient's consent was obtained including but not limited to risks of crusting, scabbing, blistering, scarring, darker or lighter pigmentary change, recurrence, incomplete removal and infection.
Detail Level: Detailed
Render Post-Care Instructions In Note?: no
Show Aperture Variable?: Yes
Duration Of Freeze Thaw-Cycle (Seconds): 10
Number Of Freeze-Thaw Cycles: 1 freeze-thaw cycle
Post-Care Instructions: I reviewed with the patient in detail post-care instructions. Patient is to wear sunprotection, and avoid picking at any of the treated lesions. Pt may apply Vaseline to crusted or scabbing areas.
Number Of Freeze-Thaw Cycles: 2 freeze-thaw cycles
Spray Paint Text: The liquid nitrogen was applied to the skin utilizing a spray paint frosting technique.
Medical Necessity Clause: This procedure was medically necessary because the lesions that were treated were:
Medical Necessity Information: It is in your best interest to select a reason for this procedure from the list below. All of these items fulfill various CMS LCD requirements except the new and changing color options.

## 2024-07-12 LAB
APTT SCREEN TO CONFIRM RATIO: 1.14 {RATIO}
CONFIRM DRVVT STA-STACLOT: ABNORMAL S
DRVVT SCREEN TO CONFIRM RATIO: 1.51 {RATIO}
DRVVT SCREEN TO CONFIRM RATIO: ABNORMAL {RATIO}
DRVVT/DRVVT CFM P DOAC NEUT NORM PPP-RTO: 0.98 {RATIO}
HEPARIN NT PPP QL: ABNORMAL
LA 3 SCREEN W REFLEX-IMP: ABNORMAL
LMW HEPARIN IND PLT AB SER QL: PRESENT
MIXING DRVVT: ABNORMAL S
PROTHROMBIN TIME: 14.1 S (ref 12–15.5)
SCREEN APTT: ABNORMAL S
THROMBIN TIME: ABNORMAL S

## 2024-07-19 ENCOUNTER — HOSPITAL ENCOUNTER (OUTPATIENT)
Dept: LAB | Facility: MEDICAL CENTER | Age: 74
End: 2024-07-19
Attending: PSYCHIATRY & NEUROLOGY
Payer: MEDICARE

## 2024-07-19 DIAGNOSIS — G40.909 SEIZURE DISORDER (HCC): ICD-10-CM

## 2024-07-19 PROCEDURE — 80177 DRUG SCRN QUAN LEVETIRACETAM: CPT

## 2024-07-19 PROCEDURE — 36415 COLL VENOUS BLD VENIPUNCTURE: CPT

## 2024-07-23 ENCOUNTER — DOCUMENTATION (OUTPATIENT)
Dept: VASCULAR LAB | Facility: MEDICAL CENTER | Age: 74
End: 2024-07-23
Payer: MEDICARE

## 2024-07-23 ENCOUNTER — DOCUMENTATION (OUTPATIENT)
Dept: NEUROLOGY | Facility: MEDICAL CENTER | Age: 74
End: 2024-07-23
Payer: MEDICARE

## 2024-07-23 DIAGNOSIS — G40.109 LOCALIZATION-RELATED EPILEPSY (HCC): ICD-10-CM

## 2024-07-24 ENCOUNTER — HOSPITAL ENCOUNTER (OUTPATIENT)
Dept: LAB | Facility: MEDICAL CENTER | Age: 74
End: 2024-07-24
Attending: PSYCHIATRY & NEUROLOGY
Payer: MEDICARE

## 2024-07-24 DIAGNOSIS — G40.109 LOCALIZATION-RELATED EPILEPSY (HCC): ICD-10-CM

## 2024-07-24 LAB — LEVETIRACETAM SERPL-MCNC: 17 UG/ML (ref 10–40)

## 2024-07-24 PROCEDURE — 80177 DRUG SCRN QUAN LEVETIRACETAM: CPT

## 2024-07-24 PROCEDURE — 36415 COLL VENOUS BLD VENIPUNCTURE: CPT

## 2024-07-27 LAB — LEVETIRACETAM SERPL-MCNC: 45 UG/ML (ref 10–40)

## 2024-08-06 ENCOUNTER — APPOINTMENT (OUTPATIENT)
Dept: MEDICAL GROUP | Facility: PHYSICIAN GROUP | Age: 74
End: 2024-08-06
Payer: MEDICARE

## 2024-08-06 VITALS
HEART RATE: 82 BPM | TEMPERATURE: 97.1 F | HEIGHT: 66 IN | OXYGEN SATURATION: 95 % | SYSTOLIC BLOOD PRESSURE: 110 MMHG | DIASTOLIC BLOOD PRESSURE: 60 MMHG | BODY MASS INDEX: 24.89 KG/M2 | RESPIRATION RATE: 12 BRPM | WEIGHT: 154.9 LBS

## 2024-08-06 DIAGNOSIS — G31.84 MILD COGNITIVE IMPAIRMENT: ICD-10-CM

## 2024-08-06 DIAGNOSIS — G40.909 SEIZURE DISORDER (HCC): ICD-10-CM

## 2024-08-06 DIAGNOSIS — G40.109 LOCALIZATION-RELATED EPILEPSY (HCC): ICD-10-CM

## 2024-08-06 DIAGNOSIS — G08 CEREBRAL VENOUS THROMBOSIS OF SIGMOID SINUS: ICD-10-CM

## 2024-08-06 DIAGNOSIS — I10 PRIMARY HYPERTENSION: ICD-10-CM

## 2024-08-06 PROCEDURE — 3074F SYST BP LT 130 MM HG: CPT | Performed by: INTERNAL MEDICINE

## 2024-08-06 PROCEDURE — 99214 OFFICE O/P EST MOD 30 MIN: CPT | Performed by: INTERNAL MEDICINE

## 2024-08-06 PROCEDURE — 3078F DIAST BP <80 MM HG: CPT | Performed by: INTERNAL MEDICINE

## 2024-08-06 RX ORDER — LEVETIRACETAM 750 MG/1
750 TABLET ORAL 2 TIMES DAILY
Qty: 200 TABLET | Refills: 3
Start: 2024-08-06

## 2024-08-06 ASSESSMENT — FIBROSIS 4 INDEX: FIB4 SCORE: 1.36

## 2024-08-06 NOTE — ASSESSMENT & PLAN NOTE
Chronic ongoing problem, saw Dr. Greer of hematology and plans to continue on Eliquis at least until Dec 2024 and possibly indefinite, still with chronic thrombus on repeat brain imaging in May 2024.

## 2024-08-06 NOTE — ASSESSMENT & PLAN NOTE
Chronic stable problem, blood pressure remains well controlled off medicine, edema improved since stopping amlodipine in June 2024, continue with observation at this time.

## 2024-08-06 NOTE — ASSESSMENT & PLAN NOTE
Chronic ongoing issue, keppra dose increased from 500 mg BID to 750 mg BID, repeat blood draw planned next week, continue follow up with Dr. Ortiz of neurology. Discussed ongoing fatigue and mood disturbance as possible side effects with Keppra.

## 2024-08-06 NOTE — ASSESSMENT & PLAN NOTE
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 Some fatigue and possible irritability with Keppra but tolerable. Twin brother screened high for ApoE and she would like to have this marker checked.

## 2024-08-06 NOTE — PROGRESS NOTES
Subjective:   Chief Complaint/History of Present Illness:  Michela Valdez is a 74 y.o. female established patient who presents today to discuss medical problems as listed below. Michela is unaccompanied for today's visit.      Problem   Seizure Disorder (Hcc)    Abnormal EEG > [facility] 1/2024> 3 electrographic seizures (no clinical correlates or awareness occurring aroud these times)     Mild Cognitive Impairment    MOCA score today of 30/30  Minor problems with word retrieval at this time     Cerebral Venous Thrombosis of Sigmoid Sinus    MR brain (10/2023):  Subacute to chronic thrombus in the right sigmoid sinus and proximal right internal jugular vein at the skull base.    New finding during evaluation for acute mental status change, recommended Eliquis 5 mg twice daily for 3 months and follow up CTV/MRV at that time. Ongoing chronic thrombus on recheck brain imaging in May 2024. Associated transient global amnesia which is improving.  Tested positive for seizure activity on EEG and now started on Keppra with dose increase in Summer 2024.    Saw Dr. Greer and has followed with Dr. Bloch of vascular medicine, agreed to continue on DOAC until at least 12/2024 and may stay on indefinitely.      Primary hypertension_off pharmacotherapy    New problem during hospitalization for TGA and venous sinus thrombosis in Oct 2023. Amlodipine added, she is taking without issue. EKG suggested LVH, echocardiogram showed no LVH.    Pressure running low in clinic today at 106/64, will do trial holding amlodipine as she is also experiencing right lower extremity edema and she will bring blood pressure readings for follow-up visit.  If she does go on pharmacotherapy would initiate losartan 25 mg daily in place of the amlodipine.          Current Medications:  Current Outpatient Medications Ordered in Epic   Medication Sig Dispense Refill    levETIRAcetam (KEPPRA) 750 MG tablet Take 1 Tablet by mouth 2 times a day. 200 Tablet 3     "atorvastatin (LIPITOR) 80 MG tablet TAKE 1 TABLET BY MOUTH EVERY DAY IN THE EVENING 90 Tablet 4    Glucos-Chond-Hyal Ac-Ca Fructo (MOVE FREE JOINT HEALTH ADVANCE) Tab Take 2 Tablets by mouth every day.      ezetimibe (ZETIA) 10 MG Tab Take 1 Tablet by mouth every day. 90 Tablet 3    apixaban (ELIQUIS) 5mg Tab Take 1 Tablet by mouth 2 times a day. 200 Tablet 3    levothyroxine (SYNTHROID) 88 MCG Tab TAKE 1 TABLET BY MOUTH EVERY DAY IN THE MORNING ON AN EMPTY STOMACH 100 Tablet 3    Cholecalciferol (VITAMIN D3) 1000 units Cap Take 2,000 Units by mouth. 1000 units x 2 capsules = 2000 units       No current Epic-ordered facility-administered medications on file.          Objective:   Physical Exam:    Vitals: /60 (BP Location: Right arm, Patient Position: Sitting, BP Cuff Size: Adult)   Pulse 82   Temp 36.2 °C (97.1 °F) (Temporal)   Resp 12   Ht 1.676 m (5' 6\")   Wt 70.3 kg (154 lb 14.4 oz)   SpO2 95%    BMI: Body mass index is 25 kg/m².  Physical Exam  Constitutional:       General: She is not in acute distress.     Appearance: Normal appearance. She is not ill-appearing.   HENT:      Right Ear: External ear normal.      Left Ear: External ear normal.   Eyes:      General: No scleral icterus.     Conjunctiva/sclera: Conjunctivae normal.   Cardiovascular:      Rate and Rhythm: Normal rate and regular rhythm.      Pulses: Normal pulses.   Pulmonary:      Effort: Pulmonary effort is normal. No respiratory distress.      Breath sounds: No wheezing or rhonchi.   Abdominal:      General: Bowel sounds are normal.      Palpations: Abdomen is soft.   Musculoskeletal:      Right lower leg: No edema.      Left lower leg: No edema.      Comments: Trace nonpitting lymphedema, left > right   Skin:     General: Skin is warm and dry.      Findings: No rash.      Comments: Spider veins on lower legs   Neurological:      Gait: Gait normal.   Psychiatric:         Mood and Affect: Mood normal.         Behavior: Behavior " normal.         Thought Content: Thought content normal.         Judgment: Judgment normal.           Results  Imaging  Chronic partial thrombus, no significant interval change in the previous CT angiogram from December and MRI from October, but no acute thrombosis.          Assessment and Plan:   Michela is a 74 y.o. female with the following:  Problem List Items Addressed This Visit       Cerebral venous thrombosis of sigmoid sinus     Chronic ongoing problem, saw Dr. Greer of hematology and plans to continue on Eliquis at least until Dec 2024 and possibly indefinite, still with chronic thrombus on repeat brain imaging in May 2024.         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 Some fatigue and possible irritability with Keppra but tolerable. Twin brother screened high for ApoE and she would like to have this marker checked.         Relevant Orders    APOLIPOPROTEIN E ALZHEIMER DISEASE RISK GENOTYPE    Primary hypertension_off pharmacotherapy     Chronic stable problem, blood pressure remains well controlled off medicine, edema improved since stopping amlodipine in June 2024, continue with observation at this time.         Seizure disorder (HCC)     Chronic ongoing issue, keppra dose increased from 500 mg BID to 750 mg BID, repeat blood draw planned next week, continue follow up with Dr. Ortiz of neurology. Discussed ongoing fatigue and mood disturbance as possible side effects with Keppra.         Relevant Medications    levETIRAcetam (KEPPRA) 750 MG tablet     Other Visit Diagnoses       Localization-related epilepsy (HCC)        Relevant Medications    levETIRAcetam (KEPPRA) 750 MG tablet               RTC: Return in about 4 months (around 12/6/2024).    I spent a total of 30 minutes with record review, exam, communication with the patient, communication with other providers, and documentation of this  encounter.    Verbal consent was acquired by the patient to use [employer] listening note generation during this visit Yes       PLEASE NOTE: This dictation was created using voice recognition software. I have made every reasonable attempt to correct obvious errors, but I expect that there are errors of grammar and possibly content that I did not discover before finalizing the note.      Archana Robertson, DO  Geriatric and Internal Medicine  [facility]

## 2024-08-08 ENCOUNTER — TELEPHONE (OUTPATIENT)
Dept: VASCULAR LAB | Facility: MEDICAL CENTER | Age: 74
End: 2024-08-08
Payer: MEDICARE

## 2024-08-08 NOTE — TELEPHONE ENCOUNTER
Called and s/w patient per Dr Bloch's request to f/u with patient.     07/22 - 1hr -1.5  at 3PM- Patient had some confusion over text from her daughter.  But episode was quick. Busy day. But recovered afterwards. Patient did not skip any medications that day.   Dr Ortiz on 07/22 did up the Kepra 750MG to 1.5 to twice a day.     And hasn't had any episodes since.    Patient saw Dr Robertson yesterday (08/07). F/u with Dr Ortiz next week.    Shalonda Rodriguez, Med Ass't  [facility]  Ph. [phone]  Fx. [phone]

## 2024-08-13 ENCOUNTER — HOSPITAL ENCOUNTER (OUTPATIENT)
Dept: LAB | Facility: MEDICAL CENTER | Age: 74
End: 2024-08-13
Attending: PSYCHIATRY & NEUROLOGY
Payer: MEDICARE

## 2024-08-13 ENCOUNTER — HOSPITAL ENCOUNTER (OUTPATIENT)
Dept: LAB | Facility: MEDICAL CENTER | Age: 74
End: 2024-08-13
Attending: INTERNAL MEDICINE
Payer: MEDICARE

## 2024-08-13 DIAGNOSIS — G40.909 SEIZURE DISORDER (HCC): ICD-10-CM

## 2024-08-13 DIAGNOSIS — G31.84 MILD COGNITIVE IMPAIRMENT: ICD-10-CM

## 2024-08-13 PROCEDURE — 81401 MOPATH PROCEDURE LEVEL 2: CPT

## 2024-08-13 PROCEDURE — 80177 DRUG SCRN QUAN LEVETIRACETAM: CPT

## 2024-08-13 PROCEDURE — 36415 COLL VENOUS BLD VENIPUNCTURE: CPT

## 2024-08-15 LAB — LEVETIRACETAM SERPL-MCNC: 27 UG/ML (ref 10–40)

## 2024-08-19 LAB
APOE ALLELE E2+E3+E4 BLD/T: ABNORMAL
SPECIMEN SOURCE: ABNORMAL

## 2024-09-05 DIAGNOSIS — E89.0 POSTOPERATIVE HYPOTHYROIDISM: ICD-10-CM

## 2024-09-05 RX ORDER — LEVOTHYROXINE SODIUM 88 UG/1
88 TABLET ORAL
Qty: 100 TABLET | Refills: 3 | Status: SHIPPED | OUTPATIENT
Start: 2024-09-05 | End: 2024-09-23 | Stop reason: SDUPTHER

## 2024-09-05 NOTE — TELEPHONE ENCOUNTER
Received request via: Pharmacy    Was the patient seen in the last year in this department? Yes    Does the patient have an active prescription (recently filled or refills available) for medication(s) requested? No    Pharmacy Name: [facility]    Does the patient have group home Plus and need 100-day supply? (This applies to ALL medications) Yes, quantity updated to 100 days

## 2024-09-09 DIAGNOSIS — G40.109 LOCALIZATION-RELATED EPILEPSY (HCC): ICD-10-CM

## 2024-09-09 RX ORDER — LEVETIRACETAM 750 MG/1
750 TABLET ORAL 2 TIMES DAILY
Qty: 200 TABLET | Refills: 3 | Status: SHIPPED | OUTPATIENT
Start: 2024-09-09

## 2024-09-18 ENCOUNTER — HOSPITAL ENCOUNTER (OUTPATIENT)
Dept: LAB | Facility: MEDICAL CENTER | Age: 74
End: 2024-09-18
Attending: PSYCHIATRY & NEUROLOGY
Payer: MEDICARE

## 2024-09-18 DIAGNOSIS — G40.109 LOCALIZATION-RELATED EPILEPSY (HCC): ICD-10-CM

## 2024-09-18 PROCEDURE — 36415 COLL VENOUS BLD VENIPUNCTURE: CPT

## 2024-09-18 PROCEDURE — 80177 DRUG SCRN QUAN LEVETIRACETAM: CPT

## 2024-09-19 ENCOUNTER — TELEPHONE (OUTPATIENT)
Dept: MEDICAL GROUP | Facility: PHYSICIAN GROUP | Age: 74
End: 2024-09-19
Payer: MEDICARE

## 2024-09-19 ENCOUNTER — PATIENT MESSAGE (OUTPATIENT)
Dept: MEDICAL GROUP | Facility: PHYSICIAN GROUP | Age: 74
End: 2024-09-19
Payer: MEDICARE

## 2024-09-19 DIAGNOSIS — E89.0 POSTOPERATIVE HYPOTHYROIDISM: ICD-10-CM

## 2024-09-19 NOTE — TELEPHONE ENCOUNTER
1. Caller Name: Michela Valdez                          Call Back Number: [phone] (home)         How would the patient prefer to be contacted with a response: Phone call OK to leave a detailed message    [facility]  Now #100 Eliquis is $338.00  Is there anything else she can do?

## 2024-09-19 NOTE — TELEPHONE ENCOUNTER
Nope, other than going month to month. If you want to call [facility] to see if they have samples you can but at this time of the year probably not since everyone is hitting the donut hole

## 2024-09-20 LAB — LEVETIRACETAM SERPL-MCNC: 26 UG/ML (ref 10–40)

## 2024-09-23 RX ORDER — LEVOTHYROXINE SODIUM 88 UG/1
88 TABLET ORAL SEE ADMIN INSTRUCTIONS
Qty: 117 TABLET | Refills: 3 | Status: SHIPPED | OUTPATIENT
Start: 2024-09-23

## 2024-09-23 NOTE — TELEPHONE ENCOUNTER
[facility] for levothyroxine as she requested and you can figure out with her to get the authorization requested from Dr. Garcia with [facility] or make sure it is not scanned in media.

## 2024-09-25 DIAGNOSIS — E78.5 DYSLIPIDEMIA: ICD-10-CM

## 2024-09-25 RX ORDER — ATORVASTATIN CALCIUM 80 MG/1
80 TABLET, FILM COATED ORAL EVERY EVENING
Qty: 100 TABLET | Refills: 1 | Status: SHIPPED | OUTPATIENT
Start: 2024-09-25

## 2024-09-25 NOTE — TELEPHONE ENCOUNTER
Pt has had OV within the 12 month protocol and lipid panel is current. 6 month supply sent to pharmacy.   Lab Results   Component Value Date/Time    CHOLSTRLTOT 126 07/03/2024 10:24 AM    LDL 64 07/03/2024 10:24 AM    HDL 43 07/03/2024 10:24 AM    TRIGLYCERIDE 97 07/03/2024 10:24 AM       Lab Results   Component Value Date/Time    SODIUM 139 07/03/2024 10:24 AM    POTASSIUM 4.4 07/03/2024 10:24 AM    CHLORIDE 105 07/03/2024 10:24 AM    CO2 22 07/03/2024 10:24 AM    GLUCOSE 114 (H) 07/03/2024 10:24 AM    BUN 19 07/03/2024 10:24 AM    CREATININE 0.53 07/03/2024 10:24 AM     Lab Results   Component Value Date/Time    ALKPHOSPHAT 121 (H) 07/03/2024 10:24 AM    ASTSGOT 25 07/03/2024 10:24 AM    ALTSGPT 24 07/03/2024 10:24 AM    TBILIRUBIN 0.4 07/03/2024 10:24 AM        Naomi Diop, Clinical Pharmacist, CDE, CACP  Managed Care Pharmacist for [facility] and [facility]

## 2024-10-01 ENCOUNTER — APPOINTMENT (OUTPATIENT)
Dept: MEDICAL GROUP | Facility: PHYSICIAN GROUP | Age: 74
End: 2024-10-01
Payer: MEDICARE

## 2024-10-01 DIAGNOSIS — Z23 NEED FOR VACCINATION: ICD-10-CM

## 2024-10-01 PROCEDURE — G0008 ADMIN INFLUENZA VIRUS VAC: HCPCS | Performed by: FAMILY MEDICINE

## 2024-10-01 PROCEDURE — 90656 IIV3 VACC NO PRSV 0.5 ML IM: CPT | Performed by: FAMILY MEDICINE

## 2024-10-02 ENCOUNTER — APPOINTMENT (RX ONLY)
Dept: URBAN - METROPOLITAN AREA CLINIC 35 | Facility: CLINIC | Age: 74
Setting detail: DERMATOLOGY
End: 2024-10-02

## 2024-10-02 DIAGNOSIS — L57.0 ACTINIC KERATOSIS: ICD-10-CM

## 2024-10-02 PROCEDURE — ? LIQUID NITROGEN

## 2024-10-02 PROCEDURE — 17003 DESTRUCT PREMALG LES 2-14: CPT

## 2024-10-02 PROCEDURE — 17000 DESTRUCT PREMALG LESION: CPT

## 2024-10-02 ASSESSMENT — LOCATION DETAILED DESCRIPTION DERM
LOCATION DETAILED: LEFT SUPERIOR PARIETAL SCALP
LOCATION DETAILED: MID-FRONTAL SCALP
LOCATION DETAILED: POSTERIOR MID-PARIETAL SCALP

## 2024-10-02 ASSESSMENT — LOCATION SIMPLE DESCRIPTION DERM
LOCATION SIMPLE: POSTERIOR SCALP
LOCATION SIMPLE: SCALP
LOCATION SIMPLE: ANTERIOR SCALP

## 2024-10-02 ASSESSMENT — LOCATION ZONE DERM: LOCATION ZONE: SCALP

## 2024-10-02 NOTE — PROCEDURE: LIQUID NITROGEN
Render Note In Bullet Format When Appropriate: No
Number Of Freeze-Thaw Cycles: 1 freeze-thaw cycle
Duration Of Freeze Thaw-Cycle (Seconds): 10
Post-Care Instructions: I reviewed with the patient in detail post-care instructions. Patient is to wear sunprotection, and avoid picking at any of the treated lesions. Pt may apply Vaseline to crusted or scabbing areas.
Show Applicator Variable?: Yes
Consent: The patient's consent was obtained including but not limited to risks of crusting, scabbing, blistering, scarring, darker or lighter pigmentary change, recurrence, incomplete removal and infection.
Detail Level: Detailed

## 2024-10-03 ENCOUNTER — TELEPHONE (OUTPATIENT)
Dept: MEDICAL GROUP | Facility: PHYSICIAN GROUP | Age: 74
End: 2024-10-03
Payer: MEDICARE

## 2024-11-06 ENCOUNTER — TELEPHONE (OUTPATIENT)
Dept: MEDICAL GROUP | Facility: PHYSICIAN GROUP | Age: 74
End: 2024-11-06
Payer: MEDICARE

## 2024-11-06 NOTE — TELEPHONE ENCOUNTER
Called patient. Left Vm regarding surgical clearance form and when she plans to schedule surgery.     She does have an appt with Dr Bloch 11.21.24

## 2024-11-08 ENCOUNTER — TELEPHONE (OUTPATIENT)
Dept: MEDICAL GROUP | Facility: PHYSICIAN GROUP | Age: 74
End: 2024-11-08
Payer: MEDICARE

## 2024-11-08 NOTE — TELEPHONE ENCOUNTER
Patient returned call in regards to her Shoulder Surgery. She states that at this time she is Not going forward with the surgery. She spoke with the surgeon and he said it was fine to delay the procedure.

## 2024-11-12 ENCOUNTER — HOSPITAL ENCOUNTER (OUTPATIENT)
Dept: LAB | Facility: MEDICAL CENTER | Age: 74
End: 2024-11-12
Attending: PSYCHIATRY & NEUROLOGY
Payer: MEDICARE

## 2024-11-12 DIAGNOSIS — G40.109 LOCALIZATION-RELATED EPILEPSY (HCC): ICD-10-CM

## 2024-11-12 PROCEDURE — 36415 COLL VENOUS BLD VENIPUNCTURE: CPT

## 2024-11-12 PROCEDURE — 80177 DRUG SCRN QUAN LEVETIRACETAM: CPT

## 2024-11-15 LAB — LEVETIRACETAM SERPL-MCNC: 25 UG/ML (ref 10–40)

## 2024-11-20 ENCOUNTER — APPOINTMENT (RX ONLY)
Dept: URBAN - METROPOLITAN AREA CLINIC 35 | Facility: CLINIC | Age: 74
Setting detail: DERMATOLOGY
End: 2024-11-20

## 2024-11-20 DIAGNOSIS — L82.1 OTHER SEBORRHEIC KERATOSIS: ICD-10-CM

## 2024-11-20 DIAGNOSIS — Z71.89 OTHER SPECIFIED COUNSELING: ICD-10-CM

## 2024-11-20 DIAGNOSIS — D22 MELANOCYTIC NEVI: ICD-10-CM

## 2024-11-20 DIAGNOSIS — L81.1 CHLOASMA: ICD-10-CM

## 2024-11-20 DIAGNOSIS — L81.4 OTHER MELANIN HYPERPIGMENTATION: ICD-10-CM

## 2024-11-20 DIAGNOSIS — L82.0 INFLAMED SEBORRHEIC KERATOSIS: ICD-10-CM

## 2024-11-20 PROBLEM — D22.5 MELANOCYTIC NEVI OF TRUNK: Status: ACTIVE | Noted: 2024-11-20

## 2024-11-20 PROCEDURE — ? LIQUID NITROGEN

## 2024-11-20 PROCEDURE — ? COUNSELING

## 2024-11-20 PROCEDURE — 17110 DESTRUCTION B9 LES UP TO 14: CPT

## 2024-11-20 PROCEDURE — 99213 OFFICE O/P EST LOW 20 MIN: CPT | Mod: 25

## 2024-11-20 ASSESSMENT — LOCATION DETAILED DESCRIPTION DERM
LOCATION DETAILED: LEFT MEDIAL SUPERIOR CHEST
LOCATION DETAILED: RIGHT LATERAL SUPERIOR CHEST
LOCATION DETAILED: RIGHT MID-UPPER BACK
LOCATION DETAILED: EPIGASTRIC SKIN
LOCATION DETAILED: RIGHT SUPERIOR UPPER BACK
LOCATION DETAILED: LEFT UPPER CUTANEOUS LIP

## 2024-11-20 ASSESSMENT — LOCATION SIMPLE DESCRIPTION DERM
LOCATION SIMPLE: ABDOMEN
LOCATION SIMPLE: LEFT LIP
LOCATION SIMPLE: CHEST
LOCATION SIMPLE: RIGHT UPPER BACK

## 2024-11-20 ASSESSMENT — LOCATION ZONE DERM
LOCATION ZONE: TRUNK
LOCATION ZONE: LIP

## 2024-11-20 NOTE — PROCEDURE: LIQUID NITROGEN
Spray Paint Technique: No
Spray Paint Text: The liquid nitrogen was applied to the skin utilizing a spray paint frosting technique.
Medical Necessity Clause: This procedure was medically necessary because the lesions that were treated were:
Number Of Freeze-Thaw Cycles: 2 freeze-thaw cycles
Show Applicator Variable?: Yes
Post-Care Instructions: I reviewed with the patient in detail post-care instructions. Patient is to wear sunprotection, and avoid picking at any of the treated lesions. Pt may apply Vaseline to crusted or scabbing areas.
Duration Of Freeze Thaw-Cycle (Seconds): 10
Detail Level: Detailed
Consent: The patient's consent was obtained including but not limited to risks of crusting, scabbing, blistering, scarring, darker or lighter pigmentary change, recurrence, incomplete removal and infection.
Medical Necessity Information: It is in your best interest to select a reason for this procedure from the list below. All of these items fulfill various CMS LCD requirements except the new and changing color options.

## 2024-11-21 ENCOUNTER — OFFICE VISIT (OUTPATIENT)
Dept: CARDIOLOGY | Facility: MEDICAL CENTER | Age: 74
End: 2024-11-21
Attending: INTERNAL MEDICINE
Payer: MEDICARE

## 2024-11-21 VITALS
HEART RATE: 70 BPM | HEIGHT: 66 IN | WEIGHT: 157 LBS | DIASTOLIC BLOOD PRESSURE: 82 MMHG | BODY MASS INDEX: 25.23 KG/M2 | SYSTOLIC BLOOD PRESSURE: 138 MMHG

## 2024-11-21 DIAGNOSIS — G08 CEREBRAL VENOUS THROMBOSIS OF SIGMOID SINUS: ICD-10-CM

## 2024-11-21 DIAGNOSIS — I10 PRIMARY HYPERTENSION: ICD-10-CM

## 2024-11-21 DIAGNOSIS — E89.0 POSTOPERATIVE HYPOTHYROIDISM: ICD-10-CM

## 2024-11-21 DIAGNOSIS — G40.909 SEIZURE DISORDER (HCC): ICD-10-CM

## 2024-11-21 DIAGNOSIS — E78.5 DYSLIPIDEMIA: ICD-10-CM

## 2024-11-21 PROCEDURE — 3079F DIAST BP 80-89 MM HG: CPT | Performed by: INTERNAL MEDICINE

## 2024-11-21 PROCEDURE — G2211 COMPLEX E/M VISIT ADD ON: HCPCS | Performed by: INTERNAL MEDICINE

## 2024-11-21 PROCEDURE — 3075F SYST BP GE 130 - 139MM HG: CPT | Performed by: INTERNAL MEDICINE

## 2024-11-21 PROCEDURE — 99212 OFFICE O/P EST SF 10 MIN: CPT

## 2024-11-21 PROCEDURE — 99214 OFFICE O/P EST MOD 30 MIN: CPT | Performed by: INTERNAL MEDICINE

## 2024-11-21 ASSESSMENT — FIBROSIS 4 INDEX: FIB4 SCORE: 1.36

## 2024-11-21 NOTE — PROGRESS NOTES
"VASCULAR MEDICINE CLINIC - Follow up VISIT  24    Michela Valdez is a 74 y.o. female who presents today  for vascular evaluation.     Subjective      HPI:  Here for f/u of cerebral vein thrombosis  Patient did have another episode of confusion in July  She had her Keppra dose increased at that time  No further episodes since then  No obvious seizure recurrence  No focal neurologic complaints  Does not have a follow-up appointment with neurology  Tolerating keppra  Feels great  Still on eliquis  No bleeding  No headache  Has appointment to see hematology in short order  No myalgias on atorvastatin  Stopped amlodipine sometime ago at the discretion of her primary  Not checking bp at home  No leg swelling  Same thyroid dose - better energy    Family History   Problem Relation Age of Onset    Osteoporosis Mother     Hyperlipidemia Father     Heart Disease Father         CAD, MI     Anemia Sister     Cancer Brother         prostate    Heart Disease Brother         CABG    Dad - fatal coronary disease in 50s    Social History     Tobacco Use    Smoking status: Former     Current packs/day: 0.00     Average packs/day: 0.5 packs/day for 4.0 years (2.0 ttl pk-yrs)     Types: Cigarettes     Start date:      Quit date:      Years since quittin.9    Smokeless tobacco: Never   Vaping Use    Vaping status: Never Used   Substance Use Topics    Alcohol use: Yes     Alcohol/week: 3.0 oz     Types: 5 Glasses of wine per week     Comment: glass of wine daily     Drug use: No      DIET AND EXERCISE:  Weight Change:stable  Diet: relatively HH  Exercise: mod regular        Objective     Objective:     Vitals:    24 1012 24 1016   BP: (!) 153/80 138/82   BP Location: Left arm Left arm   Patient Position: Sitting Sitting   BP Cuff Size: Adult Adult   Pulse: 71 70   Weight: 71.2 kg (157 lb)    Height: 1.676 m (5' 6\")       Physical Exam  Vitals reviewed.   Constitutional:       General: She is not in acute " distress.     Appearance: She is not diaphoretic.   HENT:      Head: Normocephalic and atraumatic.   Eyes:      General: No scleral icterus.     Conjunctiva/sclera: Conjunctivae normal.   Neck:      Vascular: No carotid bruit.   Cardiovascular:      Rate and Rhythm: Normal rate and regular rhythm.      Heart sounds: Normal heart sounds. No murmur heard.  Pulmonary:      Effort: Pulmonary effort is normal. No respiratory distress.      Breath sounds: Normal breath sounds. No wheezing or rales.   Musculoskeletal:      Right lower leg: No edema.      Left lower leg: No edema.   Skin:     Coloration: Skin is not pale.   Neurological:      General: No focal deficit present.      Mental Status: She is alert and oriented to person, place, and time.      Cranial Nerves: No cranial nerve deficit.      Coordination: Coordination normal.      Gait: Gait is intact. Gait normal.   Psychiatric:         Mood and Affect: Mood and affect normal.         Behavior: Behavior normal.          DATA REVIEW    Lab Results   Component Value Date/Time    CHOLSTRLTOT 126 07/03/2024 10:24 AM    LDL 64 07/03/2024 10:24 AM    HDL 43 07/03/2024 10:24 AM    TRIGLYCERIDE 97 07/03/2024 10:24 AM      Lab Results   Component Value Date/Time    LIPOPROTA 107 (H) 07/03/2024 10:24 AM      Lab Results   Component Value Date/Time    APOB 74 07/03/2024 10:24 AM      Lab Results   Component Value Date/Time    CRPHIGHSEN 0.7 08/02/2023 10:51 AM          Lab Results   Component Value Date/Time    SODIUM 139 07/03/2024 10:24 AM    POTASSIUM 4.4 07/03/2024 10:24 AM    CHLORIDE 105 07/03/2024 10:24 AM    CO2 22 07/03/2024 10:24 AM    GLUCOSE 114 (H) 07/03/2024 10:24 AM    BUN 19 07/03/2024 10:24 AM    CREATININE 0.53 07/03/2024 10:24 AM     Lab Results   Component Value Date/Time    ALKPHOSPHAT 121 (H) 07/03/2024 10:24 AM    ASTSGOT 25 07/03/2024 10:24 AM    ALTSGPT 24 07/03/2024 10:24 AM    TBILIRUBIN 0.4 07/03/2024 10:24 AM       Lab Results   Component Value  Date/Time    HBA1C 6.0 (H) 07/03/2024 10:24 AM       Lab Results   Component Value Date/Time    MALBCRT see below 01/08/2024 12:29 PM    MICROALBUR <1.2 01/08/2024 12:29 PM       Beta 2 glycoprotein IgG and IgM - normal  ACLA - IgG normal; IgM mildly elevated  FVL - neg  PTGM - neg      CAC score jan 2023  LMA - 64.8  LCX - 0.0  LAD - 16.5  RCA - 258.7  PDA - 0.0  Total Calcium Score: 340.0    CTA head and neck oct 2023  1.  Negative for thrombosis or occlusion  2.  Mild bilateral internal carotid artery stenoses  3.  Anatomic variant as the right subclavian artery originates distal to the left subclavian artery and passes posterior to the trachea before exiting the right hemithorax  4.  Anatomic variant termination of the right vertebral artery and posterior inferior cerebellar artery    Echo oct 2023  No prior study is available for comparison.   Normal left ventricular systolic function.  The left ventricular ejection fraction is visually estimated to be 60%.  No significant valvular abnormalities.   A definite cardiac source for a systemic thromboembolic event is not   identified, failure to do so does not exclude its presence. If   clinically indicated, a transesophageal study would be useful.     MRI brain oct 2023  No acute process.  Age-related volume loss.  Subacute to chronic thrombus in the right sigmoid sinus and proximal right internal jugular vein at the skull base.    CTA head and neck dec 2023  Ctangiogram of the [place] demonstrating no large vessel occlusion.   Mild bilateral atherosclerosis with less than 50% ICA stenosis.     MRV jan 2024  Stable appearance of filling defects in the right transverse and sigmoid junction and the right sigmoid sinus extending into the right internal jugular vein at the skull base.  Defect within the left internal jugular vein at the skull base noted, which may represent flow artifact or new thrombus in this location. Consider follow-up with  contrast-enhanced CTA/CT venogram of the head and neck.    MRV head may 2024  1.  There is linear filling defect in the right sigmoid sinus and visualized jugular bulb likely representing chronic partial thrombus. There has been no significant interval change in the previous CT angiogram dated 12/24/2023 and previous MRI dated   10/23/2023.  2.  There is no evidence of acute thrombosis.              Medical Decision Making:  Today's Assessment / Status / Plan:     1. Dyslipidemia        2. Primary hypertension        3. Cerebral venous thrombosis of sigmoid sinus        4. Seizure disorder (HCC)        5. Postoperative hypothyroidism             Etiology of Established CVD if Present:     1) acute confusional state -recurrent - newly diagnosed sz disorder -  no stroke seen on imaging. No residual. No source of embolism or afib.    Plan:  -911 with any tia or cva symptoms  -continue keppra per neuro  -medical management per below  -follow up with neuro for further w/u and management -encouraged to make a follow-up appointment    2) cerebral vein thrombosis - unclear chronicity and unclear relation to TGA/TIA.   Possibly related to previous covid.   FVL and PTGM normal.    Stable or improved on follow-up imaging  Normal anti-beta 2 ab, but mildly elevated IgM acla  Lipoprotein a may be prothrombotic as well  Plan:  - medical management as below  -Continue anticoagulation pending follow-up with hematology  - Will defer any indicated age appropriate screening for occult malignancy to pcp.    Lipid Management: Qualifies for Statin Therapy Based on 2018 ACC/AHA Guidelines: yes  Calculated 10-Year Risk of ASCVD: N/A  Currently on Statin: Yes  Strong FH of premature ASCVD  Subclinical athero as evidenced by mild carotid disease and elevated CAC score  Very elevated lp(a) an independent risk factor for disease  Given above would aim for LDL <70 and nonHDL <100  Reasonable control on most recent blood work  Plan  -  recommended family lipid and lp(a) screening  - continue atorva 80  -Continue ezetimibe 10     Blood Pressure Management:  Acc[place] (2017) Blood Pressure Goal <130/80  BP high at presentation, but no previous h/o hypertension   Unclear chronicity  Previously with reasonable control both at home and in the office on amlodipine which was discontinued by PCP  Elevated in the office  Unclear level of control at home  GFR and lytes stable  No albuminuria  No LVH  Plan:  -Restart home bp monitoring with good technique  -Consider reinstitution of amlodipine if elevated    Glycemic Status: Prediabetic  A1c 5.7 mild and stable  - continue lifestyle mod  - recheck fasting glucose and A1c in future    Anti-Platelet/Anti-Coagulant Tx: Yes  - continue eliquis 5 mg twice daily pending hematology follow-up  -Could consider dose reduction to 2.5 mg twice daily in the future  -Repeat blood work per heme  -Restart antiplatelet therapy if and when when off DOAC  -Report any bleeding  -Reasonable to stop for 2 days for procedures    Smoking: continue complete avoidance of all tobacco products    Physical Activity: continue mod intensity exercise    Weight Management and Nutrition: recommended med style diet    Other:     - Hypothroidism - appears under good control based on symptoms and recent blood work. Continue current levo.      Instructed to follow-up with PCP for remainder of adult medical needs: yes  We will partner with other providers in the management of established vascular disease and cardiometabolic risk factors.    Studies to Be Obtained: none  Labs to Be Obtained: Per other specialists    Follow up in: 6 weeks    Michael J Bloch, M.D.     Cc: Dr. Greer

## 2024-11-22 ENCOUNTER — TELEPHONE (OUTPATIENT)
Dept: VASCULAR LAB | Facility: MEDICAL CENTER | Age: 74
End: 2024-11-22
Payer: MEDICARE

## 2024-11-22 DIAGNOSIS — I10 PRIMARY HYPERTENSION: ICD-10-CM

## 2024-11-22 RX ORDER — AMLODIPINE BESYLATE 5 MG/1
5 TABLET ORAL DAILY
Status: SHIPPED
Start: 2024-11-22 | End: 2024-11-22

## 2024-11-22 RX ORDER — AMLODIPINE BESYLATE 5 MG/1
5 TABLET ORAL DAILY
Qty: 90 TABLET | Refills: 3 | Status: SHIPPED | OUTPATIENT
Start: 2024-11-22

## 2024-11-22 NOTE — TELEPHONE ENCOUNTER
Pt with reports home bps >150/80s.  Recommend pt restart amlodipine 5mg daily.    Continue to monitor bps and bring in log to next appt with Dr. Bloch in Jan.  Marilou GÓMEZ  [facility]

## 2024-11-22 NOTE — TELEPHONE ENCOUNTER
Caller: Michela Valdez    Topic/issue: PT was told to report her BP every other day to us at her appt yesterday.     BP: 152/87  BP: 167/78  BP: 154/82    PT has Amlodipine 5mg tabs, she would like to know if she should begin taking this.     Callback Number: [phone]    Thank you,   Cha BECERRA

## 2024-11-22 NOTE — TELEPHONE ENCOUNTER
Phone Number Called: [phone]    Call outcome: Spoke to patient regarding message below.    Message: notified patient to keep track of bp's and bring in log for Tom appointment. Patient understood. Also advised new script sent in for Amlodipine 5mg. Patient says they also still have some left over they can take until new script is filled.    Barbie POLANCO , Medical Ass't  [facility]   Phone: [phone]   Fax: [phone]

## 2024-12-04 ENCOUNTER — APPOINTMENT (OUTPATIENT)
Dept: MEDICAL GROUP | Facility: PHYSICIAN GROUP | Age: 74
End: 2024-12-04
Payer: MEDICARE

## 2024-12-04 VITALS
RESPIRATION RATE: 16 BRPM | HEART RATE: 80 BPM | BODY MASS INDEX: 25.91 KG/M2 | HEIGHT: 66 IN | WEIGHT: 161.2 LBS | DIASTOLIC BLOOD PRESSURE: 60 MMHG | TEMPERATURE: 97.3 F | SYSTOLIC BLOOD PRESSURE: 122 MMHG | OXYGEN SATURATION: 95 %

## 2024-12-04 DIAGNOSIS — R73.03 PREDIABETES: ICD-10-CM

## 2024-12-04 DIAGNOSIS — R15.1 FECAL SMEARING: ICD-10-CM

## 2024-12-04 DIAGNOSIS — E78.5 DYSLIPIDEMIA: ICD-10-CM

## 2024-12-04 DIAGNOSIS — E78.41 HIGH SERUM LIPOPROTEIN(A): ICD-10-CM

## 2024-12-04 DIAGNOSIS — I10 PRIMARY HYPERTENSION: ICD-10-CM

## 2024-12-04 DIAGNOSIS — K59.01 SLOW TRANSIT CONSTIPATION: ICD-10-CM

## 2024-12-04 DIAGNOSIS — E55.9 VITAMIN D DEFICIENCY: ICD-10-CM

## 2024-12-04 DIAGNOSIS — E89.0 POSTOPERATIVE HYPOTHYROIDISM: ICD-10-CM

## 2024-12-04 DIAGNOSIS — M12.811 ROTATOR CUFF ARTHROPATHY OF RIGHT SHOULDER: ICD-10-CM

## 2024-12-04 DIAGNOSIS — G40.909 SEIZURE DISORDER (HCC): ICD-10-CM

## 2024-12-04 PROCEDURE — G2211 COMPLEX E/M VISIT ADD ON: HCPCS | Performed by: INTERNAL MEDICINE

## 2024-12-04 PROCEDURE — 3078F DIAST BP <80 MM HG: CPT | Performed by: INTERNAL MEDICINE

## 2024-12-04 PROCEDURE — 3074F SYST BP LT 130 MM HG: CPT | Performed by: INTERNAL MEDICINE

## 2024-12-04 PROCEDURE — 99214 OFFICE O/P EST MOD 30 MIN: CPT | Performed by: INTERNAL MEDICINE

## 2024-12-04 RX ORDER — EZETIMIBE 10 MG/1
10 TABLET ORAL DAILY
Qty: 100 TABLET | Refills: 3 | Status: SHIPPED | OUTPATIENT
Start: 2024-12-04

## 2024-12-04 RX ORDER — CELECOXIB 100 MG/1
100 CAPSULE ORAL 2 TIMES DAILY
Qty: 100 CAPSULE | Refills: 3 | Status: SHIPPED | OUTPATIENT
Start: 2024-12-04

## 2024-12-04 ASSESSMENT — FIBROSIS 4 INDEX: FIB4 SCORE: 1.36

## 2024-12-04 NOTE — PROGRESS NOTES
Subjective:   Chief Complaint/History of Present Illness:  Michela Valdez is a 74 y.o. female established patient who presents today to discuss medical problems as listed below. Michela is unaccompanied for today's visit.    History of Present Illness  The patient presents for a follow-up visit.    She is currently on levothyroxine, which she reports is effective. However, she is experiencing bowel leakage, which she finds bothersome. She has been advised by her pelvic floor PT to take levothyroxine without any other medications in the morning for optimal effectiveness. She also reports cold feet, hair loss, loss of appetite, and altered taste and smell of food, attributing these symptoms to her medications (Keppra, Apixaban). She experiences fatigue around 6:00 PM, which she believes is due to Keppra. Additionally, she reports gas, which she associates with constipation and carbohydrate intake. She has not tried Beano or any over-the-counter remedies for this. She also reports subjective concern for halitosis, which she attributes to her medication causing altered taste.    She is unsure if she is up-to-date with her vaccines, but she did receive the influenza vaccine this year. She is hesitant to receive the COVID-19 vaccine. She is interested in learning more about Wegovy and its potential use for weight loss. I had prescribed semaglutide a few years ago in oral form. On reflection, she does not believe that she tried Rybelsus or any other weight loss drugs.     She has been taking Advil twice daily for pain management due to right shoulder pain from complete rotator cuff tear. She was considering shoulder replacement surgery but is hesitant due to the recovery time of 4 to 6 months. She is trying to manage her pain through exercise. She has not tried Celebrex. She takes Advil in the morning as she starts to feel pain when she begins moving around. She is also on Eliquis, which she takes in the morning. She is  interested in trying Celebrex in place of Advil to help mitigate risk for peptic ulcer disease or GERD.    She has been taking Keppra for seizures, which she reports makes her tired at night. She believes Keppra is causing her hair loss. She has been taking gummies to help with this with biotin. She has an upcoming appointment with Dr. Greer for hematologic evaluation. She also needs to set up an appointment with Dr. Ortiz, who she last saw in April 2024. She is concerned about the potential for lifelong use of Keppra.    She has been taking Colace for constipation, which she reports is effective. She also reports hemorrhoids. She describes her bowel movements as small pieces followed by a larger stool. She is unsure how to manage this. She has been taking fiber supplements but finds them unpalatable. She is concerned about the possibility of colon pathology. She had a colonoscopy on June 14, 2024, and a follow-up on June 27, 2024 with reassuring results.    She saw Dr. Bloch a few weeks ago for blood pressure management. She had stopped taking her blood pressure medication due to stable readings and ankle swelling. However, Dr. Bloch advised her to resume the medication as her readings became elevated. Daily readings are back in the normal range on amlodipine.    IMMUNIZATIONS  She is up to date on her influenza and shingles vaccine.       Current Medications:  Current Outpatient Medications Ordered in Epic   Medication Sig Dispense Refill    ezetimibe (ZETIA) 10 MG Tab TAKE 1 TABLET BY MOUTH EVERY  Tablet 3    celecoxib (CELEBREX) 100 MG Cap Take 1 Capsule by mouth 2 times a day. 100 Capsule 3    amLODIPine (NORVASC) 5 MG Tab Take 1 Tablet by mouth every day. 90 Tablet 3    atorvastatin (LIPITOR) 80 MG tablet Take 1 Tablet by mouth every evening. 100 Tablet 1    levothyroxine (SYNTHROID) 88 MCG Tab Take 1 Tablet by mouth see administration instructions. 1 pill 6 days per week and 2 pills on Sundays 117  "Tablet 3    levetiracetam (KEPPRA) 750 MG tablet Take 1 Tablet by mouth 2 times a day. 200 Tablet 3    Glucos-Chond-Hyal Ac-Ca Fructo (MOVE FREE JOINT HEALTH ADVANCE) Tab Take 2 Tablets by mouth every day.      apixaban (ELIQUIS) 5mg Tab Take 1 Tablet by mouth 2 times a day. 200 Tablet 3    Cholecalciferol (VITAMIN D3) 1000 units Cap Take 2,000 Units by mouth. 1000 units x 2 capsules = 2000 units       No current Epic-ordered facility-administered medications on file.          Objective:   Physical Exam:    Vitals: /60 (BP Location: Right arm, Patient Position: Sitting, BP Cuff Size: Adult)   Pulse 80   Temp 36.3 °C (97.3 °F) (Temporal)   Resp 16   Ht 1.676 m (5' 6\")   Wt 73.1 kg (161 lb 3.2 oz)   SpO2 95%    BMI: Body mass index is 26.02 kg/m².  Physical Exam  Constitutional:       General: She is not in acute distress.     Appearance: Normal appearance. She is not ill-appearing.   HENT:      Right Ear: External ear normal.      Left Ear: External ear normal.   Eyes:      General: No scleral icterus.     Conjunctiva/sclera: Conjunctivae normal.   Cardiovascular:      Rate and Rhythm: Normal rate.   Pulmonary:      Effort: Pulmonary effort is normal. No respiratory distress.   Skin:     General: Skin is warm and dry.      Findings: No rash.   Psychiatric:         Mood and Affect: Mood normal.         Behavior: Behavior normal.         Thought Content: Thought content normal.         Judgment: Judgment normal.           Results  Laboratory Studies  TSH was on the low end of normal in July.    Assessment & Plan  1. Chronic right shoulder pain due to complete rotator cuff tear.  She is currently taking Advil, which can increase the risk of bleeding in the stomach. Celebrex, a prescription anti-inflammatory, does not carry this risk and can still alleviate pain. A prescription for Celebrex 100 mg daily will be sent to her pharmacy ([facility]). She is advised to take it in the morning and provide feedback on its " effectiveness.    2. Hypothyroidism now with subclinical hyperthyroidism?  Her TSH levels were on the lower side of normal in 07/2024. Thyroid medication should be taken on an empty stomach, 30 minutes prior to any other medications or food, to ensure optimal absorption. Over-treatment of thyroid can lead to increased GI tract motility, while under-treatment can slow it down. Biotin, which she is currently taking, can elevate free thyroxine lab results. A blood test will be ordered to monitor her thyroid levels. She should discontinue biotin one week prior to her blood work. She is advised to continue her current regimen of taking levothyroxine first thing in the morning.    3. Seizures, transient global amnesia (multiple episodes).  Keppra 750 mg twice daily, which she is currently taking, can have neurological side effects such as mood changes and fatigue. However, it is generally well-tolerated and has a low side effect profile. She is advised to continue her current regimen of Keppra. She has discussed with Dr. Ortiz about reducing the dose in the future, will defer to his opinion.    4. Chronic idiopathic constipation with fecal leakage.  Colace, which she is currently taking, is a stool softener that only alters the outer layer of the stool. MiraLAX, an osmotic laxative, can draw water into the stool and potentially cause diarrhea. Fiber can bulk up the stool, but without sufficient water, it can harden. Stimulant laxatives like bisacodyl, Dulcolax, and Ex-Lax can manually move things forward. She is advised to try MiraLAX and Benefiber for her constipation. She should discontinue Colace if MiraLAX and fiber are effective.    5. Hypertension.  She is advised to continue her current regimen of amlodipine 5 mg daily. Her blood pressure readings have been stable since resuming the medication.    6. Gas.  She is advised to try Beano for her abdominal gas issues and limit food choices and/or portion sizes that  exacerbate bloating.    7. Health Maintenance.  Her colonoscopy was done in June 2024 and is up to date with 5 year recall (may skip due to age). She has received her flu and shingles vaccines this year. She is reluctant to get the Covid vaccine but is advised to monitor her health if she gets sick.    8. Dyslipidemia  9. Prediabetes  Continue atorvastatin 80 mg daily and ezetimibe 10 mg daily, continue follow up with vascular medicine as recommended. Continue periodic evaluations of A1c to ensure relative stability of blood sugar.          Assessment and Plan:   Michela is a 74 y.o. female with the following:  Problem List Items Addressed This Visit       Dyslipidemia    Relevant Orders    Lipid Profile    Comp Metabolic Panel    CBC WITH DIFFERENTIAL    Fecal smearing    Postoperative hypothyroidism    Relevant Orders    FREE THYROXINE    TSH    Prediabetes    Relevant Orders    VITAMIN B12    HEMOGLOBIN A1C    MICROALBUMIN CREAT RATIO URINE    Primary hypertension    Relevant Orders    MICROALBUMIN CREAT RATIO URINE    Rotator cuff arthropathy of right shoulder    Relevant Medications    celecoxib (CELEBREX) 100 MG Cap    Seizure disorder (HCC)    Slow transit constipation with bloating     Other Visit Diagnoses       Vitamin D deficiency        Relevant Orders    VITAMIN D,25 HYDROXY (DEFICIENCY)               RTC: Return in about 4 months (around 4/4/2025).    I spent a total of 36 minutes with record review, exam, communication with the patient, communication with other providers, and documentation of this encounter.    Verbal consent was acquired by the patient to use [employer] ambient listening note generation during this visit Yes     Billing : secondary to the complexity of this patient's illnesses and their interactions.  All problems listed were discussed during the office visit, medications were evaluated and complexities were discussed as well as plan for the future.      PLEASE NOTE: This  dictation was created using voice recognition software. I have made every reasonable attempt to correct obvious errors, but I expect that there are errors of grammar and possibly content that I did not discover before finalizing the note.      Archana Robertson, DO  Geriatric and Internal Medicine  [facility]

## 2024-12-04 NOTE — PATIENT INSTRUCTIONS
Double check your supplements and if anything has Biotin then hold it for 1 week before your blood draw; then okay to resume. For example, your Weem supplement has biotin.

## 2024-12-11 ENCOUNTER — TELEPHONE (OUTPATIENT)
Dept: HEALTH INFORMATION MANAGEMENT | Facility: OTHER | Age: 74
End: 2024-12-11
Payer: MEDICARE

## 2024-12-16 ENCOUNTER — HOSPITAL ENCOUNTER (OUTPATIENT)
Dept: LAB | Facility: MEDICAL CENTER | Age: 74
End: 2024-12-16
Attending: INTERNAL MEDICINE
Payer: MEDICARE

## 2024-12-16 DIAGNOSIS — E78.5 DYSLIPIDEMIA: ICD-10-CM

## 2024-12-16 DIAGNOSIS — R73.03 PREDIABETES: ICD-10-CM

## 2024-12-16 DIAGNOSIS — E89.0 POSTOPERATIVE HYPOTHYROIDISM: ICD-10-CM

## 2024-12-16 DIAGNOSIS — E55.9 VITAMIN D DEFICIENCY: ICD-10-CM

## 2024-12-16 DIAGNOSIS — I10 PRIMARY HYPERTENSION: ICD-10-CM

## 2024-12-16 LAB
25(OH)D3 SERPL-MCNC: 32 NG/ML (ref 30–100)
ALBUMIN SERPL BCP-MCNC: 4.3 G/DL (ref 3.2–4.9)
ALBUMIN/GLOB SERPL: 1.6 G/DL
ALP SERPL-CCNC: 138 U/L (ref 30–99)
ALT SERPL-CCNC: 28 U/L (ref 2–50)
ANION GAP SERPL CALC-SCNC: 10 MMOL/L (ref 7–16)
AST SERPL-CCNC: 23 U/L (ref 12–45)
BASOPHILS # BLD AUTO: 0.5 % (ref 0–1.8)
BASOPHILS # BLD: 0.04 K/UL (ref 0–0.12)
BILIRUB SERPL-MCNC: 0.5 MG/DL (ref 0.1–1.5)
BUN SERPL-MCNC: 20 MG/DL (ref 8–22)
CALCIUM ALBUM COR SERPL-MCNC: 8.9 MG/DL (ref 8.5–10.5)
CALCIUM SERPL-MCNC: 9.1 MG/DL (ref 8.5–10.5)
CHLORIDE SERPL-SCNC: 102 MMOL/L (ref 96–112)
CHOLEST SERPL-MCNC: 143 MG/DL (ref 100–199)
CO2 SERPL-SCNC: 24 MMOL/L (ref 20–33)
CREAT SERPL-MCNC: 0.49 MG/DL (ref 0.5–1.4)
EOSINOPHIL # BLD AUTO: 0.09 K/UL (ref 0–0.51)
EOSINOPHIL NFR BLD: 1 % (ref 0–6.9)
ERYTHROCYTE [DISTWIDTH] IN BLOOD BY AUTOMATED COUNT: 42 FL (ref 35.9–50)
EST. AVERAGE GLUCOSE BLD GHB EST-MCNC: 128 MG/DL
FASTING STATUS PATIENT QL REPORTED: NORMAL
GFR SERPLBLD CREATININE-BSD FMLA CKD-EPI: 97 ML/MIN/1.73 M 2
GLOBULIN SER CALC-MCNC: 2.7 G/DL (ref 1.9–3.5)
GLUCOSE SERPL-MCNC: 125 MG/DL (ref 65–99)
HBA1C MFR BLD: 6.1 % (ref 4–5.6)
HCT VFR BLD AUTO: 41.3 % (ref 37–47)
HDLC SERPL-MCNC: 56 MG/DL
HGB BLD-MCNC: 13.5 G/DL (ref 12–16)
IMM GRANULOCYTES # BLD AUTO: 0.03 K/UL (ref 0–0.11)
IMM GRANULOCYTES NFR BLD AUTO: 0.3 % (ref 0–0.9)
LDLC SERPL CALC-MCNC: 69 MG/DL
LYMPHOCYTES # BLD AUTO: 1.4 K/UL (ref 1–4.8)
LYMPHOCYTES NFR BLD: 16.1 % (ref 22–41)
MCH RBC QN AUTO: 29.7 PG (ref 27–33)
MCHC RBC AUTO-ENTMCNC: 32.7 G/DL (ref 32.2–35.5)
MCV RBC AUTO: 90.8 FL (ref 81.4–97.8)
MONOCYTES # BLD AUTO: 0.56 K/UL (ref 0–0.85)
MONOCYTES NFR BLD AUTO: 6.4 % (ref 0–13.4)
NEUTROPHILS # BLD AUTO: 6.58 K/UL (ref 1.82–7.42)
NEUTROPHILS NFR BLD: 75.7 % (ref 44–72)
NRBC # BLD AUTO: 0 K/UL
NRBC BLD-RTO: 0 /100 WBC (ref 0–0.2)
PLATELET # BLD AUTO: 250 K/UL (ref 164–446)
PMV BLD AUTO: 11.2 FL (ref 9–12.9)
POTASSIUM SERPL-SCNC: 4.1 MMOL/L (ref 3.6–5.5)
PROT SERPL-MCNC: 7 G/DL (ref 6–8.2)
RBC # BLD AUTO: 4.55 M/UL (ref 4.2–5.4)
SODIUM SERPL-SCNC: 136 MMOL/L (ref 135–145)
T4 FREE SERPL-MCNC: 1.49 NG/DL (ref 0.93–1.7)
TRIGL SERPL-MCNC: 92 MG/DL (ref 0–149)
TSH SERPL-ACNC: 2.64 UIU/ML (ref 0.35–5.5)
VIT B12 SERPL-MCNC: 530 PG/ML (ref 211–911)
WBC # BLD AUTO: 8.7 K/UL (ref 4.8–10.8)

## 2024-12-16 PROCEDURE — 85025 COMPLETE CBC W/AUTO DIFF WBC: CPT

## 2024-12-16 PROCEDURE — 36415 COLL VENOUS BLD VENIPUNCTURE: CPT

## 2024-12-16 PROCEDURE — 82570 ASSAY OF URINE CREATININE: CPT

## 2024-12-16 PROCEDURE — 80061 LIPID PANEL: CPT

## 2024-12-16 PROCEDURE — 84439 ASSAY OF FREE THYROXINE: CPT

## 2024-12-16 PROCEDURE — 82306 VITAMIN D 25 HYDROXY: CPT

## 2024-12-16 PROCEDURE — 82043 UR ALBUMIN QUANTITATIVE: CPT

## 2024-12-16 PROCEDURE — 84443 ASSAY THYROID STIM HORMONE: CPT

## 2024-12-16 PROCEDURE — 80053 COMPREHEN METABOLIC PANEL: CPT

## 2024-12-16 PROCEDURE — 82607 VITAMIN B-12: CPT

## 2024-12-16 PROCEDURE — 83036 HEMOGLOBIN GLYCOSYLATED A1C: CPT

## 2024-12-17 LAB
CREAT UR-MCNC: 106.51 MG/DL
MICROALBUMIN UR-MCNC: <1.2 MG/DL
MICROALBUMIN/CREAT UR: NORMAL MG/G (ref 0–30)

## 2024-12-20 RX ORDER — L.ACID/L.CASEI/B.BIF/B.LON/FOS 2B CELL-50
1 CAPSULE ORAL DAILY
COMMUNITY

## 2024-12-26 DIAGNOSIS — G08 CEREBRAL VENOUS THROMBOSIS OF SIGMOID SINUS: ICD-10-CM

## 2024-12-31 ENCOUNTER — TELEPHONE (OUTPATIENT)
Dept: MEDICAL GROUP | Facility: PHYSICIAN GROUP | Age: 74
End: 2024-12-31
Payer: MEDICARE

## 2024-12-31 RX ORDER — APIXABAN 5 MG/1
5 TABLET, FILM COATED ORAL 2 TIMES DAILY
Qty: 200 TABLET | Refills: 3 | Status: SHIPPED | OUTPATIENT
Start: 2024-12-31

## 2024-12-31 NOTE — TELEPHONE ENCOUNTER
Received request via: Patient    Was the patient seen in the last year in this department? Yes    Does the patient have an active prescription (recently filled or refills available) for medication(s) requested? No    Pharmacy Name: [facility]    Does the patient have [facility] and need 100-day supply? (This applies to ALL medications) Yes, quantity updated to 100 days    Already pended on 12/26/24  Please sign so patient can get Eliquis from [facility]

## 2025-01-06 ENCOUNTER — HOSPITAL ENCOUNTER (OUTPATIENT)
Dept: LAB | Facility: MEDICAL CENTER | Age: 75
End: 2025-01-06
Attending: PSYCHIATRY & NEUROLOGY
Payer: MEDICARE

## 2025-01-06 DIAGNOSIS — G40.109 LOCALIZATION-RELATED EPILEPSY (HCC): ICD-10-CM

## 2025-01-06 PROCEDURE — 36415 COLL VENOUS BLD VENIPUNCTURE: CPT

## 2025-01-06 PROCEDURE — 80177 DRUG SCRN QUAN LEVETIRACETAM: CPT

## 2025-01-08 LAB — LEVETIRACETAM SERPL-MCNC: 21 UG/ML (ref 10–40)

## 2025-01-09 ENCOUNTER — OFFICE VISIT (OUTPATIENT)
Dept: CARDIOLOGY | Facility: MEDICAL CENTER | Age: 75
End: 2025-01-09
Attending: INTERNAL MEDICINE
Payer: MEDICARE

## 2025-01-09 VITALS
DIASTOLIC BLOOD PRESSURE: 66 MMHG | HEART RATE: 87 BPM | WEIGHT: 158 LBS | SYSTOLIC BLOOD PRESSURE: 122 MMHG | BODY MASS INDEX: 25.39 KG/M2 | HEIGHT: 66 IN

## 2025-01-09 DIAGNOSIS — E03.9 HYPOTHYROIDISM, UNSPECIFIED TYPE: ICD-10-CM

## 2025-01-09 DIAGNOSIS — E78.5 DYSLIPIDEMIA: ICD-10-CM

## 2025-01-09 DIAGNOSIS — G40.909 SEIZURE DISORDER (HCC): ICD-10-CM

## 2025-01-09 DIAGNOSIS — G08 CEREBRAL VENOUS THROMBOSIS OF SIGMOID SINUS: ICD-10-CM

## 2025-01-09 DIAGNOSIS — I10 PRIMARY HYPERTENSION: ICD-10-CM

## 2025-01-09 DIAGNOSIS — R73.03 PREDIABETES: ICD-10-CM

## 2025-01-09 PROCEDURE — 99212 OFFICE O/P EST SF 10 MIN: CPT

## 2025-01-09 PROCEDURE — G2211 COMPLEX E/M VISIT ADD ON: HCPCS | Performed by: INTERNAL MEDICINE

## 2025-01-09 PROCEDURE — 3078F DIAST BP <80 MM HG: CPT | Performed by: INTERNAL MEDICINE

## 2025-01-09 PROCEDURE — 99214 OFFICE O/P EST MOD 30 MIN: CPT | Performed by: INTERNAL MEDICINE

## 2025-01-09 PROCEDURE — 3074F SYST BP LT 130 MM HG: CPT | Performed by: INTERNAL MEDICINE

## 2025-01-09 RX ORDER — TELMISARTAN 40 MG/1
40 TABLET ORAL DAILY
Qty: 30 TABLET | Refills: 3 | Status: SHIPPED | OUTPATIENT
Start: 2025-01-09

## 2025-01-09 ASSESSMENT — FIBROSIS 4 INDEX: FIB4 SCORE: 1.32

## 2025-01-10 NOTE — PROGRESS NOTES
"VASCULAR MEDICINE CLINIC - Follow up VISIT  25    Michela Valdez is a 74 y.o. female      Subjective      HPI:  Here for f/u of cerebral vein thrombosis and htn and dyslipidemia  Patient did have another episode of confusion in July  She had her Keppra dose increased at that time  No further episodes since then  No obvious seizure recurrence  No focal neurologic complaints  Does have a follow-up appointment with neurology  Tolerating keppra  Feels great  Still on eliquis  Saw hematology who recommended continuing therapy  No bleeding  No headache  No myalgias on atorvastatin  Restarted amlodipine due to her blood pressure being up at home  Now less than 130/80 on average  Has developed leg swelling-left worse than right  Not checking bp at home  Same thyroid dose - better energy    Family History   Problem Relation Age of Onset    Osteoporosis Mother     Hyperlipidemia Father     Heart Disease Father         CAD, MI     Anemia Sister     Cancer Brother         prostate    Heart Disease Brother         CABG    Dad - fatal coronary disease in 50s    Social History     Tobacco Use    Smoking status: Former     Current packs/day: 0.00     Average packs/day: 0.5 packs/day for 4.0 years (2.0 ttl pk-yrs)     Types: Cigarettes     Start date:      Quit date:      Years since quittin.0    Smokeless tobacco: Never   Vaping Use    Vaping status: Never Used   Substance Use Topics    Alcohol use: Yes     Alcohol/week: 3.0 oz     Types: 5 Glasses of wine per week     Comment: glass of wine daily     Drug use: No      DIET AND EXERCISE:  Weight Change:stable  Diet: Doing a lot of snacking  Exercise: Less than previous        Objective     Objective:     Vitals:    25 1608   BP: 122/66   BP Location: Left arm   Patient Position: Sitting   BP Cuff Size: Adult   Pulse: 87   Weight: 71.7 kg (158 lb)   Height: 1.676 m (5' 6\")      Physical Exam  Vitals reviewed.   Constitutional:       General: She is not in " acute distress.     Appearance: She is not diaphoretic.   HENT:      Head: Normocephalic and atraumatic.   Eyes:      General: No scleral icterus.     Conjunctiva/sclera: Conjunctivae normal.   Neck:      Vascular: No carotid bruit.   Cardiovascular:      Rate and Rhythm: Normal rate and regular rhythm.      Heart sounds: Normal heart sounds. No murmur heard.  Pulmonary:      Effort: Pulmonary effort is normal. No respiratory distress.      Breath sounds: Normal breath sounds. No wheezing or rales.   Musculoskeletal:      Right lower leg: No edema.      Left lower leg: No edema.   Skin:     Coloration: Skin is not pale.   Neurological:      General: No focal deficit present.      Mental Status: She is alert and oriented to person, place, and time.      Cranial Nerves: No cranial nerve deficit.      Coordination: Coordination normal.      Gait: Gait is intact. Gait normal.   Psychiatric:         Mood and Affect: Mood and affect normal.         Behavior: Behavior normal.          DATA REVIEW    Lab Results   Component Value Date/Time    CHOLSTRLTOT 143 12/16/2024 11:07 AM    LDL 69 12/16/2024 11:07 AM    HDL 56 12/16/2024 11:07 AM    TRIGLYCERIDE 92 12/16/2024 11:07 AM      Lab Results   Component Value Date/Time    LIPOPROTA 107 (H) 07/03/2024 10:24 AM      Lab Results   Component Value Date/Time    APOB 74 07/03/2024 10:24 AM      Lab Results   Component Value Date/Time    CRPHIGHSEN 0.7 08/02/2023 10:51 AM          Lab Results   Component Value Date/Time    SODIUM 136 12/16/2024 11:07 AM    POTASSIUM 4.1 12/16/2024 11:07 AM    CHLORIDE 102 12/16/2024 11:07 AM    CO2 24 12/16/2024 11:07 AM    GLUCOSE 125 (H) 12/16/2024 11:07 AM    BUN 20 12/16/2024 11:07 AM    CREATININE 0.49 (L) 12/16/2024 11:07 AM     Lab Results   Component Value Date/Time    ALKPHOSPHAT 138 (H) 12/16/2024 11:07 AM    ASTSGOT 23 12/16/2024 11:07 AM    ALTSGPT 28 12/16/2024 11:07 AM    TBILIRUBIN 0.5 12/16/2024 11:07 AM       Lab Results    Component Value Date/Time    HBA1C 6.1 (H) 12/16/2024 11:07 AM       Lab Results   Component Value Date/Time    MALBCRT see below 12/16/2024 11:08 AM    MICROALBUR <1.2 12/16/2024 11:08 AM       Beta 2 glycoprotein IgG and IgM - normal  ACLA - IgG normal; IgM mildly elevated  FVL - neg  PTGM - neg      CAC score jan 2023  LMA - 64.8  LCX - 0.0  LAD - 16.5  RCA - 258.7  PDA - 0.0  Total Calcium Score: 340.0    CTA head and neck oct 2023  1.  Negative for thrombosis or occlusion  2.  Mild bilateral internal carotid artery stenoses  3.  Anatomic variant as the right subclavian artery originates distal to the left subclavian artery and passes posterior to the trachea before exiting the right hemithorax  4.  Anatomic variant termination of the right vertebral artery and posterior inferior cerebellar artery    Echo oct 2023  No prior study is available for comparison.   Normal left ventricular systolic function.  The left ventricular ejection fraction is visually estimated to be 60%.  No significant valvular abnormalities.   A definite cardiac source for a systemic thromboembolic event is not   identified, failure to do so does not exclude its presence. If   clinically indicated, a transesophageal study would be useful.     MRI brain oct 2023  No acute process.  Age-related volume loss.  Subacute to chronic thrombus in the right sigmoid sinus and proximal right internal jugular vein at the skull base.    CTA head and neck dec 2023  Ctangiogram of the [place] demonstrating no large vessel occlusion.   Mild bilateral atherosclerosis with less than 50% ICA stenosis.     MRV jan 2024  Stable appearance of filling defects in the right transverse and sigmoid junction and the right sigmoid sinus extending into the right internal jugular vein at the skull base.  Defect within the left internal jugular vein at the skull base noted, which may represent flow artifact or new thrombus in this location. Consider follow-up  with contrast-enhanced CTA/CT venogram of the head and neck.    MRV head may 2024  1.  There is linear filling defect in the right sigmoid sinus and visualized jugular bulb likely representing chronic partial thrombus. There has been no significant interval change in the previous CT angiogram dated 12/24/2023 and previous MRI dated   10/23/2023.  2.  There is no evidence of acute thrombosis.              Medical Decision Making:  Today's Assessment / Status / Plan:     1. Dyslipidemia  Comp Metabolic Panel    Lipid Profile    APOLIPOPROTEIN B      2. Primary hypertension  telmisartan (MICARDIS) 40 MG Tab    Comp Metabolic Panel      3. Seizure disorder (HCC)        4. Cerebral venous thrombosis of sigmoid sinus  apixaban (ELIQUIS) 2.5mg Tab      5. Prediabetes  HEMOGLOBIN A1C      6. Hypothyroidism, unspecified type  TSH    THYROXINE (TOTAL)           Etiology of Established CVD if Present:     1) acute confusional state -recurrent - newly diagnosed sz disorder -  no stroke seen on imaging. No residual. No source of embolism or afib.    Plan:  -911 with any tia or cva symptoms  -continue keppra per neuro  -medical management per below  -follow up with neuro for further w/u and management    2) cerebral vein thrombosis - unclear chronicity and unclear relation to TGA/TIA.   Possibly related to previous covid.   FVL and PTGM normal.    Stable or improved on follow-up imaging  Normal anti-beta 2 ab, but mildly elevated IgM acla  Lipoprotein a may be prothrombotic as well  Plan:  - medical management as below  -Continue anticoagulation pending follow-up with hematology  - Will defer any indicated age appropriate screening for occult malignancy to pcp.    Lipid Management: Qualifies for Statin Therapy Based on 2018 ACC/AHA Guidelines: yes  Calculated 10-Year Risk of ASCVD: N/A  Currently on Statin: Yes  Strong FH of premature ASCVD  Subclinical athero as evidenced by mild carotid disease and elevated CAC score  No  significant aortic valve disease on echo October 2023  Very elevated lp(a) an independent risk factor for disease  Goal LDL less than 70 and apolipoprotein B less than 70  Reasonable control on most recent blood work  Plan  -Previously recommended family lipid and lp(a) screening  - continue atorva 80  -Continue ezetimibe 10   -Recheck fasting lipid panel and apolipoprotein B prior to next visit    Blood Pressure Management:  Acc[place] (2017) Blood Pressure Goal <130/80  Blood pressure control much improved both at home and in the office with reinstitution of amlodipine  Has developed significant leg swelling however  GFR and lytes stable  No albuminuria  No LVH  Plan:  -Continue home bp monitoring with good technique  -Stop amlodipine  -Trial of telmisartan 40 mg daily  -Recheck GFR and electrolytes prior to next visit    Glycemic Status: Prediabetic  A1c worsened to 6.1  - continue lifestyle mod  - recheck fasting glucose and A1c in future  -Consider an SGLT2 if continues to elevate    Anti-Platelet/Anti-Coagulant Tx: Yes  Agree with hematology that the benefits of extended anticoagulation likely outweigh the risks in this unique situation.  That being said I do think we can decrease the dose.  Patient in agreement  -Reduce Eliquis to 2.5 mg twice daily  -Report any bleeding  -Reasonable to stop for 2 days for procedures    Smoking: continue complete avoidance of all tobacco products    Physical Activity: Increase frequency and intensity of her exercise    Weight Management and Nutrition: recommended more attention to a low simple carbohydrate diet    Other:     - Hypothroidism - appears under good control based on symptoms and recent blood work. Continue current levo.  Recheck TSH and T4 with next blood work    Instructed to follow-up with PCP for remainder of adult medical needs: yes  We will partner with other providers in the management of established vascular disease and cardiometabolic risk factors.    Studies  to Be Obtained: none  Labs to Be Obtained: As above prior to next visit    Follow up in: 16 weeks    Michael J Bloch, M.D.     Cc: Dr. Greer

## 2025-01-20 ENCOUNTER — PATIENT MESSAGE (OUTPATIENT)
Dept: MEDICAL GROUP | Facility: PHYSICIAN GROUP | Age: 75
End: 2025-01-20
Payer: MEDICARE

## 2025-01-22 ENCOUNTER — OFFICE VISIT (OUTPATIENT)
Dept: NEUROLOGY | Facility: MEDICAL CENTER | Age: 75
End: 2025-01-22
Attending: PSYCHIATRY & NEUROLOGY
Payer: MEDICARE

## 2025-01-22 ENCOUNTER — HOSPITAL ENCOUNTER (OUTPATIENT)
Dept: LAB | Facility: MEDICAL CENTER | Age: 75
End: 2025-01-22
Attending: PSYCHIATRY & NEUROLOGY
Payer: MEDICARE

## 2025-01-22 VITALS
HEART RATE: 83 BPM | WEIGHT: 162.04 LBS | TEMPERATURE: 97.4 F | SYSTOLIC BLOOD PRESSURE: 112 MMHG | HEIGHT: 66 IN | DIASTOLIC BLOOD PRESSURE: 58 MMHG | OXYGEN SATURATION: 96 % | BODY MASS INDEX: 26.04 KG/M2

## 2025-01-22 DIAGNOSIS — G40.109 LOCALIZATION-RELATED EPILEPSY (HCC): Primary | ICD-10-CM

## 2025-01-22 DIAGNOSIS — E78.5 DYSLIPIDEMIA: ICD-10-CM

## 2025-01-22 PROCEDURE — 99214 OFFICE O/P EST MOD 30 MIN: CPT | Performed by: PSYCHIATRY & NEUROLOGY

## 2025-01-22 PROCEDURE — 3074F SYST BP LT 130 MM HG: CPT | Performed by: PSYCHIATRY & NEUROLOGY

## 2025-01-22 PROCEDURE — 36415 COLL VENOUS BLD VENIPUNCTURE: CPT

## 2025-01-22 PROCEDURE — 82172 ASSAY OF APOLIPOPROTEIN: CPT

## 2025-01-22 PROCEDURE — 99212 OFFICE O/P EST SF 10 MIN: CPT | Performed by: PSYCHIATRY & NEUROLOGY

## 2025-01-22 PROCEDURE — 3078F DIAST BP <80 MM HG: CPT | Performed by: PSYCHIATRY & NEUROLOGY

## 2025-01-22 ASSESSMENT — PATIENT HEALTH QUESTIONNAIRE - PHQ9: CLINICAL INTERPRETATION OF PHQ2 SCORE: 0

## 2025-01-22 ASSESSMENT — FIBROSIS 4 INDEX: FIB4 SCORE: 1.32

## 2025-01-22 NOTE — PROGRESS NOTES
"Neuro follow up:     Last formally seen by me in 2/2024.     EEG results reviewed with Michela and daughter today from 1/17/2024.     Presently taking Keppra 500 mg PO BID (1000 mg a day).     She remains on Eliquis (anti coagulation)     Anxiety seems reduced per daughter today while on Keppra and this seems consistent.     Otherwise  Michela A [place]ar 73 y.o. female  right handed woman who [place] and lives in [facility]) moving from [place].  She works full time as a Realtor     Problem List re-reviewed.     Covid testing positive a few months ago > about 5 days in duration \"like a cold\" (coughing and congestion) but without fever(s), sorethroat > took Paclovid x 3 days.     Memory changes have been noticed for over 1 year such as going into a room to get something which she self describes a minor nuisance in the sense of being able to recall why or what she  has gone into the room to get.       In terms of memory disturbance(s) in the last year there tends to be more tendency to forget people's names and movies per daughter.     Here last  episode on 12/24/2023:     I reviewed Dr. Quinten Ferrari MD's note from [facility] ED note.     Daughter called Michela on [address] to plan the evening. At that time Michela was feeling fine and seemed ok that day.  Daughter was on the phone with Michela and did not know what day it was and there was a \"little bit of memory loss\". There was not clear repeating of information or asking questions and over and over. When daughter went over to house and was able to communicate appropriately.  10:00 am was when daughter called. When her daughter got there, Michela's head slowly drooped for 3 seconds and her limbs per \"trembling\" for 1-2 minutes. This entire episode lasted 3 to 4 hours.   She does being recall being in the ambulance.     \"T.G.A. 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 She had a period where she could not recall a period of not recalling of information at all. Her daughter on Sunday morning she was repetitive in speech and garbled> 911 was called and paramedics called. And triaged at  her house> [facility].     Her words and speech were not normal per her daughter (that Sunday).     Note from [facility] Reviewed today:     \"Michela Valdez is a 73 y.o. female who presented 10/22/2023 with past medical history of hyperlipidemia, hypothyroidism, prediabetes who comes into the hospital with complaints of memory loss, confusion and slurred speech that started this morning when she woke up in bed.  Patient has been experiencing some symptoms for the past 2 days of memory loss.  She describes more retrograde amnesia rather than forming new memories.  She is normally high functioning grossly patient but has been unable to complete her job.  Recently patient has been experiencing dizziness in the morning.  She denies any headaches, photophobia, muscle weakness, nausea, vomiting, diarrhea.  She denies any changes in medications.  She did travel to [place] 2 weeks ago where she contracted COVID.  The patient did take Paxlovid.  She does have a family history of stroke and coronary artery disease.  Patient gets a carotid artery ultrasound every year since she has a strong family history.  She denies any recent trauma or falls.     EKG interpreted by me found normal sinus rhythm, LVH  Chest x-ray interpreted by me found no acute pulmonary process\"     The patient was admitted for transient memory loss and encephalopathy r/o acute CVA.     Hospital course under my care: afebrile and vitals wnl. Exam at bedside was grossly unremarkable with intact neuroexam though the patient has occasionally having hard time remembering " details of last two days. She does seem to have improvement with times.  Labs and imagings findings discussed with patient. MRI done was notable for no acute process but noted subacute to chronic thrombus in R sigmoid sinus and proximal R IJ vein. At this point, neurology was consulted - recommended anticoag and follow up scan vs just follow up scan. Pt was also seen by Speech therapy - she did quite well with Cognistat.      Clinical impression at this point with transient global amnesia - likely etiology is ischemic vs thrombosis. Pt is on ASA and Statin at home. Plan is to add Eliquis for thrombosis; also amlodipine to control BP. Deemed stable for DC at this point.      Therefore, she is discharged in fair and stable condition to home with close outpatient follow-up.   The patient recovered much more quickly than anticipated on admission.        There have been no  no features to suggest Keppra side effects at this point- ongoing dizziness,nausea,gait instability, visual disturbance(s) or recurrent or transient memory disturbances (last event known since December 24 2023).     Last obvious amnestic event December 24 2023 and the 1st event was October 22nd 2023.      Patient Active Problem List    Diagnosis Date Noted    Seizure disorder (HCC) 02/09/2024    Transient confusion 01/05/2024    Secondary hypercoagulable state (HCC) 11/08/2023    Mild cognitive impairment 11/03/2023    History of COVID-19 11/03/2023    Cerebral venous thrombosis of sigmoid sinus 10/24/2023    TGA vs TIA- now with +seizure on EEG 10/22/2023    Primary hypertension 10/22/2023    Fecal smearing 06/14/2023    Agatston CAC score 200-399 340 in 2023     Laryngeal spasm 01/18/2023    Elevated alkaline phosphatase level 09/28/2022    Genitourinary syndrome of menopause 09/28/2022    Skin lesion- right hand, SK 09/28/2022    Rotator cuff arthropathy of right shoulder 09/28/2022    Hip pain- right > left 09/28/2022    Palpitations 09/28/2022     Night sweats 09/28/2022    BMI 25.0-25.9,adult 09/28/2022    Chronic pain of right knee 12/07/2021    Slow transit constipation with bloating 12/07/2021    Prediabetes 05/16/2018    History of osteopenia 05/16/2018    Mild carotid artery stenosis (HCC) 05/24/2016    Postoperative hypothyroidism 10/13/2015    Dyslipidemia 10/13/2015    Diverticular disease of colon 07/21/2015    Thyroid nodule 07/21/2015       Past medical history:   Past Medical History:   Diagnosis Date    Acute laryngitis 04/19/2022    Michela presents to clinic with 8 days of URI. Start as a cold with sinus pain and pressure and drainage. She now is left with residual cough which is very intrusive for her work. Cough is nonproductive. She is using OTC vitamins. She otherwise feels well. No fevers/chills, malaise, chest pain, productive cough, or GI symptoms. This feels similar to prior episodes of laryngitis. She has had good keyana    Cataract     esau IOL implants    Dyslipidemia 10/13/2015    Elevated LFTs 05/16/2018    High cholesterol     History of osteopenia 05/16/2018    Hyperlipidemia     Hyponatremia 11/03/2023    Postoperative hypothyroidism 10/13/2015    Prediabetes 05/16/2018    Snoring     Thyroid disease     Urinary incontinence     stress incontinence, wears pad       Past surgical history:   Past Surgical History:   Procedure Laterality Date    CATARACT EXTRACTION WITH IOL Bilateral 2019    THYROIDECTOMY TOTAL  02/08/2012    Performed by ALEX CONTEH at [facility]    ABDOMINAL EXPLORATION      pyloric stenosis- as an infant    GYN SURGERY      D&C         Social history:   Social History     Socioeconomic History    Marital status:      Spouse name: Not on file    Number of children: Not on file    Years of education: Not on file    Highest education level: Not on file   Occupational History    Not on file   Tobacco Use    Smoking status: Former     Current packs/day: 0.00     Average packs/day: 0.5  packs/day for 4.0 years (2.0 ttl pk-yrs)     Types: Cigarettes     Start date:      Quit date:      Years since quittin.0    Smokeless tobacco: Never   Vaping Use    Vaping status: Never Used   Substance and Sexual Activity    Alcohol use: Yes     Alcohol/week: 3.0 oz     Types: 5 Glasses of wine per week     Comment: glass of wine daily     Drug use: No    Sexual activity: Not Currently   Other Topics Concern    Not on file   Social History Narrative    Not on file     Social Drivers of Health     Financial Resource Strain: Low Risk  (2024)    Overall Financial Resource Strain (CARDIA)     Difficulty of Paying Living Expenses: Not hard at all   Food Insecurity: No Food Insecurity (2024)    Hunger Vital Sign     Worried About Running Out of Food in the Last Year: Never true     Ran Out of Food in the Last Year: Never true   Transportation Needs: No Transportation Needs (2024)    PRAPARE - Transportation     Lack of Transportation (Medical): No     Lack of Transportation (Non-Medical): No   Physical Activity: Not on file   Stress: Not on file   Social Connections: Not on file   Intimate Partner Violence: Not on file   Housing Stability: Low Risk  (2024)    Housing Stability Vital Sign     Unable to Pay for Housing in the Last Year: No     Number of Places Lived in the Last Year: 1     Unstable Housing in the Last Year: No       Family history:   Family History   Problem Relation Age of Onset    Osteoporosis Mother     Hyperlipidemia Father     Heart Disease Father         CAD, MI     Anemia Sister     Cancer Brother         prostate    Heart Disease Brother         CABG         Current medications:   Current Outpatient Medications   Medication    telmisartan (MICARDIS) 40 MG Tab    apixaban (ELIQUIS) 2.5mg Tab    Wheat Dextrin (EQ FIBER POWDER PO)    Multiple Vitamin (MULTI-VITAMIN DAILY PO)    Probiotic Product (PROBIOTIC BLEND) Cap    ezetimibe (ZETIA) 10 MG Tab    celecoxib  "(CELEBREX) 100 MG Cap    atorvastatin (LIPITOR) 80 MG tablet    levothyroxine (SYNTHROID) 88 MCG Tab    levetiracetam (KEPPRA) 750 MG tablet    Glucos-Chond-Hyal Ac-Ca Fructo (MOVE FREE JOINT HEALTH ADVANCE) Tab    Cholecalciferol (VITAMIN D3) 1000 units Cap     No current facility-administered medications for this visit.       Medication Allergy:  Allergies   Allergen Reactions    Naproxen Hives and Rash          Penicillins Rash     As a young child           Physical examination:   Vitals:    01/22/25 1046   BP: 112/58   BP Location: Right arm   Patient Position: Sitting   BP Cuff Size: Adult   Pulse: 83   Temp: 36.3 °C (97.4 °F)   TempSrc: Temporal   SpO2: 96%   Weight: 73.5 kg (162 lb 0.6 oz)   Height: 1.676 m (5' 6\")       Normal cephalic atraumatic.  There is full range of movement around the neck in all directions without restrictions or discrete pain evoked triggers.  No lower extremity edema.      Neurological  Exam:      Mental status: Awake, alert and fully oriented to person, place, time, and situation. Normal attention and concentration.  Did not appear/act combative,irritable,anxious,paranoid/delusional or aggressive to or with me.    Speech and language: Speech is fluent without errors, clear, intact to repetition, and intact to naming.     Follows 3 step motor commands in sequence without significant delay and correctly.    Cranial nerve exam:  II: Pupils are equally round and reactive to light. Visual fields are intact by confrontation.  III, IV, VI: EOMI, no diplopia, no ptosis.  V: Sensation to light touch is normal over V1-3 distributions bilaterally.  .  VII: Facial movements are symmetrical. There is no facial droop. .  VIII: Hearing intact to soft speech and finger rub bilaterally  IX: Palate elevates symmetrically, uvula is midline. Dysarthria is not present.  XI: Shoulder shrug are symmetrical and strong.   XII: Tongue protrudes midline.      Motor exam:  Muscle tone is normal in all 4 " limbs.    Muscle strength:    Neck Flexors/Extensors: 5/5       Right  Left  Deltoid   5/5  5/5      Biceps   5/5  5/5  Triceps  5/5  5/5   Wrist extensors 5/5  5/5  Wrist flexors  5/5  5/5     5/5  5/5  Interossei  5/5  5/5  Thenar (APB)  5/5  5/5   Hip flexors  5/5  5/5  Quadriceps  5/5  5/5    Hamstrings  5/5  5/5  Dorsiflexors  5/5  5/5  Plantarflexors  5/5  5/5  Toe extension  5/5  5/5    Frontal release signs are absent    bilaterally toes are downgoing to plantar stimulation..    Coordination (finger-to-nose, heel/knee/shin, rapid alternating movements) was normal.     There was no ataxia, no tremors, and no dysmetria.     Station and gait were normal. Easily stands up from exam chair without retropulsion,veering,leaning,swaying (to either side).       Labs and Tests:     KEPPRA  Order: [id number]   Status: Final result       Visible to patient: Yes (seen)       Next appt: 04/10/2025 at 11:00 AM in [facility] (Archana Robertson D.O.)    0 Result Notes       1 Patient Communication            Component  Ref Range & Units 2 wk ago  (1/6/25) 2 mo ago  (11/12/24) 4 mo ago  (9/18/24) 5 mo ago  (8/13/24) 6 mo ago  (7/24/24) 6 mo ago  (7/19/24) 9 mo ago  (4/8/24)   Keppra  10 - 40 ug/mL 21 25 CM 26 CM 27 CM 45 High  CM 17 CM 14 CM   Comment: INTERPRETIVE INFORMATION: Keppra (Levetiracetam)  Therapeutic Range:  10-40 ug/mL  Toxic:  Not well Established  Pharmacokinetics of levetiracetam are affected by renal function.  Adverse effects may include somnolence, weakness, headache and  vomiting.  This levetiracetam (Keppra) immunoassay uses the TerraSky  reagents, which has known cross-reactivity with the drug  brivaracetam (Briviact) and may report inaccurate results.  Patients transitioning from levetiracetam to brivaracetam or those  who are using both medications should not monitor drug  concentrations with the [employer] assay. These patients  should be monitored using a validated  chromatographic methodology  that distinguishes between drugs to determine drug concentrations.  Performed By: [facility]  [address] [place] [id number]  : Murphy Craven MD, PhD  ADAIA Number: [id number]          Partial Seizure Disorder:     Plans:     A. Continue Keppra 750 mg PO BID (1500 mg a day) and has strict compliance to this medication.     B.  Will extend Keppra blood level rechecks to every 3-6 months at this time.     C. Continue anticoagulation given venous sinus clot and planning to have right shoulder surgery.     D. Brain Health factors reviewed today.     E. DMV Paperwork filled out today.     Today visit time was 30 minutes and we reviewed above issues.    Follow up in 6-9 months or so or PRN

## 2025-01-24 LAB — APO B100 SERPL-MCNC: 68 MG/DL (ref 60–117)

## 2025-01-29 ENCOUNTER — DOCUMENTATION (OUTPATIENT)
Dept: VASCULAR LAB | Facility: MEDICAL CENTER | Age: 75
End: 2025-01-29
Payer: MEDICARE

## 2025-01-29 NOTE — PROGRESS NOTES
Faxed signed clearance to [facility]  (Fx#[phone])    Scanned to media.    Shalonda Rodriguez, Med Ass't  [facility]  Ph. [phone]  Fx. [phone]

## 2025-01-29 NOTE — PROGRESS NOTES
Received clearance from [facility] for pt to have shoulder surgery.  Per Dr. Bloch's note, ok to hold Eliquis 2 days prior to procedures.  Restart as soon as possible at the discretion of the ortho surgeon.  Faxed back to [facility].    Jessie Bolaños [facility]

## 2025-02-04 DIAGNOSIS — I10 PRIMARY HYPERTENSION: ICD-10-CM

## 2025-02-04 RX ORDER — TELMISARTAN 40 MG/1
40 TABLET ORAL DAILY
Qty: 100 TABLET | Refills: 3 | Status: SHIPPED | OUTPATIENT
Start: 2025-02-04

## 2025-02-04 NOTE — TELEPHONE ENCOUNTER
Received request via: Pharmacy    Was the patient seen in the last year in this department? Yes    Does the patient have an active prescription (recently filled or refills available) for medication(s) requested? YES, WILL NEED 90 DAY SUPPLY INSTEAD OF 30    Pharmacy Name: [facility]    Does the patient have residential Plus and need 100-day supply? (This applies to ALL medications) Yes, quantity updated to 100 days

## 2025-02-07 ENCOUNTER — TELEPHONE (OUTPATIENT)
Dept: VASCULAR LAB | Facility: MEDICAL CENTER | Age: 75
End: 2025-02-07
Payer: MEDICARE

## 2025-02-07 NOTE — TELEPHONE ENCOUNTER
Caller: Michela Valdez     Topic/issue: Pt would like the office to be on the look out for her surgery clearance, office will be sending this afternoon     Callback Number: [phone]      Thank You   Nahomy LOPEZ

## 2025-03-05 NOTE — TELEPHONE ENCOUNTER
[facility] MED    Caller: Michela Valdez    Topic/issue: MEDICAL ADVICE    Please call regarding [facility] CLEAR[facility] request. Document in pt media. Please advise.    Thank you,  Nadeem STYLES    Callback Number: [phone]

## 2025-03-05 NOTE — TELEPHONE ENCOUNTER
Caller: Michela Valdez    Topic/issue: Patient relaying a different fax number for this clearance to be sent to - this is a direct fax to the provider's MA. Please re-fax clearance to [phone].    Callback Number: [phone]    Thank you,  Manasa JO

## 2025-03-05 NOTE — TELEPHONE ENCOUNTER
Faxed clearance again to below fax number provided by abbe Borrego, Medical Assistant   [facility]   Ph: [phone]  Fx: [phone]

## 2025-03-05 NOTE — TELEPHONE ENCOUNTER
Spoke with patient, she states [facility] did not receive clearance. Let patient know this was faxed on 1/29/25. Patient provided me with a new fax number which I faxed clearance to again.           Jamie Borrego, Medical Assistant   [facility]   Ph: [phone]  Fx: [phone]

## 2025-03-20 ENCOUNTER — HOSPITAL ENCOUNTER (OUTPATIENT)
Dept: RADIOLOGY | Facility: MEDICAL CENTER | Age: 75
End: 2025-03-20
Attending: PHYSICIAN ASSISTANT
Payer: MEDICARE

## 2025-03-20 DIAGNOSIS — M19.011 PRIMARY OSTEOARTHRITIS OF RIGHT SHOULDER: ICD-10-CM

## 2025-03-20 PROCEDURE — 73200 CT UPPER EXTREMITY W/O DYE: CPT | Mod: RT

## 2025-03-25 DIAGNOSIS — I10 PRIMARY HYPERTENSION: ICD-10-CM

## 2025-03-25 DIAGNOSIS — E78.5 DYSLIPIDEMIA: ICD-10-CM

## 2025-03-25 RX ORDER — ATORVASTATIN CALCIUM 80 MG/1
80 TABLET, FILM COATED ORAL EVERY EVENING
Qty: 100 TABLET | Refills: 1 | Status: SHIPPED | OUTPATIENT
Start: 2025-03-25

## 2025-03-25 RX ORDER — TELMISARTAN 40 MG/1
40 TABLET ORAL DAILY
Qty: 100 TABLET | Refills: 3 | Status: SHIPPED | OUTPATIENT
Start: 2025-03-25

## 2025-03-25 NOTE — PROGRESS NOTES
[facility]    Pt has seen vascular medicine in past 12 months and in need of prescription refills per protocol.  A 100 day supply is provided whenever possible to improve adherence rates.     Lab Results   Component Value Date/Time    HBA1C 6.1 (H) 12/16/2024 11:07 AM      Lab Results   Component Value Date/Time    MALBCRT see below 12/16/2024 11:08 AM    MICROALBUR <1.2 12/16/2024 11:08 AM      Lab Results   Component Value Date/Time    ALKPHOSPHAT 138 (H) 12/16/2024 11:07 AM    ASTSGOT 23 12/16/2024 11:07 AM    ALTSGPT 28 12/16/2024 11:07 AM    TBILIRUBIN 0.5 12/16/2024 11:07 AM        Meds refilled:  Atorvastatin  Telmisartan    Pt agrees to mail order.     Sergio Ellis, PharmD     CC  Jessie Bolaños APRN

## 2025-04-03 ENCOUNTER — APPOINTMENT (OUTPATIENT)
Dept: MEDICAL GROUP | Facility: PHYSICIAN GROUP | Age: 75
End: 2025-04-03
Payer: MEDICARE

## 2025-04-03 VITALS
HEART RATE: 76 BPM | OXYGEN SATURATION: 94 % | WEIGHT: 148.7 LBS | BODY MASS INDEX: 23.9 KG/M2 | RESPIRATION RATE: 12 BRPM | DIASTOLIC BLOOD PRESSURE: 70 MMHG | HEIGHT: 66 IN | TEMPERATURE: 97.9 F | SYSTOLIC BLOOD PRESSURE: 126 MMHG

## 2025-04-03 DIAGNOSIS — E89.0 POSTOPERATIVE HYPOTHYROIDISM: ICD-10-CM

## 2025-04-03 DIAGNOSIS — G08 CEREBRAL VENOUS THROMBOSIS OF SIGMOID SINUS: ICD-10-CM

## 2025-04-03 DIAGNOSIS — R15.1 FECAL SMEARING: ICD-10-CM

## 2025-04-03 DIAGNOSIS — L85.3 DRY SKIN: ICD-10-CM

## 2025-04-03 DIAGNOSIS — R06.09 DYSPNEA ON EXERTION: ICD-10-CM

## 2025-04-03 DIAGNOSIS — G45.4 TRANSIENT GLOBAL AMNESIA: ICD-10-CM

## 2025-04-03 DIAGNOSIS — R73.03 PREDIABETES: ICD-10-CM

## 2025-04-03 DIAGNOSIS — G40.909 SEIZURE DISORDER (HCC): ICD-10-CM

## 2025-04-03 DIAGNOSIS — M12.811 ROTATOR CUFF ARTHROPATHY OF RIGHT SHOULDER: ICD-10-CM

## 2025-04-03 DIAGNOSIS — N95.8 GENITOURINARY SYNDROME OF MENOPAUSE: ICD-10-CM

## 2025-04-03 DIAGNOSIS — R05.2 SUBACUTE COUGH: ICD-10-CM

## 2025-04-03 DIAGNOSIS — E78.5 DYSLIPIDEMIA: ICD-10-CM

## 2025-04-03 DIAGNOSIS — I10 PRIMARY HYPERTENSION: ICD-10-CM

## 2025-04-03 DIAGNOSIS — M79.10 MYALGIA: ICD-10-CM

## 2025-04-03 DIAGNOSIS — Z12.31 ENCOUNTER FOR SCREENING MAMMOGRAM FOR BREAST CANCER: ICD-10-CM

## 2025-04-03 PROCEDURE — 99215 OFFICE O/P EST HI 40 MIN: CPT | Performed by: INTERNAL MEDICINE

## 2025-04-03 PROCEDURE — 3078F DIAST BP <80 MM HG: CPT | Performed by: INTERNAL MEDICINE

## 2025-04-03 PROCEDURE — 3074F SYST BP LT 130 MM HG: CPT | Performed by: INTERNAL MEDICINE

## 2025-04-03 PROCEDURE — G2211 COMPLEX E/M VISIT ADD ON: HCPCS | Performed by: INTERNAL MEDICINE

## 2025-04-03 RX ORDER — ESTRADIOL 0.1 MG/G
1 CREAM VAGINAL
Qty: 42.5 G | Refills: 3 | Status: SHIPPED | OUTPATIENT
Start: 2025-04-03

## 2025-04-03 ASSESSMENT — FIBROSIS 4 INDEX: FIB4 SCORE: 1.32

## 2025-04-03 NOTE — PROGRESS NOTES
Subjective:   Chief Complaint/History of Present Illness:  Michela Valdez is a 74 y.o. female established patient who presents today to discuss medical problems as listed below. Michela is unaccompanied for today's visit.    History of Present Illness  The patient presents for evaluation of transient global amnesia, weight loss, hypertension, shoulder pain, hypercholesterolemia, cough, constipation and bowel leakage, blood clot, and fingertip dryness.    She has been experiencing weight loss, which she attributes to her medication regimen. She typically consumes 2 meals per day and has been advised to try intermittent fasting for 15 hours. She reports a lack of appetite and does not feel hungry. She is always trying to lose weight and would love to get rid of her stomach. She is usually 10 pounds heavier than her current weight. She tries to eat something in the morning and generally does not eat lunch but eats a late lunch and then skips dinner. She does not feel that hungry and keeps thinking it is due to the medication.    She has been experiencing ankle swelling, which she believes may be related to her blood pressure medication. She was previously on amlodipine but has since been switched to telmisartan by Dr. Borrero. She notes that wearing socks leaves a groove on her ankles.    She is currently taking Celebrex twice daily for shoulder pain, which she finds effective. She is scheduled for reverse total shoulder surgery on 05/20/2025. She has been supplementing with Tylenol extra strength midday for the past week. She reports a decrease in arm strength and daily aches. She has a torn bicep but has been informed that only the rotator cuff will be addressed during surgery. She has an appointment with Dr. Howe on 04/20/2025. She has been practicing using her left hand in preparation for the surgery. She is concerned about her loss of strength and difficulty opening jars.    She is currently on atorvastatin and  ezetimibe for cholesterol management. She expresses a desire to reduce her medication burden. She has not discussed the possibility of injectable cholesterol medications with her provider. She is curious about the potential side effects of these medications. She is considering holding off on any changes until after her surgery.    She is due for a mammogram in the next 2 weeks. She had a colonoscopy in 2024. She has scheduled an EKG with Dr. Iqbal on 04/10/2025. She has been experiencing a morning cough for the past month, which she attributes to allergies. She also reports a raspy voice and frequent water intake. She occasionally coughs in the afternoon, but it is primarily a morning symptom. She has been experiencing constipation and bowel leakage. She is currently undergoing physical therapy and expects to complete her course by the end of April 2025. She is interested in medication options for bowel control. She has not consulted with a urogynecologist or colorectal specialist. She is considering hormone replacement therapy for cognitive issues.    She is currently on Keppra 750 mg twice daily, which she finds effective. She has not had any recent episodes of transient global amnesia. Her Keppra levels have been stable, and she has been advised to reduce the frequency of monitoring. She is approximately 3 months out from her last lab work.    She is currently on Eliquis for a blood clot, which she hopes will eventually dissolve. She has been informed that her clot has not resolved, necessitating continued anticoagulation therapy. She is concerned about the potential need to discontinue Eliquis prior to surgery.    She has been experiencing difficulty walking longer distances and becomes winded after climbing a flight of stairs. She attributes this to her age and lack of exercise.    She has been experiencing dry, cracked fingertips. She does not use any emollients or hand creams.    FAMILY HISTORY  Her father  " at 53 from coronary artery disease. She has 5 siblings, and at least 5 of them are on statins.    MEDICATIONS  Current: Keppra, telmisartan, Celebrex, atorvastatin, ezetimibe, Eliquis, Tylenol.  Past: Amlodipine.    IMMUNIZATIONS  She received the RSV vaccine.       Current Medications:  Current Outpatient Medications Ordered in Epic   Medication Sig Dispense Refill    estradiol (ESTRACE) 0.1 MG/GM vaginal cream Insert 1 g into the vagina every 72 hours. 42.5 g 3    atorvastatin (LIPITOR) 80 MG tablet Take 1 Tablet by mouth every evening. 100 Tablet 1    telmisartan (MICARDIS) 40 MG Tab Take 1 Tablet by mouth every day. 100 Tablet 3    apixaban (ELIQUIS) 2.5mg Tab Take 1 Tablet by mouth 2 times a day. 180 Tablet 3    Wheat Dextrin (EQ FIBER POWDER PO) Take 1 Scoop by mouth every day. Take 1 Scoop (27 g) of Raw Organic Sprout and Fiber Blend and mix with 10 to 12 ounces of water once daily.      Probiotic Product (PROBIOTIC BLEND) Cap Take 1 Capsule by mouth every day. [employer] Probiotic      ezetimibe (ZETIA) 10 MG Tab TAKE 1 TABLET BY MOUTH EVERY  Tablet 3    celecoxib (CELEBREX) 100 MG Cap Take 1 Capsule by mouth 2 times a day. 100 Capsule 3    levothyroxine (SYNTHROID) 88 MCG Tab Take 1 Tablet by mouth see administration instructions. 1 pill 6 days per week and 2 pills on Sundays 117 Tablet 3    levetiracetam (KEPPRA) 750 MG tablet Take 1 Tablet by mouth 2 times a day. 200 Tablet 3    Glucos-Chond-Hyal Ac-Ca Fructo (MOVE FREE JOINT HEALTH ADVANCE) Tab Take 2 Tablets by mouth every day.      Cholecalciferol (VITAMIN D3) 1000 units Cap Take 2,000 Units by mouth. 1000 units x 2 capsules = 2000 units       No current Epic-ordered facility-administered medications on file.          Objective:   Physical Exam:    Vitals: /70   Pulse 76   Temp 36.6 °C (97.9 °F)   Resp 12   Ht 1.676 m (5' 6\")   Wt 67.4 kg (148 lb 11.2 oz)   SpO2 94%    BMI: Body mass index is 24 kg/m².  Physical " Exam  Constitutional:       General: She is not in acute distress.     Appearance: Normal appearance. She is not ill-appearing.   HENT:      Right Ear: External ear normal.      Left Ear: External ear normal.   Eyes:      General: No scleral icterus.     Conjunctiva/sclera: Conjunctivae normal.   Cardiovascular:      Rate and Rhythm: Normal rate.   Pulmonary:      Effort: Pulmonary effort is normal. No respiratory distress.      Breath sounds: Rales present. No wheezing.      Comments: Dry cough, crackles in right lung base  Musculoskeletal:      Right lower leg: No edema.      Left lower leg: No edema.      Comments: Pain with right arm abduction and flexion   Skin:     General: Skin is warm and dry.   Psychiatric:         Mood and Affect: Mood normal.         Behavior: Behavior normal.         Thought Content: Thought content normal.         Judgment: Judgment normal.           Results  Laboratory Studies  Kidney function is 97%. LDL levels have been in the 60s (68, 69, 64).    Imaging  MR venogram showed no significant interval change.    Assessment & Plan  1. Transient global amnesia.  2. Seizure disorder  3. Cerebral venous thrombosis of sigmoid sinus.  An MR venogram will be ordered to assess the status of her blood clot. She will continue Eliquis 2.5 mg twice daily pending the results of the MR venogram. She will continue her current regimen of Keppra 750 mg twice daily, with the potential for dose reduction to 500 mg twice daily if necessary. Lab work will be conducted 2 to 3 times annually to monitor Keppra levels. Follows with vascular medicine, neurology and hematology.    4. Prediabetes   5. Mild weight loss.  6. Hypothyroidism   She reports mild weight loss and is trying to lose weight by watching her sugar intake and considering intermittent fasting. Her appetite is fairly good, and she eats 2 meals a day. Update A1c and thyroid function to ensure stability. Continue levothyroxine 88 mcg 6 days per  week and 176 mcg on Sundays.     7. Hypertension.  She is currently on telmisartan 40 mg daily for blood pressure management. Her blood pressure readings are within normal range. Amlodipine discontinued due to ankle edema.    8. Hypercholesterolemia.  She will continue her current regimen of atorvastatin 80 mg and ezetimibe 10 mg daily. A muscle enzyme marker will be included in her next lab work to assess for potential muscle inflammation due to statin use. She also has bowel changes and wonders if her medicine could be contributing. Would be interested in Repatha or Praluent if she would qualify to help reduce pill burden.    9. Shoulder pain, chronic right  She is scheduled for reverse total shoulder surgery on 05/20/2025. She will discontinue Celebrex one week prior to surgery and resume postoperatively as needed for pain management. Tylenol extra strength can be used for additional pain relief.    10. Dry cough.  11. Lung crackles  12. Dyspnea on exertion  A chest x-ray will be ordered to rule out any underlying lung pathology. She sees cardiology next week, discuss that ENAMORADO and decreased exercise tolerance can be an anginal equivalent and to discuss with Dr. Iqbal as well.    13. Constipation and bowel leakage.  She will continue with Kegel exercises and pelvic floor physical therapy. A prescription for Estrace cream will be provided to apply nightly for the first week, then 2 to 3 times weekly thereafter. Could have her meet with urogynecology or colorectal surgery to additional examination and discussion as far as etiology. May be related to medications.     14. Fingertip dryness.  She will apply an emollient or hand cream to her fingertips before bedtime.    Follow-up  The patient will follow up in 3 months.      Assessment and Plan:   Michela is a 74 y.o. female with the following:  Problem List Items Addressed This Visit       Cerebral venous thrombosis of sigmoid sinus    Relevant Orders    MR-VENOGRAM  (MRV) HEAD    Dyslipidemia    Relevant Orders    VITAMIN B12    VITAMIN D,25 HYDROXY (DEFICIENCY)    Lipid Profile    Comp Metabolic Panel    CBC WITH DIFFERENTIAL    Fecal smearing    Genitourinary syndrome of menopause    Relevant Medications    estradiol (ESTRACE) 0.1 MG/GM vaginal cream    Postoperative hypothyroidism    Relevant Orders    FREE THYROXINE    TSH    Comp Metabolic Panel    CBC WITH DIFFERENTIAL    Prediabetes    Relevant Orders    HEMOGLOBIN A1C    Comp Metabolic Panel    Primary hypertension    Rotator cuff arthropathy of right shoulder    Seizure disorder (HCC)    Relevant Orders    KEPPRA    TGA vs TIA- now with +seizure on EEG    Relevant Orders    MR-VENOGRAM (MRV) HEAD     Other Visit Diagnoses         Encounter for screening mammogram for breast cancer        Relevant Orders    MA-SCREENING MAMMO BILAT W/TOMOSYNTHESIS W/CAD      Subacute cough        Relevant Orders    DX-CHEST-2 VIEWS      Myalgia        Relevant Orders    CREATINE KINASE      Dyspnea on exertion        Relevant Orders    proBrain Natriuretic Peptide, NT      Dry skin of fingertips                   RTC: Return in about 3 months (around 7/3/2025).    I spent a total of 42 minutes with record review, exam, communication with the patient, communication with other providers, and documentation of this encounter.    Verbal consent was acquired by the patient to use [employer] ambient listening note generation during this visit Yes     Billing : secondary to the complexity of this patient's illnesses and their interactions.  All problems listed were discussed during the office visit, medications were evaluated and complexities were discussed as well as plan for the future      PLEASE NOTE: This dictation was created using voice recognition software. I have made every reasonable attempt to correct obvious errors, but I expect that there are errors of grammar and possibly content that I did not discover before finalizing the  note.      Archana Robertson,   Geriatric and Internal Medicine  [facility]

## 2025-04-08 ENCOUNTER — HOSPITAL ENCOUNTER (OUTPATIENT)
Dept: LAB | Facility: MEDICAL CENTER | Age: 75
End: 2025-04-08
Attending: INTERNAL MEDICINE
Payer: MEDICARE

## 2025-04-08 ENCOUNTER — TELEPHONE (OUTPATIENT)
Dept: VASCULAR LAB | Facility: MEDICAL CENTER | Age: 75
End: 2025-04-08

## 2025-04-08 DIAGNOSIS — R73.03 PREDIABETES: ICD-10-CM

## 2025-04-08 DIAGNOSIS — E89.0 POSTOPERATIVE HYPOTHYROIDISM: ICD-10-CM

## 2025-04-08 DIAGNOSIS — G40.909 SEIZURE DISORDER (HCC): ICD-10-CM

## 2025-04-08 DIAGNOSIS — R06.09 DYSPNEA ON EXERTION: ICD-10-CM

## 2025-04-08 DIAGNOSIS — M79.10 MYALGIA: ICD-10-CM

## 2025-04-08 DIAGNOSIS — E78.5 DYSLIPIDEMIA: ICD-10-CM

## 2025-04-08 DIAGNOSIS — G08 CEREBRAL VENOUS THROMBOSIS OF SIGMOID SINUS: ICD-10-CM

## 2025-04-08 LAB
BASOPHILS # BLD AUTO: 0.8 % (ref 0–1.8)
BASOPHILS # BLD: 0.09 K/UL (ref 0–0.12)
EOSINOPHIL # BLD AUTO: 0.35 K/UL (ref 0–0.51)
EOSINOPHIL NFR BLD: 3.3 % (ref 0–6.9)
ERYTHROCYTE [DISTWIDTH] IN BLOOD BY AUTOMATED COUNT: 44 FL (ref 35.9–50)
EST. AVERAGE GLUCOSE BLD GHB EST-MCNC: 128 MG/DL
HBA1C MFR BLD: 6.1 % (ref 4–5.6)
HCT VFR BLD AUTO: 42.2 % (ref 37–47)
HGB BLD-MCNC: 13.3 G/DL (ref 12–16)
IMM GRANULOCYTES # BLD AUTO: 0.04 K/UL (ref 0–0.11)
IMM GRANULOCYTES NFR BLD AUTO: 0.4 % (ref 0–0.9)
LYMPHOCYTES # BLD AUTO: 2.23 K/UL (ref 1–4.8)
LYMPHOCYTES NFR BLD: 21 % (ref 22–41)
MCH RBC QN AUTO: 29.2 PG (ref 27–33)
MCHC RBC AUTO-ENTMCNC: 31.5 G/DL (ref 32.2–35.5)
MCV RBC AUTO: 92.5 FL (ref 81.4–97.8)
MONOCYTES # BLD AUTO: 0.79 K/UL (ref 0–0.85)
MONOCYTES NFR BLD AUTO: 7.4 % (ref 0–13.4)
NEUTROPHILS # BLD AUTO: 7.14 K/UL (ref 1.82–7.42)
NEUTROPHILS NFR BLD: 67.1 % (ref 44–72)
NRBC # BLD AUTO: 0 K/UL
NRBC BLD-RTO: 0 /100 WBC (ref 0–0.2)
PLATELET # BLD AUTO: 300 K/UL (ref 164–446)
PMV BLD AUTO: 12.5 FL (ref 9–12.9)
RBC # BLD AUTO: 4.56 M/UL (ref 4.2–5.4)
WBC # BLD AUTO: 10.6 K/UL (ref 4.8–10.8)

## 2025-04-08 PROCEDURE — 85025 COMPLETE CBC W/AUTO DIFF WBC: CPT

## 2025-04-08 PROCEDURE — 82550 ASSAY OF CK (CPK): CPT

## 2025-04-08 PROCEDURE — 82306 VITAMIN D 25 HYDROXY: CPT

## 2025-04-08 PROCEDURE — 80177 DRUG SCRN QUAN LEVETIRACETAM: CPT

## 2025-04-08 PROCEDURE — 83036 HEMOGLOBIN GLYCOSYLATED A1C: CPT

## 2025-04-08 PROCEDURE — 82607 VITAMIN B-12: CPT

## 2025-04-08 PROCEDURE — 80061 LIPID PANEL: CPT

## 2025-04-08 PROCEDURE — 84439 ASSAY OF FREE THYROXINE: CPT

## 2025-04-08 PROCEDURE — 80053 COMPREHEN METABOLIC PANEL: CPT

## 2025-04-08 PROCEDURE — 36415 COLL VENOUS BLD VENIPUNCTURE: CPT

## 2025-04-08 PROCEDURE — 83880 ASSAY OF NATRIURETIC PEPTIDE: CPT

## 2025-04-08 PROCEDURE — 84443 ASSAY THYROID STIM HORMONE: CPT

## 2025-04-08 NOTE — TELEPHONE ENCOUNTER
Caller: Michela Vladez    Topic/issue: Patient called because her pharmacy does not have the prescription for apixaban (ELIQUIS) 2.5mg Tab that was sent in to the pharmacy in January. She did not pick it up so it does need to be resent. She also mentioned the mg has changed to 2.5 twice a day from 5 mg twice a day. She would like a callback to discuss the mg change because she was not aware of it.     Please advise.      Callback Number: [phone]    Thank you,     Rosette ORTIZ

## 2025-04-09 ENCOUNTER — RESULTS FOLLOW-UP (OUTPATIENT)
Dept: NEUROLOGY | Facility: MEDICAL CENTER | Age: 75
End: 2025-04-09

## 2025-04-09 LAB
25(OH)D3 SERPL-MCNC: 33 NG/ML (ref 30–100)
ALBUMIN SERPL BCP-MCNC: 4.1 G/DL (ref 3.2–4.9)
ALBUMIN/GLOB SERPL: 1.3 G/DL
ALP SERPL-CCNC: 121 U/L (ref 30–99)
ALT SERPL-CCNC: 26 U/L (ref 2–50)
ANION GAP SERPL CALC-SCNC: 13 MMOL/L (ref 7–16)
AST SERPL-CCNC: 30 U/L (ref 12–45)
BILIRUB SERPL-MCNC: 0.5 MG/DL (ref 0.1–1.5)
BUN SERPL-MCNC: 19 MG/DL (ref 8–22)
CALCIUM ALBUM COR SERPL-MCNC: 9.5 MG/DL (ref 8.5–10.5)
CALCIUM SERPL-MCNC: 9.6 MG/DL (ref 8.5–10.5)
CHLORIDE SERPL-SCNC: 108 MMOL/L (ref 96–112)
CHOLEST SERPL-MCNC: 107 MG/DL (ref 100–199)
CK SERPL-CCNC: 57 U/L (ref 0–154)
CO2 SERPL-SCNC: 20 MMOL/L (ref 20–33)
CREAT SERPL-MCNC: 0.61 MG/DL (ref 0.5–1.4)
FASTING STATUS PATIENT QL REPORTED: NORMAL
GFR SERPLBLD CREATININE-BSD FMLA CKD-EPI: 92 ML/MIN/1.73 M 2
GLOBULIN SER CALC-MCNC: 3.2 G/DL (ref 1.9–3.5)
GLUCOSE SERPL-MCNC: 102 MG/DL (ref 65–99)
HDLC SERPL-MCNC: 39 MG/DL
LDLC SERPL CALC-MCNC: 43 MG/DL
NT-PROBNP SERPL IA-MCNC: 204 PG/ML (ref 0–125)
POTASSIUM SERPL-SCNC: 5.6 MMOL/L (ref 3.6–5.5)
PROT SERPL-MCNC: 7.3 G/DL (ref 6–8.2)
SODIUM SERPL-SCNC: 141 MMOL/L (ref 135–145)
T4 FREE SERPL-MCNC: 1.61 NG/DL (ref 0.93–1.7)
TRIGL SERPL-MCNC: 124 MG/DL (ref 0–149)
TSH SERPL-ACNC: 0.2 UIU/ML (ref 0.38–5.33)
VIT B12 SERPL-MCNC: 818 PG/ML (ref 211–911)

## 2025-04-10 ENCOUNTER — TELEPHONE (OUTPATIENT)
Dept: MEDICAL GROUP | Facility: PHYSICIAN GROUP | Age: 75
End: 2025-04-10

## 2025-04-10 ENCOUNTER — OFFICE VISIT (OUTPATIENT)
Dept: CARDIOLOGY | Facility: MEDICAL CENTER | Age: 75
End: 2025-04-10
Attending: INTERNAL MEDICINE
Payer: MEDICARE

## 2025-04-10 VITALS
HEIGHT: 66 IN | RESPIRATION RATE: 16 BRPM | DIASTOLIC BLOOD PRESSURE: 66 MMHG | BODY MASS INDEX: 23.78 KG/M2 | HEART RATE: 90 BPM | SYSTOLIC BLOOD PRESSURE: 120 MMHG | OXYGEN SATURATION: 94 % | WEIGHT: 148 LBS

## 2025-04-10 DIAGNOSIS — G08 CEREBRAL VENOUS THROMBOSIS OF SIGMOID SINUS: ICD-10-CM

## 2025-04-10 DIAGNOSIS — E78.5 DYSLIPIDEMIA: ICD-10-CM

## 2025-04-10 DIAGNOSIS — R93.1 AGATSTON CAC SCORE 200-399: Chronic | ICD-10-CM

## 2025-04-10 DIAGNOSIS — Z01.810 PRE-OPERATIVE CARDIOVASCULAR EXAMINATION: ICD-10-CM

## 2025-04-10 DIAGNOSIS — I77.9 MILD CAROTID ARTERY DISEASE (HCC): ICD-10-CM

## 2025-04-10 DIAGNOSIS — I10 PRIMARY HYPERTENSION: ICD-10-CM

## 2025-04-10 LAB
EKG IMPRESSION: NORMAL
LEVETIRACETAM SERPL-MCNC: 40 UG/ML (ref 10–40)

## 2025-04-10 PROCEDURE — 3078F DIAST BP <80 MM HG: CPT | Performed by: INTERNAL MEDICINE

## 2025-04-10 PROCEDURE — 3074F SYST BP LT 130 MM HG: CPT | Performed by: INTERNAL MEDICINE

## 2025-04-10 PROCEDURE — 93010 ELECTROCARDIOGRAM REPORT: CPT | Performed by: INTERNAL MEDICINE

## 2025-04-10 PROCEDURE — 99214 OFFICE O/P EST MOD 30 MIN: CPT | Performed by: INTERNAL MEDICINE

## 2025-04-10 PROCEDURE — 99213 OFFICE O/P EST LOW 20 MIN: CPT | Performed by: INTERNAL MEDICINE

## 2025-04-10 PROCEDURE — 93005 ELECTROCARDIOGRAM TRACING: CPT | Mod: TC | Performed by: INTERNAL MEDICINE

## 2025-04-10 RX ORDER — CHLORAL HYDRATE 500 MG
1000 CAPSULE ORAL
COMMUNITY

## 2025-04-10 RX ORDER — ASPIRIN 81 MG/1
81 TABLET ORAL DAILY
COMMUNITY
End: 2025-04-10

## 2025-04-10 RX ORDER — AMLODIPINE BESYLATE 5 MG/1
5 TABLET ORAL DAILY
COMMUNITY
End: 2025-04-10

## 2025-04-10 ASSESSMENT — FIBROSIS 4 INDEX: FIB4 SCORE: 1.49

## 2025-04-10 NOTE — PROGRESS NOTES
Chief Complaint   Patient presents with    Hypertension    Dyslipidemia       Subjective     Michela Valdez is a 74 y.o. female who presents today for evaluation of elevated CAC with strong family history of CAD and mild carotid stenosis     Has a cough may be after recently added telmisartan    Having shoulder surgery,     Has had more being winded past month  liekly viral    Past Medical History:   Diagnosis Date    Acute laryngitis 04/19/2022    Michela presents to clinic with 8 days of URI. Start as a cold with sinus pain and pressure and drainage. She now is left with residual cough which is very intrusive for her work. Cough is nonproductive. She is using OTC vitamins. She otherwise feels well. No fevers/chills, malaise, chest pain, productive cough, or GI symptoms. This feels similar to prior episodes of laryngitis. She has had good keyana    Cataract     esau IOL implants    Dyslipidemia 10/13/2015    Elevated LFTs 05/16/2018    High cholesterol     History of osteopenia 05/16/2018    Hyperlipidemia     Hyponatremia 11/03/2023    Postoperative hypothyroidism 10/13/2015    Prediabetes 05/16/2018    Snoring     Thyroid disease     Urinary incontinence     stress incontinence, wears pad     Past Surgical History:   Procedure Laterality Date    CATARACT EXTRACTION WITH IOL Bilateral 2019    THYROIDECTOMY TOTAL  02/08/2012    Performed by ALEX CONTEH at [facility]    ABDOMINAL EXPLORATION      pyloric stenosis- as an infant    GYN SURGERY      D&C     Family History   Problem Relation Age of Onset    Osteoporosis Mother     Hyperlipidemia Father     Heart Disease Father         CAD, MI     Anemia Sister     Cancer Brother         prostate    Heart Disease Brother         CABG     Social History     Socioeconomic History    Marital status:      Spouse name: Not on file    Number of children: Not on file    Years of education: Not on file    Highest education level: Not on file    Occupational History    Not on file   Tobacco Use    Smoking status: Former     Current packs/day: 0.00     Average packs/day: 0.5 packs/day for 4.0 years (2.0 ttl pk-yrs)     Types: Cigarettes     Start date:      Quit date:      Years since quittin.3    Smokeless tobacco: Never   Vaping Use    Vaping status: Never Used   Substance and Sexual Activity    Alcohol use: Yes     Alcohol/week: 3.0 oz     Types: 5 Glasses of wine per week     Comment: glass of wine daily     Drug use: No    Sexual activity: Not Currently   Other Topics Concern    Not on file   Social History Narrative    Not on file     Social Drivers of Health     Financial Resource Strain: Low Risk  (2024)    Overall Financial Resource Strain (CARDIA)     Difficulty of Paying Living Expenses: Not hard at all   Food Insecurity: No Food Insecurity (2024)    Hunger Vital Sign     Worried About Running Out of Food in the Last Year: Never true     Ran Out of Food in the Last Year: Never true   Transportation Needs: No Transportation Needs (2024)    PRAPARE - Transportation     Lack of Transportation (Medical): No     Lack of Transportation (Non-Medical): No   Physical Activity: Not on file   Stress: Not on file   Social Connections: Not on file   Intimate Partner Violence: Not on file   Housing Stability: Low Risk  (2024)    Housing Stability Vital Sign     Unable to Pay for Housing in the Last Year: No     Number of Places Lived in the Last Year: 1     Unstable Housing in the Last Year: No     Allergies   Allergen Reactions    Naproxen Hives and Rash          Amlodipine      Ankle swelling    Penicillins Rash     As a young child     Outpatient Encounter Medications as of 4/10/2025   Medication Sig Dispense Refill    Nirmatrelvir-Ritonavir (PAXLOVID, 150/100, PO) Take  by mouth.      Omega-3 Fatty Acids (FISH OIL) 1000 MG Cap capsule Take 1,000 mg by mouth 3 times a day with meals.      aspirin 81 MG EC tablet Take 81 mg  "by mouth every day.      apixaban (ELIQUIS) 2.5mg Tab Take 1 Tablet by mouth 2 times a day. 180 Tablet 3    estradiol (ESTRACE) 0.1 MG/GM vaginal cream Insert 1 g into the vagina every 72 hours. 42.5 g 3    atorvastatin (LIPITOR) 80 MG tablet Take 1 Tablet by mouth every evening. 100 Tablet 1    telmisartan (MICARDIS) 40 MG Tab Take 1 Tablet by mouth every day. 100 Tablet 3    Wheat Dextrin (EQ FIBER POWDER PO) Take 1 Scoop by mouth every day. Take 1 Scoop (27 g) of Raw Organic Sprout and Fiber Blend and mix with 10 to 12 ounces of water once daily.      Probiotic Product (PROBIOTIC BLEND) Cap Take 1 Capsule by mouth every day. [employer] Flor[employer]ist Probiotic      ezetimibe (ZETIA) 10 MG Tab TAKE 1 TABLET BY MOUTH EVERY  Tablet 3    celecoxib (CELEBREX) 100 MG Cap Take 1 Capsule by mouth 2 times a day. 100 Capsule 3    levothyroxine (SYNTHROID) 88 MCG Tab Take 1 Tablet by mouth see administration instructions. 1 pill 6 days per week and 2 pills on Sundays 117 Tablet 3    levetiracetam (KEPPRA) 750 MG tablet Take 1 Tablet by mouth 2 times a day. 200 Tablet 3    Glucos-Chond-Hyal Ac-Ca Fructo (MOVE FREE [facility] ADVANCE) Tab Take 2 Tablets by mouth every day.      Cholecalciferol (VITAMIN D3) 1000 units Cap Take 2,000 Units by mouth. 1000 units x 2 capsules = 2000 units      [DISCONTINUED] amLODIPine (NORVASC) 5 MG Tab Take 5 mg by mouth every day.      [DISCONTINUED] apixaban (ELIQUIS) 2.5mg Tab Take 1 Tablet by mouth 2 times a day. 180 Tablet 3    [DISCONTINUED] Multiple Vitamin (MULTI-VITAMIN DAILY PO) Take 4 Capsules by mouth every day. Advanced Immune Support. Take 4 to 6 capsules as needed. (Patient not taking: Reported on 4/3/2025)       No facility-administered encounter medications on file as of 4/10/2025.     ROS           Objective     /66 (BP Location: Left arm, Patient Position: Sitting, BP Cuff Size: Adult)   Pulse 90   Resp 16   Ht 1.676 m (5' 6\")   Wt 67.1 kg (148 lb)   SpO2 " 94%   BMI 23.89 kg/m²     Physical Exam  Constitutional:       General: She is not in acute distress.     Appearance: She is not diaphoretic.   Eyes:      General: No scleral icterus.  Neck:      Vascular: No JVD.   Cardiovascular:      Rate and Rhythm: Normal rate.      Heart sounds: Normal heart sounds. No murmur heard.     No friction rub. No gallop.   Pulmonary:      Effort: No respiratory distress.      Breath sounds: No wheezing or rales.   Abdominal:      General: Bowel sounds are normal.      Palpations: Abdomen is soft.   Musculoskeletal:      Right lower leg: No edema.      Left lower leg: No edema.   Skin:     Findings: No rash.   Neurological:      Mental Status: She is alert. Mental status is at baseline.   Psychiatric:         Mood and Affect: Mood normal.          We reviewed in person the most recent labs  Recent Results (from the past 30 weeks)   KEPPRA    Collection Time: 09/18/24  2:01 PM   Result Value Ref Range    Keppra 26 10 - 40 ug/mL   KEPPRA    Collection Time: 11/12/24  1:54 PM   Result Value Ref Range    Keppra 25 10 - 40 ug/mL   CBC WITH DIFFERENTIAL    Collection Time: 12/16/24 11:07 AM   Result Value Ref Range    WBC 8.7 4.8 - 10.8 K/uL    RBC 4.55 4.20 - 5.40 M/uL    Hemoglobin 13.5 12.0 - 16.0 g/dL    Hematocrit 41.3 37.0 - 47.0 %    MCV 90.8 81.4 - 97.8 fL    MCH 29.7 27.0 - 33.0 pg    MCHC 32.7 32.2 - 35.5 g/dL    RDW 42.0 35.9 - 50.0 fL    Platelet Count 250 164 - 446 K/uL    MPV 11.2 9.0 - 12.9 fL    Neutrophils-Polys 75.70 (H) 44.00 - 72.00 %    Lymphocytes 16.10 (L) 22.00 - 41.00 %    Monocytes 6.40 0.00 - 13.40 %    Eosinophils 1.00 0.00 - 6.90 %    Basophils 0.50 0.00 - 1.80 %    Immature Granulocytes 0.30 0.00 - 0.90 %    Nucleated RBC 0.00 0.00 - 0.20 /100 WBC    Neutrophils (Absolute) 6.58 1.82 - 7.42 K/uL    Lymphs (Absolute) 1.40 1.00 - 4.80 K/uL    Monos (Absolute) 0.56 0.00 - 0.85 K/uL    Eos (Absolute) 0.09 0.00 - 0.51 K/uL    Baso (Absolute) 0.04 0.00 - 0.12 K/uL     Immature Granulocytes (abs) 0.03 0.00 - 0.11 K/uL    NRBC (Absolute) 0.00 K/uL   Comp Metabolic Panel    Collection Time: 12/16/24 11:07 AM   Result Value Ref Range    Sodium 136 135 - 145 mmol/L    Potassium 4.1 3.6 - 5.5 mmol/L    Chloride 102 96 - 112 mmol/L    Co2 24 20 - 33 mmol/L    Anion Gap 10.0 7.0 - 16.0    Glucose 125 (H) 65 - 99 mg/dL    Bun 20 8 - 22 mg/dL    Creatinine 0.49 (L) 0.50 - 1.40 mg/dL    Calcium 9.1 8.5 - 10.5 mg/dL    Correct Calcium 8.9 8.5 - 10.5 mg/dL    AST(SGOT) 23 12 - 45 U/L    ALT(SGPT) 28 2 - 50 U/L    Alkaline Phosphatase 138 (H) 30 - 99 U/L    Total Bilirubin 0.5 0.1 - 1.5 mg/dL    Albumin 4.3 3.2 - 4.9 g/dL    Total Protein 7.0 6.0 - 8.2 g/dL    Globulin 2.7 1.9 - 3.5 g/dL    A-G Ratio 1.6 g/dL   Lipid Profile    Collection Time: 12/16/24 11:07 AM   Result Value Ref Range    Cholesterol,Tot 143 100 - 199 mg/dL    Triglycerides 92 0 - 149 mg/dL    HDL 56 >=40 mg/dL    LDL 69 <100 mg/dL   HEMOGLOBIN A1C    Collection Time: 12/16/24 11:07 AM   Result Value Ref Range    Glycohemoglobin 6.1 (H) 4.0 - 5.6 %    Est Avg Glucose 128 mg/dL   VITAMIN D,25 HYDROXY (DEFICIENCY)    Collection Time: 12/16/24 11:07 AM   Result Value Ref Range    25-Hydroxy   Vitamin D 25 32 30 - 100 ng/mL   VITAMIN B12    Collection Time: 12/16/24 11:07 AM   Result Value Ref Range    Vitamin B12 -True Cobalamin 530 211 - 911 pg/mL   TSH    Collection Time: 12/16/24 11:07 AM   Result Value Ref Range    TSH 2.640 0.350 - 5.500 uIU/mL   FREE THYROXINE    Collection Time: 12/16/24 11:07 AM   Result Value Ref Range    Free T-4 1.49 0.93 - 1.70 ng/dL   FASTING STATUS    Collection Time: 12/16/24 11:07 AM   Result Value Ref Range    Fasting Status Fasting    ESTIMATED GFR    Collection Time: 12/16/24 11:07 AM   Result Value Ref Range    GFR (CKD-EPI) 97 >60 mL/min/1.73 m 2   MICROALBUMIN CREAT RATIO URINE    Collection Time: 12/16/24 11:08 AM   Result Value Ref Range    Creatinine, Urine 106.51 mg/dL    Microalbumin,  Urine Random <1.2 mg/dL    Micro Alb Creat Ratio see below 0 - 30 mg/g   KEPPRA    Collection Time: 01/06/25  4:00 PM   Result Value Ref Range    Keppra 21 10 - 40 ug/mL   APOLIPOPROTEIN B    Collection Time: 01/22/25  9:45 AM   Result Value Ref Range    Apolipoprotein-B 68 60 - 117 mg/dL   CREATINE KINASE    Collection Time: 04/08/25 10:00 AM   Result Value Ref Range    CPK Total 57 0 - 154 U/L   proBrain Natriuretic Peptide, NT    Collection Time: 04/08/25 10:00 AM   Result Value Ref Range    NT-proBNP 204 (H) 0 - 125 pg/mL   FREE THYROXINE    Collection Time: 04/08/25 10:00 AM   Result Value Ref Range    Free T-4 1.61 0.93 - 1.70 ng/dL   TSH    Collection Time: 04/08/25 10:00 AM   Result Value Ref Range    TSH 0.202 (L) 0.380 - 5.330 uIU/mL   VITAMIN B12    Collection Time: 04/08/25 10:00 AM   Result Value Ref Range    Vitamin B12 -True Cobalamin 818 211 - 911 pg/mL   VITAMIN D,25 HYDROXY (DEFICIENCY)    Collection Time: 04/08/25 10:00 AM   Result Value Ref Range    25-Hydroxy   Vitamin D 25 33 30 - 100 ng/mL   HEMOGLOBIN A1C    Collection Time: 04/08/25 10:00 AM   Result Value Ref Range    Glycohemoglobin 6.1 (H) 4.0 - 5.6 %    Est Avg Glucose 128 mg/dL   Lipid Profile    Collection Time: 04/08/25 10:00 AM   Result Value Ref Range    Cholesterol,Tot 107 100 - 199 mg/dL    Triglycerides 124 0 - 149 mg/dL    HDL 39 (A) >=40 mg/dL    LDL 43 <100 mg/dL   Comp Metabolic Panel    Collection Time: 04/08/25 10:00 AM   Result Value Ref Range    Sodium 141 135 - 145 mmol/L    Potassium 5.6 (H) 3.6 - 5.5 mmol/L    Chloride 108 96 - 112 mmol/L    Co2 20 20 - 33 mmol/L    Anion Gap 13.0 7.0 - 16.0    Glucose 102 (H) 65 - 99 mg/dL    Bun 19 8 - 22 mg/dL    Creatinine 0.61 0.50 - 1.40 mg/dL    Calcium 9.6 8.5 - 10.5 mg/dL    Correct Calcium 9.5 8.5 - 10.5 mg/dL    AST(SGOT) 30 12 - 45 U/L    ALT(SGPT) 26 2 - 50 U/L    Alkaline Phosphatase 121 (H) 30 - 99 U/L    Total Bilirubin 0.5 0.1 - 1.5 mg/dL    Albumin 4.1 3.2 - 4.9 g/dL     Total Protein 7.3 6.0 - 8.2 g/dL    Globulin 3.2 1.9 - 3.5 g/dL    A-G Ratio 1.3 g/dL   CBC WITH DIFFERENTIAL    Collection Time: 25 10:00 AM   Result Value Ref Range    WBC 10.6 4.8 - 10.8 K/uL    RBC 4.56 4.20 - 5.40 M/uL    Hemoglobin 13.3 12.0 - 16.0 g/dL    Hematocrit 42.2 37.0 - 47.0 %    MCV 92.5 81.4 - 97.8 fL    MCH 29.2 27.0 - 33.0 pg    MCHC 31.5 (L) 32.2 - 35.5 g/dL    RDW 44.0 35.9 - 50.0 fL    Platelet Count 300 164 - 446 K/uL    MPV 12.5 9.0 - 12.9 fL    Neutrophils-Polys 67.10 44.00 - 72.00 %    Lymphocytes 21.00 (L) 22.00 - 41.00 %    Monocytes 7.40 0.00 - 13.40 %    Eosinophils 3.30 0.00 - 6.90 %    Basophils 0.80 0.00 - 1.80 %    Immature Granulocytes 0.40 0.00 - 0.90 %    Nucleated RBC 0.00 0.00 - 0.20 /100 WBC    Neutrophils (Absolute) 7.14 1.82 - 7.42 K/uL    Lymphs (Absolute) 2.23 1.00 - 4.80 K/uL    Monos (Absolute) 0.79 0.00 - 0.85 K/uL    Eos (Absolute) 0.35 0.00 - 0.51 K/uL    Baso (Absolute) 0.09 0.00 - 0.12 K/uL    Immature Granulocytes (abs) 0.04 0.00 - 0.11 K/uL    NRBC (Absolute) 0.00 K/uL   FASTING STATUS    Collection Time: 25 10:00 AM   Result Value Ref Range    Fasting Status Fasting    ESTIMATED GFR    Collection Time: 25 10:00 AM   Result Value Ref Range    GFR (CKD-EPI) 92 >60 mL/min/1.73 m 2   EKG    Collection Time: 04/10/25 10:53 AM   Result Value Ref Range    Report       [facility]    Test Date:  2025-04-10  Pt Name:    CORBY OLIVIA                  Department: [facility]  MRN:        [id number]                      Room:  Gender:     Female                       Technician: MICHAEL  :        [id number]                   Requested By:TEODORO RENTERIA  Order #:    [id number]                    Reading MD:    Measurements  Intervals                                Axis  Rate:       83                           P:          73  CA:         169                          QRS:        63  QRSD:       89                           T:           56  QT:         368  QTc:        433    Interpretive Statements  Sinus rhythm  Baseline wander in lead(s) II,III,aVF  Compared to ECG 12/24/2023 14:36:49  No significant changes     '        Assessment & Plan     1. Pre-operative cardiovascular examination  EKG      2. Dyslipidemia        3. Mild carotid artery stenosis (HCC)        4. Agatston CAC score 200-399 340 in 2023        5. Primary hypertension        6. Cerebral venous thrombosis of sigmoid sinus  apixaban (ELIQUIS) 5mg Tab          Medical Decision Making: Today's Assessment/Status/Plan:        It was my pleasure to meet with Ms. Valdez.    We addressed the management of hypertension at today's visit. Blood pressure is well controlled.  We specifically assessed the labs on hypertension treatment  Try off telmisartan to see if cough related to recent addition    We addressed the management of dyslipidemia at today's visit. She is on appropriate lipid lowering medication.    For shoulder surgery She can proceed with the proposed procedure or surgery from a cardiac standpoint, no modifiable cardiovascular risk, no further cardiac testing required, hold antiplatelet as necessary, resume as soon as possible typically when patient is able to take oral medications.    We discussed her history of venous thrombosis she had been on 5 mg BID and didnt reduce to 2.5 she is unsure what to do but prefers to stay on 5 mg until she does her MRI    It is my pleasure to participate in the care of Ms. Valdez.  Please do not hesitate to contact me with questions or concerns. [facility] is available 24/7 for consultative services at [phone] in the perioperative period.    Electronically Signed    Arnaud Iqbal MD PhD FACC  Cardiologist [facility]

## 2025-04-10 NOTE — LETTER
PROCEDURE/SURGERY CLEARANCE FORM      Encounter Date: 4/10/2025    Patient: Michela Valdez  YOB: 1950    CARDIOLOGIST:  Arnaud Iqbal M.D.    REFERRING DOCTOR:  Shyam    The above patient is cleared to have the following procedure/surgery: shoulder surgery                                           Additional comments: She can proceed with the proposed procedure or surgery from a cardiac standpoint, no modifiable cardiovascular risk, no further cardiac testing required, hold anticoagulation and antiplatelet as necessary, resume as soon as possible typically when patient is able to take oral medications.    It is my pleasure to participate in the care of Ms. Valdez.  Please do not hesitate to contact me with questions or concerns. [facility] is available 24/7 for consultative services at [phone] in the perioperative period.    Electronically Signed    Arnaud Iqbal MD PhD FACC  Cardiologist [facility]

## 2025-04-10 NOTE — PATIENT INSTRUCTIONS
Hold telmisartan for a month and if cough goes away retry just to be sure cough is not caused by telmisartan

## 2025-04-10 NOTE — Clinical Note
I gave her 5 mg BID eliquis  for now she was confused she will do the MRV and perhaps if better then go to 2.5 mg BID

## 2025-04-10 NOTE — TELEPHONE ENCOUNTER
Caller Name: Michela  Call Back Number: [phone]    How would the patient prefer to be contacted with a response: Phone call OK to leave a detailed message    Patient received message from Dr. Ortiz about abnormal lab results. Dr. Ortiz recommended PCP follow up and discuss results. Please advise.

## 2025-04-11 DIAGNOSIS — E78.5 DYSLIPIDEMIA: ICD-10-CM

## 2025-04-11 RX ORDER — ATORVASTATIN CALCIUM 80 MG/1
80 TABLET, FILM COATED ORAL EVERY EVENING
Qty: 100 TABLET | Refills: 1 | Status: SHIPPED | OUTPATIENT
Start: 2025-04-11

## 2025-04-14 DIAGNOSIS — E89.0 POSTOPERATIVE HYPOTHYROIDISM: ICD-10-CM

## 2025-04-14 PROCEDURE — RXMED WILLOW AMBULATORY MEDICATION CHARGE: Performed by: NURSE PRACTITIONER

## 2025-04-14 RX ORDER — LEVOTHYROXINE SODIUM 75 UG/1
75 TABLET ORAL SEE ADMIN INSTRUCTIONS
Qty: 100 TABLET | Refills: 3 | Status: SHIPPED | OUTPATIENT
Start: 2025-04-14 | End: 2025-04-15

## 2025-04-15 DIAGNOSIS — E89.0 POSTOPERATIVE HYPOTHYROIDISM: ICD-10-CM

## 2025-04-15 RX ORDER — LEVOTHYROXINE SODIUM 75 UG/1
75 TABLET ORAL
Qty: 100 TABLET | Refills: 3 | Status: SHIPPED | OUTPATIENT
Start: 2025-04-15

## 2025-04-16 ENCOUNTER — PHARMACY VISIT (OUTPATIENT)
Dept: PHARMACY | Facility: MEDICAL CENTER | Age: 75
End: 2025-04-16
Payer: COMMERCIAL

## 2025-04-18 ENCOUNTER — APPOINTMENT (OUTPATIENT)
Dept: RADIOLOGY | Facility: MEDICAL CENTER | Age: 75
End: 2025-04-18
Attending: INTERNAL MEDICINE
Payer: MEDICARE

## 2025-04-23 ENCOUNTER — APPOINTMENT (OUTPATIENT)
Dept: ADMISSIONS | Facility: MEDICAL CENTER | Age: 75
End: 2025-04-23
Attending: ORTHOPAEDIC SURGERY
Payer: MEDICARE

## 2025-04-28 ENCOUNTER — APPOINTMENT (OUTPATIENT)
Dept: URBAN - METROPOLITAN AREA CLINIC 35 | Facility: CLINIC | Age: 75
Setting detail: DERMATOLOGY
End: 2025-04-28

## 2025-04-28 DIAGNOSIS — L03.01 CELLULITIS OF FINGER: ICD-10-CM | Status: IMPROVED

## 2025-04-28 PROBLEM — L03.011 CELLULITIS OF RIGHT FINGER: Status: ACTIVE | Noted: 2025-04-28

## 2025-04-28 PROBLEM — L03.012 CELLULITIS OF LEFT FINGER: Status: ACTIVE | Noted: 2025-04-28

## 2025-04-28 PROCEDURE — 99213 OFFICE O/P EST LOW 20 MIN: CPT

## 2025-04-28 PROCEDURE — ? PRESCRIPTION

## 2025-04-28 PROCEDURE — ? COUNSELING

## 2025-04-28 PROCEDURE — ? TREATMENT REGIMEN

## 2025-04-28 RX ORDER — CHLORHEXIDINE GLUCONATE 1.2 MG/ML
SMALL AMOUNT RINSE BUCCAL TID
Qty: 473 | Refills: 3 | Status: ERX | COMMUNITY
Start: 2025-04-28

## 2025-04-28 RX ADMIN — CHLORHEXIDINE GLUCONATE SMALL AMOUNT: 1.2 RINSE BUCCAL at 00:00

## 2025-04-28 ASSESSMENT — LOCATION DETAILED DESCRIPTION DERM
LOCATION DETAILED: LEFT MIDDLE FINGERNAIL
LOCATION DETAILED: RIGHT MIDDLE FINGERNAIL

## 2025-04-28 ASSESSMENT — LOCATION SIMPLE DESCRIPTION DERM
LOCATION SIMPLE: LEFT MIDDLE FINGERNAIL
LOCATION SIMPLE: RIGHT MIDDLE FINGERNAIL

## 2025-04-28 ASSESSMENT — LOCATION ZONE DERM: LOCATION ZONE: FINGERNAIL

## 2025-04-29 ENCOUNTER — APPOINTMENT (OUTPATIENT)
Dept: RADIOLOGY | Facility: MEDICAL CENTER | Age: 75
End: 2025-04-29
Attending: INTERNAL MEDICINE
Payer: MEDICARE

## 2025-04-29 DIAGNOSIS — R05.2 SUBACUTE COUGH: ICD-10-CM

## 2025-04-29 PROCEDURE — 71046 X-RAY EXAM CHEST 2 VIEWS: CPT

## 2025-04-30 ENCOUNTER — RESULTS FOLLOW-UP (OUTPATIENT)
Dept: MEDICAL GROUP | Facility: PHYSICIAN GROUP | Age: 75
End: 2025-04-30

## 2025-05-01 ENCOUNTER — TELEPHONE (OUTPATIENT)
Dept: VASCULAR LAB | Facility: MEDICAL CENTER | Age: 75
End: 2025-05-01
Payer: MEDICARE

## 2025-05-01 DIAGNOSIS — E78.41 HIGH SERUM LIPOPROTEIN(A): ICD-10-CM

## 2025-05-01 DIAGNOSIS — I10 PRIMARY HYPERTENSION: ICD-10-CM

## 2025-05-01 DIAGNOSIS — R73.01 IMPAIRED FASTING BLOOD SUGAR: ICD-10-CM

## 2025-05-01 DIAGNOSIS — G08 CEREBRAL VENOUS THROMBOSIS OF SIGMOID SINUS: ICD-10-CM

## 2025-05-01 DIAGNOSIS — E89.0 POSTOPERATIVE HYPOTHYROIDISM: ICD-10-CM

## 2025-05-01 DIAGNOSIS — E78.5 DYSLIPIDEMIA: ICD-10-CM

## 2025-05-01 NOTE — TELEPHONE ENCOUNTER
AE 77 doing a weather check at this time    Established patient  Chart prep for upcoming appointment.    Any pending/incomplete orders from last visit? Yes Labs  Was patient called and reminded to complete pending orders? Yes  Were any records requested?  No    Referral up to date? Yes renewal ordered, sent to provider to co-sign, AND new referral attached to upcoming appointment.    Referral attached to appointment (renewals and New patients only)? Yes renewal ordered and sent to provider to co-sign   Virtual appointment? No    Shalonda Rodriguez, Med Ass't  [facility]  Ph. [phone]  Fx. [phone]

## 2025-05-06 ENCOUNTER — APPOINTMENT (OUTPATIENT)
Dept: RADIOLOGY | Facility: MEDICAL CENTER | Age: 75
End: 2025-05-06
Attending: INTERNAL MEDICINE
Payer: MEDICARE

## 2025-05-08 ENCOUNTER — OFFICE VISIT (OUTPATIENT)
Dept: CARDIOLOGY | Facility: MEDICAL CENTER | Age: 75
End: 2025-05-08
Attending: INTERNAL MEDICINE
Payer: MEDICARE

## 2025-05-08 ENCOUNTER — DOCUMENTATION (OUTPATIENT)
Dept: VASCULAR LAB | Facility: MEDICAL CENTER | Age: 75
End: 2025-05-08

## 2025-05-08 ENCOUNTER — TELEPHONE (OUTPATIENT)
Dept: CARDIOLOGY | Facility: MEDICAL CENTER | Age: 75
End: 2025-05-08

## 2025-05-08 VITALS
WEIGHT: 148 LBS | HEART RATE: 90 BPM | BODY MASS INDEX: 23.78 KG/M2 | SYSTOLIC BLOOD PRESSURE: 106 MMHG | HEIGHT: 66 IN | DIASTOLIC BLOOD PRESSURE: 67 MMHG

## 2025-05-08 DIAGNOSIS — M12.811 ROTATOR CUFF ARTHROPATHY OF RIGHT SHOULDER: ICD-10-CM

## 2025-05-08 DIAGNOSIS — G40.909 SEIZURE DISORDER (HCC): ICD-10-CM

## 2025-05-08 DIAGNOSIS — G08 CEREBRAL VENOUS THROMBOSIS OF SIGMOID SINUS: ICD-10-CM

## 2025-05-08 DIAGNOSIS — E78.5 DYSLIPIDEMIA: ICD-10-CM

## 2025-05-08 DIAGNOSIS — I10 PRIMARY HYPERTENSION: ICD-10-CM

## 2025-05-08 PROCEDURE — 99212 OFFICE O/P EST SF 10 MIN: CPT

## 2025-05-08 PROCEDURE — G2211 COMPLEX E/M VISIT ADD ON: HCPCS | Performed by: INTERNAL MEDICINE

## 2025-05-08 PROCEDURE — 3074F SYST BP LT 130 MM HG: CPT | Performed by: INTERNAL MEDICINE

## 2025-05-08 PROCEDURE — RXMED WILLOW AMBULATORY MEDICATION CHARGE: Performed by: INTERNAL MEDICINE

## 2025-05-08 PROCEDURE — 3078F DIAST BP <80 MM HG: CPT | Performed by: INTERNAL MEDICINE

## 2025-05-08 PROCEDURE — 99214 OFFICE O/P EST MOD 30 MIN: CPT | Performed by: INTERNAL MEDICINE

## 2025-05-08 RX ORDER — CELECOXIB 100 MG/1
100 CAPSULE ORAL DAILY
Qty: 30 CAPSULE | Refills: 11
Start: 2025-05-08

## 2025-05-08 ASSESSMENT — FIBROSIS 4 INDEX: FIB4 SCORE: 1.49

## 2025-05-08 NOTE — PROGRESS NOTES
Home Blood Pressure Monitor Calibration     Patient brought in Blood pressure device calibration test with home monitor and office monitor.     Home monitor device has been: Approved for use    See media for calibration test 5/8/2025     Shalonda Rodriguez, Med Ass't  [facility]  Ph. [phone]  Fx. [phone]

## 2025-05-08 NOTE — PROGRESS NOTES
VASCULAR MEDICINE CLINIC - Follow up VISIT  25    Michela Valdez is a 74 y.o. female      Subjective      HPI:  Here for f/u of cerebral vein thrombosis and htn and dyslipidemia  No episodes of confusion since 2024  No headaches or other neuro symptoms  Remains on keppra  Has f/u with neurology  Neuroimaging scheduled for tomorrow  No other CV complants  Has some ENAM[place]O, but improving  Still on eliquis - no bleeding  Didn't decrease dose after last visit  Saw hematology who recommended continuing therapy  No myalgias on atorvastatin  Remains on ezetimibe  No swelling since stopping amlodipine  Stopped telmisartan but no improvement in cough and BP is increasing  Thyroid dose decreased by pcp  Has decided to postpone shoulder surgery    Family History   Problem Relation Age of Onset    Osteoporosis Mother     Hyperlipidemia Father     Heart Disease Father         CAD, MI     Anemia Sister     Cancer Brother         prostate    Heart Disease Brother         CABG    Dad - fatal coronary disease in 50s    Social History     Tobacco Use    Smoking status: Former     Current packs/day: 0.00     Average packs/day: 0.5 packs/day for 4.0 years (2.0 ttl pk-yrs)     Types: Cigarettes     Start date:      Quit date:      Years since quittin.3    Smokeless tobacco: Never   Vaping Use    Vaping status: Never Used   Substance Use Topics    Alcohol use: Yes     Alcohol/week: 3.0 oz     Types: 5 Glasses of wine per week     Comment: glass of wine daily     Drug use: No      DIET AND EXERCISE:  Weight Change: down about 10 pounds  Diet: less snacking, more heart healthy  Exercise: Less than previous - pickleball and to gym frequently        Objective     Objective:     Vitals:    25 1042 25 1043 25 1044 25 1046   BP: 128/73 124/75 117/72 106/67   BP Location: Left arm Left arm Left arm Left arm   Patient Position: Sitting Sitting Sitting Sitting   BP Cuff Size: Adult Adult Adult Adult    Pulse: 91 91 91 90   Weight:       Height:          Physical Exam  Vitals reviewed.   Constitutional:       General: She is not in acute distress.     Appearance: She is not diaphoretic.   HENT:      Head: Normocephalic and atraumatic.   Eyes:      General: No scleral icterus.     Conjunctiva/sclera: Conjunctivae normal.   Neck:      Vascular: No carotid bruit.   Cardiovascular:      Rate and Rhythm: Normal rate and regular rhythm.      Heart sounds: Normal heart sounds. No murmur heard.  Pulmonary:      Effort: Pulmonary effort is normal. No respiratory distress.      Breath sounds: Normal breath sounds. No wheezing or rales.   Musculoskeletal:      Right lower leg: No edema.      Left lower leg: No edema.   Skin:     Coloration: Skin is not pale.   Neurological:      General: No focal deficit present.      Mental Status: She is alert and oriented to person, place, and time.      Cranial Nerves: No cranial nerve deficit.      Coordination: Coordination normal.      Gait: Gait is intact. Gait normal.   Psychiatric:         Mood and Affect: Mood and affect normal.         Behavior: Behavior normal.        DATA REVIEW    Lab Results   Component Value Date/Time    CHOLSTRLTOT 107 04/08/2025 10:00 AM    LDL 43 04/08/2025 10:00 AM    HDL 39 (A) 04/08/2025 10:00 AM    TRIGLYCERIDE 124 04/08/2025 10:00 AM      Lab Results   Component Value Date/Time    LIPOPROTA 107 (H) 07/03/2024 10:24 AM      Lab Results   Component Value Date/Time    APOB 68 01/22/2025 09:45 AM    APOB 74 07/03/2024 10:24 AM      Lab Results   Component Value Date/Time    CRPHIGHSEN 0.7 08/02/2023 10:51 AM      Lab Results   Component Value Date/Time    SODIUM 141 04/08/2025 10:00 AM    POTASSIUM 5.6 (H) 04/08/2025 10:00 AM    CHLORIDE 108 04/08/2025 10:00 AM    CO2 20 04/08/2025 10:00 AM    GLUCOSE 102 (H) 04/08/2025 10:00 AM    BUN 19 04/08/2025 10:00 AM    CREATININE 0.61 04/08/2025 10:00 AM     Lab Results   Component Value Date/Time     ALKPHOSPHAT 121 (H) 04/08/2025 10:00 AM    ASTSGOT 30 04/08/2025 10:00 AM    ALTSGPT 26 04/08/2025 10:00 AM    TBILIRUBIN 0.5 04/08/2025 10:00 AM       Lab Results   Component Value Date/Time    HBA1C 6.1 (H) 04/08/2025 10:00 AM       Lab Results   Component Value Date/Time    MALBCRT see below 12/16/2024 11:08 AM    MICROALBUR <1.2 12/16/2024 11:08 AM       Beta 2 glycoprotein IgG and IgM - normal  ACLA - IgG normal; IgM mildly elevated  FVL - neg  PTGM - neg    CAC score jan 2023  LMA - 64.8  LCX - 0.0  LAD - 16.5  RCA - 258.7  PDA - 0.0  Total Calcium Score: 340.0    CTA head and neck oct 2023  1.  Negative for thrombosis or occlusion  2.  Mild bilateral internal carotid artery stenoses  3.  Anatomic variant as the right subclavian artery originates distal to the left subclavian artery and passes posterior to the trachea before exiting the right hemithorax  4.  Anatomic variant termination of the right vertebral artery and posterior inferior cerebellar artery    Echo oct 2023  No prior study is available for comparison.   Normal left ventricular systolic function.  The left ventricular ejection fraction is visually estimated to be 60%.  No significant valvular abnormalities.   A definite cardiac source for a systemic thromboembolic event is not   identified, failure to do so does not exclude its presence. If   clinically indicated, a transesophageal study would be useful.     MRI brain oct 2023  No acute process.  Age-related volume loss.  Subacute to chronic thrombus in the right sigmoid sinus and proximal right internal jugular vein at the skull base.    CTA head and neck dec 2023  Ctangiogram of the [place] demonstrating no large vessel occlusion.   Mild bilateral atherosclerosis with less than 50% ICA stenosis.     MRV jan 2024  Stable appearance of filling defects in the right transverse and sigmoid junction and the right sigmoid sinus extending into the right internal jugular vein at the skull  base.  Defect within the left internal jugular vein at the skull base noted, which may represent flow artifact or new thrombus in this location. Consider follow-up with contrast-enhanced CTA/CT venogram of the head and neck.    MRV head may 2024  1.  There is linear filling defect in the right sigmoid sinus and visualized jugular bulb likely representing chronic partial thrombus. There has been no significant interval change in the previous CT angiogram dated 12/24/2023 and previous MRI dated   10/23/2023.  2.  There is no evidence of acute thrombosis.        Medical Decision Making:  Today's Assessment / Status / Plan:     1. Dyslipidemia        2. Primary hypertension        3. Seizure disorder (HCC)        4. Cerebral venous thrombosis of sigmoid sinus  apixaban (ELIQUIS) 2.5mg Tab      5. Rotator cuff arthropathy of right shoulder  celecoxib (CELEBREX) 100 MG Cap         Etiology of Established CVD if Present:     1) acute confusional state -recurrent -  diagnosed as sz disorder -  no stroke seen on imaging. No residual. No source of embolism or afib.   No recurrence in nearly one year   Plan:  -911 with any tia or cva symptoms  -continue keppra per neuro  -medical management per below  -follow up with neuro for further w/u and management    2) cerebral vein thrombosis - unclear chronicity and unclear relation to TGA/TIA.   Possibly related to previous covid.   FVL and PTGM normal.    Stable or improved on follow-up imaging  Normal anti-beta 2 ab, but mildly elevated IgM acla  Lipoprotein a may be prothrombotic as well  Plan:  - medical management as below  -Continue anticoagulation pending follow-up with hematology and per below  - Will defer any indicated age appropriate screening for occult malignancy to pcp.    Lipid Management: Qualifies for Statin Therapy Based on 2018 ACC/AHA Guidelines: yes  Calculated 10-Year Risk of ASCVD: N/A  Currently on Statin: Yes  Strong FH of premature ASCVD  Subclinical athero  as evidenced by mild carotid disease and elevated CAC score  No significant aortic valve disease on echo October 2023  Very elevated lp(a) an independent risk factor for disease  Goal LDL less than 70 and apolipoprotein B less than 70  Reasonable control on most recent blood work  Plan  -Previously recommended family lipid and lp(a) screening  - continue atorva 80  -Continue ezetimibe 10   -Recheck fasting lipid panel and apolipoprotein B in future    Blood Pressure Management:  [facility] (2017) Blood Pressure Goal <130/80  Leg swelling on amlodipine - resolved with d/c  Cough not likely related to telmisartan  GFR and lytes stable  No albuminuria  No LVH  Likely improved with wt loss  NSAID use likely worsening  Plan:  -Continue home bp monitoring with good technique  -continue telmisartan 40 mg daily  -decrease celebrex to once daily and consider change to prn if should pain does not worsen    Glycemic Status: Prediabetic  A1c worsened to 6.1 and now stable there despite weight loss  - continue lifestyle mod  - recheck fasting glucose and A1c in future  -Consider an SGLT2 if continues to elevate    Anti-Platelet/Anti-Coagulant Tx: Yes  Agree with hematology that the benefits of extended anticoagulation likely outweigh the risks in this unique situation.  That being said I do think we can decrease the dose.  Patient in agreement  -Reduce Eliquis to 2.5 mg twice daily  -Report any bleeding  -Reasonable to stop for 2 days for procedures    Smoking: continue complete avoidance of all tobacco products    Physical Activity: Increase frequency and intensity of her exercise    Weight Management and Nutrition: has lost 10-12 pounds. recommended more attention to a low simple carbohydrate diet to avoid weight regain    Other:     #  Hypothroidism - appeared overtreated on most recent blood work. Pcp has reduced dose. Defer further management to pcp    Instructed to follow-up with PCP for remainder of adult medical needs:  yes  We will partner with other providers in the management of established vascular disease and cardiometabolic risk factors.    Studies to Be Obtained: none  Labs to Be Obtained: none currently    Follow up in: 16 weeks    Time: 32 min - - chart review/prep, review of other providers' records, imaging/lab review, face-to-face time for history/examination, ordering, prescribing,  review of results/meds/ treatment plan with patient/family/caregiver, documentation in EMR, care coordination (as needed)      Michael J Bloch, M.D.     Cc: Dr. Greer

## 2025-05-08 NOTE — TELEPHONE ENCOUNTER
Contact:  Phone number:There is no home phone number on file.    Name of person spoken with and relationship to patient: self   Patient’s Adherence:  How patient is doing on medication: Very Well    How many missed doses and reason: 0 N/A    Any new medications: No    Any new conditions: No    Any new allergies: No    Any new side effects: No    Any new diagnoses: No    How many doses remainin    Did patient want to speak with pharmacist: No   Delivery:  Delivery date and method:   via 25    Needs by Date: 25    Signature required: No     Any additional details for : N/A   Teach Appointment Date:  N/A   Shipping Address:  [address] [place] [id number]   Medication(name,strength and dose):  Eliquis 2.5 mg tab    Copay:  $94   Payment Method:  Credit card on file   Supplies:  NA   Additional Information:  NA

## 2025-05-08 NOTE — TELEPHONE ENCOUNTER
Received Renewal request via [employer]  for Eliquis 2.5 mg tab . (Quantity:200, Day Supply:100)     Insurance: [facility]  Member ID:  [id number]  BIN: [id number]  PCN: [id number]  [id number]     Ran Test claim via [place]  pt gets better copay at [facility]

## 2025-05-09 ENCOUNTER — PHARMACY VISIT (OUTPATIENT)
Dept: PHARMACY | Facility: MEDICAL CENTER | Age: 75
End: 2025-05-09
Payer: COMMERCIAL

## 2025-05-09 ENCOUNTER — HOSPITAL ENCOUNTER (OUTPATIENT)
Dept: RADIOLOGY | Facility: MEDICAL CENTER | Age: 75
End: 2025-05-09
Attending: INTERNAL MEDICINE
Payer: MEDICARE

## 2025-05-09 DIAGNOSIS — G08 CEREBRAL VENOUS THROMBOSIS OF SIGMOID SINUS: ICD-10-CM

## 2025-05-09 DIAGNOSIS — G45.4 TRANSIENT GLOBAL AMNESIA: ICD-10-CM

## 2025-05-09 PROCEDURE — 70544 MR ANGIOGRAPHY HEAD W/O DYE: CPT

## 2025-05-13 ENCOUNTER — RESULTS FOLLOW-UP (OUTPATIENT)
Dept: MEDICAL GROUP | Facility: PHYSICIAN GROUP | Age: 75
End: 2025-05-13

## 2025-06-02 ENCOUNTER — HOSPITAL ENCOUNTER (OUTPATIENT)
Dept: LAB | Facility: MEDICAL CENTER | Age: 75
End: 2025-06-02
Attending: PSYCHIATRY & NEUROLOGY
Payer: MEDICARE

## 2025-06-02 DIAGNOSIS — G40.109 LOCALIZATION-RELATED EPILEPSY (HCC): ICD-10-CM

## 2025-06-02 PROCEDURE — 80177 DRUG SCRN QUAN LEVETIRACETAM: CPT

## 2025-06-02 PROCEDURE — 36415 COLL VENOUS BLD VENIPUNCTURE: CPT

## 2025-06-03 DIAGNOSIS — M12.811 ROTATOR CUFF ARTHROPATHY OF RIGHT SHOULDER: ICD-10-CM

## 2025-06-03 RX ORDER — CELECOXIB 100 MG/1
100 CAPSULE ORAL 2 TIMES DAILY
Qty: 200 CAPSULE | Refills: 3 | Status: SHIPPED | OUTPATIENT
Start: 2025-06-03

## 2025-06-04 ENCOUNTER — RESULTS FOLLOW-UP (OUTPATIENT)
Dept: NEUROLOGY | Facility: MEDICAL CENTER | Age: 75
End: 2025-06-04

## 2025-06-04 LAB — LEVETIRACETAM SERPL-MCNC: 31 UG/ML (ref 10–40)

## 2025-06-10 ENCOUNTER — APPOINTMENT (OUTPATIENT)
Dept: FAMILY PLANNING/WOMEN'S HEALTH CLINIC | Facility: PHYSICIAN GROUP | Age: 75
End: 2025-06-10
Payer: MEDICARE

## 2025-06-10 DIAGNOSIS — G40.109 LOCALIZATION-RELATED EPILEPSY (HCC): ICD-10-CM

## 2025-06-10 DIAGNOSIS — G40.909 SEIZURE DISORDER (HCC): ICD-10-CM

## 2025-06-10 DIAGNOSIS — E78.5 DYSLIPIDEMIA: Primary | ICD-10-CM

## 2025-06-10 DIAGNOSIS — I10 PRIMARY HYPERTENSION: ICD-10-CM

## 2025-06-10 NOTE — ASSESSMENT & PLAN NOTE
Chronic, stable, related to TGA. Pt maintains on levetiracetam 750mg BID. Last seizure in 12/2023. Follow up with neurology per routine.

## 2025-06-10 NOTE — ASSESSMENT & PLAN NOTE
Chronic, stable. BP today 110/60. Pt monitors BP at home stating she generally runs 120-130 SBP. Denies dizziness/lightheadedness, chest pain, dyspnea. Continue current treatment regime: telmisartan 40mg daily. Follow up with PCP annually for continued monitoring and management.

## 2025-06-10 NOTE — ASSESSMENT & PLAN NOTE
Chronic, ongoing. Pt maintains on levothyroxine 75mcg daily with TSH slightly low at 0.202 on 4/2025 labs. She does complain of increasing fatigue. She has a repeat TSH order available to reassess. Continue current treatment regime. Follow up with PCP at least annually for continued monitoring and management.   Latest Reference Range & Units 04/08/25 10:00   TSH 0.380 - 5.330 uIU/mL 0.202 (L)   Free T-4 0.93 - 1.70 ng/dL 1.61   (L): Data is abnormally low

## 2025-06-10 NOTE — ASSESSMENT & PLAN NOTE
Chronic, stable. Maintains on statin therapy without issue. Most recent lipid panel from 4/2025 WNL. Continue atorvastatin 80mg daily, ezetimibe 10mg daily. Recommend Mediterranean diet and regular physical activity. Follow up with PCP at least annually for continued monitoring and management.   Lab Results   Component Value Date/Time    CHOLSTRLTOT 107 04/08/2025 10:00 AM    LDL 43 04/08/2025 10:00 AM    HDL 39 (A) 04/08/2025 10:00 AM    TRIGLYCERIDE 124 04/08/2025 10:00 AM

## 2025-06-11 ENCOUNTER — OFFICE VISIT (OUTPATIENT)
Dept: FAMILY PLANNING/WOMEN'S HEALTH CLINIC | Facility: PHYSICIAN GROUP | Age: 75
End: 2025-06-11
Payer: MEDICARE

## 2025-06-11 VITALS
OXYGEN SATURATION: 92 % | WEIGHT: 147 LBS | BODY MASS INDEX: 23.63 KG/M2 | HEIGHT: 66 IN | SYSTOLIC BLOOD PRESSURE: 110 MMHG | HEART RATE: 75 BPM | DIASTOLIC BLOOD PRESSURE: 60 MMHG

## 2025-06-11 DIAGNOSIS — G40.909 SEIZURE DISORDER (HCC): ICD-10-CM

## 2025-06-11 DIAGNOSIS — E89.0 POSTOPERATIVE HYPOTHYROIDISM: ICD-10-CM

## 2025-06-11 DIAGNOSIS — E78.5 DYSLIPIDEMIA: Primary | ICD-10-CM

## 2025-06-11 DIAGNOSIS — I10 PRIMARY HYPERTENSION: ICD-10-CM

## 2025-06-11 DIAGNOSIS — G40.109 LOCALIZATION-RELATED EPILEPSY (HCC): ICD-10-CM

## 2025-06-11 PROCEDURE — 3078F DIAST BP <80 MM HG: CPT | Performed by: PHYSICIAN ASSISTANT

## 2025-06-11 PROCEDURE — 1126F AMNT PAIN NOTED NONE PRSNT: CPT | Performed by: PHYSICIAN ASSISTANT

## 2025-06-11 PROCEDURE — 3074F SYST BP LT 130 MM HG: CPT | Performed by: PHYSICIAN ASSISTANT

## 2025-06-11 PROCEDURE — G0439 PPPS, SUBSEQ VISIT: HCPCS | Performed by: PHYSICIAN ASSISTANT

## 2025-06-11 SDOH — ECONOMIC STABILITY: TRANSPORTATION INSECURITY: IN THE PAST 12 MONTHS, HAS LACK OF TRANSPORTATION KEPT YOU FROM MEDICAL APPOINTMENTS OR FROM GETTING MEDICATIONS?: NO

## 2025-06-11 SDOH — ECONOMIC STABILITY: FOOD INSECURITY: WITHIN THE PAST 12 MONTHS, THE FOOD YOU BOUGHT JUST DIDN'T LAST AND YOU DIDN'T HAVE MONEY TO GET MORE.: NEVER TRUE

## 2025-06-11 SDOH — ECONOMIC STABILITY: FOOD INSECURITY: WITHIN THE PAST 12 MONTHS, YOU WORRIED THAT YOUR FOOD WOULD RUN OUT BEFORE YOU GOT THE MONEY TO BUY MORE.: NEVER TRUE

## 2025-06-11 SDOH — ECONOMIC STABILITY: FOOD INSECURITY: HOW HARD IS IT FOR YOU TO PAY FOR THE VERY BASICS LIKE FOOD, HOUSING, MEDICAL CARE, AND HEATING?: NOT HARD AT ALL

## 2025-06-11 ASSESSMENT — ACTIVITIES OF DAILY LIVING (ADL)
LACK_OF_TRANSPORTATION: NO
BATHING_REQUIRES_ASSISTANCE: 0

## 2025-06-11 ASSESSMENT — ENCOUNTER SYMPTOMS: GENERAL WELL-BEING: GOOD

## 2025-06-11 ASSESSMENT — PATIENT HEALTH QUESTIONNAIRE - PHQ9: CLINICAL INTERPRETATION OF PHQ2 SCORE: 0

## 2025-06-11 ASSESSMENT — FIBROSIS 4 INDEX: FIB4 SCORE: 1.49

## 2025-06-11 ASSESSMENT — PAIN SCALES - GENERAL: PAINLEVEL_OUTOF10: NO PAIN

## 2025-06-11 NOTE — PROGRESS NOTES
Comprehensive Health Assessment Program     Michela Valdez is a 74 y.o. here for her comprehensive health assessment.    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    Seizure disorder (HCC) 02/09/2024    Transient confusion 01/05/2024    Secondary hypercoagulable state (HCC) 11/08/2023    Mild cognitive impairment 11/03/2023    History of COVID-19 11/03/2023    Cerebral venous thrombosis of sigmoid sinus 10/24/2023    TGA vs TIA- now with +seizure on EEG 10/22/2023    Primary hypertension 10/22/2023    Fecal smearing 06/14/2023    Agatston CAC score 200-399 340 in 2023     Laryngeal spasm 01/18/2023    Elevated alkaline phosphatase level 09/28/2022    Genitourinary syndrome of menopause 09/28/2022    Skin lesion- right hand, SK 09/28/2022    Rotator cuff arthropathy of right shoulder 09/28/2022    Hip pain- right > left 09/28/2022    Palpitations 09/28/2022    Night sweats 09/28/2022    BMI 25.0-25.9,adult 09/28/2022    Chronic pain of right knee 12/07/2021    Slow transit constipation with bloating 12/07/2021    Prediabetes 05/16/2018    History of osteopenia 05/16/2018    Mild carotid artery stenosis (HCC) 05/24/2016    Postoperative hypothyroidism 10/13/2015    Dyslipidemia 10/13/2015    Diverticular disease of colon 07/21/2015       Current Medications[1]       Current supplements as per medication list.     Allergies:   Naproxen, Amlodipine, and Penicillins  Social History[2]  Family History   Problem Relation Age of Onset    Osteoporosis Mother     Hyperlipidemia Father     Heart Disease Father         CAD, MI     Anemia Sister     Cancer Brother         prostate    Heart Disease Brother         CABG     Michela  has a past medical history of Acute laryngitis (04/19/2022), Cataract, Dyslipidemia (10/13/2015), Elevated LFTs (05/16/2018), High cholesterol, History of osteopenia (05/16/2018), Hyperlipidemia, Hyponatremia (11/03/2023), Postoperative  hypothyroidism (10/13/2015), Prediabetes (05/16/2018), Snoring, Thyroid disease, and Urinary incontinence.   Past Surgical History[3]    Screening:  In the last six months have you experienced any leakage of urine? No    Depression Screening  Little interest or pleasure in doing things?  0 - not at all  Feeling down, depressed , or hopeless? 0 - not at all  Patient Health Questionnaire Score: 0     If depressive symptoms identified deferred to follow up visit unless specifically addressed in assessment and plan.    Interpretation of PHQ-9 Total Score   Score Severity   1-4 No Depression   5-9 Mild Depression   10-14 Moderate Depression   15-19 Moderately Severe Depression   20-27 Severe Depression    Screening for Cognitive Impairment  Do you or any of your friends or family members have any concern about your memory? Yes  Three Minute Recall (Village, Kitchen, Baby) 3/3    Dev clock face with all 12 numbers and set the hands to show 10 minutes past 11.  Yes 5  Cognitive concerns identified deferred for follow up unless specifically addressed in assessment and plan.    Fall Risk Assessment  Has the patient had two or more falls in the last year or any fall with injury in the last year?  No    Safety Assessment  Do you always wear your seatbelt?  Yes  Any changes to home needed to function safely? No  Difficulty hearing.  No  Patient counseled about all safety risks that were identified.    Functional Assessment ADLs  Are there any barriers preventing you from cooking for yourself or meeting nutritional needs?  No.    Are there any barriers preventing you from driving safely or obtaining transportation?  No.    Are there any barriers preventing you from using a telephone or calling for help?  No    Are there any barriers preventing you from shopping?  No.    Are there any barriers preventing you from taking care of your own finances?  No    Are there any barriers preventing you from managing your medications?  No     Are there any barriers preventing you from showering, bathing or dressing yourself? No    Are there any barriers preventing you from doing housework or laundry? No  Are there any barriers preventing you from using the toilet?No  Are you currently engaging in any exercise or physical activity?  Yes. Pickleball  Walking       Self-Assessment of Health  What is your perception of your health? Good    Do you sleep more than six hours a night? Yes    In the past 7 days, how much did pain keep you from doing your normal work? None    Do you spend quality time with family or friends (virtually or in person)? Yes    Do you usually eat a heart healthy diet that constists of a variety of fruits, vegetables, whole grains and fiber? Yes    Do you eat foods high in fat and/or Fast Food more than three times per week? No    How concerned are you that your medical conditions are not being well managed? Not at all    Are you worried that in the next 2 months, you may not have stable housing that you own, rent, or stay in as part of a household? No      Advance Care Planning  Do you have an Advance Directive, Living Will, Durable Power of , or POLST? Yes  Advance Directive       is not on file - instructed patient to bring in a copy to scan into their chart      Health Maintenance Summary            Current Care Gaps       Mammogram (Yearly) Overdue since 4/16/2025 04/03/2025  Order placed for MA-SCREENING MAMMO BILAT W/TOMOSYNTHESIS W/CAD by Archana Robertson D.O.    04/16/2024  MA-SCREENING MAMMO BILAT W/TOMOSYNTHESIS W/CAD    04/14/2022  MA-SCREENING MAMMO BILAT W/TOMOSYNTHESIS W/CAD    10/14/2020  MA-SCREENING MAMMO BILAT W/TOMOSYNTHESIS W/CAD    10/04/2018  MA-SCREEN MAMMO W/CAD-BILAT     Only the first 5 history entries have been loaded, but more history exists.                    Upcoming       Bone Density Scan (Every 5 Years) Next due on 5/19/2026 05/19/2021  DS-BONE DENSITY STUDY (DEXA)     03/27/2019  DS-BONE DENSITY STUDY (DEXA)    06/16/2016  DS-BONE DENSITY STUDY (DEXA)    09/22/2008  DS-BONE DENSITY STUDY (DEXA)              Annual Wellness Visit (Yearly) Next due on 6/11/2026 06/11/2025  Level of Service: [facility] ANNUAL WELLNESS VISIT-INCLUDES PPPS SUBSEQUE*    04/12/2024  Level of Service: [facility] ANNUAL WELLNESS VISIT-INCLUDES PPPS SUBSEQUE*    03/21/2023  Level of Service: [place] ANNUAL WELLNESS VISIT-INCLUDES PPPS SUBSEQUE*    09/28/2022  Level of Service: [facility] ANNUAL WELLNESS VISIT-INCLUDES PPPS SUBSEQUE*    03/15/2022  Level of Service: W[place] ANNUAL WELLNESS VISIT-INCLUDES PPPS SUBSEQUE*      Only the first 5 history entries have been loaded, but more history exists.              IMM DTaP/Tdap/Td Vaccine (2 - Td or Tdap) Next due on 10/24/2026      10/24/2016  Imm Admin: Tdap Vaccine                      Completed or No Longer Recommended       Influenza Vaccine (Series Information) Completed      10/01/2024  Imm Admin: Influenza split virus trivalent (PF)    11/08/2023  Imm Admin: Influenza Vaccine Adult HD    09/28/2022  Imm Admin: Influenza Vaccine Adult HD    10/06/2021  Imm Admin: Influenza split virus trivalent (PF)    09/29/2019  Imm Admin: Influenza Vaccine Adult HD      Only the first 5 history entries have been loaded, but more history exists.              Zoster (Shingles) Vaccines (Series Information) Completed      05/09/2020  Imm Admin: Zoster Vaccine Recombinant (RZV) (SHINGRIX)    02/15/2020  Imm Admin: Zoster Vaccine Recombinant (RZV) (SHINGRIX)    09/29/2019  Imm Admin: Zoster Vaccine Recombinant (RZV) (SHINGRIX)              Hepatitis C Screening  Completed      02/02/2021  Hepatitis C Antibody component of HEP C VIRUS ANTIBODY              Pneumococcal Vaccine: 50+ Years (Series Information) Completed      12/03/2020  Imm Admin: Pneumococcal polysaccharide vaccine (PPSV-23)    11/12/2019  Imm Admin: Pneumococcal Conjugate Vaccine (Prevnar/PCV-13)              Hepatitis A Vaccine (Hep  A) (Series Information) Aged Out      No completion history exists for this topic.              Hepatitis B Vaccine (Hep B) (Series Information) Aged Out     No completion history exists for this topic.              HPV Vaccines (Series Information) Aged Out     No completion history exists for this topic.              Polio Vaccine (Inactivated Polio) (Series Information) Aged Out     No completion history exists for this topic.              Meningococcal Immunization (Series Information) Aged Out     No completion history exists for this topic.              Meningococcal B Vaccine (Series Information) Aged Out     No completion history exists for this topic.              Colorectal Cancer Screening  Discontinued        Frequency changed to Never automatically (Topic No Longer Applies)    06/14/2024  Colonoscopy (Reason not specified)    07/01/2019  REFERRAL TO GI FOR COLONOSCOPY    10/13/2015  Colonoscopy (Postponed - was done this year, pt bring in  results to pcp)    08/05/2014  REFERRAL TO GI FOR COLONOSCOPY     Only the first 5 history entries have been loaded, but more history exists.            COVID-19 Vaccine  Discontinued      10/05/2022  Imm Admin: PFIZER BIVALENT SARS-COV-2 VACCINE (12+)    09/25/2021  Imm Admin: PFIZER PURPLE CAP SARS-COV-2 VACCINATION (12+)    02/19/2021  Imm Admin: PFIZER PURPLE CAP SARS-COV-2 VACCINATION (12+)    01/26/2021  Imm Admin: PFIZER PURPLE CAP SARS-COV-2 VACCINATION (12+)                            Patient Care Team:  Archana Robertson D.O. as PCP - General (Internal Medicine)  Archana Robertson D.O. as PCP - [facility] Paneled (Internal Medicine)  ZACHARY Cadet D.P.M. (Podiatry)  Clinton John M.D. as Consulting Physician (Gastroenterology)  Vivian Ruff (Inactive) as [facility]   Erendira Blevins MD (Sports Medicine)  Lili Jin (Optometry)      Financial Resource Strain: Low Risk  (6/11/2025)    Overall Financial Resource  "Strain (CARDIA)     Difficulty of Paying Living Expenses: Not hard at all      Transportation Needs: No Transportation Needs (6/11/2025)    PRAPARE - Transportation     Lack of Transportation (Medical): No     Lack of Transportation (Non-Medical): No      Food Insecurity: No Food Insecurity (6/11/2025)    Hunger Vital Sign     Worried About Running Out of Food in the Last Year: Never true     Ran Out of Food in the Last Year: Never true        Encounter Vitals  Blood Pressure : 110/60  Pulse: 75  Pulse Oximetry: 92 %  Weight: 66.7 kg (147 lb)  Height: 167.6 cm (5' 6\")  BMI (Calculated): 23.73  Pain Score: No pain     Physical Exam:  Constitutional: NAD  HENMT: NC/AT, EOMI,  Cardiovascular: RRR, No m/r/g  Lungs: CTAB, no w/r/r  Extremities: No c/c/e  Skin: No lesions notes  Neurologic: Alert & oriented x3, CN II-XII grossly intact    Assessment and Plan. The following treatment and monitoring plan is recommended:  Dyslipidemia  Chronic, stable. Maintains on statin therapy without issue. Most recent lipid panel from 4/2025 WNL. Continue atorvastatin 80mg daily, ezetimibe 10mg daily. Recommend Mediterranean diet and regular physical activity. Follow up with PCP at least annually for continued monitoring and management.   Lab Results   Component Value Date/Time    CHOLSTRLTOT 107 04/08/2025 10:00 AM    LDL 43 04/08/2025 10:00 AM    HDL 39 (A) 04/08/2025 10:00 AM    TRIGLYCERIDE 124 04/08/2025 10:00 AM       Primary hypertension  Chronic, stable. BP today 110/60. Pt monitors BP at home stating she generally runs 120-130 SBP. Denies dizziness/lightheadedness, chest pain, dyspnea. Continue current treatment regime: telmisartan 40mg daily. Follow up with PCP annually for continued monitoring and management.     Seizure disorder (HCC)  Localization-related epilepsy (HCC)  Chronic, stable, related to TGA. Pt maintains on levetiracetam 750mg BID. Last seizure in 12/2023. Follow up with neurology per routine.    Postoperative " hypothyroidism  Chronic, ongoing. Pt maintains on levothyroxine 75mcg daily with TSH slightly low at 0.202 on 4/2025 labs. She does complain of increasing fatigue. She has a repeat TSH order available to reassess. Continue current treatment regime. Follow up with PCP at least annually for continued monitoring and management.   Latest Reference Range & Units 04/08/25 10:00   TSH 0.380 - 5.330 uIU/mL 0.202 (L)   Free T-4 0.93 - 1.70 ng/dL 1.61   (L): Data is abnormally low    Services suggested: No services needed at this time  Health Care Screening: Age-appropriate preventive services recommended by USPTF and ACIP covered by Medicare were discussed today. Services ordered if indicated and agreed upon by the patient.  Referrals offered: Community-based lifestyle interventions to reduce health risks and promote self-management and wellness, fall prevention, nutrition, physical activity, tobacco-use cessation, weight loss, and mental health services as per orders if indicated.    Discussion today about general wellness and lifestyle habits:    Prevent falls and reduce trip hazards; Cautioned about securing or removing rugs.  Have a working fire alarm and carbon monoxide detector.  Engage in regular physical activity and social activities.    Follow-up: No follow-ups on file.              [1]   Current Outpatient Medications   Medication Sig Dispense Refill    celecoxib (CELEBREX) 100 MG Cap TAKE 1 CAPSULE BY MOUTH TWICE A DAY (Patient taking differently: Take 100 mg by mouth every day.) 200 Capsule 3    apixaban (ELIQUIS) 2.5mg Tab Take 1 Tablet by mouth 2 times a day. 60 Tablet 11    levothyroxine (SYNTHROID) 75 MCG Tab Take 1 Tablet by mouth every morning on an empty stomach. 100 Tablet 3    atorvastatin (LIPITOR) 80 MG tablet TAKE 1 TABLET BY MOUTH EVERY DAY IN THE EVENING 100 Tablet 1    telmisartan (MICARDIS) 40 MG Tab Take 1 Tablet by mouth every day. 100 Tablet 3    ezetimibe (ZETIA) 10 MG Tab TAKE 1 TABLET BY  MOUTH EVERY  Tablet 3    levetiracetam (KEPPRA) 750 MG tablet Take 1 Tablet by mouth 2 times a day. 200 Tablet 3    Glucos-Chond-Hyal Ac-Ca Fructo (MOVE FREE JOINT HEALTH ADVANCE) Tab Take 2 Tablets by mouth every day.      Cholecalciferol (VITAMIN D3) 1000 units Cap Take 2,000 Units by mouth. 1000 units x 2 capsules = 2000 units      Omega-3 Fatty Acids (FISH OIL) 1000 MG Cap capsule Take 1,000 mg by mouth 3 times a day with meals.      estradiol (ESTRACE) 0.1 MG/GM vaginal cream Insert 1 g into the vagina every 72 hours. (Patient not taking: Reported on 2025) 42.5 g 3    Wheat Dextrin (EQ FIBER POWDER PO) Take 1 Scoop by mouth every day. Take 1 Scoop (27 g) of Raw Organic Sprout and Fiber Blend and mix with 10 to 12 ounces of water once daily.      Probiotic Product (PROBIOTIC BLEND) Cap Take 1 Capsule by mouth every day. [employer] Probiotic       No current facility-administered medications for this visit.   [2]   Social History  Tobacco Use    Smoking status: Former     Current packs/day: 0.00     Average packs/day: 0.5 packs/day for 4.0 years (2.0 ttl pk-yrs)     Types: Cigarettes     Start date:      Quit date:      Years since quittin.4    Smokeless tobacco: Never   Vaping Use    Vaping status: Never Used   Substance Use Topics    Alcohol use: Yes     Alcohol/week: 4.2 oz     Types: 7 Glasses of wine per week     Comment: glass of wine daily     Drug use: No   [3]   Past Surgical History:  Procedure Laterality Date    CATARACT EXTRACTION WITH IOL Bilateral 2019    THYROIDECTOMY TOTAL  2012    Performed by ALEX CONTEH at SURGERY SAME DAY [facility] ORS    ABDOMINAL EXPLORATION      pyloric stenosis- as an infant    GYN SURGERY      D&C

## 2025-07-18 ENCOUNTER — APPOINTMENT (OUTPATIENT)
Dept: MEDICAL GROUP | Facility: PHYSICIAN GROUP | Age: 75
End: 2025-07-18
Payer: MEDICARE

## 2025-07-18 VITALS
SYSTOLIC BLOOD PRESSURE: 130 MMHG | HEIGHT: 66 IN | DIASTOLIC BLOOD PRESSURE: 68 MMHG | TEMPERATURE: 98.2 F | WEIGHT: 147.4 LBS | BODY MASS INDEX: 23.69 KG/M2 | HEART RATE: 74 BPM | OXYGEN SATURATION: 95 %

## 2025-07-18 DIAGNOSIS — E89.0 POSTOPERATIVE HYPOTHYROIDISM: ICD-10-CM

## 2025-07-18 DIAGNOSIS — G08 CEREBRAL VENOUS THROMBOSIS OF SIGMOID SINUS: ICD-10-CM

## 2025-07-18 DIAGNOSIS — Z12.31 ENCOUNTER FOR SCREENING MAMMOGRAM FOR BREAST CANCER: Primary | ICD-10-CM

## 2025-07-18 DIAGNOSIS — R15.1 FECAL SMEARING: ICD-10-CM

## 2025-07-18 DIAGNOSIS — J30.1 SEASONAL ALLERGIC RHINITIS DUE TO POLLEN: ICD-10-CM

## 2025-07-18 DIAGNOSIS — R53.83 FATIGUE, UNSPECIFIED TYPE: ICD-10-CM

## 2025-07-18 DIAGNOSIS — R25.2 LEG CRAMP: ICD-10-CM

## 2025-07-18 DIAGNOSIS — R43.0 ANOSMIA: ICD-10-CM

## 2025-07-18 DIAGNOSIS — G40.909 SEIZURE DISORDER (HCC): ICD-10-CM

## 2025-07-18 DIAGNOSIS — Z78.0 MENOPAUSE: ICD-10-CM

## 2025-07-18 DIAGNOSIS — R73.03 PREDIABETES: ICD-10-CM

## 2025-07-18 DIAGNOSIS — M12.811 ROTATOR CUFF ARTHROPATHY OF RIGHT SHOULDER: ICD-10-CM

## 2025-07-18 PROCEDURE — 3075F SYST BP GE 130 - 139MM HG: CPT | Performed by: INTERNAL MEDICINE

## 2025-07-18 PROCEDURE — G2211 COMPLEX E/M VISIT ADD ON: HCPCS | Performed by: INTERNAL MEDICINE

## 2025-07-18 PROCEDURE — 99214 OFFICE O/P EST MOD 30 MIN: CPT | Performed by: INTERNAL MEDICINE

## 2025-07-18 PROCEDURE — 3078F DIAST BP <80 MM HG: CPT | Performed by: INTERNAL MEDICINE

## 2025-07-18 ASSESSMENT — FIBROSIS 4 INDEX: FIB4 SCORE: 1.51

## 2025-07-18 NOTE — PROGRESS NOTES
Subjective:   Chief Complaint/History of Present Illness:  Michela Valdez is a 75 y.o. female established patient who presents today to discuss medical problems as listed below. Michela is unaccompanied for today's visit.    History of Present Illness  The patient presents for a follow-up visit.    She has been experiencing bowel issues, which have improved but still persist. She always wears a pad and occasionally experiences leakage without any apparent trigger. She attempts to link these episodes to her diet or physical activity. She has discontinued her visits to [facility] as she was informed that she had graduated from the program. She does not experience any urinary leakage. She is managing her condition with Kegel exercises and is curious if there is a medication that could help. She continues to have her gallbladder and is unsure if her bowel issues are related to her diet. She is currently taking 3 wellness pills daily and 1 probiotic, and is attempting to increase her fiber intake. She has been having regular bowel movements in the mornings.    She has been experiencing fatigue, which she attributes to her work schedule and Keppra medication. She plans to reduce her workload by the end of the year. She also reports a decreased sense of smell and a lack of interest in food, which she believes may be due to her medications.    She has been experiencing occasional charley horses in her calf throughout her life, and recently had a similar sensation in her right ankle. She is unsure if this is related to blood flow. She reports no swelling in her ankles. She is currently taking telmisartan for blood pressure management.    She has been experiencing morning nasal discharge, which she attributes to pine pollen. She is not currently taking any medication for this and manages it by blowing her nose and drinking water. She has tried Benadryl in the past but found it increased her fatigue. She is considering trying  "non-drowsy allergy medications in the morning.    She has been experiencing rare episodes of racing heart. Her thyroid medication dosage was reduced from 88 mcg to 75 mcg. She reports very rare episodes of racing heart and persistent fatigue.    She has not yet undergone shoulder surgery, which was initially scheduled for 2025 but has been postponed to 2025. She experiences shoulder discomfort once a week. She has not received any steroid injections for her shoulder as they were not effective.    She is prediabetic.    She is on Keppra for seizures.    She is on blood thinners for a previous clot in the neck.    She is due for a mammogram and bone density test.    Social History:  Diet: She reports a decreased sense of smell and a lack of interest in food, which she believes may be due to her medications.  Sleep: She reports fatigue, which she attributes to her work schedule and Keppra medication. She plans to reduce her workload by the end of the year.    PAST SURGICAL HISTORY:  Shoulder surgery scheduled for 2025.  Blood clot in the neck treated with blood thinners.    FAMILY HISTORY  Her father  at 53 from a massive coronary, and his carotid arteries were significantly closed (one was 100% closed, the other was 95% closed). Her mother had severe osteoporosis.       Problem   Leg Cramp        Current Medications:  Current Medications and Prescriptions Ordered in Epic[1]       Objective:   Physical Exam:    Vitals: /68 (BP Location: Left arm, Patient Position: Sitting, BP Cuff Size: Adult)   Pulse 74   Temp 36.8 °C (98.2 °F) (Temporal)   Ht 1.676 m (5' 6\")   Wt 66.9 kg (147 lb 6.4 oz)   SpO2 95%    BMI: Body mass index is 23.79 kg/m².  Physical Exam  Constitutional:       General: She is not in acute distress.     Appearance: Normal appearance. She is not ill-appearing.   HENT:      Right Ear: Ear canal and external ear normal. There is no impacted cerumen.      Left Ear: Ear canal and " external ear normal. There is no impacted cerumen.   Eyes:      General: No scleral icterus.     Conjunctiva/sclera: Conjunctivae normal.   Cardiovascular:      Rate and Rhythm: Normal rate and regular rhythm.      Pulses: Normal pulses.   Pulmonary:      Effort: Pulmonary effort is normal. No respiratory distress.      Breath sounds: No wheezing or rhonchi.   Musculoskeletal:      Right lower leg: No edema.      Left lower leg: No edema.   Skin:     General: Skin is warm and dry.      Findings: No rash.   Neurological:      Gait: Gait normal.   Psychiatric:         Mood and Affect: Mood normal.         Behavior: Behavior normal.         Thought Content: Thought content normal.         Judgment: Judgment normal.           Results  Imaging   - MRI: Filling defect in the vein from previous clot, representing chronic sequelae.   - CT with contrast of neck arteries: 2023, Some plaque but not significant amount creating stenosis or lack of blood flow, both areas of plaque are well below 50%.   - Chest x-ray: 04/10/2025, Looked good.    Diagnostic Testing   - EK/10/2025, Showed sinus rhythm.    Assessment & Plan  1. Stool leakage  - Reports ongoing bowel issues, including stool leakage, managed with pads and dietary adjustments.  - Increasing dietary fiber intake is recommended to bulk up stools and reduce leakage. Adequate hydration is essential to prevent constipation.  - Discussed the lack of consistent food triggers and the importance of managing symptoms.  - If symptoms persist, further evaluation may be necessary.    2. Fatigue.  - Continues to experience fatigue, potentially related to current medication regimen, including Keppra.  - Advised to monitor symptoms and consider lifestyle modifications such as reducing work hours and improving sleep hygiene.  - Discussed the impact of work and medication on fatigue.  - No immediate changes to medication regimen recommended. Update thyroid labs and blood counts  to ensure stability.    3. Nocturnal leg cramp  - Experiences occasional charley horses in calf and right ankle, possibly due to dehydration or magnesium deficiency.  - Topical magnesium (Theraworx) recommended for localized application.  - Baseline magnesium level will be checked during next blood work.  - Discussed the use of magnesium supplements and topical treatments.    4. Allergic rhinitis due to seasonal allergies.  - Experiences seasonal allergies, particularly in the morning.  - Non-drowsy antihistamines such as Allegra, Claritin, or Zyrtec recommended for symptom management.  - Discussed the impact of allergies on daily activities and potential treatments.  - Advised to try non-drowsy antihistamines in the morning.    5. Postoperative hypothyroidism.  - Thyroid levels will be rechecked due to recent dosage adjustment from 88 mcg to 75 mcg.  - Symptoms of overtreatment (e.g., shaky, anxious, racing heart, trouble sleeping) and undertreatment (e.g., sluggish, constipated, low heart rate) discussed.  - Blood work will include thyroid function tests.  - Monitoring for any changes in symptoms related to thyroid function.    6. Chronic right shoulder pain due to rotator cuff arthropathy  - Has postponed shoulder surgery and reports weekly discomfort.  - Continued monitoring and conservative management recommended unless symptoms worsen.  - Discussed the possibility of future surgery and current management strategies.  - No immediate intervention required.    7. Prediabetes.  - Prediabetes status will be monitored with an A1c test during next blood work.  - Discussed the importance of regular monitoring and lifestyle modifications.  - Blood work will include A1c test.  - No immediate changes to current management plan.    8. Seizure disorder, anosmia.  - Currently on Keppra, which is well-tolerated.  - No changes to seizure management plan necessary at this time.  - Discussed the impact of Keppra on overall  health and seizure control.  - Continued monitoring of seizure activity and medication effectiveness.  - unclear if anosmia could be related to AED or gradual degeneration.     9. History of cerebral venous thrombosis of sigmoid sinus  - Remains on blood thinners due to previous clot and potential triggers such as COVID-19 and Keppra.  - Continued anticoagulation therapy recommended.  - Discussed the history of clot and ongoing management with blood thinners.  - No immediate changes to anticoagulation therapy.    10. Health maintenance.  - Due for mammogram and bone density test, which can be scheduled at convenience.  - Eligible for Prevnar-20 pneumonia vaccine and tetanus shot, which can be administered at pharmacy.  - Discussed the importance of regular health maintenance and vaccinations.  - Orders for mammogram and bone density test will be placed.    Follow-up  - A follow-up visit is scheduled in 6 months.      Assessment and Plan:   Michela is a 75 y.o. female with the following:  Problem List Items Addressed This Visit       Cerebral venous thrombosis of sigmoid sinus    Fecal smearing    Leg cramp    Relevant Orders    MAGNESIUM    Postoperative hypothyroidism    Prediabetes    Relevant Orders    CBC WITH DIFFERENTIAL    Comp Metabolic Panel    HEMOGLOBIN A1C    Rotator cuff arthropathy of right shoulder    Seizure disorder (HCC)     Other Visit Diagnoses         Encounter for screening mammogram for breast cancer    -  Primary    Relevant Orders    MA-SCREENING MAMMO BILAT W/TOMOSYNTHESIS W/CAD      Menopause        Relevant Orders    DS-BONE DENSITY STUDY (DEXA)      Anosmia          Fatigue, unspecified type          Seasonal allergic rhinitis due to pollen                   RTC: Return in about 6 months (around 1/18/2026).    I spent a total of 34 minutes with record review, exam, communication with the patient, communication with other providers, and documentation of this encounter.    Verbal consent was  acquired by the patient to use [employer] ambient listening note generation during this visit Yes     Billing : secondary to the complexity of this patient's illnesses and their interactions.  See assessment and plan above for the comprehensive evaluation and management of the patient's acute and chronic concerns.  As the patient's PCP, I am the continued focal point for all health care services for the patient's needs and ongoing subsequent medical care.  All problems listed were discussed during the office visit, medications were evaluated and complexities were discussed, as well as plan for the future.     PLEASE NOTE: This dictation was created using voice recognition software. I have made every reasonable attempt to correct obvious errors, but I expect that there are errors of grammar and possibly content that I did not discover before finalizing the note.      Archana Robertson,   Geriatric and Internal Medicine  [facility]          [1]   Current Outpatient Medications Ordered in Epic   Medication Sig Dispense Refill    celecoxib (CELEBREX) 100 MG Cap TAKE 1 CAPSULE BY MOUTH TWICE A  Capsule 3    apixaban (ELIQUIS) 2.5mg Tab Take 1 Tablet by mouth 2 times a day. 60 Tablet 11    levothyroxine (SYNTHROID) 75 MCG Tab Take 1 Tablet by mouth every morning on an empty stomach. 100 Tablet 3    atorvastatin (LIPITOR) 80 MG tablet TAKE 1 TABLET BY MOUTH EVERY DAY IN THE EVENING 100 Tablet 1    telmisartan (MICARDIS) 40 MG Tab Take 1 Tablet by mouth every day. 100 Tablet 3    Wheat Dextrin (EQ FIBER POWDER PO) Take 1 Scoop by mouth every day. Take 1 Scoop (27 g) of Raw Organic Sprout and Fiber Blend and mix with 10 to 12 ounces of water once daily.      Probiotic Product (PROBIOTIC BLEND) Cap Take 1 Capsule by mouth every day. [employer] Flor[employer] Probiotic      ezetimibe (ZETIA) 10 MG Tab TAKE 1 TABLET BY MOUTH EVERY  Tablet 3    levetiracetam (KEPPRA) 750 MG tablet Take 1 Tablet by  mouth 2 times a day. 200 Tablet 3    Glucos-Chond-Hyal Ac-Ca Fructo (MOVE FREE JOINT HEALTH ADVANCE) Tab Take 2 Tablets by mouth every day.      Cholecalciferol (VITAMIN D3) 1000 units Cap Take 2,000 Units by mouth. 1000 units x 2 capsules = 2000 units       No current Epic-ordered facility-administered medications on file.

## 2025-07-30 ENCOUNTER — APPOINTMENT (OUTPATIENT)
Dept: URBAN - METROPOLITAN AREA CLINIC 35 | Facility: CLINIC | Age: 75
Setting detail: DERMATOLOGY
End: 2025-07-30

## 2025-07-30 DIAGNOSIS — S31000A OPEN WOUND(S) (MULTIPLE) OF UNSPECIFIED SITE(S), WITHOUT MENTION OF COMPLICATION: ICD-10-CM

## 2025-07-30 DIAGNOSIS — D36.1 BENIGN NEOPLASM OF PERIPHERAL NERVES AND AUTONOMIC NERVOUS SYSTEM: ICD-10-CM

## 2025-07-30 DIAGNOSIS — D485 NEOPLASM OF UNCERTAIN BEHAVIOR OF SKIN: ICD-10-CM

## 2025-07-30 DIAGNOSIS — Z85.828 PERSONAL HISTORY OF OTHER MALIGNANT NEOPLASM OF SKIN: ICD-10-CM

## 2025-07-30 DIAGNOSIS — L82.1 OTHER SEBORRHEIC KERATOSIS: ICD-10-CM

## 2025-07-30 DIAGNOSIS — L81.4 OTHER MELANIN HYPERPIGMENTATION: ICD-10-CM

## 2025-07-30 DIAGNOSIS — L57.0 ACTINIC KERATOSIS: ICD-10-CM

## 2025-07-30 DIAGNOSIS — D22 MELANOCYTIC NEVI: ICD-10-CM

## 2025-07-30 PROBLEM — D22.5 MELANOCYTIC NEVI OF TRUNK: Status: ACTIVE | Noted: 2025-07-30

## 2025-07-30 PROBLEM — D48.5 NEOPLASM OF UNCERTAIN BEHAVIOR OF SKIN: Status: ACTIVE | Noted: 2025-07-30

## 2025-07-30 PROBLEM — D36.13 BENIGN NEOPLASM OF PERIPHERAL NERVES AND AUTONOMIC NERVOUS SYSTEM OF LOWER LIMB, INCLUDING HIP: Status: ACTIVE | Noted: 2025-07-30

## 2025-07-30 PROBLEM — S61.214A: Status: ACTIVE | Noted: 2025-07-30

## 2025-07-30 PROBLEM — D22.61 MELANOCYTIC NEVI OF RIGHT UPPER LIMB, INCLUDING SHOULDER: Status: ACTIVE | Noted: 2025-07-30

## 2025-07-30 PROCEDURE — ? COUNSELING

## 2025-07-30 PROCEDURE — ? BIOPSY BY SHAVE METHOD

## 2025-07-30 PROCEDURE — ? ADDITIONAL NOTES

## 2025-07-30 PROCEDURE — ? OBSERVATION

## 2025-07-30 PROCEDURE — ? LIQUID NITROGEN

## 2025-07-30 ASSESSMENT — LOCATION SIMPLE DESCRIPTION DERM
LOCATION SIMPLE: ANTERIOR SCALP
LOCATION SIMPLE: RIGHT PLANTAR SURFACE
LOCATION SIMPLE: CHEST
LOCATION SIMPLE: RIGHT POSTERIOR UPPER ARM
LOCATION SIMPLE: RIGHT SHOULDER
LOCATION SIMPLE: LEFT SCALP
LOCATION SIMPLE: LEFT PLANTAR SURFACE
LOCATION SIMPLE: RIGHT RING FINGER
LOCATION SIMPLE: LEFT UPPER BACK
LOCATION SIMPLE: LOWER BACK
LOCATION SIMPLE: SCALP
LOCATION SIMPLE: LEFT EAR

## 2025-07-30 ASSESSMENT — LOCATION DETAILED DESCRIPTION DERM
LOCATION DETAILED: LEFT MEDIAL SUPERIOR CHEST
LOCATION DETAILED: RIGHT MEDIAL PLANTAR MIDFOOT
LOCATION DETAILED: RIGHT DISTAL POSTERIOR UPPER ARM
LOCATION DETAILED: RIGHT POSTERIOR SHOULDER
LOCATION DETAILED: LEFT SUPERIOR HELIX
LOCATION DETAILED: RIGHT RING FINGERTIP
LOCATION DETAILED: INFERIOR LUMBAR SPINE
LOCATION DETAILED: RIGHT CENTRAL PARIETAL SCALP
LOCATION DETAILED: LEFT CENTRAL POSTAURICULAR SKIN
LOCATION DETAILED: LEFT MEDIAL FRONTAL SCALP
LOCATION DETAILED: LEFT SUPERIOR MEDIAL UPPER BACK
LOCATION DETAILED: LEFT PLANTAR FOREFOOT OVERLYING 3RD METATARSAL
LOCATION DETAILED: MID-FRONTAL SCALP

## 2025-07-30 ASSESSMENT — LOCATION ZONE DERM
LOCATION ZONE: EAR
LOCATION ZONE: FEET
LOCATION ZONE: ARM
LOCATION ZONE: SCALP
LOCATION ZONE: FINGER
LOCATION ZONE: TRUNK

## 2025-08-01 ENCOUNTER — HOSPITAL ENCOUNTER (OUTPATIENT)
Dept: LAB | Facility: MEDICAL CENTER | Age: 75
End: 2025-08-01
Attending: INTERNAL MEDICINE
Payer: MEDICARE

## 2025-08-01 DIAGNOSIS — R73.03 PREDIABETES: ICD-10-CM

## 2025-08-01 DIAGNOSIS — R25.2 LEG CRAMP: ICD-10-CM

## 2025-08-01 DIAGNOSIS — E89.0 POSTOPERATIVE HYPOTHYROIDISM: ICD-10-CM

## 2025-08-01 LAB
ALBUMIN SERPL BCP-MCNC: 4.1 G/DL (ref 3.2–4.9)
ALBUMIN/GLOB SERPL: 1.3 G/DL
ALP SERPL-CCNC: 106 U/L (ref 30–99)
ALT SERPL-CCNC: 25 U/L (ref 2–50)
ANION GAP SERPL CALC-SCNC: 11 MMOL/L (ref 7–16)
AST SERPL-CCNC: 24 U/L (ref 12–45)
BASOPHILS # BLD AUTO: 0.8 % (ref 0–1.8)
BASOPHILS # BLD: 0.06 K/UL (ref 0–0.12)
BILIRUB SERPL-MCNC: 0.4 MG/DL (ref 0.1–1.5)
BUN SERPL-MCNC: 20 MG/DL (ref 8–22)
CALCIUM ALBUM COR SERPL-MCNC: 9.8 MG/DL (ref 8.5–10.5)
CALCIUM SERPL-MCNC: 9.9 MG/DL (ref 8.5–10.5)
CHLORIDE SERPL-SCNC: 106 MMOL/L (ref 96–112)
CO2 SERPL-SCNC: 23 MMOL/L (ref 20–33)
CREAT SERPL-MCNC: 0.73 MG/DL (ref 0.5–1.4)
EOSINOPHIL # BLD AUTO: 0.21 K/UL (ref 0–0.51)
EOSINOPHIL NFR BLD: 2.8 % (ref 0–6.9)
ERYTHROCYTE [DISTWIDTH] IN BLOOD BY AUTOMATED COUNT: 45.2 FL (ref 35.9–50)
EST. AVERAGE GLUCOSE BLD GHB EST-MCNC: 128 MG/DL
GFR SERPLBLD CREATININE-BSD FMLA CKD-EPI: 85 ML/MIN/1.73 M 2
GLOBULIN SER CALC-MCNC: 3.1 G/DL (ref 1.9–3.5)
GLUCOSE SERPL-MCNC: 128 MG/DL (ref 65–99)
HBA1C MFR BLD: 6.1 % (ref 4–5.6)
HCT VFR BLD AUTO: 40.7 % (ref 37–47)
HGB BLD-MCNC: 13.3 G/DL (ref 12–16)
IMM GRANULOCYTES # BLD AUTO: 0.01 K/UL (ref 0–0.11)
IMM GRANULOCYTES NFR BLD AUTO: 0.1 % (ref 0–0.9)
LYMPHOCYTES # BLD AUTO: 2.13 K/UL (ref 1–4.8)
LYMPHOCYTES NFR BLD: 28.5 % (ref 22–41)
MAGNESIUM SERPL-MCNC: 2 MG/DL (ref 1.5–2.5)
MCH RBC QN AUTO: 29.9 PG (ref 27–33)
MCHC RBC AUTO-ENTMCNC: 32.7 G/DL (ref 32.2–35.5)
MCV RBC AUTO: 91.5 FL (ref 81.4–97.8)
MONOCYTES # BLD AUTO: 0.54 K/UL (ref 0–0.85)
MONOCYTES NFR BLD AUTO: 7.2 % (ref 0–13.4)
NEUTROPHILS # BLD AUTO: 4.52 K/UL (ref 1.82–7.42)
NEUTROPHILS NFR BLD: 60.6 % (ref 44–72)
NRBC # BLD AUTO: 0 K/UL
NRBC BLD-RTO: 0 /100 WBC (ref 0–0.2)
PLATELET # BLD AUTO: 272 K/UL (ref 164–446)
PMV BLD AUTO: 10.6 FL (ref 9–12.9)
POTASSIUM SERPL-SCNC: 4.2 MMOL/L (ref 3.6–5.5)
PROT SERPL-MCNC: 7.2 G/DL (ref 6–8.2)
RBC # BLD AUTO: 4.45 M/UL (ref 4.2–5.4)
SODIUM SERPL-SCNC: 140 MMOL/L (ref 135–145)
T4 FREE SERPL-MCNC: 1.12 NG/DL (ref 0.93–1.7)
TSH SERPL-ACNC: 1.3 UIU/ML (ref 0.38–5.33)
WBC # BLD AUTO: 7.5 K/UL (ref 4.8–10.8)

## 2025-08-01 PROCEDURE — 85025 COMPLETE CBC W/AUTO DIFF WBC: CPT

## 2025-08-01 PROCEDURE — 36415 COLL VENOUS BLD VENIPUNCTURE: CPT

## 2025-08-01 PROCEDURE — 80053 COMPREHEN METABOLIC PANEL: CPT

## 2025-08-01 PROCEDURE — 84443 ASSAY THYROID STIM HORMONE: CPT

## 2025-08-01 PROCEDURE — 83036 HEMOGLOBIN GLYCOSYLATED A1C: CPT

## 2025-08-01 PROCEDURE — 84439 ASSAY OF FREE THYROXINE: CPT

## 2025-08-01 PROCEDURE — 83735 ASSAY OF MAGNESIUM: CPT

## 2025-08-06 ENCOUNTER — SPECIALTY PHARMACY (OUTPATIENT)
Dept: CARDIOLOGY | Facility: MEDICAL CENTER | Age: 75
End: 2025-08-06
Payer: MEDICARE

## 2025-08-06 PROCEDURE — RXMED WILLOW AMBULATORY MEDICATION CHARGE: Performed by: INTERNAL MEDICINE

## 2025-08-06 PROCEDURE — RXMED WILLOW AMBULATORY MEDICATION CHARGE: Performed by: NURSE PRACTITIONER

## 2025-08-08 ENCOUNTER — PHARMACY VISIT (OUTPATIENT)
Dept: PHARMACY | Facility: MEDICAL CENTER | Age: 75
End: 2025-08-08
Payer: COMMERCIAL

## 2025-08-18 ENCOUNTER — TELEPHONE (OUTPATIENT)
Dept: VASCULAR LAB | Facility: MEDICAL CENTER | Age: 75
End: 2025-08-18
Payer: MEDICARE